# Patient Record
Sex: MALE | Race: WHITE | NOT HISPANIC OR LATINO | Employment: FULL TIME | URBAN - METROPOLITAN AREA
[De-identification: names, ages, dates, MRNs, and addresses within clinical notes are randomized per-mention and may not be internally consistent; named-entity substitution may affect disease eponyms.]

---

## 2017-01-24 ENCOUNTER — OFFICE VISIT (OUTPATIENT)
Dept: UROLOGY | Facility: CLINIC | Age: 67
End: 2017-01-24
Payer: MEDICARE

## 2017-01-24 VITALS — HEART RATE: 71 BPM | DIASTOLIC BLOOD PRESSURE: 84 MMHG | SYSTOLIC BLOOD PRESSURE: 150 MMHG

## 2017-01-24 DIAGNOSIS — N40.1 BENIGN NON-NODULAR PROSTATIC HYPERPLASIA WITH LOWER URINARY TRACT SYMPTOMS: ICD-10-CM

## 2017-01-24 DIAGNOSIS — N40.0 BPH WITH ELEVATED PSA: ICD-10-CM

## 2017-01-24 DIAGNOSIS — R97.20 BPH WITH ELEVATED PSA: ICD-10-CM

## 2017-01-24 DIAGNOSIS — R97.20 ELEVATED PROSTATE SPECIFIC ANTIGEN (PSA): Primary | ICD-10-CM

## 2017-01-24 LAB
BILIRUB SERPL-MCNC: ABNORMAL MG/DL
BLOOD URINE, POC: ABNORMAL
COLOR, POC UA: YELLOW
GLUCOSE UR QL STRIP: 50
KETONES UR QL STRIP: ABNORMAL
LEUKOCYTE ESTERASE URINE, POC: ABNORMAL
NITRITE, POC UA: ABNORMAL
PH, POC UA: 5
PROTEIN, POC: ABNORMAL
SPECIFIC GRAVITY, POC UA: 1
UROBILINOGEN, POC UA: ABNORMAL

## 2017-01-24 PROCEDURE — 99999 PR PBB SHADOW E&M-EST. PATIENT-LVL II: CPT | Mod: PBBFAC,,, | Performed by: UROLOGY

## 2017-01-24 PROCEDURE — 99214 OFFICE O/P EST MOD 30 MIN: CPT | Mod: 25,S$PBB,, | Performed by: UROLOGY

## 2017-01-24 PROCEDURE — 81002 URINALYSIS NONAUTO W/O SCOPE: CPT | Mod: PBBFAC,PO | Performed by: UROLOGY

## 2017-01-24 PROCEDURE — 99212 OFFICE O/P EST SF 10 MIN: CPT | Mod: PBBFAC,PO | Performed by: UROLOGY

## 2017-01-24 NOTE — MR AVS SNAPSHOT
[facility] [place] - Urology  [address]  [place] [address]  Phone: [phone]                  Ludwin Gee   2017 10:30 AM   Office Visit    Description:  Male : 1950   Provider:  Salo Michel MD   Department:  [facility] - [facility]           Reason for Visit     Elevated PSA           Diagnoses this Visit        Comments    Elevated prostate specific antigen (PSA)    -  Primary            To Do List           Future Appointments        Provider Department Dept Phone    2017 9:15 AM MD [facility] - Urology [phone]      Goals (5 Years of Data)     None      [facility]     [facility] On Call Nurse Care Line -  Assistance  Registered nurses in the [facility] On Call Center provide clinical advisement, health education, appointment booking, and other advisory services.  Call for this free service at [phone].             Medications           Message regarding Medications     Verify the changes and/or additions to your medication regime listed below are the same as discussed with your clinician today.  If any of these changes or additions are incorrect, please notify your healthcare provider.             Verify that the below list of medications is an accurate representation of the medications you are currently taking.  If none reported, the list may be blank. If incorrect, please contact your healthcare provider. Carry this list with you in case of emergency.           Current Medications     coenzyme Q10 (CO Q-10) 10 mg capsule Take 10 mg by mouth once daily.    losartan (COZAAR) 25 MG tablet Take 1 tablet (25 mg total) by mouth once daily.    metformin (GLUCOPHAGE-XR) 500 MG 24 hr tablet TAKE ONE TABLET BY MOUTH EVERY MORNING & TAKE TWO TABLETS BY MOUTH EVERY EVENING    rosuvastatin (CRESTOR) 5 MG tablet Take one every other evening    tamsulosin (FLOMAX) 0.4 mg Cp24 Take 1 capsule (0.4 mg total) by mouth once daily.    tramadol (ULTRAM) 50  mg tablet Take 50 mg by mouth every 6 (six) hours as needed for Pain.           Clinical Reference Information           Vital Signs - Last Recorded  Most recent update: 1/24/2017 10:51 AM by Shantell Bell MA    BP Pulse                (!) 150/84 71          Blood Pressure          Most Recent Value    BP  (!)  150/84      Allergies as of 1/24/2017     Pravastatin    Lisinopril      Immunizations Administered on Date of Encounter - 1/24/2017     None      Orders Placed During Today's Visit      Normal Orders This Visit    POCT URINE DIPSTICK WITHOUT MICROSCOPE     Future Labs/Procedures Expected by Expires    PROSTATE SPECIFIC ANTIGEN, DIAGNOSTIC  1/24/2017 3/25/2018

## 2017-01-25 NOTE — PROGRESS NOTES
CHIEF COMPLAINT  Elevated PSA and BPH.    HISTORY OF PRESENT ILLNESS:  This 67-year-old male returns for routine recheck.    He has a history of elevated PSA for which he has undergone a prostate   ultrasound with biopsy on January 2014 which showed no evidence of any   malignancy.  PSA at that time was 6.7.  He subsequently did undergo for a   4Kscore in 2014, which revealed only 1% risk of developing adenocarcinoma of the   prostate according to the report.  His most recent PSA was 7.4 on 12/02/2016.    He also has been treated for BPH with Flomax 0.4 mg p.o. daily with which he   continues to do well.    MEDICAL HISTORY UPDATE:  Reveals no change in his general health since his last   visit.    PHYSICAL EXAMINATION:  ABDOMEN:  Soft, benign and nontender.  No masses.  No hernias or organomegaly.  EXTERNAL GENITALIA:  Normal phallus with adequate meatus.  Testes descended and   feel normal.  No scrotal masses.  RECTAL:  Reveals a 40 g, smooth, firm prostate.  No nodules.  Normal sphincter   tone.    UA, 1+ sugar, pH 5.0.    FINAL IMPRESSION:  Elevated PSA, BPH.    RECOMMENDATIONS:  Continue with Flomax 0.4 mg p.o. daily and recheck in six   months with repeat PSA.      MD/ORTEGA  dd: 01/24/2017 19:02:34 (CST)  td: 01/25/2017 01:19:38 (CST)  Doc ID   #[id number]  Job ID #[id number]    CC:

## 2017-02-13 ENCOUNTER — TELEPHONE (OUTPATIENT)
Dept: FAMILY MEDICINE | Facility: CLINIC | Age: 67
End: 2017-02-13

## 2017-02-13 DIAGNOSIS — M79.646 THUMB PAIN, UNSPECIFIED LATERALITY: Primary | ICD-10-CM

## 2017-02-13 NOTE — TELEPHONE ENCOUNTER
----- Message from Román Gómez sent at 2/13/2017  8:19 AM CST -----  Contact: Mark Diaz  Has question about which orthopedic to be recommended for hand. Please call back [phone]. Thank you!

## 2017-02-21 DIAGNOSIS — E11.9 TYPE 2 DIABETES MELLITUS WITHOUT COMPLICATION, UNSPECIFIED LONG TERM INSULIN USE STATUS: ICD-10-CM

## 2017-02-21 RX ORDER — METFORMIN HYDROCHLORIDE 500 MG/1
TABLET, EXTENDED RELEASE ORAL
Qty: 90 TABLET | Refills: 0 | Status: SHIPPED | OUTPATIENT
Start: 2017-02-21 | End: 2017-03-17 | Stop reason: SDUPTHER

## 2017-03-17 DIAGNOSIS — E11.9 TYPE 2 DIABETES MELLITUS WITHOUT COMPLICATION, UNSPECIFIED LONG TERM INSULIN USE STATUS: ICD-10-CM

## 2017-03-21 RX ORDER — METFORMIN HYDROCHLORIDE 500 MG/1
TABLET, EXTENDED RELEASE ORAL
Qty: 90 TABLET | Refills: 0 | Status: SHIPPED | OUTPATIENT
Start: 2017-03-21 | End: 2017-04-20 | Stop reason: SDUPTHER

## 2017-04-05 ENCOUNTER — DOCUMENTATION ONLY (OUTPATIENT)
Dept: FAMILY MEDICINE | Facility: CLINIC | Age: 67
End: 2017-04-05

## 2017-04-05 NOTE — PROGRESS NOTES
Pre-Visit Chart Review  For Appointment Scheduled on 4-7-17    Health Maintenance Due   Topic Date Due    Influenza Vaccine  08/01/2016    Hemoglobin A1c  03/24/2017    Foot Exam  03/28/2017

## 2017-04-07 ENCOUNTER — OFFICE VISIT (OUTPATIENT)
Dept: FAMILY MEDICINE | Facility: CLINIC | Age: 67
End: 2017-04-07
Payer: MEDICARE

## 2017-04-07 VITALS
BODY MASS INDEX: 27.93 KG/M2 | WEIGHT: 177.94 LBS | OXYGEN SATURATION: 95 % | HEART RATE: 73 BPM | DIASTOLIC BLOOD PRESSURE: 77 MMHG | HEIGHT: 67 IN | RESPIRATION RATE: 12 BRPM | SYSTOLIC BLOOD PRESSURE: 128 MMHG | TEMPERATURE: 98 F

## 2017-04-07 DIAGNOSIS — E78.5 HYPERLIPIDEMIA ASSOCIATED WITH TYPE 2 DIABETES MELLITUS: ICD-10-CM

## 2017-04-07 DIAGNOSIS — E11.59 HYPERTENSION ASSOCIATED WITH DIABETES: ICD-10-CM

## 2017-04-07 DIAGNOSIS — Z00.00 ANNUAL PHYSICAL EXAM: Primary | ICD-10-CM

## 2017-04-07 DIAGNOSIS — E11.9 CONTROLLED TYPE 2 DIABETES MELLITUS WITHOUT COMPLICATION, UNSPECIFIED LONG TERM INSULIN USE STATUS: ICD-10-CM

## 2017-04-07 DIAGNOSIS — I15.2 HYPERTENSION ASSOCIATED WITH DIABETES: ICD-10-CM

## 2017-04-07 DIAGNOSIS — I10 ESSENTIAL HYPERTENSION: ICD-10-CM

## 2017-04-07 DIAGNOSIS — I10 GOOD HYPERTENSION CONTROL: ICD-10-CM

## 2017-04-07 DIAGNOSIS — E11.69 HYPERLIPIDEMIA ASSOCIATED WITH TYPE 2 DIABETES MELLITUS: ICD-10-CM

## 2017-04-07 PROCEDURE — 99397 PER PM REEVAL EST PAT 65+ YR: CPT | Mod: S$PBB,,, | Performed by: FAMILY MEDICINE

## 2017-04-07 PROCEDURE — 99213 OFFICE O/P EST LOW 20 MIN: CPT | Mod: PBBFAC,PO | Performed by: FAMILY MEDICINE

## 2017-04-07 PROCEDURE — 99999 PR PBB SHADOW E&M-EST. PATIENT-LVL III: CPT | Mod: PBBFAC,,, | Performed by: FAMILY MEDICINE

## 2017-04-07 RX ORDER — IBUPROFEN 200 MG
400 TABLET ORAL EVERY 6 HOURS PRN
COMMUNITY
End: 2019-05-07 | Stop reason: ALTCHOICE

## 2017-04-07 RX ORDER — TRAMADOL HYDROCHLORIDE 50 MG/1
50 TABLET ORAL EVERY 6 HOURS PRN
Qty: 60 TABLET | Refills: 0 | Status: SHIPPED | OUTPATIENT
Start: 2017-04-07 | End: 2019-01-08 | Stop reason: SDUPTHER

## 2017-04-07 NOTE — MR AVS SNAPSHOT
[facility]  [address] [place] [address]  Phone: [phone]  Fax: [phone]                  Ludwin Gee   2017 4:00 PM   Office Visit    Description:  Male : 1950   Provider:  Herrera Fisher MD   Department:  [facility] - Family Medicine           Reason for Visit     Annual Exam           Diagnoses this Visit        Comments    Annual physical exam    -  Primary     Controlled type 2 diabetes mellitus without complication, unspecified long term insulin use status                To Do List           Future Appointments        Provider Department Dept Phone    2017 8:45 AM [facility] [phone]    2017 9:15 AM [facility] [phone]      Goals (5 Years of Data)     None       These Medications        Disp Refills Start End    tramadol (ULTRAM) 50 mg tablet 60 tablet 0 2017     Take 1 tablet (50 mg total) by mouth every 6 (six) hours as needed for Pain. - Oral    Pharmacy: [facility] [place] [facility] [address] [facility] [address] Ph #: [phone]         [facility]     [facility] On Call Nurse Care Line -  Assistance  Unless otherwise directed by your provider, please contact [facility], our nurse care line that is available for  assistance.     Registered nurses in the [facility] Center provide: appointment scheduling, clinical advisement, health education, and other advisory services.  Call: [phone] (toll free)               Medications           Message regarding Medications     Verify the changes and/or additions to your medication regime listed below are the same as discussed with your clinician today.  If any of these changes or additions are incorrect, please notify your healthcare provider.        CHANGE how you are taking these medications     Start Taking Instead of    tramadol (ULTRAM) 50 mg tablet tramadol (ULTRAM) 50 mg tablet    Dosage:  Take 1  "tablet (50 mg total) by mouth every 6 (six) hours as needed for Pain. Dosage:  Take 50 mg by mouth every 6 (six) hours as needed for Pain.    Reason for Change:  Reorder            Verify that the below list of medications is an accurate representation of the medications you are currently taking.  If none reported, the list may be blank. If incorrect, please contact your healthcare provider. Carry this list with you in case of emergency.           Current Medications     coenzyme Q10 (CO Q-10) 10 mg capsule Take 10 mg by mouth once daily.    ibuprofen (ADVIL,MOTRIN) 200 MG tablet Take 400 mg by mouth every 6 (six) hours as needed for Pain.    losartan (COZAAR) 25 MG tablet Take 1 tablet (25 mg total) by mouth once daily.    metformin (GLUCOPHAGE-XR) 500 MG 24 hr tablet TAKE ONE TABLET BY MOUTH EVERY MORNING AND TAKE TWO TABLETS BY MOUTH EVERY EVENING    rosuvastatin (CRESTOR) 5 MG tablet Take one every other evening    tamsulosin (FLOMAX) 0.4 mg Cp24 Take 1 capsule (0.4 mg total) by mouth once daily.    tramadol (ULTRAM) 50 mg tablet Take 1 tablet (50 mg total) by mouth every 6 (six) hours as needed for Pain.           Clinical Reference Information           Your Vitals Were     BP Pulse Temp Resp    128/77 (BP Location: Right arm, Patient Position: Sitting, BP Method: Automatic) 73 98.2 °F (36.8 °C) (Oral) 12    Height Weight SpO2 BMI    5' 7.25" (1.708 m) 80.7 kg (177 lb 14.6 oz) 95% 27.66 kg/m2      Blood Pressure          Most Recent Value    BP  128/77      Allergies as of 4/7/2017     Pravastatin    Lisinopril      Immunizations Administered on Date of Encounter - 4/7/2017     None      Orders Placed During Today's Visit     Future Labs/Procedures Expected by Expires    CBC auto differential  4/7/2017 4/8/2018    Comprehensive metabolic panel  4/7/2017 4/8/2018    Hemoglobin A1c  4/7/2017 4/8/2018      Language Assistance Services     ATTENTION: Language assistance services are available, free of charge. " Please call [phone].      ATENCIÓN: Si habla español, tiene a olivarez disposición servicios gratuitos de asistencia lingüística. Llame al [phone].     CHÚ Ý: N?u b?n nói Ti?ng Vi?t, có các d?ch v? h? tr? ngôn ng? mi?n phí dành cho b?n. G?i s? [phone].         [facility] complies with applicable Federal civil rights laws and does not discriminate on the basis of race, color, national origin, age, disability, or sex.

## 2017-04-08 NOTE — PROGRESS NOTES
Subjective:       Patient ID: Ludwin Gee is a 67 y.o. male.    Chief Complaint: Annual Exam    HPI Comments: 67 year old male in for annual exam and follow up on diabetes, hypertension, and hyperlipidemia.  He had an elevated psa test seen by urology and has a recheck on that scheduled for July.  He has no active complaints.  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    Past Medical History:  No date: Diabetes mellitus  No date: Elevated PSA  No date: Hyperlipidemia  No date: Hypertension    Past Surgical History:  12/6/2013: COLONOSCOPY      Comment: Dr. Martinez, 5 year recheck  12/09/2011: SHOULDER SURGERY      Comment: right     Review of patient's family history indicates:    Diabetes                       Father                    Heart disease                  Father                    Cataracts                      Mother                    Diabetes                       Maternal Grandmother      No Known Problems              Brother                   No Known Problems              Daughter                  No Known Problems              Son                       No Known Problems              Maternal Grandfather      No Known Problems              Paternal Grandmother      No Known Problems              Paternal Grandfather      No Known Problems              Maternal Aunt             No Known Problems              Maternal Uncle            No Known Problems              Paternal Aunt             No Known Problems              Paternal Uncle            Urolithiasis                   Neg Hx                    Prostate cancer                Neg Hx                    Kidney cancer                  Neg Hx                    Retinal detachment             Neg Hx                    Macular  degeneration           Neg Hx                    Glaucoma                       Neg Hx                    Smoking status: Former Smoker                                                              Packs/day: 0.00      Years: 0.00         Types: Cigarettes     Quit date: 5/23/1985  Smokeless status: Never Used                      Alcohol use: Yes              Comment: seldom      Current Outpatient Prescriptions:     coenzyme Q10 (CO Q-10) 10 mg capsule, Take 10 mg by mouth once daily., Disp: , Rfl:     ibuprofen (ADVIL,MOTRIN) 200 MG tablet, Take 400 mg by mouth every 6 (six) hours as needed for Pain., Disp: , Rfl:     losartan (COZAAR) 25 MG tablet, Take 1 tablet (25 mg total) by mouth once daily., Disp: 30 tablet, Rfl: 11    metformin (GLUCOPHAGE-XR) 500 MG 24 hr tablet, TAKE ONE TABLET BY MOUTH EVERY MORNING AND TAKE TWO TABLETS BY MOUTH EVERY EVENING, Disp: 90 tablet, Rfl: 0    rosuvastatin (CRESTOR) 5 MG tablet, Take one every other evening, Disp: 30 tablet, Rfl: 5    tamsulosin (FLOMAX) 0.4 mg Cp24, Take 1 capsule (0.4 mg total) by mouth once daily., Disp: 30 capsule, Rfl: 6    tramadol (ULTRAM) 50 mg tablet, Take 1 tablet (50 mg total) by mouth every 6 (six) hours as needed for Pain., Disp: 60 tablet, Rfl: 0    TETANUS VACCINE due on 01/05/1968  Zoster Vaccine due on 01/05/2010  Influenza Vaccine due on 08/01/2016  Hemoglobin A1c due on 03/24/2017  Foot Exam due on 03/28/2017      Review of Systems   Constitutional: Negative for activity change, chills, fatigue and fever.   HENT: Negative for congestion, ear pain, rhinorrhea and sore throat.    Eyes: Negative for visual disturbance.   Respiratory: Negative for cough, chest tightness and shortness of breath.    Cardiovascular: Negative for chest pain and palpitations.   Gastrointestinal: Negative for abdominal pain, blood in stool, constipation, diarrhea, nausea and vomiting.   Genitourinary: Negative for difficulty urinating, dysuria and hematuria.    Musculoskeletal: Positive for arthralgias. Negative for joint swelling and neck pain.   Skin: Negative for color change, pallor and rash.   Neurological: Negative for dizziness and headaches.   Psychiatric/Behavioral: Negative for dysphoric mood and sleep disturbance. The patient is not nervous/anxious.        Objective:      Physical Exam   Constitutional: He is oriented to person, place, and time. He appears well-developed and well-nourished. No distress.   Good blood pressure control  Patient is normal weight with bmi of 27.7.   Weight is increased by 4.6lb since last visit of 6/6/16.     HENT:   Head: Normocephalic and atraumatic.   Right Ear: External ear normal.   Left Ear: External ear normal.   Nose: Nose normal.   Mouth/Throat: Oropharynx is clear and moist. No oropharyngeal exudate.   Eyes: Conjunctivae and EOM are normal. Pupils are equal, round, and reactive to light. No scleral icterus.   Neck: Normal range of motion. Neck supple. No JVD present. No tracheal deviation present. No thyromegaly present.   Cardiovascular: Normal rate, regular rhythm and normal heart sounds.  Exam reveals no gallop and no friction rub.    No murmur heard.  Pulses:       Dorsalis pedis pulses are 2+ on the right side, and 2+ on the left side.        Posterior tibial pulses are 2+ on the right side, and 2+ on the left side.   Pulmonary/Chest: Effort normal and breath sounds normal. No respiratory distress. He has no wheezes. He has no rales. He exhibits no tenderness.   Abdominal: Soft. Bowel sounds are normal. He exhibits no distension and no mass. There is no tenderness. There is no rebound and no guarding. No hernia.   Musculoskeletal: Normal range of motion. He exhibits no edema or deformity.        Right foot: There is normal range of motion and no deformity.        Left foot: There is normal range of motion and no deformity.   Feet:   Right Foot:   Protective Sensation: 10 sites tested. 10 sites sensed.   Skin  Integrity: Negative for ulcer, blister, skin breakdown, erythema, warmth, callus or dry skin.   Left Foot:   Protective Sensation: 10 sites tested. 10 sites sensed.   Skin Integrity: Negative for ulcer, blister, skin breakdown, erythema, warmth, callus or dry skin.   Lymphadenopathy:     He has no cervical adenopathy.   Neurological: He is alert and oriented to person, place, and time. He has normal reflexes. He displays normal reflexes. No cranial nerve deficit. He exhibits normal muscle tone.   Skin: Skin is warm and dry. No rash noted. He is not diaphoretic. No erythema. No pallor.   Psychiatric: He has a normal mood and affect. His behavior is normal. Judgment and thought content normal.   Nursing note and vitals reviewed.      Assessment:       1. Annual physical exam    2. Good hypertension control    3. Controlled type 2 diabetes mellitus without complication, unspecified long term insulin use status    4. Essential hypertension    5. Hypertension associated with diabetes    6. Hyperlipidemia associated with type 2 diabetes mellitus    7. BMI 27.0-27.9,adult        Plan:       1. Annual physical exam  - Comprehensive metabolic panel; Future  - CBC auto differential; Future    2. Good hypertension control  No changes needed    3. Controlled type 2 diabetes mellitus without complication, unspecified long term insulin use status  Lab Results   Component Value Date    HGBA1C 6.8 (H) 09/24/2016     - Comprehensive metabolic panel; Future  - Hemoglobin A1c; Future    4. Essential hypertension    5. Hypertension associated with diabetes    6. Hyperlipidemia associated with type 2 diabetes mellitus  Lab Results   Component Value Date    CHOL 148 09/24/2016    CHOL 129 11/25/2015    CHOL 171 05/08/2015     Lab Results   Component Value Date    HDL 39 (L) 09/24/2016    HDL 46 11/25/2015    HDL 43 05/08/2015     Lab Results   Component Value Date    LDLCALC 87.4 09/24/2016    LDLCALC 66.8 11/25/2015    LDLCALC 109.6  05/08/2015     Lab Results   Component Value Date    TRIG 108 09/24/2016    TRIG 81 11/25/2015    TRIG 92 05/08/2015     Lab Results   Component Value Date    CHOLHDL 26.4 09/24/2016    CHOLHDL 35.7 11/25/2015    CHOLHDL 25.1 05/08/2015     Due for next recheck in september 7. BMI 27.0-27.9,adult         Patient readiness: acceptance and barriers:none    During the course of the visit the patient was educated and counseled about the following:     Diabetes:  Discussed general issues about diabetes pathophysiology and management.  Discussed foot care.  Reminded to get yearly retinal exam.  Hypertension:   Medication: no change.  Dietary sodium restriction.  Regular aerobic exercise.    Goals: Diabetes: Maintain Hemoglobin A1C below 7 and Hypertension: Reduce Blood Pressure    Did patient meet goals/outcomes: Yes    The following self management tools provided: blood pressure log  blood glucose log    Patient Instructions (the written plan) was given to the patient/family.     Time spent with patient: 45 minutes

## 2017-04-13 ENCOUNTER — LAB VISIT (OUTPATIENT)
Dept: LAB | Facility: HOSPITAL | Age: 67
End: 2017-04-13
Attending: FAMILY MEDICINE
Payer: COMMERCIAL

## 2017-04-13 DIAGNOSIS — E11.9 CONTROLLED TYPE 2 DIABETES MELLITUS WITHOUT COMPLICATION, UNSPECIFIED LONG TERM INSULIN USE STATUS: ICD-10-CM

## 2017-04-13 DIAGNOSIS — Z00.00 ANNUAL PHYSICAL EXAM: ICD-10-CM

## 2017-04-13 LAB
ALBUMIN SERPL BCP-MCNC: 4.1 G/DL
ALP SERPL-CCNC: 47 U/L
ALT SERPL W/O P-5'-P-CCNC: 13 U/L
ANION GAP SERPL CALC-SCNC: 10 MMOL/L
AST SERPL-CCNC: 19 U/L
BASOPHILS # BLD AUTO: 0.02 K/UL
BASOPHILS NFR BLD: 0.5 %
BILIRUB SERPL-MCNC: 0.7 MG/DL
BUN SERPL-MCNC: 15 MG/DL
CALCIUM SERPL-MCNC: 10.1 MG/DL
CHLORIDE SERPL-SCNC: 106 MMOL/L
CO2 SERPL-SCNC: 25 MMOL/L
CREAT SERPL-MCNC: 1.3 MG/DL
DIFFERENTIAL METHOD: NORMAL
EOSINOPHIL # BLD AUTO: 0 K/UL
EOSINOPHIL NFR BLD: 0.9 %
ERYTHROCYTE [DISTWIDTH] IN BLOOD BY AUTOMATED COUNT: 12.7 %
EST. GFR  (AFRICAN AMERICAN): >60 ML/MIN/1.73 M^2
EST. GFR  (NON AFRICAN AMERICAN): 56.5 ML/MIN/1.73 M^2
GLUCOSE SERPL-MCNC: 123 MG/DL
HCT VFR BLD AUTO: 41.2 %
HGB BLD-MCNC: 14 G/DL
LYMPHOCYTES # BLD AUTO: 1.3 K/UL
LYMPHOCYTES NFR BLD: 29.1 %
MCH RBC QN AUTO: 30.3 PG
MCHC RBC AUTO-ENTMCNC: 34 %
MCV RBC AUTO: 89 FL
MONOCYTES # BLD AUTO: 0.4 K/UL
MONOCYTES NFR BLD: 8.9 %
NEUTROPHILS # BLD AUTO: 2.6 K/UL
NEUTROPHILS NFR BLD: 60.4 %
PLATELET # BLD AUTO: 205 K/UL
PMV BLD AUTO: 12 FL
POTASSIUM SERPL-SCNC: 5.3 MMOL/L
PROT SERPL-MCNC: 7.5 G/DL
RBC # BLD AUTO: 4.62 M/UL
SODIUM SERPL-SCNC: 141 MMOL/L
WBC # BLD AUTO: 4.37 K/UL

## 2017-04-13 PROCEDURE — 36415 COLL VENOUS BLD VENIPUNCTURE: CPT | Mod: PO

## 2017-04-13 PROCEDURE — 80053 COMPREHEN METABOLIC PANEL: CPT

## 2017-04-13 PROCEDURE — 85025 COMPLETE CBC W/AUTO DIFF WBC: CPT

## 2017-04-13 PROCEDURE — 83036 HEMOGLOBIN GLYCOSYLATED A1C: CPT

## 2017-04-15 LAB
ESTIMATED AVG GLUCOSE: 154 MG/DL
HBA1C MFR BLD HPLC: 7 %

## 2017-04-18 ENCOUNTER — TELEPHONE (OUTPATIENT)
Dept: FAMILY MEDICINE | Facility: CLINIC | Age: 67
End: 2017-04-18

## 2017-04-18 NOTE — TELEPHONE ENCOUNTER
Patient informed. Please send rx to [facility]. I can book his lab in 3 months please sign orders.

## 2017-04-18 NOTE — TELEPHONE ENCOUNTER
----- Message from Herrera Fisher MD sent at 4/17/2017  7:00 PM CDT -----  His potassium is mildly elevated with mildly impaired kidney function.  The blood sugar control is creeping up and no longer in the good range.  We should not increase the metformin any further due to the kidney impairment, side effects multiply.  We can add some januvia which is relatively safer with impaired kidneys.  He should not be taking any potassium supplements, most likely the elevation is a lab error caused by red cells breaking up in the sample.  The blood count is good.  Suggest we add 50mg januvia to existing dose of metformin and repeat lab in three months with bmp and a1c

## 2017-04-20 DIAGNOSIS — E11.9 TYPE 2 DIABETES MELLITUS WITHOUT COMPLICATION, UNSPECIFIED LONG TERM INSULIN USE STATUS: ICD-10-CM

## 2017-04-20 RX ORDER — METFORMIN HYDROCHLORIDE 500 MG/1
TABLET, EXTENDED RELEASE ORAL
Qty: 90 TABLET | Refills: 5 | Status: SHIPPED | OUTPATIENT
Start: 2017-04-20 | End: 2017-10-24 | Stop reason: SDUPTHER

## 2017-05-17 DIAGNOSIS — N40.0 BENIGN NON-NODULAR PROSTATIC HYPERPLASIA WITHOUT LOWER URINARY TRACT SYMPTOMS: ICD-10-CM

## 2017-05-17 RX ORDER — TAMSULOSIN HYDROCHLORIDE 0.4 MG/1
CAPSULE ORAL
Qty: 30 CAPSULE | Refills: 6 | Status: SHIPPED | OUTPATIENT
Start: 2017-05-17 | End: 2017-12-20 | Stop reason: SDUPTHER

## 2017-06-05 DIAGNOSIS — E11.9 CONTROLLED TYPE 2 DIABETES MELLITUS WITHOUT COMPLICATION, WITHOUT LONG-TERM CURRENT USE OF INSULIN: Primary | ICD-10-CM

## 2017-06-05 DIAGNOSIS — E78.5 HYPERLIPIDEMIA: ICD-10-CM

## 2017-06-05 RX ORDER — ROSUVASTATIN CALCIUM 5 MG
TABLET ORAL
Qty: 30 TABLET | Refills: 5 | Status: SHIPPED | OUTPATIENT
Start: 2017-06-05 | End: 2017-06-08 | Stop reason: SDUPTHER

## 2017-06-08 DIAGNOSIS — E78.5 HYPERLIPIDEMIA, UNSPECIFIED HYPERLIPIDEMIA TYPE: ICD-10-CM

## 2017-06-08 RX ORDER — ROSUVASTATIN CALCIUM 5 MG/1
TABLET, COATED ORAL
Qty: 30 TABLET | Refills: 2 | Status: SHIPPED | OUTPATIENT
Start: 2017-06-08 | End: 2017-11-18 | Stop reason: SDUPTHER

## 2017-06-08 NOTE — TELEPHONE ENCOUNTER
----- Message from Chery Fisher sent at 6/8/2017  3:07 PM CDT -----  Contact: Luna with [facility]   Pharmacy would like to discuss going with the generic Crestor, because the patient can't afford the name brand.       [facility] - [place], [place] - [address]  [address]  [place] [address]  Phone: [phone] Fax: [phone]    Please advise the pharmacist at [phone]    Thank you

## 2017-07-19 RX ORDER — LOSARTAN POTASSIUM 25 MG/1
TABLET ORAL
Qty: 30 TABLET | Refills: 8 | Status: SHIPPED | OUTPATIENT
Start: 2017-07-19 | End: 2018-04-24 | Stop reason: SDUPTHER

## 2017-08-09 ENCOUNTER — LAB VISIT (OUTPATIENT)
Dept: LAB | Facility: HOSPITAL | Age: 67
End: 2017-08-09
Attending: UROLOGY
Payer: COMMERCIAL

## 2017-08-09 DIAGNOSIS — R97.20 ELEVATED PROSTATE SPECIFIC ANTIGEN (PSA): ICD-10-CM

## 2017-08-09 LAB — COMPLEXED PSA SERPL-MCNC: 6.1 NG/ML

## 2017-08-09 PROCEDURE — 84153 ASSAY OF PSA TOTAL: CPT

## 2017-08-09 PROCEDURE — 36415 COLL VENOUS BLD VENIPUNCTURE: CPT | Mod: PO

## 2017-08-11 ENCOUNTER — TELEPHONE (OUTPATIENT)
Dept: UROLOGY | Facility: CLINIC | Age: 67
End: 2017-08-11

## 2017-08-11 NOTE — TELEPHONE ENCOUNTER
Spoke with pt. Informed of results & recommendations per Dr. Michel. Pt verbalized understanding.

## 2017-08-11 NOTE — TELEPHONE ENCOUNTER
----- Message from Salo Michel MD sent at 8/11/2017 10:51 AM CDT -----  PSA - 6.1 down from 7.4    Rec: Keep appt

## 2017-08-15 ENCOUNTER — OFFICE VISIT (OUTPATIENT)
Dept: UROLOGY | Facility: CLINIC | Age: 67
End: 2017-08-15
Payer: COMMERCIAL

## 2017-08-15 VITALS
BODY MASS INDEX: 27.75 KG/M2 | HEIGHT: 67 IN | HEART RATE: 77 BPM | TEMPERATURE: 98 F | WEIGHT: 176.81 LBS | RESPIRATION RATE: 18 BRPM | SYSTOLIC BLOOD PRESSURE: 128 MMHG | DIASTOLIC BLOOD PRESSURE: 77 MMHG

## 2017-08-15 DIAGNOSIS — R97.20 ELEVATED PROSTATE SPECIFIC ANTIGEN (PSA): Primary | ICD-10-CM

## 2017-08-15 DIAGNOSIS — N40.1 BENIGN NON-NODULAR PROSTATIC HYPERPLASIA WITH LOWER URINARY TRACT SYMPTOMS: ICD-10-CM

## 2017-08-15 LAB
BILIRUB SERPL-MCNC: NORMAL MG/DL
BLOOD URINE, POC: NORMAL
COLOR, POC UA: YELLOW
GLUCOSE UR QL STRIP: NORMAL
KETONES UR QL STRIP: NORMAL
LEUKOCYTE ESTERASE URINE, POC: NORMAL
NITRITE, POC UA: NORMAL
PH, POC UA: 5
PROTEIN, POC: NORMAL
SPECIFIC GRAVITY, POC UA: 1
UROBILINOGEN, POC UA: NORMAL

## 2017-08-15 PROCEDURE — 99214 OFFICE O/P EST MOD 30 MIN: CPT | Mod: 25,S$GLB,, | Performed by: UROLOGY

## 2017-08-15 PROCEDURE — 1126F AMNT PAIN NOTED NONE PRSNT: CPT | Mod: S$GLB,,, | Performed by: UROLOGY

## 2017-08-15 PROCEDURE — 3008F BODY MASS INDEX DOCD: CPT | Mod: S$GLB,,, | Performed by: UROLOGY

## 2017-08-15 PROCEDURE — 99999 PR PBB SHADOW E&M-EST. PATIENT-LVL III: CPT | Mod: PBBFAC,,, | Performed by: UROLOGY

## 2017-08-15 PROCEDURE — 81002 URINALYSIS NONAUTO W/O SCOPE: CPT | Mod: S$GLB,,, | Performed by: UROLOGY

## 2017-08-15 PROCEDURE — 3074F SYST BP LT 130 MM HG: CPT | Mod: S$GLB,,, | Performed by: UROLOGY

## 2017-08-15 PROCEDURE — 1159F MED LIST DOCD IN RCRD: CPT | Mod: S$GLB,,, | Performed by: UROLOGY

## 2017-08-15 PROCEDURE — 3078F DIAST BP <80 MM HG: CPT | Mod: S$GLB,,, | Performed by: UROLOGY

## 2017-08-16 NOTE — PROGRESS NOTES
OFFICE NOTE    CHIEF COMPLAINT:  BPH and history of elevated PSA.    HISTORY OF PRESENT ILLNESS:  This 67-year-old male returns for a routine   recheck.  He has a history of BPH for which he continues to take Flomax 0.4 mg   p.o. daily with which he continues to do well.  He also has a history of   elevated PSA for which he has undergone a prostate ultrasound with biopsy in   January 2014, which showed no evidence of any malignancy.  PSA at that time was   6.7.  He did undergo a 4Kscore in 2014, which revealed only 1% probability of   him developing prostatic carcinoma and this had been explained to the patient   and we thus continue to manage him conservatively and otherwise he has been   doing quite well.    MEDICAL HISTORY UPDATE:  Reveals no change in his general health since his last   visit.    PHYSICAL EXAMINATION:  ABDOMEN:  Soft, benign and nontender.  No masses.  No hernias or organomegaly.  EXTERNAL GENITAL:  Normal phallus with adequate meatus.  Testes descended and   feel normal.  No scrotal masses.  RECTAL:  Reveals a 40 g, smooth prostate.  No nodules.  Normal sphincter tone.    UA negative with pH 5.0.    His most recent PSA was 6.1 on 08/09/2017, having come down from 7.4 on   12/12/2016.    FINAL IMPRESSION:  BPH, history of elevated PSA.    RECOMMENDATIONS:  Continue with Flomax 0.4 mg p.o. daily.  Recheck in six months   with repeat PSA.  We also may consider possible MRI of the prostate.      MD/ORTEGA  dd: 08/16/2017 09:04:17 (CDT)  td: 08/16/2017 14:35:29 (LINDSEYT)  Doc ID   #[id number]  Job ID #[id number]    CC:

## 2017-10-02 ENCOUNTER — LAB VISIT (OUTPATIENT)
Dept: LAB | Facility: HOSPITAL | Age: 67
End: 2017-10-02
Attending: FAMILY MEDICINE
Payer: COMMERCIAL

## 2017-10-02 DIAGNOSIS — E11.9 CONTROLLED TYPE 2 DIABETES MELLITUS WITHOUT COMPLICATION, WITHOUT LONG-TERM CURRENT USE OF INSULIN: ICD-10-CM

## 2017-10-02 DIAGNOSIS — E78.5 HYPERLIPIDEMIA: ICD-10-CM

## 2017-10-02 LAB
ANION GAP SERPL CALC-SCNC: 8 MMOL/L
BUN SERPL-MCNC: 18 MG/DL
CALCIUM SERPL-MCNC: 9.2 MG/DL
CHLORIDE SERPL-SCNC: 105 MMOL/L
CHOLEST SERPL-MCNC: 122 MG/DL
CHOLEST/HDLC SERPL: 3 {RATIO}
CO2 SERPL-SCNC: 26 MMOL/L
CREAT SERPL-MCNC: 1.4 MG/DL
EST. GFR  (AFRICAN AMERICAN): 59.7 ML/MIN/1.73 M^2
EST. GFR  (NON AFRICAN AMERICAN): 51.6 ML/MIN/1.73 M^2
ESTIMATED AVG GLUCOSE: 134 MG/DL
GLUCOSE SERPL-MCNC: 106 MG/DL
HBA1C MFR BLD HPLC: 6.3 %
HDLC SERPL-MCNC: 41 MG/DL
HDLC SERPL: 33.6 %
LDLC SERPL CALC-MCNC: 64.4 MG/DL
NONHDLC SERPL-MCNC: 81 MG/DL
POTASSIUM SERPL-SCNC: 4 MMOL/L
SODIUM SERPL-SCNC: 139 MMOL/L
TRIGL SERPL-MCNC: 83 MG/DL

## 2017-10-02 PROCEDURE — 80061 LIPID PANEL: CPT

## 2017-10-02 PROCEDURE — 83036 HEMOGLOBIN GLYCOSYLATED A1C: CPT

## 2017-10-02 PROCEDURE — 36415 COLL VENOUS BLD VENIPUNCTURE: CPT | Mod: PO

## 2017-10-02 PROCEDURE — 80048 BASIC METABOLIC PNL TOTAL CA: CPT

## 2017-10-23 RX ORDER — SITAGLIPTIN 50 MG/1
TABLET, FILM COATED ORAL
Qty: 30 TABLET | Refills: 5 | Status: SHIPPED | OUTPATIENT
Start: 2017-10-23 | End: 2018-04-30 | Stop reason: SDUPTHER

## 2017-10-24 DIAGNOSIS — E11.9 TYPE 2 DIABETES MELLITUS WITHOUT COMPLICATION, UNSPECIFIED LONG TERM INSULIN USE STATUS: ICD-10-CM

## 2017-10-24 RX ORDER — METFORMIN HYDROCHLORIDE 500 MG/1
TABLET, EXTENDED RELEASE ORAL
Qty: 90 TABLET | Refills: 5 | Status: SHIPPED | OUTPATIENT
Start: 2017-10-24 | End: 2018-05-01 | Stop reason: SDUPTHER

## 2017-11-18 DIAGNOSIS — E78.5 HYPERLIPIDEMIA, UNSPECIFIED HYPERLIPIDEMIA TYPE: ICD-10-CM

## 2017-11-20 RX ORDER — ROSUVASTATIN CALCIUM 5 MG/1
TABLET, COATED ORAL
Qty: 30 TABLET | Refills: 10 | Status: SHIPPED | OUTPATIENT
Start: 2017-11-20 | End: 2018-12-27 | Stop reason: SDUPTHER

## 2017-12-20 DIAGNOSIS — N40.0 BENIGN NON-NODULAR PROSTATIC HYPERPLASIA WITHOUT LOWER URINARY TRACT SYMPTOMS: ICD-10-CM

## 2017-12-20 RX ORDER — TAMSULOSIN HYDROCHLORIDE 0.4 MG/1
CAPSULE ORAL
Qty: 30 CAPSULE | Refills: 6 | Status: SHIPPED | OUTPATIENT
Start: 2017-12-20 | End: 2018-07-17 | Stop reason: SDUPTHER

## 2018-01-25 ENCOUNTER — LAB VISIT (OUTPATIENT)
Dept: LAB | Facility: HOSPITAL | Age: 68
End: 2018-01-25
Attending: UROLOGY
Payer: COMMERCIAL

## 2018-01-25 ENCOUNTER — OFFICE VISIT (OUTPATIENT)
Dept: UROLOGY | Facility: CLINIC | Age: 68
End: 2018-01-25
Payer: COMMERCIAL

## 2018-01-25 VITALS
SYSTOLIC BLOOD PRESSURE: 127 MMHG | HEART RATE: 76 BPM | WEIGHT: 176.81 LBS | HEIGHT: 67 IN | RESPIRATION RATE: 18 BRPM | BODY MASS INDEX: 27.75 KG/M2 | DIASTOLIC BLOOD PRESSURE: 75 MMHG

## 2018-01-25 DIAGNOSIS — R97.20 ELEVATED PSA: ICD-10-CM

## 2018-01-25 DIAGNOSIS — R97.20 ELEVATED PSA: Primary | ICD-10-CM

## 2018-01-25 DIAGNOSIS — N40.1 BENIGN NON-NODULAR PROSTATIC HYPERPLASIA WITH LOWER URINARY TRACT SYMPTOMS: ICD-10-CM

## 2018-01-25 LAB
BILIRUB SERPL-MCNC: ABNORMAL MG/DL
BLOOD URINE, POC: ABNORMAL
COLOR, POC UA: CLEAR
COMPLEXED PSA SERPL-MCNC: 11.7 NG/ML
GLUCOSE UR QL STRIP: 250
KETONES UR QL STRIP: ABNORMAL
LEUKOCYTE ESTERASE URINE, POC: ABNORMAL
NITRITE, POC UA: ABNORMAL
PH, POC UA: 5
PROTEIN, POC: ABNORMAL
SPECIFIC GRAVITY, POC UA: 1.01
UROBILINOGEN, POC UA: ABNORMAL

## 2018-01-25 PROCEDURE — 81002 URINALYSIS NONAUTO W/O SCOPE: CPT | Mod: S$GLB,,, | Performed by: UROLOGY

## 2018-01-25 PROCEDURE — 99214 OFFICE O/P EST MOD 30 MIN: CPT | Mod: 25,S$GLB,, | Performed by: UROLOGY

## 2018-01-25 PROCEDURE — 99999 PR PBB SHADOW E&M-EST. PATIENT-LVL III: CPT | Mod: PBBFAC,,, | Performed by: UROLOGY

## 2018-01-25 PROCEDURE — 36415 COLL VENOUS BLD VENIPUNCTURE: CPT | Mod: PO

## 2018-01-25 PROCEDURE — 84153 ASSAY OF PSA TOTAL: CPT

## 2018-01-25 PROCEDURE — 81000 URINALYSIS NONAUTO W/SCOPE: CPT | Mod: S$GLB,,, | Performed by: UROLOGY

## 2018-01-25 NOTE — PROGRESS NOTES
OFFICE NOTE    CHIEF COMPLAINT:  BPH, elevated PSA.    HISTORY OF PRESENT ILLNESS:  This 68-year-old male returns for routine recheck.    He has a history of BPH for which he continues to take Flomax 0.4 mg p.o.   daily, which continues to be effective and is voiding quite well without any   problems.  He also has a history of elevated PSA for which he had undergone a   prostate ultrasound with biopsy in 01/2014, which had showed no evidence of any   malignancy.  He did undergo a 4Kscore in 2014, which revealed only 1%   probability of developing prostatic carcinoma and we are continuing to monitor   his PSAs.  His last PSA was 6.1 on 08/09/2017.    MEDICAL HISTORY UPDATE:  Reveals no change in general health since his last   visit of 08/15/2017.    PHYSICAL EXAMINATION:  ABDOMEN:  Soft, benign and nontender.  No masses.  No hernias.  No organomegaly.  EXTERNAL GENITAL:  Normal phallus with adequate meatus.  Testes descended and   feel normal.  No scrotal masses.  RECTAL:  Reveals a 40 g smooth prostate.  No nodules.  Normal sphincter tone.    UA reveals 1+ sugar, pH 5.0.    His last PSA was 6.1 on 08/09/2017.    FINAL IMPRESSION:  BPH, elevated PSA.    RECOMMENDATIONS:  Recheck PSA.  Otherwise, continue on Flomax 0.4 mg p.o. daily,   and contact the patient with the PSA report.      FADUMO  dd: 01/25/2018 10:30:28 (CST)  td: 01/26/2018 00:36:51 (CST)  Doc ID   #[id number]  Job ID #[id number]    CC:

## 2018-02-07 ENCOUNTER — TELEPHONE (OUTPATIENT)
Dept: UROLOGY | Facility: CLINIC | Age: 68
End: 2018-02-07

## 2018-02-07 NOTE — TELEPHONE ENCOUNTER
Spoke with patient, results given with recommendation, info faxed to [facility], to call to schedule appt, patient verbally understood.

## 2018-02-07 NOTE — TELEPHONE ENCOUNTER
----- Message from Joan Leblanc sent at 2/7/2018  4:38 PM CST -----  Contact: self  Returning missed call, please call back [phone] (home)

## 2018-02-14 ENCOUNTER — TELEPHONE (OUTPATIENT)
Dept: UROLOGY | Facility: CLINIC | Age: 68
End: 2018-02-14

## 2018-02-14 NOTE — TELEPHONE ENCOUNTER
Spoke with Marcos at [facility] informed her per Bernadette Grimes MRI of the prostate approved by insurance.

## 2018-02-14 NOTE — TELEPHONE ENCOUNTER
----- Message from Virgie Stone sent at 2/14/2018 12:21 PM CST -----  Contact: Suzan with [facility] [phone]  Suzan with [facility] [phone] calling because patient is scheduled for 2/19/18 for a prostate MRI and will need authorization for this to be done. Please advise. Thanks.

## 2018-02-19 ENCOUNTER — TELEPHONE (OUTPATIENT)
Dept: UROLOGY | Facility: CLINIC | Age: 68
End: 2018-02-19

## 2018-02-19 NOTE — TELEPHONE ENCOUNTER
Returned call, Nick was not available, informed that the last biopsy results we have is from 2014. Copy of those results faxed to given number, rep verbally understood.

## 2018-02-19 NOTE — TELEPHONE ENCOUNTER
----- Message from Andressa Mcdaniel sent at 2/19/2018 10:38 AM CST -----  Contact: [facility]  Needs biopsy of prostate report. Please fax to   .

## 2018-02-28 ENCOUNTER — TELEPHONE (OUTPATIENT)
Dept: UROLOGY | Facility: CLINIC | Age: 68
End: 2018-02-28

## 2018-02-28 NOTE — TELEPHONE ENCOUNTER
----- Message from Salo Michel MD sent at 2/28/2018  4:06 PM CST -----  MRI of the prostate showed a low probability of 2 out of 5 of prostate cancer.    Patient was informed over the phone by me    Rec: Recheck 4-6 months with repeat PSA

## 2018-04-24 RX ORDER — LOSARTAN POTASSIUM 25 MG/1
TABLET ORAL
Qty: 30 TABLET | Refills: 0 | Status: SHIPPED | OUTPATIENT
Start: 2018-04-24 | End: 2018-05-24 | Stop reason: SDUPTHER

## 2018-04-30 RX ORDER — SITAGLIPTIN 50 MG/1
TABLET, FILM COATED ORAL
Qty: 30 TABLET | Refills: 0 | Status: SHIPPED | OUTPATIENT
Start: 2018-04-30 | End: 2018-06-02 | Stop reason: SDUPTHER

## 2018-05-01 DIAGNOSIS — E11.9 TYPE 2 DIABETES MELLITUS WITHOUT COMPLICATION: ICD-10-CM

## 2018-05-01 RX ORDER — METFORMIN HYDROCHLORIDE 500 MG/1
TABLET, EXTENDED RELEASE ORAL
Qty: 90 TABLET | Refills: 0 | Status: SHIPPED | OUTPATIENT
Start: 2018-05-01 | End: 2018-06-04 | Stop reason: SDUPTHER

## 2018-05-03 ENCOUNTER — OFFICE VISIT (OUTPATIENT)
Dept: FAMILY MEDICINE | Facility: CLINIC | Age: 68
End: 2018-05-03
Payer: COMMERCIAL

## 2018-05-03 ENCOUNTER — LAB VISIT (OUTPATIENT)
Dept: LAB | Facility: HOSPITAL | Age: 68
End: 2018-05-03
Attending: NURSE PRACTITIONER
Payer: COMMERCIAL

## 2018-05-03 VITALS
SYSTOLIC BLOOD PRESSURE: 139 MMHG | HEIGHT: 67 IN | TEMPERATURE: 98 F | HEART RATE: 72 BPM | BODY MASS INDEX: 27.62 KG/M2 | WEIGHT: 176 LBS | DIASTOLIC BLOOD PRESSURE: 79 MMHG

## 2018-05-03 DIAGNOSIS — E11.22 TYPE 2 DIABETES MELLITUS WITH STAGE 3 CHRONIC KIDNEY DISEASE, WITHOUT LONG-TERM CURRENT USE OF INSULIN: ICD-10-CM

## 2018-05-03 DIAGNOSIS — N18.30 CKD STAGE 3 SECONDARY TO DIABETES: ICD-10-CM

## 2018-05-03 DIAGNOSIS — E11.69 HYPERLIPIDEMIA ASSOCIATED WITH TYPE 2 DIABETES MELLITUS: ICD-10-CM

## 2018-05-03 DIAGNOSIS — E78.5 HYPERLIPIDEMIA ASSOCIATED WITH TYPE 2 DIABETES MELLITUS: ICD-10-CM

## 2018-05-03 DIAGNOSIS — E11.22 CKD STAGE 3 SECONDARY TO DIABETES: ICD-10-CM

## 2018-05-03 DIAGNOSIS — Z00.00 ANNUAL PHYSICAL EXAM: Primary | ICD-10-CM

## 2018-05-03 DIAGNOSIS — N40.1 BENIGN PROSTATIC HYPERPLASIA WITH WEAK URINARY STREAM: ICD-10-CM

## 2018-05-03 DIAGNOSIS — I15.2 HYPERTENSION ASSOCIATED WITH DIABETES: ICD-10-CM

## 2018-05-03 DIAGNOSIS — N18.30 TYPE 2 DIABETES MELLITUS WITH STAGE 3 CHRONIC KIDNEY DISEASE, WITHOUT LONG-TERM CURRENT USE OF INSULIN: ICD-10-CM

## 2018-05-03 DIAGNOSIS — R39.12 BENIGN PROSTATIC HYPERPLASIA WITH WEAK URINARY STREAM: ICD-10-CM

## 2018-05-03 DIAGNOSIS — E11.59 HYPERTENSION ASSOCIATED WITH DIABETES: ICD-10-CM

## 2018-05-03 DIAGNOSIS — M19.042 ARTHRITIS OF BOTH HANDS: ICD-10-CM

## 2018-05-03 DIAGNOSIS — Z23 NEED FOR PROPHYLACTIC VACCINATION AGAINST DIPHTHERIA AND TETANUS: ICD-10-CM

## 2018-05-03 DIAGNOSIS — M19.041 ARTHRITIS OF BOTH HANDS: ICD-10-CM

## 2018-05-03 DIAGNOSIS — M65.332 TRIGGER MIDDLE FINGER OF LEFT HAND: ICD-10-CM

## 2018-05-03 LAB
ALBUMIN SERPL BCP-MCNC: 4.4 G/DL
ALP SERPL-CCNC: 45 U/L
ALT SERPL W/O P-5'-P-CCNC: 16 U/L
ANION GAP SERPL CALC-SCNC: 8 MMOL/L
AST SERPL-CCNC: 21 U/L
BILIRUB SERPL-MCNC: 0.7 MG/DL
BUN SERPL-MCNC: 19 MG/DL
CALCIUM SERPL-MCNC: 10.3 MG/DL
CHLORIDE SERPL-SCNC: 107 MMOL/L
CO2 SERPL-SCNC: 25 MMOL/L
CREAT SERPL-MCNC: 1.4 MG/DL
EST. GFR  (AFRICAN AMERICAN): 59.3 ML/MIN/1.73 M^2
EST. GFR  (NON AFRICAN AMERICAN): 51.3 ML/MIN/1.73 M^2
ESTIMATED AVG GLUCOSE: 140 MG/DL
GLUCOSE SERPL-MCNC: 138 MG/DL
HBA1C MFR BLD HPLC: 6.5 %
POTASSIUM SERPL-SCNC: 4.9 MMOL/L
PROT SERPL-MCNC: 7.8 G/DL
SODIUM SERPL-SCNC: 140 MMOL/L

## 2018-05-03 PROCEDURE — 90714 TD VACC NO PRESV 7 YRS+ IM: CPT | Mod: S$GLB,,, | Performed by: FAMILY MEDICINE

## 2018-05-03 PROCEDURE — 3075F SYST BP GE 130 - 139MM HG: CPT | Mod: CPTII,S$GLB,, | Performed by: NURSE PRACTITIONER

## 2018-05-03 PROCEDURE — 36415 COLL VENOUS BLD VENIPUNCTURE: CPT | Mod: PO

## 2018-05-03 PROCEDURE — 99999 PR PBB SHADOW E&M-EST. PATIENT-LVL III: CPT | Mod: PBBFAC,,, | Performed by: NURSE PRACTITIONER

## 2018-05-03 PROCEDURE — 3044F HG A1C LEVEL LT 7.0%: CPT | Mod: CPTII,S$GLB,, | Performed by: NURSE PRACTITIONER

## 2018-05-03 PROCEDURE — 90471 IMMUNIZATION ADMIN: CPT | Mod: S$GLB,,, | Performed by: FAMILY MEDICINE

## 2018-05-03 PROCEDURE — 80053 COMPREHEN METABOLIC PANEL: CPT

## 2018-05-03 PROCEDURE — 99397 PER PM REEVAL EST PAT 65+ YR: CPT | Mod: S$GLB,,, | Performed by: NURSE PRACTITIONER

## 2018-05-03 PROCEDURE — 3078F DIAST BP <80 MM HG: CPT | Mod: CPTII,S$GLB,, | Performed by: NURSE PRACTITIONER

## 2018-05-03 PROCEDURE — 83036 HEMOGLOBIN GLYCOSYLATED A1C: CPT

## 2018-05-03 NOTE — PROGRESS NOTES
Subjective:       Patient ID: Ludwin Gee is a 68 y.o. male.    Chief Complaint: Annual Exam    HPI   Chief Complaint  Chief Complaint   Patient presents with    Annual Exam       HPI  Ludwin Gee is a 68 y.o. male with medical diagnoses as listed in the medical history and problem list that presents for an annual exam.  Patient is regularly treated for high blood pressure and blood pressure today is 139/79.  He has hyperlipidemia and LDL on 10/2/17 was 64.4 and he is taking crestor 5 mg daily.  Patient has CKD 3 with GFR 51.6 on 10/2/17 and has advil on his medication list.  Advised not to take over the counter NSAIDs and to take tylenol instead.  Patient has diabetes that is controlled with A1c 6.3% on 10/2/17.  Patient is followed by Dr. Michel, urology, for an elevated PSA and PSA was 11.7 on 1/25/18. Patient had ultrasound of prostate following this PSA and has scheduled follow up in 6 months.  BMI today is 27.57 and patient has lost 1 pound since last visit 4/7/17.     PAST MEDICAL HISTORY:  Past Medical History:   Diagnosis Date    Diabetes mellitus     Elevated PSA     Hyperlipidemia     Hypertension        PAST SURGICAL HISTORY:  Past Surgical History:   Procedure Laterality Date    COLONOSCOPY  12/6/2013    Dr. Martinez, 5 year recheck    SHOULDER SURGERY  12/09/2011    right        SOCIAL HISTORY:  Social History     Social History    Marital status:      Spouse name: N/A    Number of children: N/A    Years of education: N/A     Occupational History    Not on file.     Social History Main Topics    Smoking status: Former Smoker     Types: Cigarettes     Quit date: 5/23/1985    Smokeless tobacco: Never Used    Alcohol use Yes      Comment: seldom    Drug use: No    Sexual activity: Not on file     Other Topics Concern    Not on file     Social History Narrative    No narrative on file       FAMILY HISTORY:  Family History   Problem Relation Age of Onset    Diabetes Father      Heart disease Father     Cataracts Mother     Diabetes Maternal Grandmother     No Known Problems Brother     No Known Problems Daughter     No Known Problems Son     No Known Problems Maternal Grandfather     No Known Problems Paternal Grandmother     No Known Problems Paternal Grandfather     No Known Problems Maternal Aunt     No Known Problems Maternal Uncle     No Known Problems Paternal Aunt     No Known Problems Paternal Uncle     Urolithiasis Neg Hx     Prostate cancer Neg Hx     Kidney cancer Neg Hx     Retinal detachment Neg Hx     Macular degeneration Neg Hx     Glaucoma Neg Hx        ALLERGIES AND MEDICATIONS: updated and reviewed.  Review of patient's allergies indicates:   Allergen Reactions    Pravastatin Other (See Comments)     Joint pain    Lisinopril Other (See Comments)     Cough      Current Outpatient Prescriptions   Medication Sig Dispense Refill    coenzyme Q10 (CO Q-10) 10 mg capsule Take 10 mg by mouth once daily.      ibuprofen (ADVIL,MOTRIN) 200 MG tablet Take 400 mg by mouth every 6 (six) hours as needed for Pain.      JANUVIA 50 mg Tab TAKE ONE TABLET BY MOUTH ONCE DAILY 30 tablet 0    losartan (COZAAR) 25 MG tablet TAKE ONE TABLET BY MOUTH EVERY DAY 30 tablet 0    metFORMIN (GLUCOPHAGE-XR) 500 MG 24 hr tablet TAKE ONE TABLET BY MOUTH EVERY MORNING AND TAKE TWO TABLETS BY MOUTH EVERY EVENING 90 tablet 0    rosuvastatin (CRESTOR) 5 MG tablet TAKE ONE TABLET BY MOUTH EVERY OTHER EVENING 30 tablet 10    tamsulosin (FLOMAX) 0.4 mg Cp24 TAKE ONE CAPSULE BY MOUTH EVERY DAY 30 capsule 6    tramadol (ULTRAM) 50 mg tablet Take 1 tablet (50 mg total) by mouth every 6 (six) hours as needed for Pain. 60 tablet 0     No current facility-administered medications for this visit.      I have reviewed the patient's medical history in detail and updated the computerized patient record.  Review of Systems   Constitutional: Negative for activity change, appetite change,  "chills, fatigue and fever.   HENT: Negative for congestion, ear pain, postnasal drip, rhinorrhea, sinus pain, sinus pressure and sore throat.    Eyes: Negative for visual disturbance.        Wears prescription reading glasses   Respiratory: Negative for cough, shortness of breath and wheezing.    Cardiovascular: Negative for chest pain, palpitations and leg swelling.   Gastrointestinal: Negative for abdominal pain, blood in stool, constipation, diarrhea, nausea and vomiting.   Genitourinary: Negative for difficulty urinating.        Nocturia x one at night, some difficulty with slow stream in the morning, otherwise urinates without difficulty   Musculoskeletal: Positive for arthralgias. Negative for myalgias.        Arthritis to hands with some pain off and on. Trigger finger middle finger left hand. Follows up with Dr. Valiente.    Neurological: Positive for numbness. Negative for dizziness and headaches.   Hematological: Negative for adenopathy. Does not bruise/bleed easily.   Psychiatric/Behavioral: Negative for dysphoric mood and sleep disturbance. The patient is not nervous/anxious.        Objective:       Vitals:    05/03/18 0752   BP: 139/79   BP Location: Right arm   Patient Position: Sitting   BP Method: Large (Automatic)   Pulse: 72   Temp: 98.1 °F (36.7 °C)   TempSrc: Oral   Weight: 79.8 kg (176 lb)   Height: 5' 7" (1.702 m)     Physical Exam   Constitutional: He is oriented to person, place, and time. Vital signs are normal. He appears well-developed and well-nourished. No distress.   HENT:   Head: Normocephalic and atraumatic.   Right Ear: Tympanic membrane, external ear and ear canal normal.   Left Ear: Tympanic membrane, external ear and ear canal normal.   Nose: Nose normal. No mucosal edema. Right sinus exhibits no maxillary sinus tenderness and no frontal sinus tenderness. Left sinus exhibits no maxillary sinus tenderness and no frontal sinus tenderness.   Mouth/Throat: Oropharynx is clear and moist " and mucous membranes are normal. No oropharyngeal exudate.   Eyes: Conjunctivae and lids are normal. Pupils are equal, round, and reactive to light. Right eye exhibits no discharge. Left eye exhibits no discharge. Right conjunctiva is not injected. Left conjunctiva is not injected.   Neck: Normal range of motion. Neck supple. Normal carotid pulses present. Carotid bruit is not present. No thyromegaly present.   Cardiovascular: Normal rate, regular rhythm, S1 normal, S2 normal, normal heart sounds, intact distal pulses and normal pulses.  Exam reveals no gallop and no friction rub.    No murmur heard.  Pulses:       Carotid pulses are 2+ on the right side, and 2+ on the left side.       Radial pulses are 2+ on the right side, and 2+ on the left side.        Dorsalis pedis pulses are 2+ on the right side, and 2+ on the left side.        Posterior tibial pulses are 2+ on the right side, and 2+ on the left side.   No edema   Pulmonary/Chest: Effort normal and breath sounds normal. No respiratory distress. He has no decreased breath sounds. He has no wheezes. He has no rhonchi. He has no rales.   Abdominal: Soft. Normal appearance and bowel sounds are normal. He exhibits no distension, no abdominal bruit, no pulsatile midline mass and no mass. There is no hepatosplenomegaly. There is no tenderness. No hernia.   Musculoskeletal: Normal range of motion. He exhibits no edema, tenderness or deformity.        Right foot: There is normal range of motion and no deformity.        Left foot: There is normal range of motion and no deformity.   Feet:   Right Foot:   Protective Sensation: 9 sites tested. 9 sites sensed.   Skin Integrity: Negative for ulcer, blister, skin breakdown, erythema, warmth, callus or dry skin.   Left Foot:   Protective Sensation: 9 sites tested. 9 sites sensed.   Skin Integrity: Negative for ulcer, blister, skin breakdown, erythema, warmth, callus or dry skin.   Lymphadenopathy:        Head (right side): No  submental, no submandibular, no tonsillar, no preauricular, no posterior auricular and no occipital adenopathy present.        Head (left side): No submental, no submandibular, no tonsillar, no preauricular, no posterior auricular and no occipital adenopathy present.     He has no cervical adenopathy.   Neurological: He is alert and oriented to person, place, and time. He has normal strength.   Proprioception intact to all toes to bilateral feet.    Skin: Skin is warm, dry and intact. Capillary refill takes less than 2 seconds. No rash noted. He is not diaphoretic. No pallor.   Psychiatric: He has a normal mood and affect. His speech is normal and behavior is normal. Judgment and thought content normal. His mood appears not anxious. Cognition and memory are normal. He does not exhibit a depressed mood.   Nursing note and vitals reviewed.      Assessment:       1. Annual physical exam    2. Type 2 diabetes mellitus with stage 3 chronic kidney disease, without long-term current use of insulin    3. Hypertension associated with diabetes    4. Hyperlipidemia associated with type 2 diabetes mellitus    5. Benign prostatic hyperplasia with weak urinary stream    6. CKD stage 3 secondary to diabetes    7. Arthritis of both hands    8. Trigger middle finger of left hand    9. BMI 27.0-27.9,adult    10. Need for prophylactic vaccination against diphtheria and tetanus        Plan:   Ludwin was seen today for annual exam.  Discussed healthy diet, regular exercise, necessary labs, age appropriate cancer screening, and routine vaccinations.    Diagnoses and all orders for this visit:    Annual physical exam   Discussed healthy diet, regular exercise, necessary labs, age appropriate cancer screening, and routine vaccinations.     Educational handouts provided. Prevention guidelines men age 65 and over  Type 2 diabetes mellitus with stage 3 chronic kidney disease, without long-term current use of insulin  -      Microalbumin/creatinine urine ratio; Future  -     Hemoglobin A1c; Future  - A1c 6.3% on 10/2/17, controlled, continue current diabetes management plan  - Foot exam completed, normal  - Encouraged to schedule appointment with Dr. Horta for eye exam    Hypertension associated with diabetes  -     Comprehensive metabolic panel; Future  - Blood pressure 139/79 today, no changes to medication  - Patient monitors blood pressure at home with readings 120's/70's    Hyperlipidemia associated with type 2 diabetes mellitus  -     Comprehensive metabolic panel; Future  - LDL 64.4 on 10/2/17, continue [id number] 5 mg daily    Benign prostatic hyperplasia with weak urinary stream   PSA 11.7 on 1/25/18, continue follow up Dr. Michel  CKD stage 3 secondary to diabetes  -     Comprehensive metabolic panel; Future  - Microalbumin 27.1 on 5/8/15  - Discussed with patient, advised no OTC NSAID use    Arthritis of both hands   Stable, use tylenol as needed for pain, follow up with Dr. Valiente as needed  Trigger middle finger of left hand   Stable, use tylenol as needed for pain, follow up with Dr. Valiente as needed  BMI 27.0-27.9,adult   Healthy weight for age  Need for prophylactic vaccination against diphtheria and tetanus  -     (In Office Administered) Td Vaccine - Preservative Free    Follow-up in about 6 months (around 11/3/2018) for Diabetes follow up.     Patient readiness: acceptance and barriers:none    During the course of the visit the patient was educated and counseled about the following:     Diabetes:  Discussed general issues about diabetes pathophysiology and management.  Discussed foot care.  Reminded to get yearly retinal exam.  Labs: fasting blood sugar, hemoglobin A1C and microalbuminuria.  Follow up in 6 months or as needed.  Hypertension:   Medication: no change.  Dietary sodium restriction.  Regular aerobic exercise.  Follow up: 6 months and as needed.    Goals: Diabetes: Maintain Hemoglobin A1C below 7 and  Hypertension: Reduce Blood Pressure    Did patient meet goals/outcomes: Yes    The following self management tools provided: declined    Patient Instructions (the written plan) was given to the patient/family.     Time spent with patient: 45 minutes    Barriers to medications present (no )    Adverse reactions to current medications (no)    Over the counter medications reviewed (Yes)    A total of 45 minutes was spent with patient with more than 50% of time spent on counseling and coordination of care, health maintenance reviewed with appropriate recommendations made, previous lab results discussed, current BMI discussed, appropriate diet, exercise and lifestyle modification recommendations made.

## 2018-05-03 NOTE — PROGRESS NOTES
2 patient identifiers used ( name &  ).  Administered TD  IM Left Deltoid.  Patient tolerated well, no bleeding at insertion site noted.  Pain scale 0/10.  Aseptic technique maintained. Advised patient to remain in clinic for 15 minutes to monitor for reaction.  No AR noted.

## 2018-05-03 NOTE — PATIENT INSTRUCTIONS
Prevention Guidelines, Men Ages 65 and Older  Screening tests and vaccines are an important part of managing your health. Health counseling is essential, too. Below are guidelines for these, for men ages 65 and older. Talk with your healthcare provider to make sure youre up-to-date on what you need.  Screening Who needs it How often   Abdominal aortic aneurysm Men ages 65 to 75 who have ever smoked 1 ultrasound   Alcohol misuse All men in this age group At routine exams   Blood pressure All men in this age group Every 2 years if your blood pressure is less than 120/80 mm Hg; yearly if your systolic blood pressure is 120 to 139 mm Hg, or your diastolic blood pressure reading is 80 to 89 mm Hg   Colorectal cancer All men in this age group Flexible sigmoidoscopy every 5 years, or colonoscopy every 10 years, or double-contrast barium enema every 5 years; yearly fecal occult blood test or fecal immunochemical test; or a stool DNA test as often as your healthcare provider advises; talk with your healthcare provider about which tests are best for you and when you no longer need colonoscopies (generally after age 75)   Depression All men in this age group At routine exams   Type 2 diabetes or prediabetes All adults beginning at age 45 and adults without symptoms at any age who are overweight or obese and have 1 or more other risk factors for diabetes At least every 3 years (yearly if your blood sugar has already begun to rise)   Hepatitis C Men at increased risk for infection - talk with your healthcare provider At routine exams   High cholesterol or triglycerides All men in this age group At least every 5 years   HIV Men at increased risk for infection - talk with your healthcare provider At routine exams   Lung cancer Adults ages 55 to 80 who have smoked Yearly screening in smokers with 30 pack-year history of smoking or who quit within 15 years   Obesity All men in this age group At routine exams   Prostate cancer All  men in this age group, talk to healthcare provider about risks and benefits of digital rectal exam (JOYCE) and prostate-specific antigen (PSA) screening1 At routine exams   Syphilis Men at increased risk for infection - talk with your healthcare provider At routine exams   Tuberculosis Men at increased risk for infection - talk with your healthcare provider Ask your healthcare provider   Vision All men in this age group Every 1 to 2 years; if you have a chronic health condition, ask your healthcare provider if you needs exams more often   Vaccine Who needs it How often   Chickenpox (varicella) All men in this age group who have no record of this infection or vaccine 2 doses; second dose should be given at least 4 weeks after the first dose   Hepatitis A Men at increased risk for infection - talk with your healthcare provider 2 doses given at least 6 months apart   Hepatitis B Men at increased risk for infection - talk with your healthcare provider 3 doses over 6 months; second dose should be given 1 month after the first dose; the third dose should be given at least 2 months after the second dose and at least 4 months after the first dose   Haemophilus influenzae Type B (HIB) Men at increased risk for infection - talk with your healthcare provider 1 to 3 doses   Influenza (flu) All men in this age group  Once a year   Meningococcal Men at increased risk for infection - talk with your healthcare provider 1 or more doses   Pneumococcal conjugate vaccine (PCV13) and pneumococcal polysaccharide vaccine (PPSV23) All men in this age group 1 dose of each vaccine   Tetanus/diphtheria/  pertussis (Td/Tdap) booster All men in this age group Td every 10 years, or Tdap if you will have contact with a child younger than 12 months old   Zoster All men in this age group 1 dose   Counseling Who needs it How often   Diet and exercise Men who are overweight or obese When diagnosed, and then at routine exams   Fall prevention (exercise,  vitamin D supplements) All men in this age group At routine exams   Sexually transmitted infection Men at increased risk for infection - talk with your healthcare provider At routine exams   Use of daily aspirin Men ages 45 to 79 at risk for cardiovascular health problems At routine exams   Use of tobacco and the health effects it can cause All men in this age group Every visit   [address] [place] [address] [facility]   Date Last Reviewed: 2/1/2017  © 6452-8288 [employer]. [address] [place] [address], [place] [address]. All rights reserved. This information is not intended as a substitute for professional medical care. Always follow your healthcare professional's instructions.      Encouraged to schedule eye exam with Dr. Horta.

## 2018-05-24 RX ORDER — LOSARTAN POTASSIUM 25 MG/1
TABLET ORAL
Qty: 30 TABLET | Refills: 10 | Status: SHIPPED | OUTPATIENT
Start: 2018-05-24 | End: 2019-01-08 | Stop reason: SDUPTHER

## 2018-06-04 DIAGNOSIS — E11.9 TYPE 2 DIABETES MELLITUS WITHOUT COMPLICATION: ICD-10-CM

## 2018-06-04 RX ORDER — METFORMIN HYDROCHLORIDE 500 MG/1
TABLET, EXTENDED RELEASE ORAL
Qty: 90 TABLET | Refills: 1 | Status: SHIPPED | OUTPATIENT
Start: 2018-06-04 | End: 2018-08-16 | Stop reason: SDUPTHER

## 2018-06-04 RX ORDER — SITAGLIPTIN 50 MG/1
TABLET, FILM COATED ORAL
Qty: 30 TABLET | Refills: 4 | Status: SHIPPED | OUTPATIENT
Start: 2018-06-04 | End: 2018-11-08 | Stop reason: SDUPTHER

## 2018-07-17 DIAGNOSIS — N40.0 BENIGN NON-NODULAR PROSTATIC HYPERPLASIA WITHOUT LOWER URINARY TRACT SYMPTOMS: ICD-10-CM

## 2018-07-18 RX ORDER — TAMSULOSIN HYDROCHLORIDE 0.4 MG/1
CAPSULE ORAL
Qty: 30 CAPSULE | Refills: 6 | Status: SHIPPED | OUTPATIENT
Start: 2018-07-18 | End: 2019-02-25 | Stop reason: SDUPTHER

## 2018-08-16 DIAGNOSIS — E11.9 TYPE 2 DIABETES MELLITUS WITHOUT COMPLICATION: ICD-10-CM

## 2018-08-16 RX ORDER — METFORMIN HYDROCHLORIDE 500 MG/1
TABLET, EXTENDED RELEASE ORAL
Qty: 90 TABLET | Refills: 3 | Status: SHIPPED | OUTPATIENT
Start: 2018-08-16 | End: 2018-12-27 | Stop reason: SDUPTHER

## 2018-08-21 ENCOUNTER — OFFICE VISIT (OUTPATIENT)
Dept: OPHTHALMOLOGY | Facility: CLINIC | Age: 68
End: 2018-08-21
Payer: MEDICARE

## 2018-08-21 DIAGNOSIS — H52.7 REFRACTIVE ERROR: ICD-10-CM

## 2018-08-21 DIAGNOSIS — E11.9 DIABETES MELLITUS TYPE 2 WITHOUT RETINOPATHY: Primary | ICD-10-CM

## 2018-08-21 DIAGNOSIS — H25.13 NUCLEAR SCLEROSIS, BILATERAL: ICD-10-CM

## 2018-08-21 PROCEDURE — 92014 COMPRE OPH EXAM EST PT 1/>: CPT | Mod: S$GLB,,, | Performed by: OPHTHALMOLOGY

## 2018-08-21 PROCEDURE — 92015 DETERMINE REFRACTIVE STATE: CPT | Mod: S$GLB,,, | Performed by: OPHTHALMOLOGY

## 2018-08-21 PROCEDURE — 99999 PR PBB SHADOW E&M-EST. PATIENT-LVL III: CPT | Mod: PBBFAC,,, | Performed by: OPHTHALMOLOGY

## 2018-08-21 NOTE — PROGRESS NOTES
HPI     Presenting Complaint: Pt here toady for yearly diabetic eye exam. Pt   states blood sugar leveks have been controled.   Lab Results       Component                Value               Date                       HGBA1C                   6.5 (H)             05/03/2018                 HGBA1C                   6.3 (H)             10/02/2017                 HGBA1C                   7.0 (H)             04/13/2017              Pt states vision has been stable. Pt uses OTC readers for near vision.    Ophthalmic medication / drops:Art tears as needed for dryness  (-) Eye feel crusty in the mornings or like eye lash is hitting eye  (-) headaches  (-) diplopia   (-) flashes / (-) floaters      Last edited by Gavino Rothman on 8/21/2018  3:44 PM. (History)        ROS     Positive for: Genitourinary (flomax; denies nephropathy), Endocrine (DM   type 2 diagnosed around 2011), Cardiovascular (HTN - controlled with meds   per pt), Eyes (denies surgery/trauma)    Negative for: Constitutional, Gastrointestinal, Skin, Musculoskeletal,   HENT, Respiratory, Psychiatric, Allergic/Imm, Heme/Lymph    Other comments for: Neurological (denies neuropathy)    Last edited by Mckenna Live MD on 8/21/2018  4:46 PM.   (History)        Assessment /Plan     For exam results, see Encounter Report.    Diabetes mellitus type 2 without retinopathy    Nuclear sclerosis, bilateral    Refractive error          F/u 1 year    Very mild, but  developing vacuole OD.    MRx given

## 2018-11-08 RX ORDER — SITAGLIPTIN 50 MG/1
TABLET, FILM COATED ORAL
Qty: 30 TABLET | Refills: 4 | Status: SHIPPED | OUTPATIENT
Start: 2018-11-08 | End: 2019-01-08 | Stop reason: SDUPTHER

## 2018-12-27 DIAGNOSIS — E11.9 TYPE 2 DIABETES MELLITUS WITHOUT COMPLICATION: ICD-10-CM

## 2018-12-27 DIAGNOSIS — E78.5 HYPERLIPIDEMIA, UNSPECIFIED HYPERLIPIDEMIA TYPE: ICD-10-CM

## 2018-12-27 RX ORDER — ROSUVASTATIN CALCIUM 5 MG/1
TABLET, COATED ORAL
Qty: 30 TABLET | Refills: 0 | Status: SHIPPED | OUTPATIENT
Start: 2018-12-27 | End: 2019-01-08 | Stop reason: SDUPTHER

## 2018-12-27 RX ORDER — ROSUVASTATIN CALCIUM 5 MG/1
TABLET, COATED ORAL
Qty: 30 TABLET | Refills: 10 | OUTPATIENT
Start: 2018-12-27

## 2018-12-27 RX ORDER — METFORMIN HYDROCHLORIDE 500 MG/1
TABLET, EXTENDED RELEASE ORAL
Qty: 90 TABLET | Refills: 3 | OUTPATIENT
Start: 2018-12-27

## 2018-12-27 RX ORDER — METFORMIN HYDROCHLORIDE 500 MG/1
TABLET, EXTENDED RELEASE ORAL
Qty: 90 TABLET | Refills: 0 | Status: SHIPPED | OUTPATIENT
Start: 2018-12-27 | End: 2019-01-08 | Stop reason: SDUPTHER

## 2019-01-08 ENCOUNTER — LAB VISIT (OUTPATIENT)
Dept: LAB | Facility: HOSPITAL | Age: 69
End: 2019-01-08
Attending: PODIATRIST
Payer: MEDICARE

## 2019-01-08 ENCOUNTER — OFFICE VISIT (OUTPATIENT)
Dept: FAMILY MEDICINE | Facility: CLINIC | Age: 69
End: 2019-01-08
Payer: MEDICARE

## 2019-01-08 VITALS
BODY MASS INDEX: 27.31 KG/M2 | DIASTOLIC BLOOD PRESSURE: 79 MMHG | HEIGHT: 67 IN | SYSTOLIC BLOOD PRESSURE: 130 MMHG | WEIGHT: 174 LBS | HEART RATE: 73 BPM | TEMPERATURE: 98 F

## 2019-01-08 DIAGNOSIS — R80.9 CONTROLLED TYPE 2 DIABETES MELLITUS WITH MICROALBUMINURIA, WITHOUT LONG-TERM CURRENT USE OF INSULIN: ICD-10-CM

## 2019-01-08 DIAGNOSIS — I15.2 HYPERTENSION ASSOCIATED WITH DIABETES: ICD-10-CM

## 2019-01-08 DIAGNOSIS — Z12.11 SCREEN FOR COLON CANCER: ICD-10-CM

## 2019-01-08 DIAGNOSIS — E11.59 HYPERTENSION ASSOCIATED WITH DIABETES: ICD-10-CM

## 2019-01-08 DIAGNOSIS — N18.30 TYPE 2 DIABETES MELLITUS WITH STAGE 3 CHRONIC KIDNEY DISEASE, WITHOUT LONG-TERM CURRENT USE OF INSULIN: ICD-10-CM

## 2019-01-08 DIAGNOSIS — R39.12 BENIGN PROSTATIC HYPERPLASIA WITH WEAK URINARY STREAM: ICD-10-CM

## 2019-01-08 DIAGNOSIS — E11.29 CONTROLLED TYPE 2 DIABETES MELLITUS WITH MICROALBUMINURIA, WITHOUT LONG-TERM CURRENT USE OF INSULIN: ICD-10-CM

## 2019-01-08 DIAGNOSIS — E11.22 TYPE 2 DIABETES MELLITUS WITH STAGE 3 CHRONIC KIDNEY DISEASE, WITHOUT LONG-TERM CURRENT USE OF INSULIN: Primary | ICD-10-CM

## 2019-01-08 DIAGNOSIS — E11.22 TYPE 2 DIABETES MELLITUS WITH STAGE 3 CHRONIC KIDNEY DISEASE, WITHOUT LONG-TERM CURRENT USE OF INSULIN: ICD-10-CM

## 2019-01-08 DIAGNOSIS — E78.5 HYPERLIPIDEMIA ASSOCIATED WITH TYPE 2 DIABETES MELLITUS: ICD-10-CM

## 2019-01-08 DIAGNOSIS — N40.1 BENIGN PROSTATIC HYPERPLASIA WITH WEAK URINARY STREAM: ICD-10-CM

## 2019-01-08 DIAGNOSIS — E11.69 HYPERLIPIDEMIA ASSOCIATED WITH TYPE 2 DIABETES MELLITUS: ICD-10-CM

## 2019-01-08 DIAGNOSIS — N18.30 TYPE 2 DIABETES MELLITUS WITH STAGE 3 CHRONIC KIDNEY DISEASE, WITHOUT LONG-TERM CURRENT USE OF INSULIN: Primary | ICD-10-CM

## 2019-01-08 LAB
ANION GAP SERPL CALC-SCNC: 10 MMOL/L
BUN SERPL-MCNC: 13 MG/DL
CALCIUM SERPL-MCNC: 10.2 MG/DL
CHLORIDE SERPL-SCNC: 106 MMOL/L
CO2 SERPL-SCNC: 25 MMOL/L
CREAT SERPL-MCNC: 1.2 MG/DL
EST. GFR  (AFRICAN AMERICAN): >60 ML/MIN/1.73 M^2
EST. GFR  (NON AFRICAN AMERICAN): >60 ML/MIN/1.73 M^2
ESTIMATED AVG GLUCOSE: 157 MG/DL
GLUCOSE SERPL-MCNC: 121 MG/DL
HBA1C MFR BLD HPLC: 7.1 %
POTASSIUM SERPL-SCNC: 3.8 MMOL/L
SODIUM SERPL-SCNC: 141 MMOL/L

## 2019-01-08 PROCEDURE — 83036 HEMOGLOBIN GLYCOSYLATED A1C: CPT

## 2019-01-08 PROCEDURE — 80048 BASIC METABOLIC PNL TOTAL CA: CPT

## 2019-01-08 PROCEDURE — 99214 PR OFFICE/OUTPT VISIT, EST, LEVL IV, 30-39 MIN: ICD-10-PCS | Mod: S$GLB,,, | Performed by: NURSE PRACTITIONER

## 2019-01-08 PROCEDURE — 3045F PR MOST RECENT HEMOGLOBIN A1C LEVEL 7.0-9.0%: ICD-10-PCS | Mod: S$GLB,,, | Performed by: NURSE PRACTITIONER

## 2019-01-08 PROCEDURE — 99999 PR PBB SHADOW E&M-EST. PATIENT-LVL IV: ICD-10-PCS | Mod: PBBFAC,,, | Performed by: NURSE PRACTITIONER

## 2019-01-08 PROCEDURE — 99214 OFFICE O/P EST MOD 30 MIN: CPT | Mod: S$GLB,,, | Performed by: NURSE PRACTITIONER

## 2019-01-08 PROCEDURE — 3078F PR MOST RECENT DIASTOLIC BLOOD PRESSURE < 80 MM HG: ICD-10-PCS | Mod: S$GLB,,, | Performed by: NURSE PRACTITIONER

## 2019-01-08 PROCEDURE — 1101F PR PT FALLS ASSESS DOC 0-1 FALLS W/OUT INJ PAST YR: ICD-10-PCS | Mod: S$GLB,,, | Performed by: NURSE PRACTITIONER

## 2019-01-08 PROCEDURE — 3075F SYST BP GE 130 - 139MM HG: CPT | Mod: S$GLB,,, | Performed by: NURSE PRACTITIONER

## 2019-01-08 PROCEDURE — 99999 PR PBB SHADOW E&M-EST. PATIENT-LVL IV: CPT | Mod: PBBFAC,,, | Performed by: NURSE PRACTITIONER

## 2019-01-08 PROCEDURE — 36415 COLL VENOUS BLD VENIPUNCTURE: CPT | Mod: PO

## 2019-01-08 PROCEDURE — 3075F PR MOST RECENT SYSTOLIC BLOOD PRESS GE 130-139MM HG: ICD-10-PCS | Mod: S$GLB,,, | Performed by: NURSE PRACTITIONER

## 2019-01-08 PROCEDURE — 3045F PR MOST RECENT HEMOGLOBIN A1C LEVEL 7.0-9.0%: CPT | Mod: S$GLB,,, | Performed by: NURSE PRACTITIONER

## 2019-01-08 PROCEDURE — 3078F DIAST BP <80 MM HG: CPT | Mod: S$GLB,,, | Performed by: NURSE PRACTITIONER

## 2019-01-08 PROCEDURE — 1101F PT FALLS ASSESS-DOCD LE1/YR: CPT | Mod: S$GLB,,, | Performed by: NURSE PRACTITIONER

## 2019-01-08 RX ORDER — METFORMIN HYDROCHLORIDE 500 MG/1
TABLET, EXTENDED RELEASE ORAL
Qty: 270 TABLET | Refills: 1 | Status: SHIPPED | OUTPATIENT
Start: 2019-01-08 | End: 2019-05-07 | Stop reason: SDUPTHER

## 2019-01-08 RX ORDER — ROSUVASTATIN CALCIUM 5 MG/1
TABLET, COATED ORAL
Qty: 90 TABLET | Refills: 1 | Status: SHIPPED | OUTPATIENT
Start: 2019-01-08 | End: 2019-05-07 | Stop reason: SDUPTHER

## 2019-01-08 RX ORDER — TRAMADOL HYDROCHLORIDE 50 MG/1
50 TABLET ORAL EVERY 6 HOURS PRN
Qty: 60 TABLET | Refills: 0 | Status: CANCELLED | OUTPATIENT
Start: 2019-01-08

## 2019-01-08 RX ORDER — TRAMADOL HYDROCHLORIDE 50 MG/1
50 TABLET ORAL EVERY 6 HOURS PRN
Qty: 60 TABLET | Refills: 0 | Status: SHIPPED | OUTPATIENT
Start: 2019-01-08 | End: 2019-10-31

## 2019-01-08 RX ORDER — LOSARTAN POTASSIUM 25 MG/1
25 TABLET ORAL DAILY
Qty: 90 TABLET | Refills: 1 | Status: SHIPPED | OUTPATIENT
Start: 2019-01-08 | End: 2019-05-07 | Stop reason: SDUPTHER

## 2019-01-08 NOTE — PROGRESS NOTES
Subjective:       Patient ID: Ludwin Gee is a 69 y.o. male.    Chief Complaint: Diabetes and Hypertension    Chief Complaint  Chief Complaint   Patient presents with    Diabetes    Hypertension       HPI  Ludwni Gee is a 69 y.o. male with medical diagnoses as listed in the medical history and problem list that presents for regular scheduled follow up of type 2 diabetes, hypertension, and hyperlipidemia.  Patient does not check his blood pressure or blood sugar at home.  Patient has also had an elevated PSA in the past and is followed by Dr. Michel and has had a negative prostate biopsy.  Blood pressure today is 130/79.  BMI today is 27.25 and patient has lost 2 pounds since last visit 5/3/18.       PAST MEDICAL HISTORY:  Past Medical History:   Diagnosis Date    Diabetes mellitus     Elevated PSA     Hyperlipidemia     Hypertension        PAST SURGICAL HISTORY:  Past Surgical History:   Procedure Laterality Date    COLONOSCOPY  2013    Dr. Martinez, 5 year recheck    COLONOSCOPY N/A 2013    Performed by Boo Martinez MD at [facility] END[facility]    SHOULDER SURGERY  2011    right        SOCIAL HISTORY:  Social History     Socioeconomic History    Marital status:      Spouse name: Not on file    Number of children: Not on file    Years of education: Not on file    Highest education level: Not on file   Social Needs    Financial resource strain: Not on file    Food insecurity - worry: Not on file    Food insecurity - inability: Not on file    Transportation needs - medical: Not on file    Transportation needs - non-medical: Not on file   Occupational History    Not on file   Tobacco Use    Smoking status: Former Smoker     Types: Cigarettes     Last attempt to quit: 1985     Years since quittin.6    Smokeless tobacco: Never Used   Substance and Sexual Activity    Alcohol use: Yes     Comment: seldom    Drug use: No    Sexual activity: Not on file   Other  Topics Concern    Not on file   Social History Narrative    Not on file       FAMILY HISTORY:  Family History   Problem Relation Age of Onset    Diabetes Father     Heart disease Father     Cataracts Mother     Diabetes Maternal Grandmother     No Known Problems Brother     No Known Problems Daughter     No Known Problems Son     No Known Problems Maternal Grandfather     No Known Problems Paternal Grandmother     No Known Problems Paternal Grandfather     No Known Problems Maternal Aunt     No Known Problems Maternal Uncle     No Known Problems Paternal Aunt     No Known Problems Paternal Uncle     Urolithiasis Neg Hx     Prostate cancer Neg Hx     Kidney cancer Neg Hx     Retinal detachment Neg Hx     Macular degeneration Neg Hx     Glaucoma Neg Hx        ALLERGIES AND MEDICATIONS: updated and reviewed.  Review of patient's allergies indicates:   Allergen Reactions    Pravastatin Other (See Comments)     Joint pain    Lisinopril Other (See Comments)     Cough      Current Outpatient Medications   Medication Sig Dispense Refill    coenzyme Q10 (CO Q-10) 10 mg capsule Take 10 mg by mouth once daily.      ibuprofen (ADVIL,MOTRIN) 200 MG tablet Take 400 mg by mouth every 6 (six) hours as needed for Pain.      losartan (COZAAR) 25 MG tablet Take 1 tablet (25 mg total) by mouth once daily. 90 tablet 1    metFORMIN (GLUCOPHAGE-XR) 500 MG 24 hr tablet TAKE ONE TABLET BY MOUTH EVERY MORNING AND TAKE TWO TABLETS BY MOUTH EVERY EVENING 270 tablet 1    rosuvastatin (CRESTOR) 5 MG tablet TAKE ONE TABLET BY MOUTH EVERY OTHER EVENING 90 tablet 1    SITagliptin (JANUVIA) 50 MG Tab Take 1 tablet (50 mg total) by mouth once daily. 90 tablet 1    tamsulosin (FLOMAX) 0.4 mg Cap TAKE ONE CAPSULE BY MOUTH EVERY DAY 30 capsule 6    tramadol (ULTRAM) 50 mg tablet Take 1 tablet (50 mg total) by mouth every 6 (six) hours as needed for Pain. 60 tablet 0     No current facility-administered medications for  "this visit.        I have reviewed the patient's medical history in detail and updated the computerized patient record.    Review of Systems   Constitutional: Negative for activity change, appetite change, chills, fatigue and fever.        No regular exercise, active lifestyle   HENT: Negative for congestion, ear pain, postnasal drip, rhinorrhea, sinus pressure, sinus pain and sore throat.    Eyes: Negative for visual disturbance.        Vision is good, reading glasses only   Respiratory: Negative for cough and shortness of breath.    Cardiovascular: Negative for chest pain, palpitations and leg swelling.   Gastrointestinal: Negative for abdominal pain, constipation, diarrhea, nausea and vomiting.   Genitourinary: Positive for difficulty urinating. Negative for dysuria.        Nocturia x 2, intermittent difficulty passing urine.    Musculoskeletal: Positive for arthralgias and myalgias.        Bilateral thumb pain due to arthritis, takes tylenol and if really severe will take a tramadol. Orthopedist was Dr. Valiente.    Skin: Negative for rash and wound.   Neurological: Positive for numbness. Negative for dizziness and headaches.        Numbness and tingling to hands, intermittent   Hematological: Does not bruise/bleed easily.   Psychiatric/Behavioral: Negative for dysphoric mood and sleep disturbance. The patient is not nervous/anxious.          Objective:      Vitals:    01/08/19 1034   BP: 130/79   BP Location: Right arm   Patient Position: Sitting   BP Method: Large (Automatic)   Pulse: 73   Temp: 98.2 °F (36.8 °C)   TempSrc: Oral   Weight: 78.9 kg (174 lb)   Height: 5' 7" (1.702 m)     Physical Exam   Constitutional: He is oriented to person, place, and time. Vital signs are normal. He appears well-developed and well-nourished. No distress.   Blood pressure 130/79   HENT:   Head: Normocephalic and atraumatic.   Right Ear: Tympanic membrane, external ear and ear canal normal.   Left Ear: Tympanic membrane, external " ear and ear canal normal.   Nose: Nose normal. No mucosal edema.   Mouth/Throat: Oropharynx is clear and moist and mucous membranes are normal. No oropharyngeal exudate.   Eyes: Conjunctivae, EOM and lids are normal. Pupils are equal, round, and reactive to light. Right eye exhibits no discharge. Left eye exhibits no discharge. Right conjunctiva is not injected. Left conjunctiva is not injected.   Neck: Normal range of motion. Neck supple. Normal carotid pulses present. Carotid bruit is not present. No thyromegaly present.   Cardiovascular: Normal rate, regular rhythm, S1 normal, S2 normal, normal heart sounds, intact distal pulses and normal pulses.   No murmur heard.  Pulses:       Carotid pulses are 2+ on the right side, and 2+ on the left side.       Radial pulses are 2+ on the right side, and 2+ on the left side.        Posterior tibial pulses are 2+ on the right side, and 2+ on the left side.   Pulmonary/Chest: Effort normal and breath sounds normal. No respiratory distress. He has no decreased breath sounds. He has no wheezes. He has no rhonchi. He has no rales.   Abdominal: Soft. Normal appearance, normal aorta and bowel sounds are normal. He exhibits no distension, no abdominal bruit, no pulsatile midline mass and no mass. There is no hepatosplenomegaly. There is no tenderness. No hernia.   Musculoskeletal: Normal range of motion. He exhibits no edema, tenderness or deformity.   Lymphadenopathy:     He has no cervical adenopathy.        Right: No supraclavicular adenopathy present.        Left: No supraclavicular adenopathy present.   Neurological: He is alert and oriented to person, place, and time. He has normal strength.   Skin: Skin is warm, dry and intact. No rash noted. He is not diaphoretic. No erythema. No pallor.   Psychiatric: He has a normal mood and affect. His speech is normal and behavior is normal. Judgment and thought content normal. His mood appears not anxious. Cognition and memory are  normal. He does not exhibit a depressed mood.   Nursing note and vitals reviewed.        Assessment:       1. Type 2 diabetes mellitus with stage 3 chronic kidney disease, without long-term current use of insulin    2. Controlled type 2 diabetes mellitus with microalbuminuria, without long-term current use of insulin    3. Hypertension associated with diabetes    4. Hyperlipidemia associated with type 2 diabetes mellitus    5. Benign prostatic hyperplasia with weak urinary stream    6. BMI 27.0-27.9,adult    7. Screen for colon cancer          Plan:       Ludwin was seen today for diabetes and hypertension.  Discussed healthy diet, regular exercise, necessary labs, age appropriate cancer screening, and routine vaccinations.  Patient declines flu and pneumonia immunizations today.  Agrees to referral to Gastroenterology for colonoscopy.  Diagnoses and all orders for this visit:    Type 2 diabetes mellitus with stage 3 chronic kidney disease, without long-term current use of insulin  -     metFORMIN (GLUCOPHAGE-XR) 500 MG 24 hr tablet; TAKE ONE TABLET BY MOUTH EVERY MORNING AND TAKE TWO TABLETS BY MOUTH EVERY EVENING  -     SITagliptin (JANUVIA) 50 MG Tab; Take 1 tablet (50 mg total) by mouth once daily.  -     Hemoglobin A1c; Future  -     Microalbumin/creatinine urine ratio; Future  -     Basic metabolic panel; Future  -   Lab Results   Component Value Date    HGBA1C 6.5 (H) 05/03/2018   - continue current diabetes medications and management plan, will adjust if needed when new A1c results are available  - GFR was 51.3 on May 03/2018  - Advised no OTC NSAIDs, tylenol only as needed for pain or fever.     Controlled type 2 diabetes mellitus with microalbuminuria, without long-term current use of insulin  -     metFORMIN (GLUCOPHAGE-XR) 500 MG 24 hr tablet; TAKE ONE TABLET BY MOUTH EVERY MORNING AND TAKE TWO TABLETS BY MOUTH EVERY EVENING  -     SITagliptin (JANUVIA) 50 MG Tab; Take 1 tablet (50 mg total) by mouth once  daily.  -     Microalbumin/creatinine urine ratio; Future  -     Basic metabolic panel; Future  - random urine microalbumin creatinine ratio was 35.6 on 5/3/2018    Hypertension associated with diabetes  -     losartan (COZAAR) 25 MG tablet; Take 1 tablet (25 mg total) by mouth once daily.  -     Basic metabolic panel; Future  - Blood pressure controlled 130/79, continue current medication without change    Hyperlipidemia associated with type 2 diabetes mellitus  -     rosuvastatin (CRESTOR) 5 MG tablet; TAKE ONE TABLET BY MOUTH EVERY OTHER EVENING  -   Lab Results   Component Value Date    CHOL 122 10/02/2017    CHOL 148 09/24/2016    CHOL 129 11/25/2015     Lab Results   Component Value Date    HDL 41 10/02/2017    HDL 39 (L) 09/24/2016    HDL 46 11/25/2015     Lab Results   Component Value Date    LDLCALC 64.4 10/02/2017    LDLCALC 87.4 09/24/2016    LDLCALC 66.8 11/25/2015     Lab Results   Component Value Date    TRIG 83 10/02/2017    TRIG 108 09/24/2016    TRIG 81 11/25/2015     Lab Results   Component Value Date    CHOLHDL 33.6 10/02/2017    CHOLHDL 26.4 09/24/2016    CHOLHDL 35.7 11/25/2015       Benign prostatic hyperplasia with weak urinary stream   PSA on 1/25/2018 was 11.7   Continue follow up with Dr. Michel as instructed  BMI 27.0-27.9,adult   BMI today is 27.25 and patient has lost 2 lb since visit on 5/3/2018  Screen for colon cancer  -     Ambulatory referral to Gastroenterology    Follow-up in about 6 months (around 7/8/2019) for diabetes/HTN/HLD follow up.      Patient readiness: acceptance and barriers:none    During the course of the visit the patient was educated and counseled about the following:     Diabetes:  Discussed general issues about diabetes pathophysiology and management.  Addressed ADA diet.  Reminded to get yearly retinal exam.  Labs: fasting blood sugar, fasting lipid panel and hemoglobin A1C.  Follow up in 6 months or as needed.  Hypertension:   Medication: no change.  Dietary  sodium restriction.  Regular aerobic exercise.  Follow up: 6 months and as needed.    Goals: Diabetes: Maintain Hemoglobin A1C below 7 and Hypertension: Reduce Blood Pressure    Did patient meet goals/outcomes: Yes    The following self management tools provided: declined    Patient Instructions (the written plan) was given to the patient/family.     Time spent with patient: 30 minutes    Barriers to medications present (no )    Adverse reactions to current medications (no)    Over the counter medications reviewed (Yes)

## 2019-01-08 NOTE — TELEPHONE ENCOUNTER
Patient is requesting a refill for his tramadol for hand pain, takes infrequently.  queried without inappropriate activity. You last provided medication #60 on 4/7/17, no other controlled meds listed.   Thanks.  Iliana

## 2019-01-10 ENCOUNTER — TELEPHONE (OUTPATIENT)
Dept: FAMILY MEDICINE | Facility: CLINIC | Age: 69
End: 2019-01-10

## 2019-01-10 DIAGNOSIS — R80.9 CONTROLLED TYPE 2 DIABETES MELLITUS WITH MICROALBUMINURIA, WITHOUT LONG-TERM CURRENT USE OF INSULIN: Primary | ICD-10-CM

## 2019-01-10 DIAGNOSIS — E11.29 CONTROLLED TYPE 2 DIABETES MELLITUS WITH MICROALBUMINURIA, WITHOUT LONG-TERM CURRENT USE OF INSULIN: Primary | ICD-10-CM

## 2019-01-10 NOTE — TELEPHONE ENCOUNTER
Patient states he has not been following his diabetic meal plans. He doesn't want to increase his medication at this time. Will work on his diet and follow up in 3 months.       ----- Message from Iliana Beard NP sent at 1/10/2019  7:22 AM CST -----  ## would he like to try following a better diabetes meal plan or increase the dose of his metformin or januvia at this time? He will need another A1c in 3 months, ordered, please schedule.##    Dear Mr. Gee,   I have reviewed the results of your recent blood work.  Your blood sugar at the lab was 121.  This is close to the goal for fasting blood sugar for patients with diabetes which is .  However, your hemoglobin A1c is now 7.1%, and this indicates that your diabetes is not well controlled.  Goal for A1c is less 7%.  If you feel that you have been following your diabetes meal plan pretty strictly in the past three months, we should consider either increasing her metformin to two tablets 2 times daily or increasing the dose of Januvia to 100 mg a day.  Please let me know how you would like to proceed.  All other electrolytes and kidney function tests are normal.  If you have any questions, please call or email the office.  Thank you.  Iliana

## 2019-02-25 DIAGNOSIS — N40.0 BENIGN NON-NODULAR PROSTATIC HYPERPLASIA WITHOUT LOWER URINARY TRACT SYMPTOMS: ICD-10-CM

## 2019-02-25 RX ORDER — TAMSULOSIN HYDROCHLORIDE 0.4 MG/1
CAPSULE ORAL
Qty: 30 CAPSULE | Refills: 6 | Status: SHIPPED | OUTPATIENT
Start: 2019-02-25 | End: 2019-05-07 | Stop reason: SDUPTHER

## 2019-04-18 ENCOUNTER — LAB VISIT (OUTPATIENT)
Dept: LAB | Facility: HOSPITAL | Age: 69
End: 2019-04-18
Attending: NURSE PRACTITIONER
Payer: MEDICARE

## 2019-04-18 DIAGNOSIS — E11.29 CONTROLLED TYPE 2 DIABETES MELLITUS WITH MICROALBUMINURIA, WITHOUT LONG-TERM CURRENT USE OF INSULIN: ICD-10-CM

## 2019-04-18 DIAGNOSIS — R80.9 CONTROLLED TYPE 2 DIABETES MELLITUS WITH MICROALBUMINURIA, WITHOUT LONG-TERM CURRENT USE OF INSULIN: ICD-10-CM

## 2019-04-18 LAB
ESTIMATED AVG GLUCOSE: 140 MG/DL (ref 68–131)
HBA1C MFR BLD HPLC: 6.5 % (ref 4–5.6)

## 2019-04-18 PROCEDURE — 36415 COLL VENOUS BLD VENIPUNCTURE: CPT | Mod: PO

## 2019-04-18 PROCEDURE — 83036 HEMOGLOBIN GLYCOSYLATED A1C: CPT

## 2019-05-07 ENCOUNTER — TELEPHONE (OUTPATIENT)
Dept: FAMILY MEDICINE | Facility: CLINIC | Age: 69
End: 2019-05-07

## 2019-05-07 ENCOUNTER — OFFICE VISIT (OUTPATIENT)
Dept: FAMILY MEDICINE | Facility: CLINIC | Age: 69
End: 2019-05-07
Payer: MEDICARE

## 2019-05-07 VITALS
BODY MASS INDEX: 27 KG/M2 | HEIGHT: 67 IN | TEMPERATURE: 98 F | SYSTOLIC BLOOD PRESSURE: 127 MMHG | HEART RATE: 81 BPM | DIASTOLIC BLOOD PRESSURE: 76 MMHG | WEIGHT: 172 LBS

## 2019-05-07 DIAGNOSIS — N40.1 BENIGN PROSTATIC HYPERPLASIA WITH NOCTURIA: ICD-10-CM

## 2019-05-07 DIAGNOSIS — Z79.899 HIGH RISK MEDICATION USE: ICD-10-CM

## 2019-05-07 DIAGNOSIS — I15.2 HYPERTENSION ASSOCIATED WITH DIABETES: ICD-10-CM

## 2019-05-07 DIAGNOSIS — Z12.11 SCREEN FOR COLON CANCER: ICD-10-CM

## 2019-05-07 DIAGNOSIS — R35.1 BENIGN PROSTATIC HYPERPLASIA WITH NOCTURIA: ICD-10-CM

## 2019-05-07 DIAGNOSIS — R80.9 CONTROLLED TYPE 2 DIABETES MELLITUS WITH MICROALBUMINURIA, WITHOUT LONG-TERM CURRENT USE OF INSULIN: Primary | ICD-10-CM

## 2019-05-07 DIAGNOSIS — E11.59 HYPERTENSION ASSOCIATED WITH DIABETES: ICD-10-CM

## 2019-05-07 DIAGNOSIS — E11.29 CONTROLLED TYPE 2 DIABETES MELLITUS WITH MICROALBUMINURIA, WITHOUT LONG-TERM CURRENT USE OF INSULIN: Primary | ICD-10-CM

## 2019-05-07 DIAGNOSIS — E78.5 HYPERLIPIDEMIA ASSOCIATED WITH TYPE 2 DIABETES MELLITUS: ICD-10-CM

## 2019-05-07 DIAGNOSIS — E11.69 HYPERLIPIDEMIA ASSOCIATED WITH TYPE 2 DIABETES MELLITUS: ICD-10-CM

## 2019-05-07 PROCEDURE — 1101F PR PT FALLS ASSESS DOC 0-1 FALLS W/OUT INJ PAST YR: ICD-10-PCS | Mod: S$GLB,,, | Performed by: NURSE PRACTITIONER

## 2019-05-07 PROCEDURE — 3078F DIAST BP <80 MM HG: CPT | Mod: S$GLB,,, | Performed by: NURSE PRACTITIONER

## 2019-05-07 PROCEDURE — 3044F HG A1C LEVEL LT 7.0%: CPT | Mod: S$GLB,,, | Performed by: NURSE PRACTITIONER

## 2019-05-07 PROCEDURE — 99214 PR OFFICE/OUTPT VISIT, EST, LEVL IV, 30-39 MIN: ICD-10-PCS | Mod: S$GLB,,, | Performed by: NURSE PRACTITIONER

## 2019-05-07 PROCEDURE — 99214 OFFICE O/P EST MOD 30 MIN: CPT | Mod: S$GLB,,, | Performed by: NURSE PRACTITIONER

## 2019-05-07 PROCEDURE — 3074F PR MOST RECENT SYSTOLIC BLOOD PRESSURE < 130 MM HG: ICD-10-PCS | Mod: S$GLB,,, | Performed by: NURSE PRACTITIONER

## 2019-05-07 PROCEDURE — 1101F PT FALLS ASSESS-DOCD LE1/YR: CPT | Mod: S$GLB,,, | Performed by: NURSE PRACTITIONER

## 2019-05-07 PROCEDURE — 3044F PR MOST RECENT HEMOGLOBIN A1C LEVEL <7.0%: ICD-10-PCS | Mod: S$GLB,,, | Performed by: NURSE PRACTITIONER

## 2019-05-07 PROCEDURE — 99999 PR PBB SHADOW E&M-EST. PATIENT-LVL III: ICD-10-PCS | Mod: PBBFAC,,, | Performed by: NURSE PRACTITIONER

## 2019-05-07 PROCEDURE — 99999 PR PBB SHADOW E&M-EST. PATIENT-LVL III: CPT | Mod: PBBFAC,,, | Performed by: NURSE PRACTITIONER

## 2019-05-07 PROCEDURE — 3078F PR MOST RECENT DIASTOLIC BLOOD PRESSURE < 80 MM HG: ICD-10-PCS | Mod: S$GLB,,, | Performed by: NURSE PRACTITIONER

## 2019-05-07 PROCEDURE — 3074F SYST BP LT 130 MM HG: CPT | Mod: S$GLB,,, | Performed by: NURSE PRACTITIONER

## 2019-05-07 RX ORDER — ROSUVASTATIN CALCIUM 5 MG/1
TABLET, COATED ORAL
Qty: 90 TABLET | Refills: 1 | Status: SHIPPED | OUTPATIENT
Start: 2019-05-07 | End: 2020-02-14 | Stop reason: SDUPTHER

## 2019-05-07 RX ORDER — LOSARTAN POTASSIUM 25 MG/1
25 TABLET ORAL DAILY
Qty: 90 TABLET | Refills: 1 | Status: SHIPPED | OUTPATIENT
Start: 2019-05-07 | End: 2020-02-14 | Stop reason: SDUPTHER

## 2019-05-07 RX ORDER — METFORMIN HYDROCHLORIDE 500 MG/1
TABLET, EXTENDED RELEASE ORAL
Qty: 270 TABLET | Refills: 1 | Status: SHIPPED | OUTPATIENT
Start: 2019-05-07 | End: 2020-02-14 | Stop reason: SDUPTHER

## 2019-05-07 RX ORDER — TAMSULOSIN HYDROCHLORIDE 0.4 MG/1
1 CAPSULE ORAL DAILY
Qty: 90 CAPSULE | Refills: 1 | Status: SHIPPED | OUTPATIENT
Start: 2019-05-07 | End: 2020-03-23

## 2019-05-07 NOTE — PROGRESS NOTES
Subjective:       Patient ID: Ludwin Gee is a 69 y.o. male.    Chief Complaint: Diabetes and Hypertension    Chief Complaint  Chief Complaint   Patient presents with    Diabetes    Hypertension       HPI  Ludwin Gee is a 69 y.o. male with medical diagnoses as listed in the medical history and problem list that presents for regular scheduled follow-up of type 2 diabetes with microalbuminuria, BPH, hyperlipidemia, and hypertension.  Blood pressure today is 127/76.  Patient's most recent hemoglobin A1c was 6.5% on 4/18/2019.  BMI today is 26.94 and he has lost 2 lb since his last visit.  He is due for a foot exam, a urine microalbumin creatinine ratio, a lipid panel, and a colonoscopy.  He is also due for a Pneum.rep ovax 23 and shingles vaccine.  The shingles vaccine will have to be received at his pharmacy.  Established patient with last clinic appointment 1/8/2019.  The patient also has [employer] physical paperwork that needs to be completed today.         PAST MEDICAL HISTORY:  Past Medical History:   Diagnosis Date    Diabetes mellitus     Elevated PSA     Hyperlipidemia     Hypertension        PAST SURGICAL HISTORY:  Past Surgical History:   Procedure Laterality Date    COLONOSCOPY  12/6/2013    Dr. Martinez, 5 year recheck    COLONOSCOPY N/A 12/6/2013    Performed by Boo Martinez [facility] at [facility]    SHOULDER SURGERY  12/09/2011    right        SOCIAL HISTORY:  Social History     Socioeconomic History    Marital status:      Spouse name: Not on file    Number of children: Not on file    Years of education: Not on file    Highest education level: Not on file   Occupational History    Not on file   Social Needs    Financial resource strain: Not on file    Food insecurity:     Worry: Not on file     Inability: Not on file    Transportation needs:     Medical: Not on file     Non-medical: Not on file   Tobacco Use    Smoking status: Former Smoker     Types: Cigarettes      Last attempt to quit: 1985     Years since quittin.9    Smokeless tobacco: Never Used   Substance and Sexual Activity    Alcohol use: Yes     Comment: seldom    Drug use: No    Sexual activity: Not on file   Lifestyle    Physical activity:     Days per week: Not on file     Minutes per session: Not on file    Stress: Not on file   Relationships    Social connections:     Talks on phone: Not on file     Gets together: Not on file     Attends [employer] service: Not on file     Active member of club or organization: Not on file     Attends meetings of clubs or organizations: Not on file     Relationship status: Not on file   Other Topics Concern    Not on file   Social History Narrative    Not on file       FAMILY HISTORY:  Family History   Problem Relation Age of Onset    Diabetes Father     Heart disease Father     Cataracts Mother     Diabetes Maternal Grandmother     No Known Problems Brother     No Known Problems Daughter     No Known Problems Son     No Known Problems Maternal Grandfather     No Known Problems Paternal Grandmother     No Known Problems Paternal Grandfather     No Known Problems Maternal Aunt     No Known Problems Maternal Uncle     No Known Problems Paternal Aunt     No Known Problems Paternal Uncle     Urolithiasis Neg Hx     Prostate cancer Neg Hx     Kidney cancer Neg Hx     Retinal detachment Neg Hx     Macular degeneration Neg Hx     Glaucoma Neg Hx        ALLERGIES AND MEDICATIONS: updated and reviewed.  Review of patient's allergies indicates:   Allergen Reactions    Pravastatin Other (See Comments)     Joint pain    Lisinopril Other (See Comments)     Cough      Current Outpatient Medications   Medication Sig Dispense Refill    coenzyme Q10 (CO Q-10) 10 mg capsule Take 10 mg by mouth once daily.      losartan (COZAAR) 25 MG tablet Take 1 tablet (25 mg total) by mouth once daily. 90 tablet 1    metFORMIN (GLUCOPHAGE-XR) 500 MG 24 hr tablet TAKE  ONE TABLET BY MOUTH EVERY MORNING AND TAKE TWO TABLETS BY MOUTH EVERY EVENING 270 tablet 1    rosuvastatin (CRESTOR) 5 MG tablet TAKE ONE TABLET BY MOUTH EVERY OTHER EVENING 90 tablet 1    SITagliptin (JANUVIA) 50 MG Tab Take 1 tablet (50 mg total) by mouth once daily. 90 tablet 1    tamsulosin (FLOMAX) 0.4 mg Cap Take 1 capsule (0.4 mg total) by mouth once daily. 90 capsule 1    traMADol (ULTRAM) 50 mg tablet Take 1 tablet (50 mg total) by mouth every 6 (six) hours as needed for Pain. 60 tablet 0     No current facility-administered medications for this visit.        I have reviewed the patient's medical, family, and social history in detail and updated the computerized patient record.    Review of Systems   Constitutional: Negative for activity change, appetite change, chills, fatigue and fever.        No regular exercise, active lifestyle.   HENT: Negative for congestion, ear pain, postnasal drip, rhinorrhea, sinus pressure, sinus pain, sore throat and trouble swallowing.    Eyes: Negative for visual disturbance.        Vision corrected with glasses, recent eye exam with no diabetic eye changes.    Respiratory: Negative for cough and shortness of breath.    Cardiovascular: Negative for chest pain, palpitations and leg swelling.   Gastrointestinal: Negative for abdominal pain, blood in stool, constipation, diarrhea, nausea and vomiting.   Genitourinary: Negative for difficulty urinating, dysuria, frequency, hematuria and urgency.        Nocturia 2 times   Musculoskeletal: Negative for arthralgias and myalgias.   Skin: Negative for rash and wound.   Neurological: Negative for dizziness, numbness and headaches.   Hematological: Does not bruise/bleed easily.   Psychiatric/Behavioral: Negative for dysphoric mood and sleep disturbance. The patient is not nervous/anxious.          Objective:      Vitals:    05/07/19 0922   BP: 127/76   BP Location: Right arm   Patient Position: Sitting   BP Method: Large (Automatic)  "  Pulse: 81   Temp: 98.4 °F (36.9 °C)   TempSrc: Oral   Weight: 78 kg (172 lb)   Height: 5' 7" (1.702 m)     Physical Exam   Constitutional: He is oriented to person, place, and time. Vital signs are normal. He appears well-developed and well-nourished. No distress.   Blood pressure 127/76   HENT:   Head: Normocephalic and atraumatic.   Right Ear: Hearing, tympanic membrane, external ear and ear canal normal.   Left Ear: Hearing, tympanic membrane, external ear and ear canal normal.   Nose: Nose normal. No mucosal edema.   Mouth/Throat: Oropharynx is clear and moist and mucous membranes are normal. No oropharyngeal exudate.   Five foot whisper test intact to bilateral ears.    Eyes: Pupils are equal, round, and reactive to light. Conjunctivae, EOM and lids are normal. Right eye exhibits no discharge. Left eye exhibits no discharge. Right conjunctiva is not injected. Left conjunctiva is not injected.   Ishihara color plate color vision screening using web site is normal   Neck: Normal range of motion. Neck supple. Normal carotid pulses present. Carotid bruit is not present. No thyromegaly present.   Cardiovascular: Normal rate, regular rhythm, S1 normal, S2 normal, normal heart sounds, intact distal pulses and normal pulses.   No murmur heard.  Pulses:       Carotid pulses are 2+ on the right side, and 2+ on the left side.       Radial pulses are 2+ on the right side, and 2+ on the left side.        Dorsalis pedis pulses are 2+ on the right side, and 2+ on the left side.        Posterior tibial pulses are 2+ on the right side, and 2+ on the left side.   No edema   Pulmonary/Chest: Effort normal and breath sounds normal. No respiratory distress. He has no decreased breath sounds. He has no wheezes. He has no rhonchi. He has no rales.   Abdominal: Soft. Normal appearance, normal aorta and bowel sounds are normal. He exhibits no distension, no abdominal bruit, no pulsatile midline mass and no mass. There is no " hepatosplenomegaly. There is no tenderness. No hernia.   Musculoskeletal: Normal range of motion. He exhibits no edema, tenderness or deformity.        Right foot: There is normal range of motion and no deformity.        Left foot: There is normal range of motion and no deformity.   Full active ROM of all joints without pain.   Feet:   Right Foot:   Protective Sensation: 10 sites tested. 10 sites sensed.   Skin Integrity: Negative for ulcer, blister, skin breakdown, erythema, warmth, callus or dry skin.   Left Foot:   Protective Sensation: 10 sites tested. 10 sites sensed.   Skin Integrity: Negative for ulcer, blister, skin breakdown, erythema, warmth, callus or dry skin.   Lymphadenopathy:        Head (right side): No submental, no submandibular and no tonsillar adenopathy present.        Head (left side): No submental, no submandibular and no tonsillar adenopathy present.     He has no cervical adenopathy.        Right: No supraclavicular adenopathy present.        Left: No supraclavicular adenopathy present.   Neurological: He is alert and oriented to person, place, and time. He has normal strength. No sensory deficit. Gait normal.   Reflex Scores:       Bicep reflexes are 2+ on the right side and 2+ on the left side.       Brachioradialis reflexes are 2+ on the right side and 2+ on the left side.       Patellar reflexes are 2+ on the right side and 2+ on the left side.       Achilles reflexes are 2+ on the right side and 2+ on the left side.  No sensory deficit identified to either foot with monofilament, proprioception intact to all toes bilateral feet.    Skin: Skin is warm, dry and intact. No rash noted. He is not diaphoretic. No erythema. No pallor.   Psychiatric: He has a normal mood and affect. His speech is normal and behavior is normal. Judgment and thought content normal. His mood appears not anxious. Cognition and memory are normal. He does not exhibit a depressed mood.   Nursing note and vitals  reviewed.        Assessment:       1. Controlled type 2 diabetes mellitus with microalbuminuria, without long-term current use of insulin    2. Hypertension associated with diabetes    3. Hyperlipidemia associated with type 2 diabetes mellitus    4. Benign prostatic hyperplasia with nocturia    5. BMI 26.0-26.9,adult    6. Screen for colon cancer    7. High risk medication use          Plan:       Ludwin was seen today for diabetes and hypertension.  Discussed healthy diet, regular exercise, necessary labs, age appropriate cancer screening, and routine vaccinations.  Patient declined Pneumovax 23 at today's visit.  Patient was educated on shingles vaccine and the need to receive it at pharmacy.   Educational handouts provided.  Prevention guidelines men age 65 and over  [employer] physical form completed and returned to the patient  Diagnoses and all orders for this visit:    Controlled type 2 diabetes mellitus with microalbuminuria, without long-term current use of insulin  -     metFORMIN (GLUCOPHAGE-XR) 500 MG 24 hr tablet; TAKE ONE TABLET BY MOUTH EVERY MORNING AND TAKE TWO TABLETS BY MOUTH EVERY EVENING  -     SITagliptin (JANUVIA) 50 MG Tab; Take 1 tablet (50 mg total) by mouth once daily.  -     Comprehensive metabolic panel; Future  -     Microalbumin/creatinine urine ratio; Future  -   Lab Results   Component Value Date    HGBA1C 6.5 (H) 04/18/2019   - diabetes is well controlled, continue current medications and management plan  - diabetic foot exam completed at today's visit, normal monofilament exam normal proprioception  - Educational handouts provided.  Diabetic foot care  - patient does not check his blood sugar at home and this is not necessary at this time  - random urine microalbumin creatinine ratio was 35.6 on 5/3/2018, patient to continue ARB    Hypertension associated with diabetes  -     losartan (COZAAR) 25 MG tablet; Take 1 tablet (25 mg total) by mouth once daily.  -     Comprehensive  metabolic panel; Future  - Blood pressure controlled 127/76, continue current medication without change    Hyperlipidemia associated with type 2 diabetes mellitus  -     rosuvastatin (CRESTOR) 5 MG tablet; TAKE ONE TABLET BY MOUTH EVERY OTHER EVENING  -     Lipid panel; Future  -   Lab Results   Component Value Date    CHOL 122 10/02/2017    CHOL 148 09/24/2016    CHOL 129 11/25/2015     Lab Results   Component Value Date    HDL 41 10/02/2017    HDL 39 (L) 09/24/2016    HDL 46 11/25/2015     Lab Results   Component Value Date    LDLCALC 64.4 10/02/2017    LDLCALC 87.4 09/24/2016    LDLCALC 66.8 11/25/2015     Lab Results   Component Value Date    TRIG 83 10/02/2017    TRIG 108 09/24/2016    TRIG 81 11/25/2015     Lab Results   Component Value Date    CHOLHDL 33.6 10/02/2017    CHOLHDL 26.4 09/24/2016    CHOLHDL 35.7 11/25/2015   - LDL cholesterol is at goal of less than 70 on current dose of Crestor 5 mg daily, will readdress statin dose when new lipid panel results are available    Benign prostatic hyperplasia with nocturia  -     tamsulosin (FLOMAX) 0.4 mg Cap; Take 1 capsule (0.4 mg total) by mouth once daily.  -     Prostate Specific Antigen, Diagnostic; Future  - PSA a was 11.7 on 1/25/2018  - patient is being monitored and followed by Urology, Dr. Michel    BMI 26.0-26.9,adult   BMI today is 26.94 and the patient has lost 2 lb since his last visit on 1/8/2019   Weight loss recommended with well balanced diet and portion controlled meals and increased activity.   Screen for colon cancer  -     Ambulatory referral to Gastroenterology    High risk medication use  -     Comprehensive metabolic panel; Future      Follow up in about 6 months (around 11/7/2019) for Dr. Fisher, 40 minutes, schedule lab fasting.      Patient readiness: acceptance and barriers:none    During the course of the visit the patient was educated and counseled about the following:     Diabetes:  Discussed general issues about diabetes  pathophysiology and management.  Addressed ADA diet.  Discussed foot care.  Reminded to get yearly retinal exam.  Labs: fasting blood sugar, fasting lipid panel, hemoglobin A1C and microalbuminuria.  Follow up in 6 months or as needed.  Hypertension:   Medication: no change.  Dietary sodium restriction.  Regular aerobic exercise.  Follow up: 6 months and as needed.    Goals: Diabetes: Maintain Hemoglobin A1C below 7 and Hypertension: Reduce Blood Pressure    Did patient meet goals/outcomes: Yes    The following self management tools provided: declined    Patient Instructions (the written plan) was given to the patient/family.     Time spent with patient: 45 minutes    Barriers to medications present (no )    Adverse reactions to current medications (no)    Over the counter medications reviewed (Yes)

## 2019-05-07 NOTE — PATIENT INSTRUCTIONS
Prevention Guidelines, Men Ages 65 and Older  Screening tests and vaccines are an important part of managing your health. Health counseling is essential, too. Below are guidelines for these, for men ages 65 and older. Talk with your healthcare provider to make sure youre up-to-date on what you need.  Screening Who needs it How often   Abdominal aortic aneurysm Men ages 65 to 75 who have ever smoked 1 ultrasound   Alcohol misuse All men in this age group At routine exams   Blood pressure All men in this age group Every 2 years if your blood pressure is less than 120/80 mm Hg; yearly if your systolic blood pressure is 120 to 139 mm Hg, or your diastolic blood pressure reading is 80 to 89 mm Hg   Colorectal cancer All men in this age group Flexible sigmoidoscopy every 5 years, or colonoscopy every 10 years, or double-contrast barium enema every 5 years; yearly fecal occult blood test or fecal immunochemical test; or a stool DNA test as often as your healthcare provider advises; talk with your healthcare provider about which tests are best for you and when you no longer need colonoscopies (generally after age 75)   Depression All men in this age group At routine exams   Type 2 diabetes or prediabetes All adults beginning at age 45 and adults without symptoms at any age who are overweight or obese and have 1 or more other risk factors for diabetes At least every 3 years (yearly if your blood sugar has already begun to rise)   Hepatitis C Men at increased risk for infection - talk with your healthcare provider At routine exams   High cholesterol or triglycerides All men in this age group At least every 5 years   HIV Men at increased risk for infection - talk with your healthcare provider At routine exams   Lung cancer Adults ages 55 to 80 who have smoked Yearly screening in smokers with 30 pack-year history of smoking or who quit within 15 years   Obesity All men in this age group At routine exams   Prostate cancer All  men in this age group, talk to healthcare provider about risks and benefits of digital rectal exam (JOYCE) and prostate-specific antigen (PSA) screening1 At routine exams   Syphilis Men at increased risk for infection - talk with your healthcare provider At routine exams   Tuberculosis Men at increased risk for infection - talk with your healthcare provider Ask your healthcare provider   Vision All men in this age group Every 1 to 2 years; if you have a chronic health condition, ask your healthcare provider if you needs exams more often   Vaccine Who needs it How often   Chickenpox (varicella) All men in this age group who have no record of this infection or vaccine 2 doses; second dose should be given at least 4 weeks after the first dose   Hepatitis A Men at increased risk for infection - talk with your healthcare provider 2 doses given at least 6 months apart   Hepatitis B Men at increased risk for infection - talk with your healthcare provider 3 doses over 6 months; second dose should be given 1 month after the first dose; the third dose should be given at least 2 months after the second dose and at least 4 months after the first dose   Haemophilus influenzae Type B (HIB) Men at increased risk for infection - talk with your healthcare provider 1 to 3 doses   Influenza (flu) All men in this age group  Once a year   Meningococcal Men at increased risk for infection - talk with your healthcare provider 1 or more doses   Pneumococcal conjugate vaccine (PCV13) and pneumococcal polysaccharide vaccine (PPSV23) All men in this age group 1 dose of each vaccine   Tetanus/diphtheria/  pertussis (Td/Tdap) booster All men in this age group Td every 10 years, or Tdap if you will have contact with a child younger than 12 months old   Zoster All men in this age group 1 dose   Counseling Who needs it How often   Diet and exercise Men who are overweight or obese When diagnosed, and then at routine exams   Fall prevention (exercise,  vitamin D supplements) All men in this age group At routine exams   Sexually transmitted infection Men at increased risk for infection - talk with your healthcare provider At routine exams   Use of daily aspirin Men ages 45 to 79 at risk for cardiovascular health problems At routine exams   Use of tobacco and the health effects it can cause All men in this age group Every visit   [address] [place] [address] [facility]   Date Last Reviewed: 2/1/2017  © 2736-7958 [employer]. [address] [place] [address], [place] [address]. All rights reserved. This information is not intended as a substitute for professional medical care. Always follow your healthcare professional's instructions.        Diabetic Foot Care  Diabetes can lead to a number of foot complications. Fortunately, you can prevent most of these with a little extra foot care. If diabetes is not well controlled, it can cause damage to blood vessels and result in poor circulation to the foot. When the skin does not get enough blood flow, it becomes prone to pressure sores and ulcers, which heal slowly.  Diabetes can also damage nerves, interfering with the ability to feel pain and pressure. When you cant feel your foot normally, it is easy to injure your skin, bones, and joints without knowing it. For these reasons diabetes increases the risk of fungal infections, bunions, and ulcers. An ulcer is a sore or break in the skin. With ulcers, often the skin seems to have worn away. Deep ulcers can lead to bone infection.  Gangrene is the most serious foot complication of diabetes. It usually occurs on the tips of the toes as blackened areas of skin. The black area is dead tissue. In severe cases, gangrene spreads to involve the entire toe, other toes, and the entire foot. Foot or toe amputation may be required. Good foot care and blood sugar control can prevent this.  Home care  · Wear comfortable, well-fitting shoes.  · Wash your feet daily with warm water  and mild soap.  · After drying, apply a moisturizing cream or lotion to the top and bottom of your feet. Don't put lotion between toes.  · Check your feet daily for skin breaks, blisters, swelling, or redness. Look between your toes as well. If you cannot see the bottoms of your feet, ask someone to look or use a mirror.  · Wear cotton socks and change them every day.  · Trim toenails carefully, and do not cut your cuticles.  · Strive to keep your blood sugar under control with a combination of medicines, diet, and activity.  · If you smoke and have diabetes, it is very important that you stop. Smoking reduces blood flow to your foot.  · Schedule foot exams at least every year, or more often if you have foot problems.  · Put your feet up when sitting, wiggle toes, and move ankles to help improve blood flow.  Avoid activities that increase your risk of foot injury:  · Do not walk barefoot.  · Do not use heating pads or hot water bottles on your feet.  · Do not put your foot in a hot tub without first checking the temperature with your hand.  Follow-up care  Follow up with your healthcare provider, or as advised. Be sure to take off your shoes and socks before your appointment starts so your healthcare provider will be sure to check your feet. Report any cut, puncture, scrape, blister, or other injury to your foot. Also report if you have a bunion, hammertoes, ingrown toenail, or ulcer on your foot.  When to seek medical advice  Call your healthcare provider right away if any of these occur:  · Black skin color anywhere on the foot  · Open ulcer with pus draining from the wound  · Increasing foot or leg pain  · New areas of redness or swelling or tender areas of the foot  · Fever of 100.4°F (38°C) or greater  Date Last Reviewed: 5/25/2016  © 6557-6048 [employer]. [address] [place] [address], [place] [address]. All rights reserved. This information is not intended as a substitute for professional medical  care. Always follow your healthcare professional's instructions.

## 2019-05-09 ENCOUNTER — TELEPHONE (OUTPATIENT)
Dept: FAMILY MEDICINE | Facility: CLINIC | Age: 69
End: 2019-05-09

## 2019-05-09 NOTE — TELEPHONE ENCOUNTER
----- Message from Kaitlynn Roland sent at 5/9/2019 12:50 PM CDT -----  Contact: gabriella Diaz  Type: Needs Medical Advice    Who Called:  Emily Raymond  Best Call Back Number: [phone]  Additional Information: forms were filled out for [employer] were not done correctly & needs to have them redone--wants to know if he needs to be seen again or if he can just come in & give the forms to be filled out? Please advise--thank you

## 2019-05-09 NOTE — TELEPHONE ENCOUNTER
Spoke to patient- he was given outdated form by [facility]-he will bring new forms to office today.

## 2019-07-02 ENCOUNTER — TELEPHONE (OUTPATIENT)
Dept: FAMILY MEDICINE | Facility: CLINIC | Age: 69
End: 2019-07-02

## 2019-07-02 NOTE — TELEPHONE ENCOUNTER
----- Message from Micheal Wood sent at 7/2/2019  4:53 PM CDT -----  Contact: pt's wife Emily  Type: Needs Medical Advice    Who Called:  Emily    [facility] Call Back Number: [phone]  Additional Information: pt would like to discuss the paperwork for the [employer]. Please call to advise.

## 2019-07-08 NOTE — TELEPHONE ENCOUNTER
Spoke to patient- the last form filled out has that he takes Tramadol  prn-he states he no longer takes this medication- he will bring a new [employer] form on Friday.

## 2019-07-12 ENCOUNTER — TELEPHONE (OUTPATIENT)
Dept: FAMILY MEDICINE | Facility: CLINIC | Age: 69
End: 2019-07-12

## 2019-07-12 NOTE — TELEPHONE ENCOUNTER
Patient seen by  Kisha in May. Paperwork for [facility] [employer] was done. Medication list included Tramadol. Patient does not take it anymore. His last refill was in 2017. [facility] requesting letter stating patient no longer takes Tramadol.

## 2019-07-15 ENCOUNTER — PATIENT MESSAGE (OUTPATIENT)
Dept: FAMILY MEDICINE | Facility: CLINIC | Age: 69
End: 2019-07-15

## 2019-07-15 NOTE — TELEPHONE ENCOUNTER
Letter completed. Last tramadol refill was 1/14/19.   Letter given to Paz to be provided to patient.  Iliana

## 2019-10-31 ENCOUNTER — APPOINTMENT (OUTPATIENT)
Dept: LAB | Facility: HOSPITAL | Age: 69
End: 2019-10-31
Attending: UROLOGY
Payer: MEDICARE

## 2019-10-31 ENCOUNTER — OFFICE VISIT (OUTPATIENT)
Dept: UROLOGY | Facility: CLINIC | Age: 69
End: 2019-10-31
Payer: MEDICARE

## 2019-10-31 VITALS
WEIGHT: 180.75 LBS | SYSTOLIC BLOOD PRESSURE: 131 MMHG | TEMPERATURE: 99 F | HEART RATE: 78 BPM | DIASTOLIC BLOOD PRESSURE: 80 MMHG | HEIGHT: 67 IN | BODY MASS INDEX: 28.37 KG/M2 | RESPIRATION RATE: 18 BRPM

## 2019-10-31 DIAGNOSIS — R97.20 ELEVATED PSA: ICD-10-CM

## 2019-10-31 DIAGNOSIS — N40.1 BENIGN NON-NODULAR PROSTATIC HYPERPLASIA WITH LOWER URINARY TRACT SYMPTOMS: Primary | ICD-10-CM

## 2019-10-31 DIAGNOSIS — R31.29 MICROSCOPIC HEMATURIA: ICD-10-CM

## 2019-10-31 LAB
BILIRUB SERPL-MCNC: ABNORMAL MG/DL
BLOOD URINE, POC: ABNORMAL
COLOR, POC UA: YELLOW
GLUCOSE UR QL STRIP: ABNORMAL
KETONES UR QL STRIP: ABNORMAL
LEUKOCYTE ESTERASE URINE, POC: ABNORMAL
NITRITE, POC UA: ABNORMAL
PH, POC UA: 5.5
PROTEIN, POC: ABNORMAL
SPECIFIC GRAVITY, POC UA: 1.01
UROBILINOGEN, POC UA: 0.2

## 2019-10-31 PROCEDURE — 99214 OFFICE O/P EST MOD 30 MIN: CPT | Mod: 25,S$GLB,, | Performed by: UROLOGY

## 2019-10-31 PROCEDURE — 99999 PR PBB SHADOW E&M-EST. PATIENT-LVL III: CPT | Mod: PBBFAC,,, | Performed by: UROLOGY

## 2019-10-31 PROCEDURE — 88112 CYTOPATH CELL ENHANCE TECH: CPT | Mod: 26,,, | Performed by: PATHOLOGY

## 2019-10-31 PROCEDURE — 1101F PR PT FALLS ASSESS DOC 0-1 FALLS W/OUT INJ PAST YR: ICD-10-PCS | Mod: S$GLB,,, | Performed by: UROLOGY

## 2019-10-31 PROCEDURE — 3079F DIAST BP 80-89 MM HG: CPT | Mod: S$GLB,,, | Performed by: UROLOGY

## 2019-10-31 PROCEDURE — 81002 URINALYSIS NONAUTO W/O SCOPE: CPT | Mod: S$GLB,,, | Performed by: UROLOGY

## 2019-10-31 PROCEDURE — 99214 PR OFFICE/OUTPT VISIT, EST, LEVL IV, 30-39 MIN: ICD-10-PCS | Mod: 25,S$GLB,, | Performed by: UROLOGY

## 2019-10-31 PROCEDURE — 1101F PT FALLS ASSESS-DOCD LE1/YR: CPT | Mod: S$GLB,,, | Performed by: UROLOGY

## 2019-10-31 PROCEDURE — 87086 URINE CULTURE/COLONY COUNT: CPT

## 2019-10-31 PROCEDURE — 81002 POCT URINE DIPSTICK WITHOUT MICROSCOPE: ICD-10-PCS | Mod: S$GLB,,, | Performed by: UROLOGY

## 2019-10-31 PROCEDURE — 3075F PR MOST RECENT SYSTOLIC BLOOD PRESS GE 130-139MM HG: ICD-10-PCS | Mod: S$GLB,,, | Performed by: UROLOGY

## 2019-10-31 PROCEDURE — 3075F SYST BP GE 130 - 139MM HG: CPT | Mod: S$GLB,,, | Performed by: UROLOGY

## 2019-10-31 PROCEDURE — 88112 CYTOPATH CELL ENHANCE TECH: CPT | Performed by: PATHOLOGY

## 2019-10-31 PROCEDURE — 3079F PR MOST RECENT DIASTOLIC BLOOD PRESSURE 80-89 MM HG: ICD-10-PCS | Mod: S$GLB,,, | Performed by: UROLOGY

## 2019-10-31 PROCEDURE — 88112 CYTOLOGY SPECIMEN-URINE: ICD-10-PCS | Mod: 26,,, | Performed by: PATHOLOGY

## 2019-10-31 PROCEDURE — 99999 PR PBB SHADOW E&M-EST. PATIENT-LVL III: ICD-10-PCS | Mod: PBBFAC,,, | Performed by: UROLOGY

## 2019-10-31 NOTE — PROGRESS NOTES
OFFICE NOTE  [unfilled]  [id number]  10/31/2019       CHIEF COMPLAINT:   BPH, elevated PSA     HISTORY OF PRESENT ILLNESS:   this 69-year-old male returns routine recheck.  Has a history of BPH for which he continues to take Flomax 0.4 mg p.o. q.d. which continues to be effective.  He has a history elevated PSA for which he underwent prostate ultrasound biopsy in 2014 which showed no evidence of any malignancy.  He has undergone MRI of the prostate and 4K score both of which revealed low probability of developing prostatic carcinoma with a 4 K score of only 1% and the MRI revealed a low probability of only 2/5.    Physical exam:  Abdomen - soft benign nontender no masses no hernias no organomegaly                             External genitalia - normal phallus with adequate meatus testes descended and feel normal no scrotal mass                             Rectal - 40 g smooth prostate no nodules normal sphincter tone       PSA  - 11.7 on 01/25/2018     UA - trace of blood pH 5.5     FINAL IMPRESSION:  BPH, elevated PSA, microscopic hematuria       RECOMMENDATIONS:  PSA, urine for culture and cytology.  Continue on Flomax 0.4 mg p.o. q.d. .  We also discussed consideration for resuming Proscar that he has taken in the past.

## 2019-11-01 LAB — BACTERIA UR CULT: NO GROWTH

## 2019-11-06 ENCOUNTER — TELEPHONE (OUTPATIENT)
Dept: UROLOGY | Facility: CLINIC | Age: 69
End: 2019-11-06

## 2019-11-06 DIAGNOSIS — R39.11 HESITANCY: Primary | ICD-10-CM

## 2019-11-06 NOTE — TELEPHONE ENCOUNTER
----- Message from Modesta Butterfield sent at 11/6/2019  2:04 PM CST -----  Contact: pt [phone]  Call placed to pod pt is returning a mumtaz back,

## 2019-11-08 ENCOUNTER — LAB VISIT (OUTPATIENT)
Dept: LAB | Facility: HOSPITAL | Age: 69
End: 2019-11-08
Attending: UROLOGY
Payer: MEDICARE

## 2019-11-08 DIAGNOSIS — R97.20 ELEVATED PSA: ICD-10-CM

## 2019-11-08 LAB — COMPLEXED PSA SERPL-MCNC: 7.3 NG/ML (ref 0–4)

## 2019-11-08 PROCEDURE — 36415 COLL VENOUS BLD VENIPUNCTURE: CPT | Mod: PO

## 2019-11-08 PROCEDURE — 84153 ASSAY OF PSA TOTAL: CPT

## 2019-11-13 ENCOUNTER — TELEPHONE (OUTPATIENT)
Dept: UROLOGY | Facility: CLINIC | Age: 69
End: 2019-11-13

## 2019-11-13 NOTE — TELEPHONE ENCOUNTER
Call placed to give urine results, no answer, unable to leave message as mailbox is full, will send message via portal.

## 2019-11-13 NOTE — TELEPHONE ENCOUNTER
----- Message from Salo Michel MD sent at 11/12/2019  5:32 PM CST -----  Repeat urinalysis - no blood, 1+ glucose    Rec:  Recheck 3-4 months

## 2019-11-18 ENCOUNTER — PES CALL (OUTPATIENT)
Dept: ADMINISTRATIVE | Facility: CLINIC | Age: 69
End: 2019-11-18

## 2019-11-19 ENCOUNTER — TELEPHONE (OUTPATIENT)
Dept: FAMILY MEDICINE | Facility: CLINIC | Age: 69
End: 2019-11-19

## 2019-11-19 ENCOUNTER — PES CALL (OUTPATIENT)
Dept: ADMINISTRATIVE | Facility: CLINIC | Age: 69
End: 2019-11-19

## 2019-11-19 DIAGNOSIS — E11.9 DIABETES MELLITUS WITHOUT COMPLICATION: Primary | ICD-10-CM

## 2019-11-19 NOTE — TELEPHONE ENCOUNTER
----- Message from Christa Betancourt sent at 11/19/2019 12:19 PM CST -----  Contact: Emily Gee (Spouse) [phone] or [phone]  Emily Gee (Spouse) [phone] or [phone]  Patient called requesting orders to have labs done, a1c.

## 2019-11-22 ENCOUNTER — LAB VISIT (OUTPATIENT)
Dept: LAB | Facility: HOSPITAL | Age: 69
End: 2019-11-22
Attending: NURSE PRACTITIONER
Payer: MEDICARE

## 2019-11-22 ENCOUNTER — TELEPHONE (OUTPATIENT)
Dept: FAMILY MEDICINE | Facility: CLINIC | Age: 69
End: 2019-11-22

## 2019-11-22 DIAGNOSIS — R80.9 CONTROLLED TYPE 2 DIABETES MELLITUS WITH MICROALBUMINURIA, WITHOUT LONG-TERM CURRENT USE OF INSULIN: Primary | ICD-10-CM

## 2019-11-22 DIAGNOSIS — I15.2 HYPERTENSION ASSOCIATED WITH DIABETES: ICD-10-CM

## 2019-11-22 DIAGNOSIS — R35.1 BENIGN PROSTATIC HYPERPLASIA WITH NOCTURIA: ICD-10-CM

## 2019-11-22 DIAGNOSIS — E11.9 DIABETES MELLITUS WITHOUT COMPLICATION: ICD-10-CM

## 2019-11-22 DIAGNOSIS — E11.29 CONTROLLED TYPE 2 DIABETES MELLITUS WITH MICROALBUMINURIA, WITHOUT LONG-TERM CURRENT USE OF INSULIN: ICD-10-CM

## 2019-11-22 DIAGNOSIS — N40.1 BENIGN PROSTATIC HYPERPLASIA WITH NOCTURIA: ICD-10-CM

## 2019-11-22 DIAGNOSIS — R80.9 CONTROLLED TYPE 2 DIABETES MELLITUS WITH MICROALBUMINURIA, WITHOUT LONG-TERM CURRENT USE OF INSULIN: ICD-10-CM

## 2019-11-22 DIAGNOSIS — E11.29 CONTROLLED TYPE 2 DIABETES MELLITUS WITH MICROALBUMINURIA, WITHOUT LONG-TERM CURRENT USE OF INSULIN: Primary | ICD-10-CM

## 2019-11-22 DIAGNOSIS — Z79.899 HIGH RISK MEDICATION USE: ICD-10-CM

## 2019-11-22 DIAGNOSIS — E11.59 HYPERTENSION ASSOCIATED WITH DIABETES: ICD-10-CM

## 2019-11-22 DIAGNOSIS — E11.69 HYPERLIPIDEMIA ASSOCIATED WITH TYPE 2 DIABETES MELLITUS: ICD-10-CM

## 2019-11-22 DIAGNOSIS — E78.5 HYPERLIPIDEMIA ASSOCIATED WITH TYPE 2 DIABETES MELLITUS: ICD-10-CM

## 2019-11-22 LAB
ALBUMIN SERPL BCP-MCNC: 4 G/DL (ref 3.5–5.2)
ALP SERPL-CCNC: 38 U/L (ref 55–135)
ALT SERPL W/O P-5'-P-CCNC: 12 U/L (ref 10–44)
ANION GAP SERPL CALC-SCNC: 8 MMOL/L (ref 8–16)
AST SERPL-CCNC: 15 U/L (ref 10–40)
BILIRUB SERPL-MCNC: 0.7 MG/DL (ref 0.1–1)
BUN SERPL-MCNC: 12 MG/DL (ref 8–23)
CALCIUM SERPL-MCNC: 9.6 MG/DL (ref 8.7–10.5)
CHLORIDE SERPL-SCNC: 103 MMOL/L (ref 95–110)
CHOLEST SERPL-MCNC: 135 MG/DL (ref 120–199)
CHOLEST/HDLC SERPL: 3.4 {RATIO} (ref 2–5)
CO2 SERPL-SCNC: 29 MMOL/L (ref 23–29)
COMPLEXED PSA SERPL-MCNC: 6.6 NG/ML (ref 0–4)
CREAT SERPL-MCNC: 1.4 MG/DL (ref 0.5–1.4)
EST. GFR  (AFRICAN AMERICAN): 58.8 ML/MIN/1.73 M^2
EST. GFR  (NON AFRICAN AMERICAN): 50.9 ML/MIN/1.73 M^2
ESTIMATED AVG GLUCOSE: 160 MG/DL (ref 68–131)
GLUCOSE SERPL-MCNC: 167 MG/DL (ref 70–110)
HBA1C MFR BLD HPLC: 7.2 % (ref 4–5.6)
HDLC SERPL-MCNC: 40 MG/DL (ref 40–75)
HDLC SERPL: 29.6 % (ref 20–50)
LDLC SERPL CALC-MCNC: 72.2 MG/DL (ref 63–159)
NONHDLC SERPL-MCNC: 95 MG/DL
POTASSIUM SERPL-SCNC: 4.8 MMOL/L (ref 3.5–5.1)
PROT SERPL-MCNC: 7.1 G/DL (ref 6–8.4)
SODIUM SERPL-SCNC: 140 MMOL/L (ref 136–145)
TRIGL SERPL-MCNC: 114 MG/DL (ref 30–150)

## 2019-11-22 PROCEDURE — 84153 ASSAY OF PSA TOTAL: CPT

## 2019-11-22 PROCEDURE — 83036 HEMOGLOBIN GLYCOSYLATED A1C: CPT

## 2019-11-22 PROCEDURE — 80053 COMPREHEN METABOLIC PANEL: CPT

## 2019-11-22 PROCEDURE — 80061 LIPID PANEL: CPT

## 2019-11-22 PROCEDURE — 36415 COLL VENOUS BLD VENIPUNCTURE: CPT | Mod: PO

## 2019-11-22 NOTE — TELEPHONE ENCOUNTER
Patient informed. He will get back on his diet. Did not want to increase medications. Please place 3 month lab orders. (any day is ok. Patient is on [facility])

## 2019-12-03 ENCOUNTER — PES CALL (OUTPATIENT)
Dept: ADMINISTRATIVE | Facility: CLINIC | Age: 69
End: 2019-12-03

## 2020-02-14 DIAGNOSIS — E78.5 HYPERLIPIDEMIA ASSOCIATED WITH TYPE 2 DIABETES MELLITUS: ICD-10-CM

## 2020-02-14 DIAGNOSIS — E11.29 CONTROLLED TYPE 2 DIABETES MELLITUS WITH MICROALBUMINURIA, WITHOUT LONG-TERM CURRENT USE OF INSULIN: ICD-10-CM

## 2020-02-14 DIAGNOSIS — E11.69 HYPERLIPIDEMIA ASSOCIATED WITH TYPE 2 DIABETES MELLITUS: ICD-10-CM

## 2020-02-14 DIAGNOSIS — E11.59 HYPERTENSION ASSOCIATED WITH DIABETES: ICD-10-CM

## 2020-02-14 DIAGNOSIS — R80.9 CONTROLLED TYPE 2 DIABETES MELLITUS WITH MICROALBUMINURIA, WITHOUT LONG-TERM CURRENT USE OF INSULIN: ICD-10-CM

## 2020-02-14 DIAGNOSIS — I15.2 HYPERTENSION ASSOCIATED WITH DIABETES: ICD-10-CM

## 2020-02-14 RX ORDER — ROSUVASTATIN CALCIUM 5 MG/1
TABLET, COATED ORAL
Qty: 90 TABLET | Refills: 2 | Status: SHIPPED | OUTPATIENT
Start: 2020-02-14 | End: 2021-04-20

## 2020-02-14 RX ORDER — METFORMIN HYDROCHLORIDE 500 MG/1
TABLET, EXTENDED RELEASE ORAL
Qty: 270 TABLET | Refills: 0 | Status: SHIPPED | OUTPATIENT
Start: 2020-02-14 | End: 2020-06-30

## 2020-02-14 RX ORDER — LOSARTAN POTASSIUM 25 MG/1
25 TABLET ORAL DAILY
Qty: 90 TABLET | Refills: 0 | Status: SHIPPED | OUTPATIENT
Start: 2020-02-14 | End: 2020-03-05

## 2020-02-18 ENCOUNTER — TELEPHONE (OUTPATIENT)
Dept: FAMILY MEDICINE | Facility: CLINIC | Age: 70
End: 2020-02-18

## 2020-02-18 NOTE — TELEPHONE ENCOUNTER
----- Message from Laury Monsivais sent at 2/18/2020 12:57 PM CST -----  Contact: pt  Pt needing a call back regarding scheduling his annual appt. No dates in epic until June       Pt contact # [phone]

## 2020-02-18 NOTE — TELEPHONE ENCOUNTER
Spoke to patient in regarding to scheduling annual exam. Patient scheduled Friday, April 17, 2020 at 1400 with XOCHILT Mcrae. Patient verbalized understanding.

## 2020-02-26 ENCOUNTER — LAB VISIT (OUTPATIENT)
Dept: LAB | Facility: HOSPITAL | Age: 70
End: 2020-02-26
Attending: NURSE PRACTITIONER
Payer: MEDICARE

## 2020-02-26 DIAGNOSIS — E11.29 CONTROLLED TYPE 2 DIABETES MELLITUS WITH MICROALBUMINURIA, WITHOUT LONG-TERM CURRENT USE OF INSULIN: ICD-10-CM

## 2020-02-26 DIAGNOSIS — R80.9 CONTROLLED TYPE 2 DIABETES MELLITUS WITH MICROALBUMINURIA, WITHOUT LONG-TERM CURRENT USE OF INSULIN: ICD-10-CM

## 2020-02-26 LAB
ESTIMATED AVG GLUCOSE: 171 MG/DL (ref 68–131)
HBA1C MFR BLD HPLC: 7.6 % (ref 4–5.6)

## 2020-02-26 PROCEDURE — 83036 HEMOGLOBIN GLYCOSYLATED A1C: CPT

## 2020-02-26 PROCEDURE — 36415 COLL VENOUS BLD VENIPUNCTURE: CPT | Mod: PO

## 2020-03-05 DIAGNOSIS — I15.2 HYPERTENSION ASSOCIATED WITH DIABETES: ICD-10-CM

## 2020-03-05 DIAGNOSIS — E11.59 HYPERTENSION ASSOCIATED WITH DIABETES: ICD-10-CM

## 2020-03-05 RX ORDER — LOSARTAN POTASSIUM 25 MG/1
TABLET ORAL
Qty: 30 TABLET | Refills: 0 | Status: SHIPPED | OUTPATIENT
Start: 2020-03-05 | End: 2020-06-15 | Stop reason: SDUPTHER

## 2020-03-17 ENCOUNTER — TELEPHONE (OUTPATIENT)
Dept: GASTROENTEROLOGY | Facility: CLINIC | Age: 70
End: 2020-03-17

## 2020-03-17 NOTE — TELEPHONE ENCOUNTER
----- Message from Modesta Butterfield sent at 3/10/2020  2:13 PM CDT -----  Contact: pt's wife Emily  [phone]  Appt     Patient's wife is calling to make an appointment for Colonoscopy Screening. He was told by   His PCP to have the test done .   Last test was 2013  Call back [phone]

## 2020-03-17 NOTE — TELEPHONE ENCOUNTER
Call placed to Ms. Diaz in regards to message received. I advised her that at this time we are not scheduling screening colonoscopy's due to covid-19. She stated that she will call back at a later date to get it scheduled. No further issues noted.

## 2020-03-23 DIAGNOSIS — N40.1 BENIGN PROSTATIC HYPERPLASIA WITH NOCTURIA: ICD-10-CM

## 2020-03-23 DIAGNOSIS — R35.1 BENIGN PROSTATIC HYPERPLASIA WITH NOCTURIA: ICD-10-CM

## 2020-03-23 RX ORDER — TAMSULOSIN HYDROCHLORIDE 0.4 MG/1
CAPSULE ORAL
Qty: 30 CAPSULE | Refills: 6 | Status: SHIPPED | OUTPATIENT
Start: 2020-03-23 | End: 2020-10-16 | Stop reason: SDUPTHER

## 2020-04-03 ENCOUNTER — PATIENT OUTREACH (OUTPATIENT)
Dept: ADMINISTRATIVE | Facility: OTHER | Age: 70
End: 2020-04-03

## 2020-04-03 DIAGNOSIS — R80.9 CONTROLLED TYPE 2 DIABETES MELLITUS WITH MICROALBUMINURIA, WITHOUT LONG-TERM CURRENT USE OF INSULIN: Primary | ICD-10-CM

## 2020-04-03 DIAGNOSIS — E11.29 CONTROLLED TYPE 2 DIABETES MELLITUS WITH MICROALBUMINURIA, WITHOUT LONG-TERM CURRENT USE OF INSULIN: Primary | ICD-10-CM

## 2020-04-06 ENCOUNTER — TELEPHONE (OUTPATIENT)
Dept: UROLOGY | Facility: CLINIC | Age: 70
End: 2020-04-06

## 2020-04-06 ENCOUNTER — OFFICE VISIT (OUTPATIENT)
Dept: UROLOGY | Facility: CLINIC | Age: 70
End: 2020-04-06
Payer: MEDICARE

## 2020-04-06 DIAGNOSIS — R97.20 ELEVATED PSA: ICD-10-CM

## 2020-04-06 DIAGNOSIS — N40.1 BENIGN NON-NODULAR PROSTATIC HYPERPLASIA WITH LOWER URINARY TRACT SYMPTOMS: Primary | ICD-10-CM

## 2020-04-06 PROCEDURE — 1101F PR PT FALLS ASSESS DOC 0-1 FALLS W/OUT INJ PAST YR: ICD-10-PCS | Mod: 95,,, | Performed by: UROLOGY

## 2020-04-06 PROCEDURE — 1101F PT FALLS ASSESS-DOCD LE1/YR: CPT | Mod: 95,,, | Performed by: UROLOGY

## 2020-04-06 PROCEDURE — 1159F PR MEDICATION LIST DOCUMENTED IN MEDICAL RECORD: ICD-10-PCS | Mod: 95,,, | Performed by: UROLOGY

## 2020-04-06 PROCEDURE — 1159F MED LIST DOCD IN RCRD: CPT | Mod: 95,,, | Performed by: UROLOGY

## 2020-04-06 PROCEDURE — 99441 PR PHYSICIAN TELEPHONE EVALUATION 5-10 MIN: ICD-10-PCS | Mod: 95,,, | Performed by: UROLOGY

## 2020-04-06 PROCEDURE — 99441 PR PHYSICIAN TELEPHONE EVALUATION 5-10 MIN: CPT | Mod: 95,,, | Performed by: UROLOGY

## 2020-04-06 NOTE — TELEPHONE ENCOUNTER
----- Message from Mara Gomez MA sent at 4/6/2020  2:57 PM CDT -----  Contact: self  Patient requesting to speak to nurse regarding virtual visit tomorrow       Please call to advice [phone] (home)

## 2020-04-06 NOTE — PROGRESS NOTES
OFFICE NOTE  [unfilled]  [id number]  4/6/2020       CHIEF COMPLAINT:   BPH, elevated PSA     HISTORY OF PRESENT ILLNESS:   this 70-year-old male returns routine visit although on today's visit we can only do an audio visit in view of the Coronavirus situation.  Patient has history of BPH for which he continues to take Flomax with which he states he continues to do well.  He also has a history of elevated PSA and did undergo prostate ultrasound biopsy in 2014 which showed no evidence of any malignancy.  He did subsequently undergo MRI of the prostate and the 4K score with both of which showed low probability of developing prostate cancer.  His most recent PSA was 6.6 on 11/22/2019 having come down from 7.3 five months prior and 11.7 one to two years prior.  Denies any specific complaints on today's visit.    Physical exam:   unable the form physical examination view of the Coronavirus situation     PSA  - 6.6 on 11/22/2019       FINAL IMPRESSION:  BPH, elevated PSA       RECOMMENDATIONS:   continue Flomax 0.4 mg p.o. Q.d..  Recheck in 4-6 months.

## 2020-05-14 ENCOUNTER — TELEPHONE (OUTPATIENT)
Dept: FAMILY MEDICINE | Facility: CLINIC | Age: 70
End: 2020-05-14

## 2020-05-14 DIAGNOSIS — R80.9 CONTROLLED TYPE 2 DIABETES MELLITUS WITH MICROALBUMINURIA, WITHOUT LONG-TERM CURRENT USE OF INSULIN: ICD-10-CM

## 2020-05-14 DIAGNOSIS — E11.29 CONTROLLED TYPE 2 DIABETES MELLITUS WITH MICROALBUMINURIA, WITHOUT LONG-TERM CURRENT USE OF INSULIN: ICD-10-CM

## 2020-05-14 NOTE — TELEPHONE ENCOUNTER
----- Message from Geetha Landeros sent at 5/14/2020 12:01 PM CDT -----  Contact: Pt wife  Type: Needs medical advice      Who Called: Pt wife  Best Call Back Number:  [phone]  Additional Information:   Pt wife requesting a call back. Pt wants to see Dr. Fisher for his annual appt, pt wife stated he does not want to see the PA.       Please advise. Thank you

## 2020-05-14 NOTE — TELEPHONE ENCOUNTER
Spoke to wife- advised it would be September when I can schedule with Dr. Fisher-he will keep appt in June.

## 2020-05-15 ENCOUNTER — TELEPHONE (OUTPATIENT)
Dept: FAMILY MEDICINE | Facility: CLINIC | Age: 70
End: 2020-05-15

## 2020-05-15 NOTE — TELEPHONE ENCOUNTER
----- Message from Lico Ferrari sent at 5/15/2020 12:21 PM CDT -----  Contact: Christina ([facility])  Type:  Pharmacy Calling to Clarify an RX    Name of Caller:  Christina  Pharmacy Name:    [facility] - [place], [place] - [id number]  [place] [address]  Phone: [phone] Fax: [phone]  Prescription Name:  JANUVIA 50 mg Tab  What do they need to clarify?:  Did not receive prescription  Best Call Back Number:  [phone]  Additional Information:  NA

## 2020-05-15 NOTE — TELEPHONE ENCOUNTER
----- Message from Christa Betancourt sent at 5/15/2020 12:46 PM CDT -----  Contact: [phone]  Patient requesting a refill on SITagliptin (JANUVIA) 50 MG Tab, completely out stated they been waiting since Friday of last week.    Patient will be using   [facility] - [place], [place] - [address] [facility] [id number]  [facility] [address]  Phone: [phone] Fax: [phone]  .    Please call patient at [phone]. Thanks!

## 2020-06-01 ENCOUNTER — PATIENT OUTREACH (OUTPATIENT)
Dept: ADMINISTRATIVE | Facility: HOSPITAL | Age: 70
End: 2020-06-01

## 2020-06-01 NOTE — PROGRESS NOTES
Chart review completed 06/01/2020.  Care Everywhere updates requested and reviewed.  Immunizations reconciled. Media reviewed.     DIS, Lab aaron, and quest reviewed    WOG orders placed. URINE MICROALBUMIN  PORTAL MESSAGE SENT W. LINK TO SCHEDULE EYE EXAM

## 2020-06-10 ENCOUNTER — TELEPHONE (OUTPATIENT)
Dept: GASTROENTEROLOGY | Facility: CLINIC | Age: 70
End: 2020-06-10

## 2020-06-10 NOTE — TELEPHONE ENCOUNTER
Call placed to Mr. Mascorro in regards to scheduling his colonoscopy. 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 He stated he will call us back once he gets the schedule. No further issues noted.

## 2020-06-15 ENCOUNTER — OFFICE VISIT (OUTPATIENT)
Dept: FAMILY MEDICINE | Facility: CLINIC | Age: 70
End: 2020-06-15
Payer: MEDICARE

## 2020-06-15 VITALS
HEIGHT: 67 IN | WEIGHT: 169.75 LBS | HEART RATE: 70 BPM | BODY MASS INDEX: 26.64 KG/M2 | OXYGEN SATURATION: 97 % | TEMPERATURE: 98 F | SYSTOLIC BLOOD PRESSURE: 136 MMHG | DIASTOLIC BLOOD PRESSURE: 72 MMHG

## 2020-06-15 DIAGNOSIS — I15.2 HYPERTENSION ASSOCIATED WITH DIABETES: ICD-10-CM

## 2020-06-15 DIAGNOSIS — E78.5 HYPERLIPIDEMIA ASSOCIATED WITH TYPE 2 DIABETES MELLITUS: ICD-10-CM

## 2020-06-15 DIAGNOSIS — E78.2 MIXED HYPERLIPIDEMIA: Primary | ICD-10-CM

## 2020-06-15 DIAGNOSIS — N18.30 CKD (CHRONIC KIDNEY DISEASE) STAGE 3, GFR 30-59 ML/MIN: ICD-10-CM

## 2020-06-15 DIAGNOSIS — R80.9 CONTROLLED TYPE 2 DIABETES MELLITUS WITH MICROALBUMINURIA, WITHOUT LONG-TERM CURRENT USE OF INSULIN: ICD-10-CM

## 2020-06-15 DIAGNOSIS — E11.69 HYPERLIPIDEMIA ASSOCIATED WITH TYPE 2 DIABETES MELLITUS: ICD-10-CM

## 2020-06-15 DIAGNOSIS — E11.59 HYPERTENSION ASSOCIATED WITH DIABETES: ICD-10-CM

## 2020-06-15 DIAGNOSIS — I10 ESSENTIAL HYPERTENSION: ICD-10-CM

## 2020-06-15 DIAGNOSIS — E11.29 CONTROLLED TYPE 2 DIABETES MELLITUS WITH MICROALBUMINURIA, WITHOUT LONG-TERM CURRENT USE OF INSULIN: ICD-10-CM

## 2020-06-15 DIAGNOSIS — Z12.11 COLON CANCER SCREENING: ICD-10-CM

## 2020-06-15 PROCEDURE — 3051F HG A1C>EQUAL 7.0%<8.0%: CPT | Mod: S$GLB,,, | Performed by: PHYSICIAN ASSISTANT

## 2020-06-15 PROCEDURE — 1126F AMNT PAIN NOTED NONE PRSNT: CPT | Mod: S$GLB,,, | Performed by: PHYSICIAN ASSISTANT

## 2020-06-15 PROCEDURE — 99214 PR OFFICE/OUTPT VISIT, EST, LEVL IV, 30-39 MIN: ICD-10-PCS | Mod: S$GLB,,, | Performed by: PHYSICIAN ASSISTANT

## 2020-06-15 PROCEDURE — 3051F PR MOST RECENT HEMOGLOBIN A1C LEVEL 7.0 - < 8.0%: ICD-10-PCS | Mod: S$GLB,,, | Performed by: PHYSICIAN ASSISTANT

## 2020-06-15 PROCEDURE — 1126F PR PAIN SEVERITY QUANTIFIED, NO PAIN PRESENT: ICD-10-PCS | Mod: S$GLB,,, | Performed by: PHYSICIAN ASSISTANT

## 2020-06-15 PROCEDURE — 3078F DIAST BP <80 MM HG: CPT | Mod: S$GLB,,, | Performed by: PHYSICIAN ASSISTANT

## 2020-06-15 PROCEDURE — 1101F PR PT FALLS ASSESS DOC 0-1 FALLS W/OUT INJ PAST YR: ICD-10-PCS | Mod: S$GLB,,, | Performed by: PHYSICIAN ASSISTANT

## 2020-06-15 PROCEDURE — 3075F PR MOST RECENT SYSTOLIC BLOOD PRESS GE 130-139MM HG: ICD-10-PCS | Mod: S$GLB,,, | Performed by: PHYSICIAN ASSISTANT

## 2020-06-15 PROCEDURE — 1101F PT FALLS ASSESS-DOCD LE1/YR: CPT | Mod: S$GLB,,, | Performed by: PHYSICIAN ASSISTANT

## 2020-06-15 PROCEDURE — 99999 PR PBB SHADOW E&M-EST. PATIENT-LVL IV: CPT | Mod: PBBFAC,,, | Performed by: PHYSICIAN ASSISTANT

## 2020-06-15 PROCEDURE — 99214 OFFICE O/P EST MOD 30 MIN: CPT | Mod: S$GLB,,, | Performed by: PHYSICIAN ASSISTANT

## 2020-06-15 PROCEDURE — 3075F SYST BP GE 130 - 139MM HG: CPT | Mod: S$GLB,,, | Performed by: PHYSICIAN ASSISTANT

## 2020-06-15 PROCEDURE — 1159F PR MEDICATION LIST DOCUMENTED IN MEDICAL RECORD: ICD-10-PCS | Mod: S$GLB,,, | Performed by: PHYSICIAN ASSISTANT

## 2020-06-15 PROCEDURE — 1159F MED LIST DOCD IN RCRD: CPT | Mod: S$GLB,,, | Performed by: PHYSICIAN ASSISTANT

## 2020-06-15 PROCEDURE — 3078F PR MOST RECENT DIASTOLIC BLOOD PRESSURE < 80 MM HG: ICD-10-PCS | Mod: S$GLB,,, | Performed by: PHYSICIAN ASSISTANT

## 2020-06-15 PROCEDURE — 99999 PR PBB SHADOW E&M-EST. PATIENT-LVL IV: ICD-10-PCS | Mod: PBBFAC,,, | Performed by: PHYSICIAN ASSISTANT

## 2020-06-15 RX ORDER — LOSARTAN POTASSIUM 25 MG/1
25 TABLET ORAL DAILY
Qty: 30 TABLET | Refills: 0 | Status: SHIPPED | OUTPATIENT
Start: 2020-06-15 | End: 2021-03-03

## 2020-06-15 NOTE — PROGRESS NOTES
Subjective:       Patient ID: Ludwin Gee is a 70 y.o. male.    Chief Complaint: Annual Exam    HPI   Pt. Needs foot and eye exam  Needs f/u colonoscopy  Pt. Needs refills  Pt. Feels well   No med complaints  Pt. Glucose readings elevated  Pt. Not on diabetic diet  Review of Systems   Constitutional: Negative.  Negative for activity change, appetite change, chills, diaphoresis, fatigue, fever and unexpected weight change.   HENT: Negative.    Eyes: Negative.    Respiratory: Negative.  Negative for cough and shortness of breath.    Cardiovascular: Negative.  Negative for chest pain and leg swelling.   Gastrointestinal: Negative.    Endocrine: Negative.    Genitourinary: Negative.    Musculoskeletal: Negative.    Skin: Negative.  Negative for rash.   Neurological: Negative.        Objective:      Physical Exam  Vitals signs reviewed.   Constitutional:       General: He is not in acute distress.     Appearance: Normal appearance. He is normal weight. He is not ill-appearing, toxic-appearing or diaphoretic.   HENT:      Head: Normocephalic and atraumatic.      Right Ear: Tympanic membrane and ear canal normal.      Left Ear: Tympanic membrane and ear canal normal.      Mouth/Throat:      Mouth: Mucous membranes are moist.      Pharynx: No oropharyngeal exudate.   Eyes:      General: No scleral icterus.     Conjunctiva/sclera: Conjunctivae normal.   Neck:      Musculoskeletal: Neck supple. No muscular tenderness.      Vascular: No carotid bruit.   Cardiovascular:      Rate and Rhythm: Normal rate and regular rhythm.      Pulses: Normal pulses.           Dorsalis pedis pulses are 2+ on the right side and 2+ on the left side.        Posterior tibial pulses are 2+ on the right side and 2+ on the left side.      Heart sounds: No murmur. No friction rub. No gallop.    Pulmonary:      Effort: Pulmonary effort is normal. No respiratory distress.      Breath sounds: Normal breath sounds. No stridor. No wheezing, rhonchi or  rales.   Abdominal:      General: Abdomen is flat. Bowel sounds are normal. There is no distension.      Palpations: Abdomen is soft. There is no mass.      Tenderness: There is no abdominal tenderness. There is no guarding or rebound.      Hernia: No hernia is present.      Comments: No organomegaly   Musculoskeletal:         General: No swelling.      Right lower leg: No edema.      Left lower leg: No edema.      Right foot: Normal range of motion. No deformity, bunion, Charcot foot, foot drop or prominent metatarsal heads.      Left foot: Normal range of motion. No deformity, bunion, Charcot foot, foot drop or prominent metatarsal heads.   Feet:      Right foot:      Protective Sensation: 10 sites tested. 10 sites sensed.      Skin integrity: Skin integrity normal. No ulcer, blister, skin breakdown, erythema, warmth, callus, dry skin or fissure.      Toenail Condition: Right toenails are normal.      Left foot:      Protective Sensation: 10 sites tested. 10 sites sensed.      Skin integrity: Skin integrity normal. No ulcer, blister, skin breakdown, erythema, warmth, callus, dry skin or fissure.      Toenail Condition: Left toenails are normal.   Lymphadenopathy:      Cervical: No cervical adenopathy.   Skin:     General: Skin is warm and dry.      Findings: No rash.   Neurological:      General: No focal deficit present.      Mental Status: He is alert and oriented to person, place, and time.         Assessment:       1. Mixed hyperlipidemia    2. Hypertension associated with diabetes    3. Hyperlipidemia associated with type 2 diabetes mellitus    4. Essential hypertension    5. Controlled type 2 diabetes mellitus with microalbuminuria, without long-term current use of insulin    6. CKD (chronic kidney disease) stage 3, GFR 30-59 ml/min    7. Colon cancer screening        Plan:       Ludwin was seen today for annual exam.    Diagnoses and all orders for this visit:    Mixed hyperlipidemia  -     Comprehensive  metabolic panel; Future  -     Lipid Panel; Future  -     Hemoglobin A1C; Future  -     MICROALBUMIN / CREATININE RATIO URINE  -     Urinalysis; Future    Hypertension associated with diabetes  -     losartan (COZAAR) 25 MG tablet; Take 1 tablet (25 mg total) by mouth once daily.  -     Comprehensive metabolic panel; Future  -     Lipid Panel; Future  -     Hemoglobin A1C; Future  -     MICROALBUMIN / CREATININE RATIO URINE  -     Urinalysis; Future    Hyperlipidemia associated with type 2 diabetes mellitus  -     Comprehensive metabolic panel; Future  -     Lipid Panel; Future  -     Hemoglobin A1C; Future  -     MICROALBUMIN / CREATININE RATIO URINE  -     Urinalysis; Future    Essential hypertension  -     Comprehensive metabolic panel; Future  -     Lipid Panel; Future  -     Hemoglobin A1C; Future  -     MICROALBUMIN / CREATININE RATIO URINE  -     Urinalysis; Future    Controlled type 2 diabetes mellitus with microalbuminuria, without long-term current use of insulin  -     SITagliptin (JANUVIA) 50 MG Tab; Take 1 tablet (50 mg total) by mouth once daily.  -     Comprehensive metabolic panel; Future  -     Lipid Panel; Future  -     Hemoglobin A1C; Future  -     MICROALBUMIN / CREATININE RATIO URINE  -     Urinalysis; Future    CKD (chronic kidney disease) stage 3, GFR 30-59 ml/min  -     MICROALBUMIN / CREATININE RATIO URINE  -     Urinalysis; Future    Colon cancer screening  -     Ambulatory referral/consult to Gastroenterology; Future    pt. To schedule eye exam  Discussed diet  Recheck 6 mos  Sent script to [facility] to see if Januvia discount available

## 2020-06-18 DIAGNOSIS — E11.29 CONTROLLED TYPE 2 DIABETES MELLITUS WITH MICROALBUMINURIA, WITHOUT LONG-TERM CURRENT USE OF INSULIN: ICD-10-CM

## 2020-06-18 DIAGNOSIS — R80.9 CONTROLLED TYPE 2 DIABETES MELLITUS WITH MICROALBUMINURIA, WITHOUT LONG-TERM CURRENT USE OF INSULIN: ICD-10-CM

## 2020-06-18 NOTE — TELEPHONE ENCOUNTER
----- Message from Chandana Marks sent at 6/18/2020  4:03 PM CDT -----  Regarding: Patient Advice  Contact: Ludwin Gee  Pt would like to have a nurse call about some medication     Pt can be reached at [phone]

## 2020-06-19 NOTE — TELEPHONE ENCOUNTER
No answer patients phone. Called spoke to wife- he is in the donut hole. Januvia will be less expensive at [facility]. If still unaffordable she was advised to ask pharmacy to run it under Good Rx card. Please send Januvia to [facility] pharmacy.

## 2020-06-24 ENCOUNTER — LAB VISIT (OUTPATIENT)
Dept: LAB | Facility: HOSPITAL | Age: 70
End: 2020-06-24
Attending: PHYSICIAN ASSISTANT
Payer: MEDICARE

## 2020-06-24 DIAGNOSIS — I15.2 HYPERTENSION ASSOCIATED WITH DIABETES: ICD-10-CM

## 2020-06-24 DIAGNOSIS — E78.2 MIXED HYPERLIPIDEMIA: ICD-10-CM

## 2020-06-24 DIAGNOSIS — I10 ESSENTIAL HYPERTENSION: ICD-10-CM

## 2020-06-24 DIAGNOSIS — R80.9 CONTROLLED TYPE 2 DIABETES MELLITUS WITH MICROALBUMINURIA, WITHOUT LONG-TERM CURRENT USE OF INSULIN: ICD-10-CM

## 2020-06-24 DIAGNOSIS — E11.69 HYPERLIPIDEMIA ASSOCIATED WITH TYPE 2 DIABETES MELLITUS: ICD-10-CM

## 2020-06-24 DIAGNOSIS — E11.59 HYPERTENSION ASSOCIATED WITH DIABETES: ICD-10-CM

## 2020-06-24 DIAGNOSIS — E78.5 HYPERLIPIDEMIA ASSOCIATED WITH TYPE 2 DIABETES MELLITUS: ICD-10-CM

## 2020-06-24 DIAGNOSIS — E11.29 CONTROLLED TYPE 2 DIABETES MELLITUS WITH MICROALBUMINURIA, WITHOUT LONG-TERM CURRENT USE OF INSULIN: ICD-10-CM

## 2020-06-24 LAB
ALBUMIN SERPL BCP-MCNC: 4.2 G/DL (ref 3.5–5.2)
ALP SERPL-CCNC: 39 U/L (ref 55–135)
ALT SERPL W/O P-5'-P-CCNC: 13 U/L (ref 10–44)
ANION GAP SERPL CALC-SCNC: 10 MMOL/L (ref 8–16)
AST SERPL-CCNC: 17 U/L (ref 10–40)
BILIRUB SERPL-MCNC: 0.5 MG/DL (ref 0.1–1)
BUN SERPL-MCNC: 14 MG/DL (ref 8–23)
CALCIUM SERPL-MCNC: 9.8 MG/DL (ref 8.7–10.5)
CHLORIDE SERPL-SCNC: 103 MMOL/L (ref 95–110)
CHOLEST SERPL-MCNC: 128 MG/DL (ref 120–199)
CHOLEST/HDLC SERPL: 2.9 {RATIO} (ref 2–5)
CO2 SERPL-SCNC: 25 MMOL/L (ref 23–29)
CREAT SERPL-MCNC: 1.3 MG/DL (ref 0.5–1.4)
EST. GFR  (AFRICAN AMERICAN): >60 ML/MIN/1.73 M^2
EST. GFR  (NON AFRICAN AMERICAN): 55.3 ML/MIN/1.73 M^2
ESTIMATED AVG GLUCOSE: 143 MG/DL (ref 68–131)
GLUCOSE SERPL-MCNC: 131 MG/DL (ref 70–110)
HBA1C MFR BLD HPLC: 6.6 % (ref 4–5.6)
HDLC SERPL-MCNC: 44 MG/DL (ref 40–75)
HDLC SERPL: 34.4 % (ref 20–50)
LDLC SERPL CALC-MCNC: 66.4 MG/DL (ref 63–159)
NONHDLC SERPL-MCNC: 84 MG/DL
POTASSIUM SERPL-SCNC: 4.6 MMOL/L (ref 3.5–5.1)
PROT SERPL-MCNC: 7.4 G/DL (ref 6–8.4)
SODIUM SERPL-SCNC: 138 MMOL/L (ref 136–145)
TRIGL SERPL-MCNC: 88 MG/DL (ref 30–150)

## 2020-06-24 PROCEDURE — 83036 HEMOGLOBIN GLYCOSYLATED A1C: CPT

## 2020-06-24 PROCEDURE — 80053 COMPREHEN METABOLIC PANEL: CPT

## 2020-06-24 PROCEDURE — 36415 COLL VENOUS BLD VENIPUNCTURE: CPT | Mod: PO

## 2020-06-24 PROCEDURE — 80061 LIPID PANEL: CPT

## 2020-06-30 DIAGNOSIS — E11.29 CONTROLLED TYPE 2 DIABETES MELLITUS WITH MICROALBUMINURIA, WITHOUT LONG-TERM CURRENT USE OF INSULIN: ICD-10-CM

## 2020-06-30 DIAGNOSIS — R80.9 CONTROLLED TYPE 2 DIABETES MELLITUS WITH MICROALBUMINURIA, WITHOUT LONG-TERM CURRENT USE OF INSULIN: ICD-10-CM

## 2020-06-30 RX ORDER — METFORMIN HYDROCHLORIDE 500 MG/1
TABLET, EXTENDED RELEASE ORAL
Qty: 270 TABLET | Refills: 1 | Status: SHIPPED | OUTPATIENT
Start: 2020-06-30 | End: 2021-01-25

## 2020-08-19 ENCOUNTER — PATIENT OUTREACH (OUTPATIENT)
Dept: ADMINISTRATIVE | Facility: OTHER | Age: 70
End: 2020-08-19

## 2020-08-19 NOTE — PROGRESS NOTES
Chart was reviewed for overdue [facility] Encounters (IR[facility])  topics  Updates were requested from care everywhere  Health Maintenance was unable to be updated  LINKS immunization registry triggered

## 2020-08-25 ENCOUNTER — OFFICE VISIT (OUTPATIENT)
Dept: OPTOMETRY | Facility: CLINIC | Age: 70
End: 2020-08-25
Payer: MEDICARE

## 2020-08-25 DIAGNOSIS — H25.13 NUCLEAR SCLEROSIS, BILATERAL: ICD-10-CM

## 2020-08-25 DIAGNOSIS — H52.7 REFRACTIVE ERROR: ICD-10-CM

## 2020-08-25 DIAGNOSIS — E11.9 DIABETES MELLITUS TYPE 2 WITHOUT RETINOPATHY: Primary | ICD-10-CM

## 2020-08-25 DIAGNOSIS — E11.36 DIABETIC CATARACT: ICD-10-CM

## 2020-08-25 PROCEDURE — 92014 COMPRE OPH EXAM EST PT 1/>: CPT | Mod: S$GLB,,, | Performed by: OPTOMETRIST

## 2020-08-25 PROCEDURE — 92014 PR EYE EXAM, EST PATIENT,COMPREHESV: ICD-10-PCS | Mod: S$GLB,,, | Performed by: OPTOMETRIST

## 2020-08-25 PROCEDURE — 99999 PR PBB SHADOW E&M-EST. PATIENT-LVL III: CPT | Mod: PBBFAC,,, | Performed by: OPTOMETRIST

## 2020-08-25 PROCEDURE — 99999 PR PBB SHADOW E&M-EST. PATIENT-LVL III: ICD-10-PCS | Mod: PBBFAC,,, | Performed by: OPTOMETRIST

## 2020-08-25 NOTE — PROGRESS NOTES
HPI     Diabetic Eye Exam      Additional comments: BSL controlled              Itchy Eye      Additional comments: +Refresh gtts OU prn              Annual Exam      Additional comments: DLE 8-18 (madison)   ocular health exam //               Comments     Hemoglobin A1C       Date                     Value               Ref Range             Status                06/24/2020               6.6 (H)             4.0 - 5.6 %           Final                  02/26/2020               7.6 (H)             4.0 - 5.6 %           Final                  11/22/2019               7.2 (H)             4.0 - 5.6 %           Final                          Last edited by Lui Horta, OD on 8/25/2020 10:31 AM. (History)            Assessment /Plan     For exam results, see Encounter Report.    Diabetes mellitus type 2 without retinopathy    Nuclear sclerosis, bilateral    Diabetic cataract    Refractive error      DM type 2 w/o ocular retinopathy OU. Discussed possible ocular affects of uncontrolled blood sugar with patient. Recommended continued strong blood sugar control and continued care with PCP. Monitor yearly.     Mild cataracts OU. Discussed possible ocular affects of cataracts. Acceptable BCVA OU. Discussed treatment options. Surgery not recommended at this time. Monitor yearly.     Patient doing well with current specs, declines refraction. Return as desired for spec Rx.       RTC in 1 year for comprehensive eye exam, or sooner prn.

## 2020-10-04 ENCOUNTER — PATIENT OUTREACH (OUTPATIENT)
Dept: ADMINISTRATIVE | Facility: OTHER | Age: 70
End: 2020-10-04

## 2020-10-07 ENCOUNTER — LAB VISIT (OUTPATIENT)
Dept: LAB | Facility: HOSPITAL | Age: 70
End: 2020-10-07
Attending: UROLOGY
Payer: MEDICARE

## 2020-10-07 ENCOUNTER — OFFICE VISIT (OUTPATIENT)
Dept: UROLOGY | Facility: CLINIC | Age: 70
End: 2020-10-07
Payer: MEDICARE

## 2020-10-07 VITALS
WEIGHT: 169.75 LBS | HEIGHT: 67 IN | DIASTOLIC BLOOD PRESSURE: 79 MMHG | BODY MASS INDEX: 26.64 KG/M2 | HEART RATE: 75 BPM | TEMPERATURE: 98 F | RESPIRATION RATE: 18 BRPM | SYSTOLIC BLOOD PRESSURE: 122 MMHG

## 2020-10-07 DIAGNOSIS — R31.29 MICROSCOPIC HEMATURIA: ICD-10-CM

## 2020-10-07 DIAGNOSIS — N40.1 BENIGN NON-NODULAR PROSTATIC HYPERPLASIA WITH LOWER URINARY TRACT SYMPTOMS: ICD-10-CM

## 2020-10-07 DIAGNOSIS — R97.20 ELEVATED PROSTATE SPECIFIC ANTIGEN (PSA): Primary | ICD-10-CM

## 2020-10-07 DIAGNOSIS — R97.20 ELEVATED PROSTATE SPECIFIC ANTIGEN (PSA): ICD-10-CM

## 2020-10-07 LAB — COMPLEXED PSA SERPL-MCNC: 7.9 NG/ML (ref 0–4)

## 2020-10-07 PROCEDURE — 81000 URINALYSIS NONAUTO W/SCOPE: CPT | Mod: S$GLB,,, | Performed by: UROLOGY

## 2020-10-07 PROCEDURE — 3078F DIAST BP <80 MM HG: CPT | Mod: S$GLB,,, | Performed by: UROLOGY

## 2020-10-07 PROCEDURE — 1159F MED LIST DOCD IN RCRD: CPT | Mod: S$GLB,,, | Performed by: UROLOGY

## 2020-10-07 PROCEDURE — 3008F PR BODY MASS INDEX (BMI) DOCUMENTED: ICD-10-PCS | Mod: S$GLB,,, | Performed by: UROLOGY

## 2020-10-07 PROCEDURE — 81000 CHG URINALYSIS, NONAUTO, W/SCOPE: ICD-10-PCS | Mod: S$GLB,,, | Performed by: UROLOGY

## 2020-10-07 PROCEDURE — 36415 COLL VENOUS BLD VENIPUNCTURE: CPT | Mod: PO

## 2020-10-07 PROCEDURE — 84153 ASSAY OF PSA TOTAL: CPT

## 2020-10-07 PROCEDURE — 3074F SYST BP LT 130 MM HG: CPT | Mod: S$GLB,,, | Performed by: UROLOGY

## 2020-10-07 PROCEDURE — 1126F AMNT PAIN NOTED NONE PRSNT: CPT | Mod: S$GLB,,, | Performed by: UROLOGY

## 2020-10-07 PROCEDURE — 88112 CYTOPATH CELL ENHANCE TECH: CPT | Performed by: PATHOLOGY

## 2020-10-07 PROCEDURE — 1159F PR MEDICATION LIST DOCUMENTED IN MEDICAL RECORD: ICD-10-PCS | Mod: S$GLB,,, | Performed by: UROLOGY

## 2020-10-07 PROCEDURE — 99214 OFFICE O/P EST MOD 30 MIN: CPT | Mod: 25,S$GLB,, | Performed by: UROLOGY

## 2020-10-07 PROCEDURE — 88112 CYTOPATH CELL ENHANCE TECH: CPT | Mod: 26,,, | Performed by: PATHOLOGY

## 2020-10-07 PROCEDURE — 99999 PR PBB SHADOW E&M-EST. PATIENT-LVL III: ICD-10-PCS | Mod: PBBFAC,,, | Performed by: UROLOGY

## 2020-10-07 PROCEDURE — 1101F PR PT FALLS ASSESS DOC 0-1 FALLS W/OUT INJ PAST YR: ICD-10-PCS | Mod: S$GLB,,, | Performed by: UROLOGY

## 2020-10-07 PROCEDURE — 3078F PR MOST RECENT DIASTOLIC BLOOD PRESSURE < 80 MM HG: ICD-10-PCS | Mod: S$GLB,,, | Performed by: UROLOGY

## 2020-10-07 PROCEDURE — 1126F PR PAIN SEVERITY QUANTIFIED, NO PAIN PRESENT: ICD-10-PCS | Mod: S$GLB,,, | Performed by: UROLOGY

## 2020-10-07 PROCEDURE — 3074F PR MOST RECENT SYSTOLIC BLOOD PRESSURE < 130 MM HG: ICD-10-PCS | Mod: S$GLB,,, | Performed by: UROLOGY

## 2020-10-07 PROCEDURE — 1101F PT FALLS ASSESS-DOCD LE1/YR: CPT | Mod: S$GLB,,, | Performed by: UROLOGY

## 2020-10-07 PROCEDURE — 87086 URINE CULTURE/COLONY COUNT: CPT

## 2020-10-07 PROCEDURE — 99214 PR OFFICE/OUTPT VISIT, EST, LEVL IV, 30-39 MIN: ICD-10-PCS | Mod: 25,S$GLB,, | Performed by: UROLOGY

## 2020-10-07 PROCEDURE — 3008F BODY MASS INDEX DOCD: CPT | Mod: S$GLB,,, | Performed by: UROLOGY

## 2020-10-07 PROCEDURE — 88112 PR  CYTOPATH, CELL ENHANCE TECH: ICD-10-PCS | Mod: 26,,, | Performed by: PATHOLOGY

## 2020-10-07 PROCEDURE — 99999 PR PBB SHADOW E&M-EST. PATIENT-LVL III: CPT | Mod: PBBFAC,,, | Performed by: UROLOGY

## 2020-10-07 NOTE — PROGRESS NOTES
OFFICE NOTE  [unfilled]  [id number]  10/7/2020       CHIEF COMPLAINT:   BPH, elevated PSA     HISTORY OF PRESENT ILLNESS:   this 70-year-old male returns routine recheck.  Has history of elevated PSA for which he had undergone a prostate ultrasound biopsy in 2014 and was no evidence any malignancy.  He did undergo an MRI of the prostate and the 4K score both of which revealed a low probability of developing prostate cancer.  He continues to take Flomax with which he continues to do well.  His most recent PSA was 6.6 on 11/22/2019 having come down from 7.3 for some months prior.  He is yet to do another 1 this year.  Denies any complaints.    No changes his general medical history    Physical exam:  Abdomen - soft benign nontender no masses no hernias no organomegaly                             External genitalia - normal phallus with adequate meatus testes descended and feel normal no scrotal mass                             Rectal - 30 g smooth firm slightly increased in size prostate no nodules normal sphincter tone         PSA  - 6.6 on 11/22/2019     UA - trace of blood pH 5.5     FINAL IMPRESSION:  BPH, elevated PSA, microscopic hematuria of undetermined significance       RECOMMENDATIONS:  Recheck PSA.  Urine for culture and cytology.  Continue on Flomax.  Will contact patient with results and notify him of his next appointment.

## 2020-10-08 LAB — BACTERIA UR CULT: NO GROWTH

## 2020-10-12 LAB — FINAL PATHOLOGIC DIAGNOSIS: NORMAL

## 2020-10-13 DIAGNOSIS — R97.20 ELEVATED PSA: Primary | ICD-10-CM

## 2020-10-16 DIAGNOSIS — N40.1 BENIGN PROSTATIC HYPERPLASIA WITH NOCTURIA: ICD-10-CM

## 2020-10-16 DIAGNOSIS — R35.1 BENIGN PROSTATIC HYPERPLASIA WITH NOCTURIA: ICD-10-CM

## 2020-10-16 RX ORDER — TAMSULOSIN HYDROCHLORIDE 0.4 MG/1
1 CAPSULE ORAL DAILY
Qty: 30 CAPSULE | Refills: 6 | Status: SHIPPED | OUTPATIENT
Start: 2020-10-16 | End: 2020-12-16

## 2020-10-27 DIAGNOSIS — R80.9 CONTROLLED TYPE 2 DIABETES MELLITUS WITH MICROALBUMINURIA, WITHOUT LONG-TERM CURRENT USE OF INSULIN: Primary | ICD-10-CM

## 2020-10-27 DIAGNOSIS — E11.59 HYPERTENSION ASSOCIATED WITH DIABETES: ICD-10-CM

## 2020-10-27 DIAGNOSIS — E78.2 MIXED HYPERLIPIDEMIA: ICD-10-CM

## 2020-10-27 DIAGNOSIS — E11.29 CONTROLLED TYPE 2 DIABETES MELLITUS WITH MICROALBUMINURIA, WITHOUT LONG-TERM CURRENT USE OF INSULIN: Primary | ICD-10-CM

## 2020-10-27 DIAGNOSIS — I15.2 HYPERTENSION ASSOCIATED WITH DIABETES: ICD-10-CM

## 2020-10-27 DIAGNOSIS — I10 ESSENTIAL HYPERTENSION: ICD-10-CM

## 2020-10-27 NOTE — TELEPHONE ENCOUNTER
Care Due:                  Date            Visit Type   Department     Provider  --------------------------------------------------------------------------------    Last Visit: None Found      None         None Found                                           [facility]  Next Visit: 12-      [facility]       Herrera Fisher                                                            Last  Test          Frequency    Reason                     Performed    Due Date  --------------------------------------------------------------------------------    HBA1C.......  6 months...  metFORMIN................  06- 12-    Powered by [employer]. Reference number: [id number]. 10/27/2020 9:32:46 AM   CDT

## 2020-10-28 RX ORDER — LOSARTAN POTASSIUM 25 MG/1
TABLET ORAL
Qty: 90 TABLET | Refills: 0 | Status: SHIPPED | OUTPATIENT
Start: 2020-10-28 | End: 2020-12-16 | Stop reason: SDUPTHER

## 2020-10-28 NOTE — PROGRESS NOTES
Refill Authorization Note   Ludwin Gee is requesting a refill authorization.  Brief assessment and rationale for refill: Approve    -Medication-related problems identified: Non-adherence (knowledge deficit) non-intentional  Medication Therapy Plan: CDMR. (FOVS). LABS(A1C); PER EPIC DATA 78% ADHERENCE    Medication reconciliation completed: No   Comments:       Requested Prescriptions   Pending Prescriptions Disp Refills    losartan (COZAAR) 25 MG tablet [Pharmacy Med Name: LOSARTAN POTASSIUM 25 MG TA 25 Tablet] 90 tablet 0     Sig: TAKE ONE TABLET BY MOUTH EVERY DAY       Cardiovascular:  Angiotensin Receptor Blockers Passed - 10/27/2020  9:32 AM        Passed - Patient is at least 18 years old        Passed - Last BP in normal range within 360 days.     BP Readings from Last 3 Encounters:   10/07/20 122/79   06/15/20 136/72   10/31/19 131/80              Passed - Office visit in past 12 months or future 90 days     Recent Outpatient Visits            3 weeks ago Elevated prostate specific antigen (PSA)    [facility] Salo Michel MD    2 months ago Diabetes mellitus type 2 without retinopathy    [facility] [place] Diana Horta, OD    4 months ago Mixed hyperlipidemia    [facility] Alo Mcrae PA-C    6 months ago Benign non-nodular prostatic hyperplasia with lower urinary tract symptoms    [facility] Salo Michel MD    12 months ago Benign non-nodular prostatic hyperplasia with lower urinary tract symptoms    [facility] Salo Michel MD          Future Appointments              In 1 month Herrera Fisher MD [facility]                Passed - Cr is 1.4 or below and within 360 days     Creatinine   Date Value Ref Range Status   06/24/2020 1.3 0.5 - 1.4 mg/dL Final   11/22/2019 1.4 0.5 - 1.4 mg/dL Final   01/08/2019 1.2 0.5 - 1.4 mg/dL Final              Passed - K in normal range and within 360 days     Potassium   Date Value  Ref Range Status   06/24/2020 4.6 3.5 - 5.1 mmol/L Final   11/22/2019 4.8 3.5 - 5.1 mmol/L Final   01/08/2019 3.8 3.5 - 5.1 mmol/L Final              Passed - eGFR within 360 days     eGFR if non    Date Value Ref Range Status   06/24/2020 55.3 (A) >60 mL/min/1.73 m^2 Final     Comment:     Calculation used to obtain the estimated glomerular filtration  rate (eGFR) is the CKD-EPI equation.      11/22/2019 50.9 (A) >60 mL/min/1.73 m^2 Final     Comment:     Calculation used to obtain the estimated glomerular filtration  rate (eGFR) is the CKD-EPI equation.      01/08/2019 >60.0 >60 mL/min/1.73 m^2 Final     Comment:     Calculation used to obtain the estimated glomerular filtration  rate (eGFR) is the CKD-EPI equation.        eGFR if    Date Value Ref Range Status   06/24/2020 >60.0 >60 mL/min/1.73 m^2 Final   11/22/2019 58.8 (A) >60 mL/min/1.73 m^2 Final   01/08/2019 >60.0 >60 mL/min/1.73 m^2 Final                  Appointments  past 12m or future 3m with PCP    Date Provider   Last Visit   4/7/2017 Herrera Fisher MD   Next Visit   12/16/2020 Herrera Fisher MD   ED visits in past 90 days: 0     Note composed:4:34 PM 10/28/2020

## 2020-10-28 NOTE — TELEPHONE ENCOUNTER
Provider Staff:     Action is required for this patient.     Please schedule patient for Labs (A1C)    Thanks!  [facility]     Appointments  past 12m or future 3m with PCP    Date Provider   Last Visit   4/7/2017 Herrera Fisher MD   Next Visit   12/16/2020 Herrera Fisher MD     Note composed:4:38 PM 10/28/2020

## 2020-10-29 NOTE — TELEPHONE ENCOUNTER
In addition to the A1c ordered with his refill request he will need a CMP and a microalbumin creatinine ratio.  The microalbumin creatinine ratio has already been ordered in June but does need to be scheduled.  The order is in for a CMP.  Please schedule them altogether with the A1c, it should be due in December.

## 2020-10-29 NOTE — TELEPHONE ENCOUNTER
Called patient to schedule labs for prior to his appt in Dec. Patient was not available so I left a message with his wife and he will return my call to schedule at his convenience

## 2020-11-03 ENCOUNTER — TELEPHONE (OUTPATIENT)
Dept: UROLOGY | Facility: CLINIC | Age: 70
End: 2020-11-03

## 2020-11-03 NOTE — TELEPHONE ENCOUNTER
Returned call, informed the order for PSA is in, the patient will have it drawn with his lab work for PCP, to have ready for appt, she verbally understood.

## 2020-11-03 NOTE — TELEPHONE ENCOUNTER
----- Message from Lizzy Quiroz sent at 11/3/2020 12:10 PM CST -----  Regarding: advice  Contact: wife  Type: Needs Medical Advice  Who Called: wife-Emily  Symptoms (please be specific):    How long has patient had these symptoms:    Pharmacy name and phone #:    Best Call Back Number: [phone]   Additional Information: Patient's wife requesting ordered labs for patient prior to the appointment.

## 2020-12-02 ENCOUNTER — PATIENT OUTREACH (OUTPATIENT)
Dept: ADMINISTRATIVE | Facility: OTHER | Age: 70
End: 2020-12-02

## 2020-12-03 ENCOUNTER — LAB VISIT (OUTPATIENT)
Dept: LAB | Facility: HOSPITAL | Age: 70
End: 2020-12-03
Attending: UROLOGY
Payer: MEDICARE

## 2020-12-03 DIAGNOSIS — R97.20 ELEVATED PSA: ICD-10-CM

## 2020-12-03 LAB — COMPLEXED PSA SERPL-MCNC: 5.4 NG/ML (ref 0–4)

## 2020-12-03 PROCEDURE — 84153 ASSAY OF PSA TOTAL: CPT

## 2020-12-04 ENCOUNTER — OFFICE VISIT (OUTPATIENT)
Dept: UROLOGY | Facility: CLINIC | Age: 70
End: 2020-12-04
Payer: MEDICARE

## 2020-12-04 VITALS
SYSTOLIC BLOOD PRESSURE: 133 MMHG | HEART RATE: 72 BPM | WEIGHT: 169.75 LBS | HEIGHT: 67 IN | DIASTOLIC BLOOD PRESSURE: 80 MMHG | BODY MASS INDEX: 26.64 KG/M2 | RESPIRATION RATE: 18 BRPM | TEMPERATURE: 98 F

## 2020-12-04 DIAGNOSIS — N40.1 BENIGN NON-NODULAR PROSTATIC HYPERPLASIA WITH LOWER URINARY TRACT SYMPTOMS: ICD-10-CM

## 2020-12-04 DIAGNOSIS — R97.20 ELEVATED PROSTATE SPECIFIC ANTIGEN (PSA): Primary | ICD-10-CM

## 2020-12-04 PROCEDURE — 3288F PR FALLS RISK ASSESSMENT DOCUMENTED: ICD-10-PCS | Mod: S$GLB,,, | Performed by: UROLOGY

## 2020-12-04 PROCEDURE — 1126F AMNT PAIN NOTED NONE PRSNT: CPT | Mod: S$GLB,,, | Performed by: UROLOGY

## 2020-12-04 PROCEDURE — 99214 OFFICE O/P EST MOD 30 MIN: CPT | Mod: 25,S$GLB,, | Performed by: UROLOGY

## 2020-12-04 PROCEDURE — 3079F PR MOST RECENT DIASTOLIC BLOOD PRESSURE 80-89 MM HG: ICD-10-PCS | Mod: S$GLB,,, | Performed by: UROLOGY

## 2020-12-04 PROCEDURE — 1159F PR MEDICATION LIST DOCUMENTED IN MEDICAL RECORD: ICD-10-PCS | Mod: S$GLB,,, | Performed by: UROLOGY

## 2020-12-04 PROCEDURE — 3008F BODY MASS INDEX DOCD: CPT | Mod: S$GLB,,, | Performed by: UROLOGY

## 2020-12-04 PROCEDURE — 99214 PR OFFICE/OUTPT VISIT, EST, LEVL IV, 30-39 MIN: ICD-10-PCS | Mod: 25,S$GLB,, | Performed by: UROLOGY

## 2020-12-04 PROCEDURE — 1126F PR PAIN SEVERITY QUANTIFIED, NO PAIN PRESENT: ICD-10-PCS | Mod: S$GLB,,, | Performed by: UROLOGY

## 2020-12-04 PROCEDURE — 3075F SYST BP GE 130 - 139MM HG: CPT | Mod: S$GLB,,, | Performed by: UROLOGY

## 2020-12-04 PROCEDURE — 3008F PR BODY MASS INDEX (BMI) DOCUMENTED: ICD-10-PCS | Mod: S$GLB,,, | Performed by: UROLOGY

## 2020-12-04 PROCEDURE — 3075F PR MOST RECENT SYSTOLIC BLOOD PRESS GE 130-139MM HG: ICD-10-PCS | Mod: S$GLB,,, | Performed by: UROLOGY

## 2020-12-04 PROCEDURE — 81000 CHG URINALYSIS, NONAUTO, W/SCOPE: ICD-10-PCS | Mod: S$GLB,,, | Performed by: UROLOGY

## 2020-12-04 PROCEDURE — 3079F DIAST BP 80-89 MM HG: CPT | Mod: S$GLB,,, | Performed by: UROLOGY

## 2020-12-04 PROCEDURE — 1101F PR PT FALLS ASSESS DOC 0-1 FALLS W/OUT INJ PAST YR: ICD-10-PCS | Mod: S$GLB,,, | Performed by: UROLOGY

## 2020-12-04 PROCEDURE — 1159F MED LIST DOCD IN RCRD: CPT | Mod: S$GLB,,, | Performed by: UROLOGY

## 2020-12-04 PROCEDURE — 3288F FALL RISK ASSESSMENT DOCD: CPT | Mod: S$GLB,,, | Performed by: UROLOGY

## 2020-12-04 PROCEDURE — 1101F PT FALLS ASSESS-DOCD LE1/YR: CPT | Mod: S$GLB,,, | Performed by: UROLOGY

## 2020-12-04 PROCEDURE — 99999 PR PBB SHADOW E&M-EST. PATIENT-LVL III: ICD-10-PCS | Mod: PBBFAC,,, | Performed by: UROLOGY

## 2020-12-04 PROCEDURE — 81000 URINALYSIS NONAUTO W/SCOPE: CPT | Mod: S$GLB,,, | Performed by: UROLOGY

## 2020-12-04 PROCEDURE — 99999 PR PBB SHADOW E&M-EST. PATIENT-LVL III: CPT | Mod: PBBFAC,,, | Performed by: UROLOGY

## 2020-12-04 RX ORDER — ALFUZOSIN HYDROCHLORIDE 10 MG/1
10 TABLET, EXTENDED RELEASE ORAL
Qty: 30 TABLET | Refills: 11 | Status: SHIPPED | OUTPATIENT
Start: 2020-12-04 | End: 2021-08-04 | Stop reason: SDUPTHER

## 2020-12-04 NOTE — PROGRESS NOTES
OFFICE NOTE  [unfilled]  [id number]  12/4/2020       CHIEF COMPLAINT:   BPH, elevated PSA     HISTORY OF PRESENT ILLNESS:   this 70-year-old male returns routine recheck.  He has history of elevated PSA for which he underwent prostate ultrasound biopsy in 2014 and was no evidence of any malignancies.  He has subsequently undergone MRI of the prostate and the 4K score both of which revealed low probability of developing prostate cancer.  He has been taking Flomax but he feels it has not been as effective lately and wants to try something else.  His most recent PSA was 5.4 on 12/03/2020 having come down from 6.6.       PSA  - 5.4 on 12/03/2020     UA negative pH 6.0     FINAL IMPRESSION:   BPH, elevated PSA     RECOMMENDATIONS:  Trial on Uroxatral 10 mg p.o. q.d. in place of Flomax.  Recheck 3 months with repeat PSA.

## 2020-12-07 DIAGNOSIS — R80.9 CONTROLLED TYPE 2 DIABETES MELLITUS WITH MICROALBUMINURIA, WITHOUT LONG-TERM CURRENT USE OF INSULIN: Primary | ICD-10-CM

## 2020-12-07 DIAGNOSIS — E11.29 CONTROLLED TYPE 2 DIABETES MELLITUS WITH MICROALBUMINURIA, WITHOUT LONG-TERM CURRENT USE OF INSULIN: Primary | ICD-10-CM

## 2020-12-07 RX ORDER — GLIMEPIRIDE 1 MG/1
TABLET ORAL
Qty: 90 TABLET | Refills: 0 | Status: SHIPPED | OUTPATIENT
Start: 2020-12-07 | End: 2021-03-18

## 2020-12-07 NOTE — TELEPHONE ENCOUNTER
----- Message from Lillie Jimenez MA sent at 12/4/2020  2:52 PM CST -----  Spoke to patient re lab results. Pt verbalized understanding. Pt states he agrees to start taking the higher dose of metformin. Also, patient states he has not been taking the Januvia because the price increased and he was not able to afford anymore. Pt wants to know if there is something else he can take that would be easier on his pocket book. Pt has phys scheduled 12/16 and states this is one of the things he would like to address.

## 2020-12-07 NOTE — TELEPHONE ENCOUNTER
I am a little leery of increasing the metformin as his kidney function is getting a little weak with age.  If he can't afford the januvia he will have the same problem with most of the other alternatives.  We can try a low dose glimepiride-its relatively cheap but tends to cause some weight gain and has a bit higher risk of low blood sugar.  I think he can handle a low dose and hopefully that will be all he needs.

## 2020-12-07 NOTE — TELEPHONE ENCOUNTER
Glimepiride [facility] to [facility].  I want him to take 1/2 daily for the 1st two weeks and keep a blood sugar log every morning.  Send log results to me before increasing to a full tablet.  I want to avoid hypoglycemia

## 2020-12-07 NOTE — TELEPHONE ENCOUNTER
Patient notified and given this information- he agrees to try the Glimeperide. [facility] [place]

## 2020-12-08 RX ORDER — LANCETS
EACH MISCELLANEOUS
Qty: 100 EACH | Refills: 3 | Status: SHIPPED | OUTPATIENT
Start: 2020-12-08 | End: 2023-06-22 | Stop reason: ALTCHOICE

## 2020-12-08 RX ORDER — DEXTROSE 4 G
TABLET,CHEWABLE ORAL
Qty: 1 EACH | Refills: 0 | Status: SHIPPED | OUTPATIENT
Start: 2020-12-08

## 2020-12-08 NOTE — TELEPHONE ENCOUNTER
No new care gaps identified.  Powered by [employer]. Reference number: [id number]. 12/08/2020 10:53:02 AM   CST

## 2020-12-08 NOTE — TELEPHONE ENCOUNTER
Patient notified and will start with half tablet daily. [facility] in meter[facility] strips [facility]. Patient advised to take fasting glucose every morning and send in numbers.

## 2020-12-16 ENCOUNTER — OFFICE VISIT (OUTPATIENT)
Dept: FAMILY MEDICINE | Facility: CLINIC | Age: 70
End: 2020-12-16
Attending: FAMILY MEDICINE
Payer: MEDICARE

## 2020-12-16 VITALS
WEIGHT: 171.06 LBS | HEIGHT: 67 IN | HEART RATE: 71 BPM | DIASTOLIC BLOOD PRESSURE: 70 MMHG | BODY MASS INDEX: 26.85 KG/M2 | TEMPERATURE: 98 F | SYSTOLIC BLOOD PRESSURE: 118 MMHG | OXYGEN SATURATION: 97 %

## 2020-12-16 DIAGNOSIS — E11.29 CONTROLLED TYPE 2 DIABETES MELLITUS WITH MICROALBUMINURIA, WITHOUT LONG-TERM CURRENT USE OF INSULIN: Primary | ICD-10-CM

## 2020-12-16 DIAGNOSIS — I15.2 HYPERTENSION ASSOCIATED WITH DIABETES: ICD-10-CM

## 2020-12-16 DIAGNOSIS — R80.9 CONTROLLED TYPE 2 DIABETES MELLITUS WITH MICROALBUMINURIA, WITHOUT LONG-TERM CURRENT USE OF INSULIN: Primary | ICD-10-CM

## 2020-12-16 DIAGNOSIS — E78.5 HYPERLIPIDEMIA ASSOCIATED WITH TYPE 2 DIABETES MELLITUS: ICD-10-CM

## 2020-12-16 DIAGNOSIS — E11.69 HYPERLIPIDEMIA ASSOCIATED WITH TYPE 2 DIABETES MELLITUS: ICD-10-CM

## 2020-12-16 DIAGNOSIS — E11.59 HYPERTENSION ASSOCIATED WITH DIABETES: ICD-10-CM

## 2020-12-16 PROCEDURE — 3008F BODY MASS INDEX DOCD: CPT | Mod: S$GLB,,, | Performed by: FAMILY MEDICINE

## 2020-12-16 PROCEDURE — 1159F PR MEDICATION LIST DOCUMENTED IN MEDICAL RECORD: ICD-10-PCS | Mod: S$GLB,,, | Performed by: FAMILY MEDICINE

## 2020-12-16 PROCEDURE — 3074F PR MOST RECENT SYSTOLIC BLOOD PRESSURE < 130 MM HG: ICD-10-PCS | Mod: S$GLB,,, | Performed by: FAMILY MEDICINE

## 2020-12-16 PROCEDURE — 1159F MED LIST DOCD IN RCRD: CPT | Mod: S$GLB,,, | Performed by: FAMILY MEDICINE

## 2020-12-16 PROCEDURE — 99212 PR OFFICE/OUTPT VISIT, EST, LEVL II, 10-19 MIN: ICD-10-PCS | Mod: S$GLB,,, | Performed by: FAMILY MEDICINE

## 2020-12-16 PROCEDURE — 3078F PR MOST RECENT DIASTOLIC BLOOD PRESSURE < 80 MM HG: ICD-10-PCS | Mod: S$GLB,,, | Performed by: FAMILY MEDICINE

## 2020-12-16 PROCEDURE — 99212 OFFICE O/P EST SF 10 MIN: CPT | Mod: S$GLB,,, | Performed by: FAMILY MEDICINE

## 2020-12-16 PROCEDURE — 1101F PT FALLS ASSESS-DOCD LE1/YR: CPT | Mod: S$GLB,,, | Performed by: FAMILY MEDICINE

## 2020-12-16 PROCEDURE — 3288F FALL RISK ASSESSMENT DOCD: CPT | Mod: S$GLB,,, | Performed by: FAMILY MEDICINE

## 2020-12-16 PROCEDURE — 99999 PR PBB SHADOW E&M-EST. PATIENT-LVL IV: ICD-10-PCS | Mod: PBBFAC,,, | Performed by: FAMILY MEDICINE

## 2020-12-16 PROCEDURE — 3288F PR FALLS RISK ASSESSMENT DOCUMENTED: ICD-10-PCS | Mod: S$GLB,,, | Performed by: FAMILY MEDICINE

## 2020-12-16 PROCEDURE — 3051F PR MOST RECENT HEMOGLOBIN A1C LEVEL 7.0 - < 8.0%: ICD-10-PCS | Mod: S$GLB,,, | Performed by: FAMILY MEDICINE

## 2020-12-16 PROCEDURE — 3051F HG A1C>EQUAL 7.0%<8.0%: CPT | Mod: S$GLB,,, | Performed by: FAMILY MEDICINE

## 2020-12-16 PROCEDURE — 1126F AMNT PAIN NOTED NONE PRSNT: CPT | Mod: S$GLB,,, | Performed by: FAMILY MEDICINE

## 2020-12-16 PROCEDURE — 3008F PR BODY MASS INDEX (BMI) DOCUMENTED: ICD-10-PCS | Mod: S$GLB,,, | Performed by: FAMILY MEDICINE

## 2020-12-16 PROCEDURE — 99999 PR PBB SHADOW E&M-EST. PATIENT-LVL IV: CPT | Mod: PBBFAC,,, | Performed by: FAMILY MEDICINE

## 2020-12-16 PROCEDURE — 3078F DIAST BP <80 MM HG: CPT | Mod: S$GLB,,, | Performed by: FAMILY MEDICINE

## 2020-12-16 PROCEDURE — 1126F PR PAIN SEVERITY QUANTIFIED, NO PAIN PRESENT: ICD-10-PCS | Mod: S$GLB,,, | Performed by: FAMILY MEDICINE

## 2020-12-16 PROCEDURE — 1101F PR PT FALLS ASSESS DOC 0-1 FALLS W/OUT INJ PAST YR: ICD-10-PCS | Mod: S$GLB,,, | Performed by: FAMILY MEDICINE

## 2020-12-16 PROCEDURE — 3074F SYST BP LT 130 MM HG: CPT | Mod: S$GLB,,, | Performed by: FAMILY MEDICINE

## 2020-12-16 NOTE — PROGRESS NOTES
Subjective:       Patient ID: Ludwin Gee is a 70 y.o. male.    Chief Complaint: Follow-up    70-year-old male comes in for follow-up on diabetes..  He had a recent A1c of 7.1 resulting in an increase in his metformin from three pills a day to four pills taking two b.i.d..  He also was placed on a low-dose glimepiride and brings in his blood sugar log from the last seven days with a high of 140 that he says was due to some dietary indiscretions and a low of 96.  The majority of the readings are in a good range.  He has no complaints at this time and feels well.    Past Medical History:  No date: Diabetes mellitus  No date: Elevated PSA  No date: Hyperlipidemia  No date: Hypertension    Past Surgical History:  12/6/2013: COLONOSCOPY      Comment:  Dr. Martinez, 5 year recheck  12/09/2011: SHOULDER SURGERY      Comment:  right     Current Outpatient Medications on File Prior to Visit:  alfuzosin (UROXATRAL) 10 mg Tb24, Take 1 tablet (10 mg total) by mouth daily with breakfast., Disp: 30 tablet, Rfl: 11  blood sugar diagnostic Strp, One Touch check fasting glucose in morning, Disp: 100 each, Rfl: 3  blood-glucose meter Misc, One Touch glucometer check fasting glucose in morning, Disp: 1 each, Rfl: 0  coenzyme Q10 (CO Q-10) 10 mg capsule, Take 10 mg by mouth once daily., Disp: , Rfl:   glimepiride (AMARYL) 1 MG tablet, Take 1/2 tablet daily by mouth for the 1st two weeks.  After that increase to one tablet daily by mouth, Disp: 90 tablet, Rfl: 0  lancets Misc, One Touch lancets check fasting glucose in morning, Disp: 100 each, Rfl: 3  losartan (COZAAR) 25 MG tablet, Take 1 tablet (25 mg total) by mouth once daily., Disp: 30 tablet, Rfl: 0  metFORMIN (GLUCOPHAGE-XR) 500 MG XR 24hr tablet, TAKE ONE TABLET BY MOUTH EVERY MORNING THEN TAKE TWO TABLETS BY MOUTH EVERY EVENING, Disp: 270 tablet, Rfl: 1  rosuvastatin (CRESTOR) 5 MG tablet, TAKE ONE TABLET BY MOUTH EVERY OTHER EVENING, Disp: 90 tablet, Rfl: 2  (DISCONTINUED)  losartan (COZAAR) 25 MG tablet, TAKE ONE TABLET BY MOUTH EVERY DAY, Disp: 90 tablet, Rfl: 0  (DISCONTINUED) SITagliptin (JANUVIA) 50 MG Tab, Take 1 tablet (50 mg total) by mouth once daily. (Patient not taking: Reported on 12/16/2020), Disp: 90 tablet, Rfl: 1  (DISCONTINUED) tamsulosin (FLOMAX) 0.4 mg Cap, Take 1 capsule (0.4 mg total) by mouth once daily. (Patient not taking: Reported on 12/16/2020), Disp: 30 capsule, Rfl: 6    No current facility-administered medications on file prior to visit.         Review of Systems   Constitutional: Negative for activity change, appetite change, chills, fever and unexpected weight change.       Objective:      Physical Exam  Vitals signs and nursing note reviewed.   Constitutional:       Comments: Good blood pressure control  Normal weight with a BMI of 26.8 he is down 6.8 lb from his last visit with me April 7, 2017         Assessment:       1. Controlled type 2 diabetes mellitus with microalbuminuria, without long-term current use of insulin    2. Hypertension associated with diabetes    3. Hyperlipidemia associated with type 2 diabetes mellitus        Plan:       1. Controlled type 2 diabetes mellitus with microalbuminuria, without long-term current use of insulin  Continue maximum dose metformin and low-dose glimepiride.  Patient had stop Januvia due to the cost which was prohibitive for him.  He has lab scheduled for Dr. Michel to check a PSA in March and will get CMP, A1c, lipid panel and microalbumin at that time.  - Comprehensive Metabolic Panel; Future  - Hemoglobin A1C; Future  - Lipid Panel; Future  - Microalbumin/Creatinine Ratio, Urine; Future    2. Hypertension associated with diabetes  Good control no changes needed  - Comprehensive Metabolic Panel; Future    3. Hyperlipidemia associated with type 2 diabetes mellitus  See above, lab scheduled  - Comprehensive Metabolic Panel; Future  - Lipid Panel; Future

## 2021-01-04 ENCOUNTER — PATIENT MESSAGE (OUTPATIENT)
Dept: ADMINISTRATIVE | Facility: HOSPITAL | Age: 71
End: 2021-01-04

## 2021-01-22 DIAGNOSIS — E11.29 CONTROLLED TYPE 2 DIABETES MELLITUS WITH MICROALBUMINURIA, WITHOUT LONG-TERM CURRENT USE OF INSULIN: ICD-10-CM

## 2021-01-22 DIAGNOSIS — R80.9 CONTROLLED TYPE 2 DIABETES MELLITUS WITH MICROALBUMINURIA, WITHOUT LONG-TERM CURRENT USE OF INSULIN: ICD-10-CM

## 2021-01-25 RX ORDER — METFORMIN HYDROCHLORIDE 500 MG/1
TABLET, EXTENDED RELEASE ORAL
Qty: 270 TABLET | Refills: 1 | Status: SHIPPED | OUTPATIENT
Start: 2021-01-25 | End: 2021-09-26

## 2021-03-01 ENCOUNTER — LAB VISIT (OUTPATIENT)
Dept: LAB | Facility: HOSPITAL | Age: 71
End: 2021-03-01
Attending: UROLOGY
Payer: MEDICARE

## 2021-03-01 DIAGNOSIS — E11.59 HYPERTENSION ASSOCIATED WITH DIABETES: ICD-10-CM

## 2021-03-01 DIAGNOSIS — R97.20 ELEVATED PROSTATE SPECIFIC ANTIGEN (PSA): ICD-10-CM

## 2021-03-01 DIAGNOSIS — E11.69 HYPERLIPIDEMIA ASSOCIATED WITH TYPE 2 DIABETES MELLITUS: ICD-10-CM

## 2021-03-01 DIAGNOSIS — I15.2 HYPERTENSION ASSOCIATED WITH DIABETES: ICD-10-CM

## 2021-03-01 DIAGNOSIS — E78.5 HYPERLIPIDEMIA ASSOCIATED WITH TYPE 2 DIABETES MELLITUS: ICD-10-CM

## 2021-03-01 DIAGNOSIS — E11.29 CONTROLLED TYPE 2 DIABETES MELLITUS WITH MICROALBUMINURIA, WITHOUT LONG-TERM CURRENT USE OF INSULIN: ICD-10-CM

## 2021-03-01 DIAGNOSIS — R80.9 CONTROLLED TYPE 2 DIABETES MELLITUS WITH MICROALBUMINURIA, WITHOUT LONG-TERM CURRENT USE OF INSULIN: ICD-10-CM

## 2021-03-01 LAB
ALBUMIN SERPL BCP-MCNC: 4.2 G/DL (ref 3.5–5.2)
ALP SERPL-CCNC: 37 U/L (ref 55–135)
ALT SERPL W/O P-5'-P-CCNC: 15 U/L (ref 10–44)
ANION GAP SERPL CALC-SCNC: 7 MMOL/L (ref 8–16)
AST SERPL-CCNC: 19 U/L (ref 10–40)
BILIRUB SERPL-MCNC: 0.8 MG/DL (ref 0.1–1)
BUN SERPL-MCNC: 17 MG/DL (ref 8–23)
CALCIUM SERPL-MCNC: 9.8 MG/DL (ref 8.7–10.5)
CHLORIDE SERPL-SCNC: 104 MMOL/L (ref 95–110)
CHOLEST SERPL-MCNC: 152 MG/DL (ref 120–199)
CHOLEST/HDLC SERPL: 3.2 {RATIO} (ref 2–5)
CO2 SERPL-SCNC: 28 MMOL/L (ref 23–29)
COMPLEXED PSA SERPL-MCNC: 6.4 NG/ML (ref 0–4)
CREAT SERPL-MCNC: 1.3 MG/DL (ref 0.5–1.4)
EST. GFR  (AFRICAN AMERICAN): >60 ML/MIN/1.73 M^2
EST. GFR  (NON AFRICAN AMERICAN): 54.9 ML/MIN/1.73 M^2
ESTIMATED AVG GLUCOSE: 123 MG/DL (ref 68–131)
GLUCOSE SERPL-MCNC: 110 MG/DL (ref 70–110)
HBA1C MFR BLD: 5.9 % (ref 4–5.6)
HDLC SERPL-MCNC: 48 MG/DL (ref 40–75)
HDLC SERPL: 31.6 % (ref 20–50)
LDLC SERPL CALC-MCNC: 84.8 MG/DL (ref 63–159)
NONHDLC SERPL-MCNC: 104 MG/DL
POTASSIUM SERPL-SCNC: 5 MMOL/L (ref 3.5–5.1)
PROT SERPL-MCNC: 7.6 G/DL (ref 6–8.4)
SODIUM SERPL-SCNC: 139 MMOL/L (ref 136–145)
TRIGL SERPL-MCNC: 96 MG/DL (ref 30–150)

## 2021-03-01 PROCEDURE — 36415 COLL VENOUS BLD VENIPUNCTURE: CPT | Mod: PO

## 2021-03-01 PROCEDURE — 83036 HEMOGLOBIN GLYCOSYLATED A1C: CPT

## 2021-03-01 PROCEDURE — 80053 COMPREHEN METABOLIC PANEL: CPT

## 2021-03-01 PROCEDURE — 80061 LIPID PANEL: CPT

## 2021-03-01 PROCEDURE — 84153 ASSAY OF PSA TOTAL: CPT

## 2021-03-02 ENCOUNTER — PATIENT OUTREACH (OUTPATIENT)
Dept: ADMINISTRATIVE | Facility: OTHER | Age: 71
End: 2021-03-02

## 2021-03-03 RX ORDER — LOSARTAN POTASSIUM 25 MG/1
TABLET ORAL
Qty: 90 TABLET | Refills: 3 | Status: SHIPPED | OUTPATIENT
Start: 2021-03-03 | End: 2022-04-04

## 2021-03-04 ENCOUNTER — OFFICE VISIT (OUTPATIENT)
Dept: UROLOGY | Facility: CLINIC | Age: 71
End: 2021-03-04
Payer: MEDICARE

## 2021-03-04 VITALS
HEIGHT: 67 IN | RESPIRATION RATE: 18 BRPM | HEART RATE: 77 BPM | DIASTOLIC BLOOD PRESSURE: 76 MMHG | BODY MASS INDEX: 26.85 KG/M2 | WEIGHT: 171.06 LBS | SYSTOLIC BLOOD PRESSURE: 137 MMHG

## 2021-03-04 DIAGNOSIS — N40.1 BENIGN NON-NODULAR PROSTATIC HYPERPLASIA WITH LOWER URINARY TRACT SYMPTOMS: ICD-10-CM

## 2021-03-04 DIAGNOSIS — R97.20 ELEVATED PROSTATE SPECIFIC ANTIGEN (PSA): Primary | ICD-10-CM

## 2021-03-04 PROCEDURE — 3072F PR LOW RISK FOR RETINOPATHY: ICD-10-PCS | Mod: S$GLB,,, | Performed by: UROLOGY

## 2021-03-04 PROCEDURE — 3008F BODY MASS INDEX DOCD: CPT | Mod: S$GLB,,, | Performed by: UROLOGY

## 2021-03-04 PROCEDURE — 1126F AMNT PAIN NOTED NONE PRSNT: CPT | Mod: S$GLB,,, | Performed by: UROLOGY

## 2021-03-04 PROCEDURE — 3075F SYST BP GE 130 - 139MM HG: CPT | Mod: S$GLB,,, | Performed by: UROLOGY

## 2021-03-04 PROCEDURE — 3078F DIAST BP <80 MM HG: CPT | Mod: S$GLB,,, | Performed by: UROLOGY

## 2021-03-04 PROCEDURE — 1159F PR MEDICATION LIST DOCUMENTED IN MEDICAL RECORD: ICD-10-PCS | Mod: S$GLB,,, | Performed by: UROLOGY

## 2021-03-04 PROCEDURE — 3008F PR BODY MASS INDEX (BMI) DOCUMENTED: ICD-10-PCS | Mod: S$GLB,,, | Performed by: UROLOGY

## 2021-03-04 PROCEDURE — 3288F PR FALLS RISK ASSESSMENT DOCUMENTED: ICD-10-PCS | Mod: S$GLB,,, | Performed by: UROLOGY

## 2021-03-04 PROCEDURE — 1101F PT FALLS ASSESS-DOCD LE1/YR: CPT | Mod: S$GLB,,, | Performed by: UROLOGY

## 2021-03-04 PROCEDURE — 1126F PR PAIN SEVERITY QUANTIFIED, NO PAIN PRESENT: ICD-10-PCS | Mod: S$GLB,,, | Performed by: UROLOGY

## 2021-03-04 PROCEDURE — 3075F PR MOST RECENT SYSTOLIC BLOOD PRESS GE 130-139MM HG: ICD-10-PCS | Mod: S$GLB,,, | Performed by: UROLOGY

## 2021-03-04 PROCEDURE — 99999 PR PBB SHADOW E&M-EST. PATIENT-LVL III: CPT | Mod: PBBFAC,,, | Performed by: UROLOGY

## 2021-03-04 PROCEDURE — 1159F MED LIST DOCD IN RCRD: CPT | Mod: S$GLB,,, | Performed by: UROLOGY

## 2021-03-04 PROCEDURE — 81000 CHG URINALYSIS, NONAUTO, W/SCOPE: ICD-10-PCS | Mod: S$GLB,,, | Performed by: UROLOGY

## 2021-03-04 PROCEDURE — 81000 URINALYSIS NONAUTO W/SCOPE: CPT | Mod: S$GLB,,, | Performed by: UROLOGY

## 2021-03-04 PROCEDURE — 99999 PR PBB SHADOW E&M-EST. PATIENT-LVL III: ICD-10-PCS | Mod: PBBFAC,,, | Performed by: UROLOGY

## 2021-03-04 PROCEDURE — 3072F LOW RISK FOR RETINOPATHY: CPT | Mod: S$GLB,,, | Performed by: UROLOGY

## 2021-03-04 PROCEDURE — 99214 OFFICE O/P EST MOD 30 MIN: CPT | Mod: 25,S$GLB,, | Performed by: UROLOGY

## 2021-03-04 PROCEDURE — 3288F FALL RISK ASSESSMENT DOCD: CPT | Mod: S$GLB,,, | Performed by: UROLOGY

## 2021-03-04 PROCEDURE — 99214 PR OFFICE/OUTPT VISIT, EST, LEVL IV, 30-39 MIN: ICD-10-PCS | Mod: 25,S$GLB,, | Performed by: UROLOGY

## 2021-03-04 PROCEDURE — 3078F PR MOST RECENT DIASTOLIC BLOOD PRESSURE < 80 MM HG: ICD-10-PCS | Mod: S$GLB,,, | Performed by: UROLOGY

## 2021-03-04 PROCEDURE — 1101F PR PT FALLS ASSESS DOC 0-1 FALLS W/OUT INJ PAST YR: ICD-10-PCS | Mod: S$GLB,,, | Performed by: UROLOGY

## 2021-03-16 DIAGNOSIS — E11.29 CONTROLLED TYPE 2 DIABETES MELLITUS WITH MICROALBUMINURIA, WITHOUT LONG-TERM CURRENT USE OF INSULIN: ICD-10-CM

## 2021-03-16 DIAGNOSIS — R80.9 CONTROLLED TYPE 2 DIABETES MELLITUS WITH MICROALBUMINURIA, WITHOUT LONG-TERM CURRENT USE OF INSULIN: ICD-10-CM

## 2021-03-18 RX ORDER — GLIMEPIRIDE 1 MG/1
1 TABLET ORAL DAILY
Qty: 90 TABLET | Refills: 1 | Status: SHIPPED | OUTPATIENT
Start: 2021-03-18 | End: 2021-10-05

## 2021-04-05 ENCOUNTER — PATIENT MESSAGE (OUTPATIENT)
Dept: ADMINISTRATIVE | Facility: HOSPITAL | Age: 71
End: 2021-04-05

## 2021-04-20 DIAGNOSIS — E78.5 HYPERLIPIDEMIA ASSOCIATED WITH TYPE 2 DIABETES MELLITUS: ICD-10-CM

## 2021-04-20 DIAGNOSIS — E11.69 HYPERLIPIDEMIA ASSOCIATED WITH TYPE 2 DIABETES MELLITUS: ICD-10-CM

## 2021-04-20 RX ORDER — ROSUVASTATIN CALCIUM 10 MG/1
10 TABLET, COATED ORAL DAILY
Qty: 90 TABLET | Refills: 3 | Status: SHIPPED | OUTPATIENT
Start: 2021-04-20 | End: 2022-05-06 | Stop reason: SDUPTHER

## 2021-04-20 RX ORDER — ROSUVASTATIN CALCIUM 10 MG/1
10 TABLET, COATED ORAL DAILY
Qty: 90 TABLET | Refills: 3 | Status: SHIPPED | OUTPATIENT
Start: 2021-04-20 | End: 2021-04-20 | Stop reason: SDUPTHER

## 2021-07-01 ENCOUNTER — TELEPHONE (OUTPATIENT)
Dept: UROLOGY | Facility: CLINIC | Age: 71
End: 2021-07-01

## 2021-07-07 ENCOUNTER — PATIENT MESSAGE (OUTPATIENT)
Dept: ADMINISTRATIVE | Facility: HOSPITAL | Age: 71
End: 2021-07-07

## 2021-07-23 ENCOUNTER — LAB VISIT (OUTPATIENT)
Dept: LAB | Facility: HOSPITAL | Age: 71
End: 2021-07-23
Attending: UROLOGY
Payer: MEDICARE

## 2021-07-23 DIAGNOSIS — R97.20 ELEVATED PROSTATE SPECIFIC ANTIGEN (PSA): ICD-10-CM

## 2021-07-23 LAB — COMPLEXED PSA SERPL-MCNC: 5.8 NG/ML (ref 0–4)

## 2021-07-23 PROCEDURE — 36415 COLL VENOUS BLD VENIPUNCTURE: CPT | Mod: PO | Performed by: UROLOGY

## 2021-07-23 PROCEDURE — 84153 ASSAY OF PSA TOTAL: CPT | Performed by: UROLOGY

## 2021-07-28 ENCOUNTER — PATIENT OUTREACH (OUTPATIENT)
Dept: ADMINISTRATIVE | Facility: OTHER | Age: 71
End: 2021-07-28

## 2021-08-04 ENCOUNTER — OFFICE VISIT (OUTPATIENT)
Dept: UROLOGY | Facility: CLINIC | Age: 71
End: 2021-08-04
Payer: MEDICARE

## 2021-08-04 VITALS
DIASTOLIC BLOOD PRESSURE: 76 MMHG | HEIGHT: 66 IN | WEIGHT: 171.06 LBS | SYSTOLIC BLOOD PRESSURE: 135 MMHG | HEART RATE: 66 BPM | BODY MASS INDEX: 27.49 KG/M2

## 2021-08-04 DIAGNOSIS — N40.0 BPH WITH ELEVATED PSA: Primary | ICD-10-CM

## 2021-08-04 DIAGNOSIS — R97.20 BPH WITH ELEVATED PSA: Primary | ICD-10-CM

## 2021-08-04 PROCEDURE — 1160F RVW MEDS BY RX/DR IN RCRD: CPT | Mod: S$GLB,,, | Performed by: NURSE PRACTITIONER

## 2021-08-04 PROCEDURE — 1160F PR REVIEW ALL MEDS BY PRESCRIBER/CLIN PHARMACIST DOCUMENTED: ICD-10-PCS | Mod: S$GLB,,, | Performed by: NURSE PRACTITIONER

## 2021-08-04 PROCEDURE — 1159F MED LIST DOCD IN RCRD: CPT | Mod: S$GLB,,, | Performed by: NURSE PRACTITIONER

## 2021-08-04 PROCEDURE — 99213 PR OFFICE/OUTPT VISIT, EST, LEVL III, 20-29 MIN: ICD-10-PCS | Mod: S$GLB,,, | Performed by: NURSE PRACTITIONER

## 2021-08-04 PROCEDURE — 3044F HG A1C LEVEL LT 7.0%: CPT | Mod: S$GLB,,, | Performed by: NURSE PRACTITIONER

## 2021-08-04 PROCEDURE — 3075F SYST BP GE 130 - 139MM HG: CPT | Mod: S$GLB,,, | Performed by: NURSE PRACTITIONER

## 2021-08-04 PROCEDURE — 1159F PR MEDICATION LIST DOCUMENTED IN MEDICAL RECORD: ICD-10-PCS | Mod: S$GLB,,, | Performed by: NURSE PRACTITIONER

## 2021-08-04 PROCEDURE — 3078F PR MOST RECENT DIASTOLIC BLOOD PRESSURE < 80 MM HG: ICD-10-PCS | Mod: S$GLB,,, | Performed by: NURSE PRACTITIONER

## 2021-08-04 PROCEDURE — 3008F BODY MASS INDEX DOCD: CPT | Mod: S$GLB,,, | Performed by: NURSE PRACTITIONER

## 2021-08-04 PROCEDURE — 99999 PR PBB SHADOW E&M-EST. PATIENT-LVL III: ICD-10-PCS | Mod: PBBFAC,,, | Performed by: NURSE PRACTITIONER

## 2021-08-04 PROCEDURE — 99999 PR PBB SHADOW E&M-EST. PATIENT-LVL III: CPT | Mod: PBBFAC,,, | Performed by: NURSE PRACTITIONER

## 2021-08-04 PROCEDURE — 3072F PR LOW RISK FOR RETINOPATHY: ICD-10-PCS | Mod: S$GLB,,, | Performed by: NURSE PRACTITIONER

## 2021-08-04 PROCEDURE — 3078F DIAST BP <80 MM HG: CPT | Mod: S$GLB,,, | Performed by: NURSE PRACTITIONER

## 2021-08-04 PROCEDURE — 3072F LOW RISK FOR RETINOPATHY: CPT | Mod: S$GLB,,, | Performed by: NURSE PRACTITIONER

## 2021-08-04 PROCEDURE — 3075F PR MOST RECENT SYSTOLIC BLOOD PRESS GE 130-139MM HG: ICD-10-PCS | Mod: S$GLB,,, | Performed by: NURSE PRACTITIONER

## 2021-08-04 PROCEDURE — 99213 OFFICE O/P EST LOW 20 MIN: CPT | Mod: S$GLB,,, | Performed by: NURSE PRACTITIONER

## 2021-08-04 PROCEDURE — 3008F PR BODY MASS INDEX (BMI) DOCUMENTED: ICD-10-PCS | Mod: S$GLB,,, | Performed by: NURSE PRACTITIONER

## 2021-08-04 PROCEDURE — 3044F PR MOST RECENT HEMOGLOBIN A1C LEVEL <7.0%: ICD-10-PCS | Mod: S$GLB,,, | Performed by: NURSE PRACTITIONER

## 2021-08-04 RX ORDER — ALFUZOSIN HYDROCHLORIDE 10 MG/1
10 TABLET, EXTENDED RELEASE ORAL
Qty: 30 TABLET | Refills: 11 | Status: SHIPPED | OUTPATIENT
Start: 2021-08-04 | End: 2022-03-24 | Stop reason: SDUPTHER

## 2021-08-18 ENCOUNTER — OFFICE VISIT (OUTPATIENT)
Dept: FAMILY MEDICINE | Facility: CLINIC | Age: 71
End: 2021-08-18
Attending: FAMILY MEDICINE
Payer: MEDICARE

## 2021-08-18 VITALS
OXYGEN SATURATION: 98 % | WEIGHT: 175.69 LBS | BODY MASS INDEX: 28.23 KG/M2 | DIASTOLIC BLOOD PRESSURE: 70 MMHG | HEIGHT: 66 IN | HEART RATE: 77 BPM | SYSTOLIC BLOOD PRESSURE: 128 MMHG

## 2021-08-18 DIAGNOSIS — R35.1 BENIGN PROSTATIC HYPERPLASIA WITH NOCTURIA: ICD-10-CM

## 2021-08-18 DIAGNOSIS — E11.59 HYPERTENSION ASSOCIATED WITH DIABETES: ICD-10-CM

## 2021-08-18 DIAGNOSIS — R80.9 CONTROLLED TYPE 2 DIABETES MELLITUS WITH MICROALBUMINURIA, WITHOUT LONG-TERM CURRENT USE OF INSULIN: Primary | ICD-10-CM

## 2021-08-18 DIAGNOSIS — E78.5 HYPERLIPIDEMIA ASSOCIATED WITH TYPE 2 DIABETES MELLITUS: ICD-10-CM

## 2021-08-18 DIAGNOSIS — Z53.20 COLON CANCER SCREENING DECLINED: ICD-10-CM

## 2021-08-18 DIAGNOSIS — I15.2 HYPERTENSION ASSOCIATED WITH DIABETES: ICD-10-CM

## 2021-08-18 DIAGNOSIS — Z78.9 UNKNOWN STATUS OF IMMUNITY TO COVID-19 VIRUS: ICD-10-CM

## 2021-08-18 DIAGNOSIS — Z28.21 PNEUMOCOCCAL VACCINATION DECLINED: ICD-10-CM

## 2021-08-18 DIAGNOSIS — N40.1 BENIGN PROSTATIC HYPERPLASIA WITH NOCTURIA: ICD-10-CM

## 2021-08-18 DIAGNOSIS — E11.69 HYPERLIPIDEMIA ASSOCIATED WITH TYPE 2 DIABETES MELLITUS: ICD-10-CM

## 2021-08-18 DIAGNOSIS — E11.29 CONTROLLED TYPE 2 DIABETES MELLITUS WITH MICROALBUMINURIA, WITHOUT LONG-TERM CURRENT USE OF INSULIN: Primary | ICD-10-CM

## 2021-08-18 DIAGNOSIS — Z28.21 COVID-19 VIRUS VACCINATION DECLINED: ICD-10-CM

## 2021-08-18 PROCEDURE — 1126F AMNT PAIN NOTED NONE PRSNT: CPT | Mod: S$GLB,,, | Performed by: FAMILY MEDICINE

## 2021-08-18 PROCEDURE — 1160F RVW MEDS BY RX/DR IN RCRD: CPT | Mod: S$GLB,,, | Performed by: FAMILY MEDICINE

## 2021-08-18 PROCEDURE — 3078F PR MOST RECENT DIASTOLIC BLOOD PRESSURE < 80 MM HG: ICD-10-PCS | Mod: S$GLB,,, | Performed by: FAMILY MEDICINE

## 2021-08-18 PROCEDURE — 3288F PR FALLS RISK ASSESSMENT DOCUMENTED: ICD-10-PCS | Mod: S$GLB,,, | Performed by: FAMILY MEDICINE

## 2021-08-18 PROCEDURE — 3008F PR BODY MASS INDEX (BMI) DOCUMENTED: ICD-10-PCS | Mod: S$GLB,,, | Performed by: FAMILY MEDICINE

## 2021-08-18 PROCEDURE — 99999 PR PBB SHADOW E&M-EST. PATIENT-LVL IV: CPT | Mod: PBBFAC,,, | Performed by: FAMILY MEDICINE

## 2021-08-18 PROCEDURE — 1101F PT FALLS ASSESS-DOCD LE1/YR: CPT | Mod: S$GLB,,, | Performed by: FAMILY MEDICINE

## 2021-08-18 PROCEDURE — 1159F MED LIST DOCD IN RCRD: CPT | Mod: S$GLB,,, | Performed by: FAMILY MEDICINE

## 2021-08-18 PROCEDURE — 1101F PR PT FALLS ASSESS DOC 0-1 FALLS W/OUT INJ PAST YR: ICD-10-PCS | Mod: S$GLB,,, | Performed by: FAMILY MEDICINE

## 2021-08-18 PROCEDURE — 1159F PR MEDICATION LIST DOCUMENTED IN MEDICAL RECORD: ICD-10-PCS | Mod: S$GLB,,, | Performed by: FAMILY MEDICINE

## 2021-08-18 PROCEDURE — 1126F PR PAIN SEVERITY QUANTIFIED, NO PAIN PRESENT: ICD-10-PCS | Mod: S$GLB,,, | Performed by: FAMILY MEDICINE

## 2021-08-18 PROCEDURE — 3288F FALL RISK ASSESSMENT DOCD: CPT | Mod: S$GLB,,, | Performed by: FAMILY MEDICINE

## 2021-08-18 PROCEDURE — 3074F SYST BP LT 130 MM HG: CPT | Mod: S$GLB,,, | Performed by: FAMILY MEDICINE

## 2021-08-18 PROCEDURE — 3044F HG A1C LEVEL LT 7.0%: CPT | Mod: S$GLB,,, | Performed by: FAMILY MEDICINE

## 2021-08-18 PROCEDURE — 3072F LOW RISK FOR RETINOPATHY: CPT | Mod: S$GLB,,, | Performed by: FAMILY MEDICINE

## 2021-08-18 PROCEDURE — 3078F DIAST BP <80 MM HG: CPT | Mod: S$GLB,,, | Performed by: FAMILY MEDICINE

## 2021-08-18 PROCEDURE — 3008F BODY MASS INDEX DOCD: CPT | Mod: S$GLB,,, | Performed by: FAMILY MEDICINE

## 2021-08-18 PROCEDURE — 3044F PR MOST RECENT HEMOGLOBIN A1C LEVEL <7.0%: ICD-10-PCS | Mod: S$GLB,,, | Performed by: FAMILY MEDICINE

## 2021-08-18 PROCEDURE — 99999 PR PBB SHADOW E&M-EST. PATIENT-LVL IV: ICD-10-PCS | Mod: PBBFAC,,, | Performed by: FAMILY MEDICINE

## 2021-08-18 PROCEDURE — 3074F PR MOST RECENT SYSTOLIC BLOOD PRESSURE < 130 MM HG: ICD-10-PCS | Mod: S$GLB,,, | Performed by: FAMILY MEDICINE

## 2021-08-18 PROCEDURE — 3072F PR LOW RISK FOR RETINOPATHY: ICD-10-PCS | Mod: S$GLB,,, | Performed by: FAMILY MEDICINE

## 2021-08-18 PROCEDURE — 99214 PR OFFICE/OUTPT VISIT, EST, LEVL IV, 30-39 MIN: ICD-10-PCS | Mod: S$GLB,,, | Performed by: FAMILY MEDICINE

## 2021-08-18 PROCEDURE — 99214 OFFICE O/P EST MOD 30 MIN: CPT | Mod: S$GLB,,, | Performed by: FAMILY MEDICINE

## 2021-08-18 PROCEDURE — 1160F PR REVIEW ALL MEDS BY PRESCRIBER/CLIN PHARMACIST DOCUMENTED: ICD-10-PCS | Mod: S$GLB,,, | Performed by: FAMILY MEDICINE

## 2021-08-18 RX ORDER — IBUPROFEN 800 MG/1
800 TABLET ORAL EVERY 6 HOURS PRN
COMMUNITY
Start: 2021-08-10 | End: 2022-09-06

## 2021-08-18 RX ORDER — AMOXICILLIN 500 MG/1
CAPSULE ORAL
COMMUNITY
Start: 2021-08-10 | End: 2021-12-03

## 2021-08-18 RX ORDER — HYDROCODONE BITARTRATE AND ACETAMINOPHEN 7.5; 325 MG/1; MG/1
1 TABLET ORAL
COMMUNITY
Start: 2021-08-10 | End: 2021-12-03

## 2021-08-24 ENCOUNTER — LAB VISIT (OUTPATIENT)
Dept: LAB | Facility: HOSPITAL | Age: 71
End: 2021-08-24
Attending: FAMILY MEDICINE
Payer: MEDICARE

## 2021-08-24 DIAGNOSIS — E11.29 CONTROLLED TYPE 2 DIABETES MELLITUS WITH MICROALBUMINURIA, WITHOUT LONG-TERM CURRENT USE OF INSULIN: ICD-10-CM

## 2021-08-24 DIAGNOSIS — E11.59 HYPERTENSION ASSOCIATED WITH DIABETES: ICD-10-CM

## 2021-08-24 DIAGNOSIS — I15.2 HYPERTENSION ASSOCIATED WITH DIABETES: ICD-10-CM

## 2021-08-24 DIAGNOSIS — R80.9 CONTROLLED TYPE 2 DIABETES MELLITUS WITH MICROALBUMINURIA, WITHOUT LONG-TERM CURRENT USE OF INSULIN: ICD-10-CM

## 2021-08-24 DIAGNOSIS — Z78.9 UNKNOWN STATUS OF IMMUNITY TO COVID-19 VIRUS: ICD-10-CM

## 2021-08-24 LAB
ANION GAP SERPL CALC-SCNC: 8 MMOL/L (ref 8–16)
BUN SERPL-MCNC: 17 MG/DL (ref 8–23)
CALCIUM SERPL-MCNC: 10.6 MG/DL (ref 8.7–10.5)
CHLORIDE SERPL-SCNC: 103 MMOL/L (ref 95–110)
CO2 SERPL-SCNC: 27 MMOL/L (ref 23–29)
CREAT SERPL-MCNC: 1.2 MG/DL (ref 0.5–1.4)
EST. GFR  (AFRICAN AMERICAN): >60 ML/MIN/1.73 M^2
EST. GFR  (NON AFRICAN AMERICAN): >60 ML/MIN/1.73 M^2
ESTIMATED AVG GLUCOSE: 120 MG/DL (ref 68–131)
GLUCOSE SERPL-MCNC: 101 MG/DL (ref 70–110)
HBA1C MFR BLD: 5.8 % (ref 4–5.6)
POTASSIUM SERPL-SCNC: 5 MMOL/L (ref 3.5–5.1)
SARS-COV-2 IGG SERPL IA-ACNC: <50 AU/ML
SARS-COV-2 IGG SERPL QL IA: NEGATIVE
SODIUM SERPL-SCNC: 138 MMOL/L (ref 136–145)

## 2021-08-24 PROCEDURE — 83036 HEMOGLOBIN GLYCOSYLATED A1C: CPT | Performed by: FAMILY MEDICINE

## 2021-08-24 PROCEDURE — 86769 SARS-COV-2 COVID-19 ANTIBODY: CPT | Performed by: FAMILY MEDICINE

## 2021-08-24 PROCEDURE — 80048 BASIC METABOLIC PNL TOTAL CA: CPT | Performed by: FAMILY MEDICINE

## 2021-08-24 PROCEDURE — 36415 COLL VENOUS BLD VENIPUNCTURE: CPT | Mod: PO | Performed by: FAMILY MEDICINE

## 2021-09-01 ENCOUNTER — TELEPHONE (OUTPATIENT)
Dept: OPHTHALMOLOGY | Facility: CLINIC | Age: 71
End: 2021-09-01

## 2021-09-22 DIAGNOSIS — E11.29 CONTROLLED TYPE 2 DIABETES MELLITUS WITH MICROALBUMINURIA, WITHOUT LONG-TERM CURRENT USE OF INSULIN: ICD-10-CM

## 2021-09-22 DIAGNOSIS — R80.9 CONTROLLED TYPE 2 DIABETES MELLITUS WITH MICROALBUMINURIA, WITHOUT LONG-TERM CURRENT USE OF INSULIN: ICD-10-CM

## 2021-09-23 ENCOUNTER — PATIENT OUTREACH (OUTPATIENT)
Dept: ADMINISTRATIVE | Facility: OTHER | Age: 71
End: 2021-09-23

## 2021-09-23 ENCOUNTER — OFFICE VISIT (OUTPATIENT)
Dept: OPTOMETRY | Facility: CLINIC | Age: 71
End: 2021-09-23
Payer: COMMERCIAL

## 2021-09-23 DIAGNOSIS — H26.9 CORTICAL CATARACT: ICD-10-CM

## 2021-09-23 DIAGNOSIS — E11.9 DIABETES MELLITUS TYPE 2 WITHOUT RETINOPATHY: ICD-10-CM

## 2021-09-23 DIAGNOSIS — H52.7 REFRACTIVE ERROR: ICD-10-CM

## 2021-09-23 DIAGNOSIS — H04.123 DRY EYE SYNDROME, BILATERAL: ICD-10-CM

## 2021-09-23 DIAGNOSIS — Z01.00 EXAMINATION OF EYES AND VISION: Primary | ICD-10-CM

## 2021-09-23 DIAGNOSIS — E11.36 DIABETIC CATARACT: ICD-10-CM

## 2021-09-23 DIAGNOSIS — H25.13 NUCLEAR SCLEROSIS, BILATERAL: ICD-10-CM

## 2021-09-23 PROCEDURE — 99999 PR PBB SHADOW E&M-EST. PATIENT-LVL III: ICD-10-PCS | Mod: PBBFAC,,, | Performed by: OPTOMETRIST

## 2021-09-23 PROCEDURE — 92015 DETERMINE REFRACTIVE STATE: CPT | Mod: S$GLB,,, | Performed by: OPTOMETRIST

## 2021-09-23 PROCEDURE — 99999 PR PBB SHADOW E&M-EST. PATIENT-LVL III: CPT | Mod: PBBFAC,,, | Performed by: OPTOMETRIST

## 2021-09-23 PROCEDURE — 92014 PR EYE EXAM, EST PATIENT,COMPREHESV: ICD-10-PCS | Mod: S$GLB,,, | Performed by: OPTOMETRIST

## 2021-09-23 PROCEDURE — 92015 PR REFRACTION: ICD-10-PCS | Mod: S$GLB,,, | Performed by: OPTOMETRIST

## 2021-09-23 PROCEDURE — 92014 COMPRE OPH EXAM EST PT 1/>: CPT | Mod: S$GLB,,, | Performed by: OPTOMETRIST

## 2021-09-26 RX ORDER — METFORMIN HYDROCHLORIDE 500 MG/1
TABLET, EXTENDED RELEASE ORAL
Qty: 270 TABLET | Refills: 1 | Status: SHIPPED | OUTPATIENT
Start: 2021-09-26 | End: 2022-05-06 | Stop reason: SDUPTHER

## 2021-10-04 DIAGNOSIS — E11.29 CONTROLLED TYPE 2 DIABETES MELLITUS WITH MICROALBUMINURIA, WITHOUT LONG-TERM CURRENT USE OF INSULIN: ICD-10-CM

## 2021-10-04 DIAGNOSIS — R80.9 CONTROLLED TYPE 2 DIABETES MELLITUS WITH MICROALBUMINURIA, WITHOUT LONG-TERM CURRENT USE OF INSULIN: ICD-10-CM

## 2021-10-05 RX ORDER — GLIMEPIRIDE 1 MG/1
1 TABLET ORAL DAILY
Qty: 90 TABLET | Refills: 1 | Status: SHIPPED | OUTPATIENT
Start: 2021-10-05 | End: 2022-03-07 | Stop reason: ALTCHOICE

## 2021-11-20 ENCOUNTER — HOSPITAL ENCOUNTER (EMERGENCY)
Facility: HOSPITAL | Age: 71
Discharge: HOME | End: 2021-11-20
Attending: EMERGENCY MEDICINE
Payer: MEDICARE

## 2021-11-20 VITALS
OXYGEN SATURATION: 98 % | WEIGHT: 170 LBS | BODY MASS INDEX: 27.32 KG/M2 | HEIGHT: 66 IN | RESPIRATION RATE: 16 BRPM | SYSTOLIC BLOOD PRESSURE: 137 MMHG | TEMPERATURE: 98 F | DIASTOLIC BLOOD PRESSURE: 72 MMHG | HEART RATE: 86 BPM

## 2021-11-20 DIAGNOSIS — S51.811A FOREARM LACERATION, RIGHT, INITIAL ENCOUNTER: Primary | ICD-10-CM

## 2021-11-20 PROCEDURE — 25000003 PHARM REV CODE 250

## 2021-11-20 PROCEDURE — 25000003 PHARM REV CODE 250: Performed by: EMERGENCY MEDICINE

## 2021-11-20 PROCEDURE — 12001 RPR S/N/AX/GEN/TRNK 2.5CM/<: CPT

## 2021-11-20 PROCEDURE — 99283 EMERGENCY DEPT VISIT LOW MDM: CPT | Mod: 25

## 2021-11-20 RX ORDER — LIDOCAINE HYDROCHLORIDE 10 MG/ML
10 INJECTION INFILTRATION; PERINEURAL
Status: COMPLETED | OUTPATIENT
Start: 2021-11-20 | End: 2021-11-20

## 2021-11-20 RX ORDER — CEPHALEXIN 250 MG/1
500 CAPSULE ORAL EVERY 8 HOURS
Status: DISCONTINUED | OUTPATIENT
Start: 2021-11-20 | End: 2021-11-20 | Stop reason: HOSPADM

## 2021-11-20 RX ORDER — BACITRACIN ZINC 500 [USP'U]/G
OINTMENT TOPICAL
Status: COMPLETED
Start: 2021-11-20 | End: 2021-11-20

## 2021-11-20 RX ORDER — BACITRACIN ZINC 500 [USP'U]/G
OINTMENT TOPICAL 2 TIMES DAILY
Status: COMPLETED | OUTPATIENT
Start: 2021-11-20 | End: 2021-11-20

## 2021-11-20 RX ADMIN — LIDOCAINE HYDROCHLORIDE 10 ML: 10 INJECTION, SOLUTION INFILTRATION; PERINEURAL at 03:11

## 2021-11-20 RX ADMIN — BACITRACIN ZINC: 500 OINTMENT TOPICAL at 03:11

## 2021-11-22 ENCOUNTER — TELEPHONE (OUTPATIENT)
Dept: FAMILY MEDICINE | Facility: CLINIC | Age: 71
End: 2021-11-22
Payer: MEDICARE

## 2021-12-03 ENCOUNTER — OFFICE VISIT (OUTPATIENT)
Dept: FAMILY MEDICINE | Facility: CLINIC | Age: 71
End: 2021-12-03
Payer: MEDICARE

## 2021-12-03 VITALS
DIASTOLIC BLOOD PRESSURE: 62 MMHG | BODY MASS INDEX: 28.88 KG/M2 | OXYGEN SATURATION: 97 % | HEIGHT: 66 IN | SYSTOLIC BLOOD PRESSURE: 128 MMHG | HEART RATE: 67 BPM | WEIGHT: 179.69 LBS

## 2021-12-03 DIAGNOSIS — S51.811D LACERATION OF RIGHT FOREARM, SUBSEQUENT ENCOUNTER: Primary | ICD-10-CM

## 2021-12-03 PROCEDURE — 99213 PR OFFICE/OUTPT VISIT, EST, LEVL III, 20-29 MIN: ICD-10-PCS | Mod: S$GLB,,, | Performed by: PHYSICIAN ASSISTANT

## 2021-12-03 PROCEDURE — 99999 PR PBB SHADOW E&M-EST. PATIENT-LVL III: CPT | Mod: PBBFAC,,, | Performed by: PHYSICIAN ASSISTANT

## 2021-12-03 PROCEDURE — 3061F NEG MICROALBUMINURIA REV: CPT | Mod: CPTII,S$GLB,, | Performed by: PHYSICIAN ASSISTANT

## 2021-12-03 PROCEDURE — 3072F LOW RISK FOR RETINOPATHY: CPT | Mod: CPTII,S$GLB,, | Performed by: PHYSICIAN ASSISTANT

## 2021-12-03 PROCEDURE — 3072F PR LOW RISK FOR RETINOPATHY: ICD-10-PCS | Mod: CPTII,S$GLB,, | Performed by: PHYSICIAN ASSISTANT

## 2021-12-03 PROCEDURE — 3061F PR NEG MICROALBUMINURIA RESULT DOCUMENTED/REVIEW: ICD-10-PCS | Mod: CPTII,S$GLB,, | Performed by: PHYSICIAN ASSISTANT

## 2021-12-03 PROCEDURE — 4010F ACE/ARB THERAPY RXD/TAKEN: CPT | Mod: CPTII,S$GLB,, | Performed by: PHYSICIAN ASSISTANT

## 2021-12-03 PROCEDURE — 99213 OFFICE O/P EST LOW 20 MIN: CPT | Mod: S$GLB,,, | Performed by: PHYSICIAN ASSISTANT

## 2021-12-03 PROCEDURE — 99999 PR PBB SHADOW E&M-EST. PATIENT-LVL III: ICD-10-PCS | Mod: PBBFAC,,, | Performed by: PHYSICIAN ASSISTANT

## 2021-12-03 PROCEDURE — 4010F PR ACE/ARB THEARPY RXD/TAKEN: ICD-10-PCS | Mod: CPTII,S$GLB,, | Performed by: PHYSICIAN ASSISTANT

## 2021-12-03 PROCEDURE — 3066F NEPHROPATHY DOC TX: CPT | Mod: CPTII,S$GLB,, | Performed by: PHYSICIAN ASSISTANT

## 2021-12-03 PROCEDURE — 3066F PR DOCUMENTATION OF TREATMENT FOR NEPHROPATHY: ICD-10-PCS | Mod: CPTII,S$GLB,, | Performed by: PHYSICIAN ASSISTANT

## 2022-01-04 ENCOUNTER — TELEPHONE (OUTPATIENT)
Dept: FAMILY MEDICINE | Facility: CLINIC | Age: 72
End: 2022-01-04
Payer: MEDICARE

## 2022-01-04 DIAGNOSIS — M54.2 NECK PAIN: Primary | ICD-10-CM

## 2022-01-04 RX ORDER — TIZANIDINE 4 MG/1
4 TABLET ORAL EVERY 6 HOURS PRN
Qty: 60 TABLET | Refills: 0 | Status: SHIPPED | OUTPATIENT
Start: 2022-01-04 | End: 2022-01-14

## 2022-01-04 NOTE — TELEPHONE ENCOUNTER
Patient says neck is stiff on both sides- he was very busy with company over holidays. Trying heating pad and showers and it helps but wears off. Asking for a muscle relaxer. [facility] [place].

## 2022-01-04 NOTE — TELEPHONE ENCOUNTER
----- Message from Manisha Sweeney sent at 1/4/2022 12:51 PM CST -----  Contact: PT  Type: Needs Medical Advice    Who Called:  the patient WIFE  Best Call Back Number: [phone]  Additional Information: Requesting a call back regarding STIFF NECK SINCE Saturday. WANTING TO KNOW IF YOU COULD GIVE HIM SOMETHING FOR THIS.  PLEASE CALL PT OR CALL TO COME IN.    [facility] - [place], [place] - [address] [facility] [address] [address] [facility] [address]  [place] [address]  Phone: [phone] Fax: [phone]      Please Advise ---Thank you

## 2022-01-05 NOTE — TELEPHONE ENCOUNTER
Zanaflex 4 mg sent to [facility].  If he needs to he can cut them in half for use during the day if he has problems with drowsiness or dizziness.

## 2022-01-22 ENCOUNTER — NURSE TRIAGE (OUTPATIENT)
Dept: ADMINISTRATIVE | Facility: CLINIC | Age: 72
End: 2022-01-22
Payer: MEDICARE

## 2022-01-22 NOTE — TELEPHONE ENCOUNTER
Ludwin was exposed to Covid 19 on 01/14/2022.  He has not been vaccinated. He has been isolating at home since; states he does have cough, runny nose, and he took home test for Covid 19 yesterday and it was negative.  He is having no other symptoms, but is concerned about length of time to isolate.  Home care advice given, encouraged one more home test to be taken two days after first one, and assured him I will message Herrera Fisher MD, pcp, with request to call him to discuss any further needs / recommended PCR testing.  He states understanding.  Will continue isolating for now and wait to hear from Dr Fisher or his nurse.      Reason for Disposition   [1] Fever (or feeling feverish) OR cough occurs AND [2] living in area with major community spread AND [3] testing being done in the community for symptoms    Additional Information   Negative: Severe difficulty breathing (e.g., struggling for each breath, speaks in single words)   Negative: Bluish (or gray) lips or face now   Negative: Sounds like a life-threatening emergency to the triager   Negative: [1] Adult has symptoms of COVID-19 (fever, cough, or SOB) AND [2] lab test positive   Negative: [1] Difficulty breathing (shortness of breath) occurs AND [2] onset > 14 days after COVID-19 EXPOSURE (Close Contact) AND [3] no major community spread   Negative: [1] Adult has symptoms of COVID-19 (fever, cough or SOB) AND [2] major community spread where patient lives AND [3] testing not being done for mild symptoms   Negative: [1] Dry cough occurs AND [2] onset > 14 days after COVID-19 EXPOSURE AND [3] no major community spread   Negative: [1] Wet cough (i.e., white-yellow, yellow, green, or angeline colored sputum) AND [2] onset > 14 days after COVID-19 EXPOSURE AND [3] no major community spread   Negative: [1] Common cold symptoms AND [2] onset > 14 days after COVID-19 EXPOSURE AND [3] no major community spread   Negative: [1] Difficulty breathing occurs  AND [2] within 14 days of COVID-19 EXPOSURE (Close Contact)   Negative: Patient sounds very sick or weak to the triager   Negative: [1] Fever (or feeling feverish) OR cough AND [2] within 14 Days of COVID-19 EXPOSURE (Close Contact)   Negative: [1] Fever (or feeling feverish) OR cough occurs AND [2] within 14 days of travel from another country (international travel)   Negative: [1] Fever (or feeling feverish) OR cough occurs AND [2] within 14 days of travel from a city or area with major community spread    Protocols used: CORONAVIRUS (COVID-19) - EXPOSURE-A-AH

## 2022-01-24 NOTE — TELEPHONE ENCOUNTER
Call placed to patient. Call answered by patient's wife (Emily) who states patient was not home at time of call. States patient is fine. States they are currently keeping their minor grandchild, and wanted to know if the grandchild could go to [employer] with them. Recommended for Mrs Diaz to contact grandchild's pediatrician with questions regarding minor child to ensure advise given is appropriate regarding child's medical conditions.

## 2022-01-27 ENCOUNTER — PATIENT MESSAGE (OUTPATIENT)
Dept: UROLOGY | Facility: CLINIC | Age: 72
End: 2022-01-27
Payer: MEDICARE

## 2022-01-30 ENCOUNTER — NURSE TRIAGE (OUTPATIENT)
Dept: ADMINISTRATIVE | Facility: CLINIC | Age: 72
End: 2022-01-30
Payer: MEDICARE

## 2022-01-31 NOTE — TELEPHONE ENCOUNTER
Patient thought steroids would help get rid of the fever. Patient advised he doesn't need steroids-keep using Tylenol every 4 hours as needed.

## 2022-01-31 NOTE — TELEPHONE ENCOUNTER
Why?  His symptoms do not appear to warrant it.  Steroids do reduce the effectiveness of the immune system.  FLOR

## 2022-01-31 NOTE — TELEPHONE ENCOUNTER
Reason for Disposition   HIGH RISK for severe COVID complications (e.g., age > 64 years, obesity with BMI > 25, pregnant, chronic lung disease or other chronic medical condition)  (Exception: Already seen by PCP and no new or worsening symptoms.)    Additional Information   Negative: SEVERE difficulty breathing (e.g., struggling for each breath, speaks in single words)   Negative: Bluish (or gray) lips or face now   Negative: Difficult to awaken or acting confused (e.g., disoriented, slurred speech)   Negative: Shock suspected (e.g., cold/pale/clammy skin, too weak to stand, low BP, rapid pulse)   Negative: Sounds like a life-threatening emergency to the triager   Negative: [1] Diagnosed with COVID-19 AND [2] symptoms lasting 3 or more weeks   Negative: [1] COVID-19 exposure AND [2] no symptoms   Negative: COVID-19 vaccine reaction suspected (e.g., fever, headache, muscle aches) occurring 1 to 3 days after getting vaccine   Negative: COVID-19 vaccine, questions about   Negative: [1] Lives with someone known to have influenza (flu test positive) AND [2] flu-like symptoms (e.g., cough, runny nose, sore throat, SOB; with or without fever)   Negative: [1] Adult with possible COVID-19 symptoms AND [2] triager concerned about severity of symptoms or other causes   Negative: COVID-19 and breastfeeding, questions about   Negative: SEVERE or constant chest pain or pressure (Exception: mild central chest pain, present only when coughing)   Negative: MODERATE difficulty breathing (e.g., speaks in phrases, SOB even at rest, pulse 100-120)   Negative: [1] Headache AND [2] stiff neck (can't touch chin to chest)   Negative: Chest pain or pressure   Negative: Patient sounds very sick or weak to the triager   Negative: MILD difficulty breathing (e.g., minimal/no SOB at rest, SOB with walking, pulse <100)   Negative: Fever > 103 F (39.4 C)   Negative: [1] Fever > 101 F (38.3 C) AND [2] age > 60 years    Negative: [1] Fever > 100.0 F (37.8 C) AND [2] bedridden (e.g., nursing home patient, CVA, chronic illness, recovering from surgery)    Protocols used: CORONAVIRUS (COVID-19) DIAGNOSED OR [id number]  spouse states pt tested pos for covid 19 virus at home thurs. having low grade fever. T99.2 took Tylenol at 2pm. started with cough last pm. wife called re O2 sat. wife states oximeter reading low. After deep breaths and making sure hands warm pt o2 sat =95%. denies dizzy no SOB, no CP. pt states he has been up and around. pt states hx DM. BG okay. spoke with dr roberts re above. rec home care treat cough, tylneol. rec ED if persistent sob, high fever [id number] mental status changes. not doing infusions for omicron. spouse notified. Call back with questions.

## 2022-01-31 NOTE — TELEPHONE ENCOUNTER
Patient reports testing positive for Covid on Thursday 1-27-22. Reports symptoms of temperature of 99 this morning, and diarrhea on last night/none today. Patient denies using any over the counter medications related to symptoms. Patient requesting to know if Dr Fisher will prescribe a steroid medication for him. Preferred pharmacy is [facility]. Please advise. Thank you.

## 2022-02-01 ENCOUNTER — HOSPITAL ENCOUNTER (INPATIENT)
Facility: HOSPITAL | Age: 72
LOS: 4 days | Discharge: HOME | DRG: 177 | End: 2022-02-05
Attending: EMERGENCY MEDICINE | Admitting: OPHTHALMOLOGY
Payer: MEDICARE

## 2022-02-01 DIAGNOSIS — R07.9 CHEST PAIN: ICD-10-CM

## 2022-02-01 DIAGNOSIS — U07.1 COVID-19: ICD-10-CM

## 2022-02-01 PROBLEM — J96.01 ACUTE HYPOXEMIC RESPIRATORY FAILURE DUE TO COVID-19: Status: ACTIVE | Noted: 2022-02-01

## 2022-02-01 PROBLEM — D64.9 ANEMIA: Status: ACTIVE | Noted: 2022-02-01

## 2022-02-01 LAB
ALBUMIN SERPL BCP-MCNC: 3.3 G/DL (ref 3.5–5.2)
ALP SERPL-CCNC: 46 U/L (ref 55–135)
ALT SERPL W/O P-5'-P-CCNC: 32 U/L (ref 10–44)
ANION GAP SERPL CALC-SCNC: 15 MMOL/L (ref 8–16)
AST SERPL-CCNC: 51 U/L (ref 10–40)
BASOPHILS # BLD AUTO: 0.01 K/UL (ref 0–0.2)
BASOPHILS NFR BLD: 0.3 % (ref 0–1.9)
BILIRUB SERPL-MCNC: 0.6 MG/DL (ref 0.1–1)
BNP SERPL-MCNC: 16 PG/ML (ref 0–99)
BUN SERPL-MCNC: 17 MG/DL (ref 8–23)
CALCIUM SERPL-MCNC: 9.2 MG/DL (ref 8.7–10.5)
CHLORIDE SERPL-SCNC: 100 MMOL/L (ref 95–110)
CK SERPL-CCNC: 91 U/L (ref 20–200)
CO2 SERPL-SCNC: 20 MMOL/L (ref 23–29)
CREAT SERPL-MCNC: 1.3 MG/DL (ref 0.5–1.4)
CRP SERPL-MCNC: 79.8 MG/L (ref 0–8.2)
D DIMER PPP IA.FEU-MCNC: 0.77 MG/L FEU
DIFFERENTIAL METHOD: ABNORMAL
EOSINOPHIL # BLD AUTO: 0 K/UL (ref 0–0.5)
EOSINOPHIL NFR BLD: 0 % (ref 0–8)
ERYTHROCYTE [DISTWIDTH] IN BLOOD BY AUTOMATED COUNT: 11.7 % (ref 11.5–14.5)
EST. GFR  (AFRICAN AMERICAN): >60 ML/MIN/1.73 M^2
EST. GFR  (NON AFRICAN AMERICAN): 55 ML/MIN/1.73 M^2
FERRITIN SERPL-MCNC: 2328 NG/ML (ref 20–300)
GLUCOSE SERPL-MCNC: 195 MG/DL (ref 70–110)
HCT VFR BLD AUTO: 36.7 % (ref 40–54)
HGB BLD-MCNC: 12.4 G/DL (ref 14–18)
IMM GRANULOCYTES # BLD AUTO: 0.03 K/UL (ref 0–0.04)
IMM GRANULOCYTES NFR BLD AUTO: 0.8 % (ref 0–0.5)
LACTATE SERPL-SCNC: 0.9 MMOL/L (ref 0.5–2.2)
LDH SERPL L TO P-CCNC: 388 U/L (ref 110–260)
LYMPHOCYTES # BLD AUTO: 0.4 K/UL (ref 1–4.8)
LYMPHOCYTES NFR BLD: 9.4 % (ref 18–48)
MCH RBC QN AUTO: 30 PG (ref 27–31)
MCHC RBC AUTO-ENTMCNC: 33.8 G/DL (ref 32–36)
MCV RBC AUTO: 89 FL (ref 82–98)
MONOCYTES # BLD AUTO: 0.2 K/UL (ref 0.3–1)
MONOCYTES NFR BLD: 4.4 % (ref 4–15)
NEUTROPHILS # BLD AUTO: 3.3 K/UL (ref 1.8–7.7)
NEUTROPHILS NFR BLD: 85.1 % (ref 38–73)
NRBC BLD-RTO: 0 /100 WBC
PLATELET # BLD AUTO: 151 K/UL (ref 150–450)
PMV BLD AUTO: 10.7 FL (ref 9.2–12.9)
POCT GLUCOSE: 139 MG/DL (ref 70–110)
POCT GLUCOSE: 150 MG/DL (ref 70–110)
POCT GLUCOSE: 167 MG/DL (ref 70–110)
POTASSIUM SERPL-SCNC: 4 MMOL/L (ref 3.5–5.1)
PROCALCITONIN SERPL IA-MCNC: 0.11 NG/ML
PROT SERPL-MCNC: 7.2 G/DL (ref 6–8.4)
RBC # BLD AUTO: 4.14 M/UL (ref 4.6–6.2)
SARS-COV-2 RDRP RESP QL NAA+PROBE: POSITIVE
SODIUM SERPL-SCNC: 135 MMOL/L (ref 136–145)
TROPONIN I SERPL DL<=0.01 NG/ML-MCNC: 0.01 NG/ML (ref 0–0.03)
WBC # BLD AUTO: 3.85 K/UL (ref 3.9–12.7)

## 2022-02-01 PROCEDURE — 12000002 HC ACUTE/MED SURGE SEMI-PRIVATE ROOM

## 2022-02-01 PROCEDURE — 85025 COMPLETE CBC W/AUTO DIFF WBC: CPT | Performed by: EMERGENCY MEDICINE

## 2022-02-01 PROCEDURE — 99291 CRITICAL CARE FIRST HOUR: CPT | Mod: 25

## 2022-02-01 PROCEDURE — 84484 ASSAY OF TROPONIN QUANT: CPT | Performed by: EMERGENCY MEDICINE

## 2022-02-01 PROCEDURE — 93010 EKG 12-LEAD: ICD-10-PCS | Mod: ,,, | Performed by: SPECIALIST

## 2022-02-01 PROCEDURE — 85379 FIBRIN DEGRADATION QUANT: CPT | Performed by: STUDENT IN AN ORGANIZED HEALTH CARE EDUCATION/TRAINING PROGRAM

## 2022-02-01 PROCEDURE — 83880 ASSAY OF NATRIURETIC PEPTIDE: CPT | Performed by: EMERGENCY MEDICINE

## 2022-02-01 PROCEDURE — 36415 COLL VENOUS BLD VENIPUNCTURE: CPT | Performed by: EMERGENCY MEDICINE

## 2022-02-01 PROCEDURE — 36415 COLL VENOUS BLD VENIPUNCTURE: CPT | Performed by: STUDENT IN AN ORGANIZED HEALTH CARE EDUCATION/TRAINING PROGRAM

## 2022-02-01 PROCEDURE — 82550 ASSAY OF CK (CPK): CPT | Performed by: EMERGENCY MEDICINE

## 2022-02-01 PROCEDURE — 86140 C-REACTIVE PROTEIN: CPT | Performed by: EMERGENCY MEDICINE

## 2022-02-01 PROCEDURE — 80053 COMPREHEN METABOLIC PANEL: CPT | Performed by: EMERGENCY MEDICINE

## 2022-02-01 PROCEDURE — 94761 N-INVAS EAR/PLS OXIMETRY MLT: CPT

## 2022-02-01 PROCEDURE — 63600175 PHARM REV CODE 636 W HCPCS: Performed by: STUDENT IN AN ORGANIZED HEALTH CARE EDUCATION/TRAINING PROGRAM

## 2022-02-01 PROCEDURE — 93005 ELECTROCARDIOGRAM TRACING: CPT

## 2022-02-01 PROCEDURE — 82728 ASSAY OF FERRITIN: CPT | Performed by: EMERGENCY MEDICINE

## 2022-02-01 PROCEDURE — 93010 ELECTROCARDIOGRAM REPORT: CPT | Mod: ,,, | Performed by: SPECIALIST

## 2022-02-01 PROCEDURE — 83615 LACTATE (LD) (LDH) ENZYME: CPT | Performed by: EMERGENCY MEDICINE

## 2022-02-01 PROCEDURE — 27000221 HC OXYGEN, UP TO 24 HOURS

## 2022-02-01 PROCEDURE — U0002 COVID-19 LAB TEST NON-CDC: HCPCS | Performed by: EMERGENCY MEDICINE

## 2022-02-01 PROCEDURE — 25000242 PHARM REV CODE 250 ALT 637 W/ HCPCS: Performed by: STUDENT IN AN ORGANIZED HEALTH CARE EDUCATION/TRAINING PROGRAM

## 2022-02-01 PROCEDURE — 83605 ASSAY OF LACTIC ACID: CPT | Performed by: EMERGENCY MEDICINE

## 2022-02-01 PROCEDURE — 84145 PROCALCITONIN (PCT): CPT | Performed by: EMERGENCY MEDICINE

## 2022-02-01 PROCEDURE — 27000207 HC ISOLATION

## 2022-02-01 PROCEDURE — 25000003 PHARM REV CODE 250: Performed by: STUDENT IN AN ORGANIZED HEALTH CARE EDUCATION/TRAINING PROGRAM

## 2022-02-01 RX ORDER — GLUCAGON 1 MG
1 KIT INJECTION
Status: DISCONTINUED | OUTPATIENT
Start: 2022-02-01 | End: 2022-02-05 | Stop reason: HOSPADM

## 2022-02-01 RX ORDER — FAMOTIDINE 20 MG/1
20 TABLET, FILM COATED ORAL DAILY
Status: DISCONTINUED | OUTPATIENT
Start: 2022-02-01 | End: 2022-02-05 | Stop reason: HOSPADM

## 2022-02-01 RX ORDER — IBUPROFEN 200 MG
16 TABLET ORAL
Status: DISCONTINUED | OUTPATIENT
Start: 2022-02-01 | End: 2022-02-05 | Stop reason: HOSPADM

## 2022-02-01 RX ORDER — MELOXICAM 15 MG/1
15 TABLET ORAL DAILY
COMMUNITY
Start: 2021-11-10 | End: 2022-09-06

## 2022-02-01 RX ORDER — TAMSULOSIN HYDROCHLORIDE 0.4 MG/1
0.4 CAPSULE ORAL DAILY
Status: DISCONTINUED | OUTPATIENT
Start: 2022-02-01 | End: 2022-02-05 | Stop reason: HOSPADM

## 2022-02-01 RX ORDER — SODIUM CHLORIDE 0.9 % (FLUSH) 0.9 %
10 SYRINGE (ML) INJECTION
Status: DISCONTINUED | OUTPATIENT
Start: 2022-02-01 | End: 2022-02-05 | Stop reason: HOSPADM

## 2022-02-01 RX ORDER — IBUPROFEN 200 MG
24 TABLET ORAL
Status: DISCONTINUED | OUTPATIENT
Start: 2022-02-01 | End: 2022-02-05 | Stop reason: HOSPADM

## 2022-02-01 RX ORDER — SODIUM,POTASSIUM PHOSPHATES 280-250MG
2 POWDER IN PACKET (EA) ORAL
Status: DISCONTINUED | OUTPATIENT
Start: 2022-02-01 | End: 2022-02-05 | Stop reason: HOSPADM

## 2022-02-01 RX ORDER — ENOXAPARIN SODIUM 100 MG/ML
40 INJECTION SUBCUTANEOUS EVERY 24 HOURS
Status: DISCONTINUED | OUTPATIENT
Start: 2022-02-01 | End: 2022-02-05 | Stop reason: HOSPADM

## 2022-02-01 RX ORDER — INSULIN ASPART 100 [IU]/ML
1-10 INJECTION, SOLUTION INTRAVENOUS; SUBCUTANEOUS
Status: DISCONTINUED | OUTPATIENT
Start: 2022-02-01 | End: 2022-02-05 | Stop reason: HOSPADM

## 2022-02-01 RX ORDER — ACETAMINOPHEN 325 MG/1
650 TABLET ORAL EVERY 8 HOURS PRN
Status: DISCONTINUED | OUTPATIENT
Start: 2022-02-01 | End: 2022-02-05 | Stop reason: HOSPADM

## 2022-02-01 RX ORDER — TALC
6 POWDER (GRAM) TOPICAL NIGHTLY PRN
Status: DISCONTINUED | OUTPATIENT
Start: 2022-02-01 | End: 2022-02-05 | Stop reason: HOSPADM

## 2022-02-01 RX ORDER — ATORVASTATIN CALCIUM 40 MG/1
40 TABLET, FILM COATED ORAL DAILY
Status: DISCONTINUED | OUTPATIENT
Start: 2022-02-01 | End: 2022-02-05 | Stop reason: HOSPADM

## 2022-02-01 RX ORDER — FAMOTIDINE 20 MG/1
20 TABLET, FILM COATED ORAL DAILY
COMMUNITY
Start: 2021-11-10

## 2022-02-01 RX ORDER — LOSARTAN POTASSIUM 25 MG/1
25 TABLET ORAL DAILY
Status: DISCONTINUED | OUTPATIENT
Start: 2022-02-01 | End: 2022-02-05 | Stop reason: HOSPADM

## 2022-02-01 RX ORDER — NALOXONE HCL 0.4 MG/ML
0.02 VIAL (ML) INJECTION
Status: DISCONTINUED | OUTPATIENT
Start: 2022-02-01 | End: 2022-02-05 | Stop reason: HOSPADM

## 2022-02-01 RX ADMIN — DEXAMETHASONE 6 MG: 4 TABLET ORAL at 11:02

## 2022-02-01 RX ADMIN — ACETAMINOPHEN 650 MG: 325 TABLET ORAL at 11:02

## 2022-02-01 RX ADMIN — REMDESIVIR 200 MG: 100 INJECTION, POWDER, LYOPHILIZED, FOR SOLUTION INTRAVENOUS at 11:02

## 2022-02-01 RX ADMIN — ACETAMINOPHEN 650 MG: 325 TABLET ORAL at 03:02

## 2022-02-01 RX ADMIN — ENOXAPARIN SODIUM 40 MG: 100 INJECTION SUBCUTANEOUS at 05:02

## 2022-02-01 RX ADMIN — INSULIN ASPART 2 UNITS: 100 INJECTION, SOLUTION INTRAVENOUS; SUBCUTANEOUS at 12:02

## 2022-02-01 NOTE — Clinical Note
Is this patient a high probability for COVID-19?: Yes   Diagnosis: COVID-19 [[id number]]   Future Attending Provider: ALONZO OLMEDO [[id number]]   Admitting Provider:: WEI BERG [[id number]]   Special Needs:: No Special Needs [1]   Include Location In Plan?: No Detail Level: Detailed

## 2022-02-01 NOTE — PLAN OF CARE
[facility]  Initial Discharge Assessment       Primary Care Provider: Herrera Fisher MD    Admission Diagnosis: COVID-19 [U07.1]    Admission Date: 2/1/2022  Expected Discharge Date:  to be determined    Pt is covid positive and on isolation.  SW called pt's spouse, Emily to complete discharge assessment, verified PCP, pharmacy and information on facesheet.  Pt lives with spouse and drives self to MD appt and independent with ADLs, prior to admit.  No HH, DME, dialysis and doesn't take coumadin.  Spouse will drivept home upon discharge.  No needs identified at this time.     Payor: BCBS [place] MEDICARE / Plan: [employer] [place] / Product Type: Medicare Advantage /     Extended Emergency Contact Information  Primary Emergency Contact: [employer] ridleyte  Address: [id number] [place] [address] [id number] [place] [address]  Mobile Phone: [phone]  Relation: Spouse  Preferred language: English   needed? No    Discharge Plan A: Home  Discharge Plan B: Home      [facility] [address] [address]  [address] [facility] [address]  [place] [address]  Phone: [phone] Fax: [phone]      Initial Assessment (most recent)     Adult Discharge Assessment - 02/01/22 1444        Discharge Assessment    Assessment Type Discharge Planning Assessment     Confirmed/corrected address, phone number and insurance Yes     Confirmed Demographics Correct on Facesheet     Source of Information family     Communicated ANTELMO with patient/caregiver No     Lives With spouse     Do you expect to return to your current living situation? Yes     Walking or Climbing Stairs Difficulty none     Dressing/Bathing Difficulty none     Home Accessibility wheelchair accessible     Home Layout Able to live on 1st floor     Equipment Currently Used at Home none     Readmission within 30 days? No     Patient currently being followed by outpatient case management? No     Do you currently have  service(s) that help you manage your care at home? No     Do you take prescription medications? Yes     Do you have prescription coverage? Yes     Coverage BCBS     Do you have any problems affording any of your prescribed medications? No     Is the patient taking medications as prescribed? yes     How do you get to doctors appointments? car, drives self     Are you on dialysis? No     Do you take coumadin? No     Discharge Plan A Home     Discharge Plan B Home     DME Needed Upon Discharge  none     Discharge Plan discussed with: Spouse/sig other

## 2022-02-01 NOTE — ED PROVIDER NOTES
Encounter Date: 2/1/2022    SCRIBE #1 NOTE: [facility], am scribing for, and in the presence of, Ludwin Gonzalez MD.       History     Chief Complaint   Patient presents with    COVID-19 Concerns     Positive test last week     Time seen by provider: 7:40 AM on 02/01/2022    Ludwin Gee is a 72 y.o. male who presents to the ED for COVID-19 concerns with an onset of fever. Patient began experiencing symptoms a week ago and tested positive for COVID-19 last week. He reports fever (Tmax 102.0 °F) this morning for which he took medication PTA. He has had decreased appetite and diarrhea. The patient denies N/V or any other symptoms at this time. He is not vaccinated against COVID-19. No Hx of chronic lung disease, but pt is a former smoker. He discontinued tobacco use 30-35 years ago. Patient is a diabetic but has not been monitoring blood sugar recently. PMHx of DM, HLD, and HTN. No pertinent PSHx.    The history is provided by the patient and the spouse.     Review of patient's allergies indicates:   Allergen Reactions    Pravastatin Other (See Comments)     Joint pain    Lisinopril Other (See Comments)     Cough      Past Medical History:   Diagnosis Date    Diabetes mellitus     Elevated PSA     Hyperlipidemia     Hypertension      Past Surgical History:   Procedure Laterality Date    COLONOSCOPY  12/6/2013    Dr. Martinez, 5 year recheck    SHOULDER SURGERY  12/09/2011    right      Family History   Problem Relation Age of Onset    Diabetes Father     Heart disease Father     Cataracts Mother     Glaucoma Mother     Diabetes Maternal Grandmother     No Known Problems Brother     No Known Problems Daughter     No Known Problems Son     No Known Problems Maternal Grandfather     No Known Problems Paternal Grandmother     No Known Problems Paternal Grandfather     No Known Problems Maternal Aunt     No Known Problems Maternal Uncle     No Known Problems Paternal Aunt     No Known  Problems Paternal Uncle     Urolithiasis Neg Hx     Prostate cancer Neg Hx     Kidney cancer Neg Hx     Retinal detachment Neg Hx     Macular degeneration Neg Hx      Social History     Tobacco Use    Smoking status: Former Smoker     Types: Cigarettes     Quit date: 1985     Years since quittin.7    Smokeless tobacco: Never Used   Substance Use Topics    Alcohol use: Yes     Comment: seldom    Drug use: No     Review of Systems   Constitutional: Positive for appetite change and fever. Negative for activity change and fatigue.   HENT: Negative for congestion, rhinorrhea and sore throat.    Eyes: Negative for visual disturbance.   Respiratory: Negative for cough, chest tightness, shortness of breath and wheezing.    Cardiovascular: Negative for chest pain and palpitations.   Gastrointestinal: Positive for diarrhea. Negative for nausea and vomiting.   Musculoskeletal: Negative for arthralgias and myalgias.   Skin: Negative for rash.   Neurological: Negative for weakness, light-headedness and headaches.       Physical Exam     Initial Vitals [22 0727]   BP Pulse Resp Temp SpO2   (!) 112/57 96 18 98.4 °F (36.9 °C) (!) 91 %      MAP       --         Physical Exam    Nursing note and vitals reviewed.  Constitutional: He appears well-nourished.   HENT:   Head: Normocephalic and atraumatic.   Eyes: Conjunctivae and EOM are normal.   Neck: Neck supple. No thyroid mass present.   Normal range of motion.  Cardiovascular: Normal rate, regular rhythm and normal heart sounds. Exam reveals no gallop and no friction rub.    No murmur heard.  Pulmonary/Chest: Breath sounds normal. He has no wheezes. He has no rhonchi. He has no rales.   Abdominal: Abdomen is soft. Bowel sounds are normal. There is no abdominal tenderness.   Musculoskeletal:      Cervical back: Normal range of motion and neck supple.     Neurological: He is alert and oriented to person, place, and time. He has normal strength. No cranial  nerve deficit or sensory deficit.   Skin: Skin is warm and dry. No rash noted. No erythema.   Psychiatric: He has a normal mood and affect. His speech is normal. Cognition and memory are normal.         ED Course   Procedures  Labs Reviewed   CBC W/ AUTO DIFFERENTIAL - Abnormal; Notable for the following components:       Result Value    WBC 3.85 (*)     RBC 4.14 (*)     Hemoglobin 12.4 (*)     Hematocrit 36.7 (*)     Immature Granulocytes 0.8 (*)     Lymph # 0.4 (*)     Mono # 0.2 (*)     Gran % 85.1 (*)     Lymph % 9.4 (*)     All other components within normal limits    Narrative:     Collection has been rescheduled by TDC at 02/01/2022 07:48 Reason: Pt   getting iv   COMPREHENSIVE METABOLIC PANEL - Abnormal; Notable for the following components:    Sodium 135 (*)     CO2 20 (*)     Glucose 195 (*)     Albumin 3.3 (*)     Alkaline Phosphatase 46 (*)     AST 51 (*)     eGFR if non  55 (*)     All other components within normal limits    Narrative:     Collection has been rescheduled by TDC at 02/01/2022 07:48 Reason: Pt   getting iv   C-REACTIVE PROTEIN - Abnormal; Notable for the following components:    CRP 79.8 (*)     All other components within normal limits    Narrative:     Collection has been rescheduled by TDC at 02/01/2022 07:48 Reason: Pt   getting iv   FERRITIN - Abnormal; Notable for the following components:    Ferritin 2,328 (*)     All other components within normal limits    Narrative:     Collection has been rescheduled by TDC at 02/01/2022 07:48 Reason: Pt   getting iv   LACTATE DEHYDROGENASE - Abnormal; Notable for the following components:     (*)     All other components within normal limits    Narrative:     Collection has been rescheduled by TDC at 02/01/2022 07:48 Reason: Pt   getting iv   SARS-COV-2 RNA AMPLIFICATION, QUAL - Abnormal; Notable for the following components:    SARS-CoV-2 RNA, Amplification, Qual Positive (*)     All other components within normal  limits   CK    Narrative:     Collection has been rescheduled by TDC at 02/01/2022 07:48 Reason: Pt   getting iv   LACTIC ACID, PLASMA    Narrative:     Collection has been rescheduled by TDC at 02/01/2022 07:48 Reason: Pt   getting iv   TROPONIN I    Narrative:     Collection has been rescheduled by TDC at 02/01/2022 07:48 Reason: Pt   getting iv   B-TYPE NATRIURETIC PEPTIDE    Narrative:     Collection has been rescheduled by TDC at 02/01/2022 07:48 Reason: Pt   getting iv   PROCALCITONIN    Narrative:     Collection has been rescheduled by TDC at 02/01/2022 07:48 Reason: Pt   getting iv     EKG Readings: (Independently Interpreted)   Initial Reading: No STEMI. Rhythm: Normal Sinus Rhythm. Heart Rate: 91. Ectopy: No Ectopy. Conduction: Normal. ST Segments: Normal ST Segments. T Waves: Normal. Clinical Impression: Normal Sinus Rhythm     ECG Results          EKG 12-lead (Final result)  Result time 02/01/22 09:11:39    Final result by Interface, Lab In [facility] (02/01/22 09:11:39)                 Narrative:    Test Reason : U07.1,    Vent. Rate : 091 BPM     Atrial Rate : 091 BPM     P-R Int : 144 ms          QRS Dur : 078 ms      QT Int : 328 ms       P-R-T Axes : 024 024 033 degrees     QTc Int : 403 ms    Normal sinus rhythm  Normal ECG  When compared with ECG of 20-JUL-2009 07:54,  No significant change was found  Confirmed by Ebenezer SAEZ, Boo FULTON ([id number]) on 2/1/2022 9:11:29 AM    Referred By:  ED DR           Confirmed By:Boo Sol MD                            Imaging Results          X-Ray Chest AP Portable (Final result)  Result time 02/01/22 08:01:54    Final result by Vern Bailey MD (02/01/22 08:01:54)                 Impression:      Mild infiltrates which may represent an atypical infectious process.      Electronically signed by: Vern Bailey MD  Date:    02/01/2022  Time:    08:01             Narrative:    EXAMINATION:  XR CHEST AP PORTABLE    CLINICAL  HISTORY:  COVID-19;    TECHNIQUE:  Single frontal view of the chest was performed.    COMPARISON:  07/20/2009    FINDINGS:  There are strandy infiltrates in the periphery of the left mid to lower lung zone and subtly on the right.  There is no pleural effusion.  The heart size is normal.                                 Medications   tamsulosin 24 hr capsule 0.4 mg (0.4 mg Oral Not Given 2/1/22 1115)   famotidine tablet 20 mg (20 mg Oral Not Given 2/1/22 1115)   losartan tablet 25 mg (25 mg Oral Not Given 2/1/22 1115)   atorvastatin tablet 40 mg (40 mg Oral Not Given 2/1/22 1115)   sodium chloride 0.9% flush 10 mL (has no administration in time range)   potassium bicarbonate disintegrating tablet 50 mEq (has no administration in time range)   potassium bicarbonate disintegrating tablet 35 mEq (has no administration in time range)   potassium bicarbonate disintegrating tablet 60 mEq (has no administration in time range)   potassium, sodium phosphates 280-160-250 mg packet 2 packet (has no administration in time range)   potassium, sodium phosphates 280-160-250 mg packet 2 packet (has no administration in time range)   potassium, sodium phosphates 280-160-250 mg packet 2 packet (has no administration in time range)   glucose chewable tablet 16 g (has no administration in time range)   glucose chewable tablet 24 g (has no administration in time range)   dextrose 50% injection 12.5 g (has no administration in time range)   dextrose 50% injection 25 g (has no administration in time range)   glucagon (human recombinant) injection 1 mg (has no administration in time range)   acetaminophen tablet 650 mg (has no administration in time range)   melatonin tablet 6 mg (has no administration in time range)   naloxone 0.4 mg/mL injection 0.02 mg (has no administration in time range)   enoxaparin injection 40 mg (has no administration in time range)   insulin aspart U-100 pen 1-10 Units (2 Units Subcutaneous Given 2/1/22 1209)      Medical Decision Making:   History:   Old Medical Records: I decided to obtain old medical records.  Independently Interpreted Test(s):   I have ordered and independently interpreted EKG Reading(s) - see prior notes  Clinical Tests:   Lab Tests: Reviewed  Radiological Study: Ordered and Reviewed  Medical Tests: Reviewed and Ordered  ED Management:  This patient was rapidly assessed shortly after arrival.  Initial vital signs are significant for low oxygen saturations on room air.  There is appropriate improvement with nasal cannula supplementation.  He is COVID positive.  Chest x-ray reveals mild infiltrates consistent with COVID pneumonia.  Additional labs indicate systemic inflammation secondary to this infection.  The patient warrants admission for antiviral therapy and further oxygen supplementation.  Case discussed with and accepted by the on-call hospitalist.  Patient and significant other are in agreement with this disposition and he is transported to a telemetry bed in guarded condition.          Scribe Attestation:   Scribe #1: I performed the above scribed service and the documentation accurately describes the services I performed. I attest to the accuracy of the note.    Attending Attestation:         Attending Critical Care:   Critical Care Times:   Direct Patient Care (initial evaluation, reassessments, and time considering the case)................................................................15 minutes.   Additional History from reviewing old medical records or taking additional history from the family, EMS, PCP, etc.......................5 minutes.   Ordering, Reviewing, and Interpreting Diagnostic Studies...............................................................................................................5 minutes.    Documentation..................................................................................................................................................................................5 minutes.   Consultation with other Physicians. .................................................................................................................................................5 minutes.   Consultation with the patient's family directly relating to the patient's condition, care, and DNR status (when patient unable)......5 minutes.   Other..................................................................................................................................................................................................5 minutes.   ==============================================================  · Total Critical Care Time - exclusive of procedural time: 45 minutes.  ==============================================================  Critical care was necessary to treat or prevent imminent or life-threatening deterioration of the following conditions: respiratory failure.   Critical care was time spent personally by me on the following activities: obtaining history from patient or relative, examination of patient, review of old charts, ordering lab, x-rays, and/or EKG, evaluation of patient's response to treatment, ordering and performing treatments and interventions, development of treatment plan with patient or relative, discussions with primary provider, interpretation of cardiac measurements and re-evaluation of patient's conition.   Critical Care Condition: potentially life-threatening                  I, Dr. Ludwin Gonzalez, personally performed the services described in this documentation. All medical record entries made by the scribe were at my direction and in my presence.  I have reviewed the chart and agree that the record reflects my personal performance and is accurate and complete. Ludwin Gonzalez,  MD.  3:11 PM 02/01/2022    Clinical Impression:   Final diagnoses:  [U07.1] COVID-19          ED Disposition Condition    Admit               Ludwin Gonzalez MD  02/01/22 0019

## 2022-02-02 LAB
ALBUMIN SERPL BCP-MCNC: 3.1 G/DL (ref 3.5–5.2)
ALP SERPL-CCNC: 49 U/L (ref 55–135)
ALT SERPL W/O P-5'-P-CCNC: 44 U/L (ref 10–44)
ANION GAP SERPL CALC-SCNC: 12 MMOL/L (ref 8–16)
AST SERPL-CCNC: 61 U/L (ref 10–40)
BASOPHILS # BLD AUTO: 0 K/UL (ref 0–0.2)
BASOPHILS NFR BLD: 0 % (ref 0–1.9)
BILIRUB SERPL-MCNC: 0.5 MG/DL (ref 0.1–1)
BUN SERPL-MCNC: 25 MG/DL (ref 8–23)
CALCIUM SERPL-MCNC: 9.2 MG/DL (ref 8.7–10.5)
CHLORIDE SERPL-SCNC: 101 MMOL/L (ref 95–110)
CO2 SERPL-SCNC: 25 MMOL/L (ref 23–29)
CREAT SERPL-MCNC: 1.4 MG/DL (ref 0.5–1.4)
DIFFERENTIAL METHOD: ABNORMAL
EOSINOPHIL # BLD AUTO: 0 K/UL (ref 0–0.5)
EOSINOPHIL NFR BLD: 0 % (ref 0–8)
ERYTHROCYTE [DISTWIDTH] IN BLOOD BY AUTOMATED COUNT: 11.8 % (ref 11.5–14.5)
EST. GFR  (AFRICAN AMERICAN): 58 ML/MIN/1.73 M^2
EST. GFR  (NON AFRICAN AMERICAN): 50 ML/MIN/1.73 M^2
GLUCOSE SERPL-MCNC: 245 MG/DL (ref 70–110)
HCT VFR BLD AUTO: 34.9 % (ref 40–54)
HGB BLD-MCNC: 11.5 G/DL (ref 14–18)
IMM GRANULOCYTES # BLD AUTO: 0.01 K/UL (ref 0–0.04)
IMM GRANULOCYTES NFR BLD AUTO: 0.3 % (ref 0–0.5)
LYMPHOCYTES # BLD AUTO: 0.5 K/UL (ref 1–4.8)
LYMPHOCYTES NFR BLD: 12.9 % (ref 18–48)
MAGNESIUM SERPL-MCNC: 1.9 MG/DL (ref 1.6–2.6)
MCH RBC QN AUTO: 29.9 PG (ref 27–31)
MCHC RBC AUTO-ENTMCNC: 33 G/DL (ref 32–36)
MCV RBC AUTO: 91 FL (ref 82–98)
MONOCYTES # BLD AUTO: 0.1 K/UL (ref 0.3–1)
MONOCYTES NFR BLD: 3.1 % (ref 4–15)
NEUTROPHILS # BLD AUTO: 3 K/UL (ref 1.8–7.7)
NEUTROPHILS NFR BLD: 83.7 % (ref 38–73)
NRBC BLD-RTO: 0 /100 WBC
PHOSPHATE SERPL-MCNC: 3.4 MG/DL (ref 2.7–4.5)
PLATELET # BLD AUTO: 177 K/UL (ref 150–450)
PMV BLD AUTO: 10.7 FL (ref 9.2–12.9)
POCT GLUCOSE: 230 MG/DL (ref 70–110)
POCT GLUCOSE: 279 MG/DL (ref 70–110)
POCT GLUCOSE: 281 MG/DL (ref 70–110)
POCT GLUCOSE: 289 MG/DL (ref 70–110)
POTASSIUM SERPL-SCNC: 4.7 MMOL/L (ref 3.5–5.1)
PROT SERPL-MCNC: 7.1 G/DL (ref 6–8.4)
RBC # BLD AUTO: 3.84 M/UL (ref 4.6–6.2)
SODIUM SERPL-SCNC: 138 MMOL/L (ref 136–145)
WBC # BLD AUTO: 3.57 K/UL (ref 3.9–12.7)

## 2022-02-02 PROCEDURE — 25000242 PHARM REV CODE 250 ALT 637 W/ HCPCS: Performed by: STUDENT IN AN ORGANIZED HEALTH CARE EDUCATION/TRAINING PROGRAM

## 2022-02-02 PROCEDURE — 12000002 HC ACUTE/MED SURGE SEMI-PRIVATE ROOM

## 2022-02-02 PROCEDURE — 80053 COMPREHEN METABOLIC PANEL: CPT | Performed by: STUDENT IN AN ORGANIZED HEALTH CARE EDUCATION/TRAINING PROGRAM

## 2022-02-02 PROCEDURE — 83735 ASSAY OF MAGNESIUM: CPT | Performed by: STUDENT IN AN ORGANIZED HEALTH CARE EDUCATION/TRAINING PROGRAM

## 2022-02-02 PROCEDURE — 36415 COLL VENOUS BLD VENIPUNCTURE: CPT | Performed by: STUDENT IN AN ORGANIZED HEALTH CARE EDUCATION/TRAINING PROGRAM

## 2022-02-02 PROCEDURE — 84100 ASSAY OF PHOSPHORUS: CPT | Performed by: STUDENT IN AN ORGANIZED HEALTH CARE EDUCATION/TRAINING PROGRAM

## 2022-02-02 PROCEDURE — 63600175 PHARM REV CODE 636 W HCPCS: Performed by: STUDENT IN AN ORGANIZED HEALTH CARE EDUCATION/TRAINING PROGRAM

## 2022-02-02 PROCEDURE — 27000221 HC OXYGEN, UP TO 24 HOURS

## 2022-02-02 PROCEDURE — 25000003 PHARM REV CODE 250: Performed by: STUDENT IN AN ORGANIZED HEALTH CARE EDUCATION/TRAINING PROGRAM

## 2022-02-02 PROCEDURE — 27000207 HC ISOLATION

## 2022-02-02 PROCEDURE — 94761 N-INVAS EAR/PLS OXIMETRY MLT: CPT

## 2022-02-02 PROCEDURE — 85025 COMPLETE CBC W/AUTO DIFF WBC: CPT | Performed by: STUDENT IN AN ORGANIZED HEALTH CARE EDUCATION/TRAINING PROGRAM

## 2022-02-02 PROCEDURE — 94799 UNLISTED PULMONARY SVC/PX: CPT

## 2022-02-02 RX ADMIN — ATORVASTATIN CALCIUM 40 MG: 40 TABLET, FILM COATED ORAL at 10:02

## 2022-02-02 RX ADMIN — LOSARTAN POTASSIUM 25 MG: 25 TABLET, FILM COATED ORAL at 10:02

## 2022-02-02 RX ADMIN — INSULIN ASPART 6 UNITS: 100 INJECTION, SOLUTION INTRAVENOUS; SUBCUTANEOUS at 12:02

## 2022-02-02 RX ADMIN — TAMSULOSIN HYDROCHLORIDE 0.4 MG: 0.4 CAPSULE ORAL at 10:02

## 2022-02-02 RX ADMIN — DEXAMETHASONE 6 MG: 4 TABLET ORAL at 10:02

## 2022-02-02 RX ADMIN — INSULIN ASPART 6 UNITS: 100 INJECTION, SOLUTION INTRAVENOUS; SUBCUTANEOUS at 05:02

## 2022-02-02 RX ADMIN — FAMOTIDINE 20 MG: 20 TABLET ORAL at 10:02

## 2022-02-02 RX ADMIN — INSULIN ASPART 4 UNITS: 100 INJECTION, SOLUTION INTRAVENOUS; SUBCUTANEOUS at 08:02

## 2022-02-02 RX ADMIN — REMDESIVIR 100 MG: 100 INJECTION, POWDER, LYOPHILIZED, FOR SOLUTION INTRAVENOUS at 03:02

## 2022-02-02 RX ADMIN — ENOXAPARIN SODIUM 40 MG: 100 INJECTION SUBCUTANEOUS at 05:02

## 2022-02-02 RX ADMIN — INSULIN ASPART 3 UNITS: 100 INJECTION, SOLUTION INTRAVENOUS; SUBCUTANEOUS at 09:02

## 2022-02-02 NOTE — SUBJECTIVE & OBJECTIVE
Past Medical History:   Diagnosis Date    Diabetes mellitus     Elevated PSA     Hyperlipidemia     Hypertension        Past Surgical History:   Procedure Laterality Date    COLONOSCOPY  12/6/2013    Dr. Martinez, 5 year recheck    SHOULDER SURGERY  12/09/2011    right        Review of patient's allergies indicates:   Allergen Reactions    Pravastatin Other (See Comments)     Joint pain    Lisinopril Other (See Comments)     Cough        No current facility-administered medications on file prior to encounter.     Current Outpatient Medications on File Prior to Encounter   Medication Sig    alfuzosin (UROXATRAL) 10 mg Tb24 Take 1 tablet (10 mg total) by mouth daily with breakfast.    blood sugar diagnostic Strp One Touch check fasting glucose in morning    blood-glucose meter Misc One Touch glucometer check fasting glucose in morning    coenzyme Q10 10 mg capsule Take 10 mg by mouth once daily.    famotidine (PEPCID) 20 MG tablet Take 20 mg by mouth once daily.    glimepiride (AMARYL) 1 MG tablet Take 1 tablet (1 mg total) by mouth once daily.    ibuprofen (ADVIL,MOTRIN) 800 MG tablet Take 800 mg by mouth every 6 (six) hours as needed.    lancets Misc One Touch lancets check fasting glucose in morning    losartan (COZAAR) 25 MG tablet TAKE ONE TABLET BY MOUTH EVERY DAY    meloxicam (MOBIC) 15 MG tablet Take 15 mg by mouth once daily.    metFORMIN (GLUCOPHAGE-XR) 500 MG ER 24hr tablet TAKE ONE TABLET BY MOUTH EVERY MORNING AND TAKE TWO TABLETS BY MOUTH EVERY EVENING    rosuvastatin (CRESTOR) 10 MG tablet Take 1 tablet (10 mg total) by mouth once daily.     Family History     Problem Relation (Age of Onset)    Cataracts Mother    Diabetes Father, Maternal Grandmother    Glaucoma Mother    Heart disease Father    No Known Problems Brother, Daughter, Son, Maternal Grandfather, Paternal Grandmother, Paternal Grandfather, Maternal Aunt, Maternal Uncle, Paternal Aunt, Paternal Uncle        Tobacco Use     Smoking status: Former Smoker     Types: Cigarettes     Quit date: 1985     Years since quittin.7    Smokeless tobacco: Never Used   Substance and Sexual Activity    Alcohol use: Yes     Comment: seldom    Drug use: No    Sexual activity: Not on file     Review of Systems   Constitutional: Positive for chills and fever.   Respiratory: Negative for cough and shortness of breath.    Gastrointestinal: Positive for diarrhea.   Allergic/Immunologic: Positive for immunocompromised state.   All other systems reviewed and are negative.    Objective:     Vital Signs (Most Recent):  Temp: 98.9 °F (37.2 °C) (22)  Pulse: 80 (22)  Resp: 18 (22)  BP: 119/68 (22)  SpO2: (!) 92 % (22) Vital Signs (24h Range):  Temp:  [98.4 °F (36.9 °C)-101.1 °F (38.4 °C)] 98.9 °F (37.2 °C)  Pulse:  [79-96] 80  Resp:  [18-20] 18  SpO2:  [89 %-96 %] 92 %  BP: (108-119)/(52-68) 119/68     Weight: 76.2 kg (168 lb)  Body mass index is 24.81 kg/m².    Physical Exam  Vitals and nursing note reviewed.   Constitutional:       Appearance: He is ill-appearing.   HENT:      Head: Normocephalic and atraumatic.      Right Ear: External ear normal.      Left Ear: External ear normal.      Nose: Nose normal.      Mouth/Throat:      Mouth: Mucous membranes are moist.      Pharynx: Oropharynx is clear.   Eyes:      Extraocular Movements: Extraocular movements intact.      Conjunctiva/sclera: Conjunctivae normal.   Cardiovascular:      Rate and Rhythm: Normal rate and regular rhythm.      Pulses: Normal pulses.      Heart sounds: Normal heart sounds.   Pulmonary:      Effort: Pulmonary effort is normal.      Breath sounds: Rhonchi present.   Abdominal:      General: Bowel sounds are normal.      Palpations: Abdomen is soft.   Musculoskeletal:         General: Normal range of motion.      Cervical back: Normal range of motion and neck supple.      Right lower leg: No edema.      Left lower leg: No  edema.   Skin:     General: Skin is warm and dry.   Neurological:      General: No focal deficit present.      Mental Status: He is alert and oriented to person, place, and time. Mental status is at baseline.   Psychiatric:         Mood and Affect: Mood normal.         Behavior: Behavior normal.             Significant Labs: All pertinent labs within the past 24 hours have been reviewed.    Significant Imaging: I have reviewed all pertinent imaging results/findings within the past 24 hours.

## 2022-02-02 NOTE — H&P
[facility] Medicine  History & Physical    Patient Name: Ludwin Gee  MRN: [id number]  Patient Class: IP- Inpatient  Admission Date: 2/1/2022  Attending Physician: Adis Field MD  Primary Care Provider: Herrera Fisher MD         Patient information was obtained from patient, spouse/SO, past medical records and ER records.     Subjective:     Principal Problem:Acute hypoxemic respiratory failure due to COVID-19    Chief Complaint:   Chief Complaint   Patient presents with    COVID-19 Concerns     Positive test last week        HPI: Ludwin Webber is a 72 year old male with a past medical history with a past medical history of DM, HTN, HLD, and former tobacco use who presents with persistent fevers and chills. He recently tested positive for COVID-19 roughly one week ago. He had to be placed on 2 L NC while in the ED. Hospital Medicine was consulted for admission.      Past Medical History:   Diagnosis Date    Diabetes mellitus     Elevated PSA     Hyperlipidemia     Hypertension        Past Surgical History:   Procedure Laterality Date    COLONOSCOPY  12/6/2013    Dr. Martinez, 5 year recheck    SHOULDER SURGERY  12/09/2011    right        Review of patient's allergies indicates:   Allergen Reactions    Pravastatin Other (See Comments)     Joint pain    Lisinopril Other (See Comments)     Cough        No current facility-administered medications on file prior to encounter.     Current Outpatient Medications on File Prior to Encounter   Medication Sig    alfuzosin (UROXATRAL) 10 mg Tb24 Take 1 tablet (10 mg total) by mouth daily with breakfast.    blood sugar diagnostic Strp One Touch check fasting glucose in morning    blood-glucose meter Misc One Touch glucometer check fasting glucose in morning    coenzyme Q10 10 mg capsule Take 10 mg by mouth once daily.    famotidine (PEPCID) 20 MG tablet Take 20 mg by mouth once daily.    glimepiride (AMARYL) 1 MG tablet Take 1  tablet (1 mg total) by mouth once daily.    ibuprofen (ADVIL,MOTRIN) 800 MG tablet Take 800 mg by mouth every 6 (six) hours as needed.    lancets Misc One Touch lancets check fasting glucose in morning    losartan (COZAAR) 25 MG tablet TAKE ONE TABLET BY MOUTH EVERY DAY    meloxicam (MOBIC) 15 MG tablet Take 15 mg by mouth once daily.    metFORMIN (GLUCOPHAGE-XR) 500 MG ER 24hr tablet TAKE ONE TABLET BY MOUTH EVERY MORNING AND TAKE TWO TABLETS BY MOUTH EVERY EVENING    rosuvastatin (CRESTOR) 10 MG tablet Take 1 tablet (10 mg total) by mouth once daily.     Family History     Problem Relation (Age of Onset)    Cataracts Mother    Diabetes Father, Maternal Grandmother    Glaucoma Mother    Heart disease Father    No Known Problems Brother, Daughter, Son, Maternal Grandfather, Paternal Grandmother, Paternal Grandfather, Maternal Aunt, Maternal Uncle, Paternal Aunt, Paternal Uncle        Tobacco Use    Smoking status: Former Smoker     Types: Cigarettes     Quit date: 1985     Years since quittin.7    Smokeless tobacco: Never Used   Substance and Sexual Activity    Alcohol use: Yes     Comment: seldom    Drug use: No    Sexual activity: Not on file     Review of Systems   Constitutional: Positive for chills and fever.   Respiratory: Negative for cough and shortness of breath.    Gastrointestinal: Positive for diarrhea.   Allergic/Immunologic: Positive for immunocompromised state.   All other systems reviewed and are negative.    Objective:     Vital Signs (Most Recent):  Temp: 98.9 °F (37.2 °C) (22)  Pulse: 80 (22)  Resp: 18 (22)  BP: 119/68 (22)  SpO2: (!) 92 % (22) Vital Signs (24h Range):  Temp:  [98.4 °F (36.9 °C)-101.1 °F (38.4 °C)] 98.9 °F (37.2 °C)  Pulse:  [79-96] 80  Resp:  [18-20] 18  SpO2:  [89 %-96 %] 92 %  BP: (108-119)/(52-68) 119/68     Weight: 76.2 kg (168 lb)  Body mass index is 24.81 kg/m².    Physical Exam  Vitals and nursing  note reviewed.   Constitutional:       Appearance: He is ill-appearing.   HENT:      Head: Normocephalic and atraumatic.      Right Ear: External ear normal.      Left Ear: External ear normal.      Nose: Nose normal.      Mouth/Throat:      Mouth: Mucous membranes are moist.      Pharynx: Oropharynx is clear.   Eyes:      Extraocular Movements: Extraocular movements intact.      Conjunctiva/sclera: Conjunctivae normal.   Cardiovascular:      Rate and Rhythm: Normal rate and regular rhythm.      Pulses: Normal pulses.      Heart sounds: Normal heart sounds.   Pulmonary:      Effort: Pulmonary effort is normal.      Breath sounds: Rhonchi present.   Abdominal:      General: Bowel sounds are normal.      Palpations: Abdomen is soft.   Musculoskeletal:         General: Normal range of motion.      Cervical back: Normal range of motion and neck supple.      Right lower leg: No edema.      Left lower leg: No edema.   Skin:     General: Skin is warm and dry.   Neurological:      General: No focal deficit present.      Mental Status: He is alert and oriented to person, place, and time. Mental status is at baseline.   Psychiatric:         Mood and Affect: Mood normal.         Behavior: Behavior normal.             Significant Labs: All pertinent labs within the past 24 hours have been reviewed.    Significant Imaging: I have reviewed all pertinent imaging results/findings within the past 24 hours.    Assessment/Plan:     * Acute hypoxemic respiratory failure due to COVID-19  -Decadron  -Remdesivir  -Lovenox  -Trend inflammatory markers  -Airborne, isolation and contact precautions    Anemia  -CBC trend        Hyperlipidemia associated with type 2 diabetes mellitus  -Statin      Hypertension associated with diabetes  -Continue losartan      BPH (benign prostatic hyperplasia)  -Flomax      Controlled type 2 diabetes mellitus with microalbuminuria, without long-term current use of insulin  -SSI  -AC HS glucose  checks  -Hypoglycemic precautions  -Diabetic diet        VTE Risk Mitigation (From admission, onward)         Ordered     enoxaparin injection 40 mg  Daily         02/01/22 1101     IP VTE HIGH RISK PATIENT  Once         02/01/22 1101     Place sequential compression device  Until discontinued         02/01/22 1101                   Adis Field MD  Department of Hospital Medicine   [facility]

## 2022-02-02 NOTE — HOSPITAL COURSE
Ludwin Webber is a 72 year old male with a past medical history with a past medical history of DM, HTN, HLD, and former tobacco use who presented with persistent fevers and chills secondary to COVID-19 pneumonia. He was given Lovenox prophylactic dosing, remdesivir and Decadron. Inflammatory markers were trended.  Completed a 5 day course of remdesivir.  His oxygen requirements were stable and his inflammatory markers were improving.  He clinically looked and felt improved as well.  He was discharged home with supplemental oxygen and was enrolled in the COVID-19 surveillance program.  Return precautions were discussed.  He expressed understanding.  He was instructed to follow up with his PCP in 1 week.    Patient was seen on day of discharge by myself and was examined. They were stable for discharge. Discharge plan, follow up instructions, and contacts in case of further needs were discussed in detail.

## 2022-02-02 NOTE — ASSESSMENT & PLAN NOTE
-Decadron  -Remdesivir  -Lovenox  -Trend inflammatory markers  -Airborne, isolation and contact precautions

## 2022-02-02 NOTE — HPI
Ludwin Webber is a 72 year old male with a past medical history with a past medical history of DM, HTN, HLD, and former tobacco use who presents with persistent fevers and chills. He recently tested positive for COVID-19 roughly one week ago. He had to be placed on 2 L NC while in the ED. Hospital Medicine was consulted for admission.

## 2022-02-02 NOTE — SUBJECTIVE & OBJECTIVE
Interval History: remains on NC; on steriods, Lovenox and remdesivir.    Review of Systems   Constitutional: Positive for chills and fever.   Respiratory: Negative for cough and shortness of breath.    Gastrointestinal: Positive for diarrhea.   Allergic/Immunologic: Positive for immunocompromised state.   All other systems reviewed and are negative.    Objective:     Vital Signs (Most Recent):  Temp: 98.4 °F (36.9 °C) (02/02/22 1150)  Pulse: 72 (02/02/22 1150)  Resp: 16 (02/02/22 1150)  BP: (!) 118/59 (02/02/22 1150)  SpO2: (!) 92 % (02/02/22 1150) Vital Signs (24h Range):  Temp:  [97.2 °F (36.2 °C)-101.1 °F (38.4 °C)] 98.4 °F (36.9 °C)  Pulse:  [72-90] 72  Resp:  [16-20] 16  SpO2:  [91 %-96 %] 92 %  BP: (108-144)/(52-69) 118/59     Weight: 76.2 kg (168 lb)  Body mass index is 24.81 kg/m².  No intake or output data in the 24 hours ending 02/02/22 1312   Physical Exam  Vitals and nursing note reviewed.   Constitutional:       Appearance: He is ill-appearing.   HENT:      Head: Normocephalic and atraumatic.      Right Ear: External ear normal.      Left Ear: External ear normal.      Nose: Nose normal.      Mouth/Throat:      Mouth: Mucous membranes are moist.      Pharynx: Oropharynx is clear.   Eyes:      Extraocular Movements: Extraocular movements intact.      Conjunctiva/sclera: Conjunctivae normal.   Cardiovascular:      Rate and Rhythm: Normal rate and regular rhythm.      Pulses: Normal pulses.      Heart sounds: Normal heart sounds.   Pulmonary:      Effort: Pulmonary effort is normal.      Breath sounds: Rhonchi present.   Abdominal:      General: Bowel sounds are normal.      Palpations: Abdomen is soft.   Musculoskeletal:         General: Normal range of motion.      Cervical back: Normal range of motion and neck supple.      Right lower leg: No edema.      Left lower leg: No edema.   Skin:     General: Skin is warm and dry.   Neurological:      General: No focal deficit present.      Mental Status: He is  alert and oriented to person, place, and time. Mental status is at baseline.   Psychiatric:         Mood and Affect: Mood normal.         Behavior: Behavior normal.         Significant Labs: All pertinent labs within the past 24 hours have been reviewed.    Significant Imaging: I have reviewed all pertinent imaging results/findings within the past 24 hours.

## 2022-02-02 NOTE — PLAN OF CARE
Patient is alert & oriented x4.  He is up ad vick.  His vital signs are stable.  He has no complaints of pain.  He was started tonight on steroids and remdesivir.  All safety precautions are in place.  Report will be given to oncoming nurse.  I will continue to monitor patient.

## 2022-02-02 NOTE — PROGRESS NOTES
[facility]  Progress Note    Patient Name: Ludwin Gee  MRN: [id number]  Patient Class: IP- Inpatient   Admission Date: 2/1/2022  Length of Stay: 1 days  Attending Physician: Adis Field MD  Primary Care Provider: Herrera Fisher MD        Subjective:     Principal Problem:Acute hypoxemic respiratory failure due to COVID-19        HPI:  Ludwin Webber is a 72 year old male with a past medical history with a past medical history of DM, HTN, HLD, and former tobacco use who presents with persistent fevers and chills. He recently tested positive for COVID-19 roughly one week ago. He had to be placed on 2 L NC while in the ED. Hospital Medicine was consulted for admission.      Overview/Hospital Course:  Ludwin Webber is a 72 year old male with a past medical history with a past medical history of DM, HTN, HLD, and former tobacco use who presented with persistent fevers and chills secondary to COVID-19 pneumonia. He is on Lovenox prophylactic dosing, remdesivir and Decadron. Inflammatory markers are being trended.      Interval History: remains on NC; on steriods, Lovenox and remdesivir.    Review of Systems   Constitutional: Positive for chills and fever.   Respiratory: Negative for cough and shortness of breath.    Gastrointestinal: Positive for diarrhea.   Allergic/Immunologic: Positive for immunocompromised state.   All other systems reviewed and are negative.    Objective:     Vital Signs (Most Recent):  Temp: 98.4 °F (36.9 °C) (02/02/22 1150)  Pulse: 72 (02/02/22 1150)  Resp: 16 (02/02/22 1150)  BP: (!) 118/59 (02/02/22 1150)  SpO2: (!) 92 % (02/02/22 1150) Vital Signs (24h Range):  Temp:  [97.2 °F (36.2 °C)-101.1 °F (38.4 °C)] 98.4 °F (36.9 °C)  Pulse:  [72-90] 72  Resp:  [16-20] 16  SpO2:  [91 %-96 %] 92 %  BP: (108-144)/(52-69) 118/59     Weight: 76.2 kg (168 lb)  Body mass index is 24.81 kg/m².  No intake or output data in the 24 hours ending 02/02/22 1312   Physical  Exam  Vitals and nursing note reviewed.   Constitutional:       Appearance: He is ill-appearing.   HENT:      Head: Normocephalic and atraumatic.      Right Ear: External ear normal.      Left Ear: External ear normal.      Nose: Nose normal.      Mouth/Throat:      Mouth: Mucous membranes are moist.      Pharynx: Oropharynx is clear.   Eyes:      Extraocular Movements: Extraocular movements intact.      Conjunctiva/sclera: Conjunctivae normal.   Cardiovascular:      Rate and Rhythm: Normal rate and regular rhythm.      Pulses: Normal pulses.      Heart sounds: Normal heart sounds.   Pulmonary:      Effort: Pulmonary effort is normal.      Breath sounds: Rhonchi present.   Abdominal:      General: Bowel sounds are normal.      Palpations: Abdomen is soft.   Musculoskeletal:         General: Normal range of motion.      Cervical back: Normal range of motion and neck supple.      Right lower leg: No edema.      Left lower leg: No edema.   Skin:     General: Skin is warm and dry.   Neurological:      General: No focal deficit present.      Mental Status: He is alert and oriented to person, place, and time. Mental status is at baseline.   Psychiatric:         Mood and Affect: Mood normal.         Behavior: Behavior normal.         Significant Labs: All pertinent labs within the past 24 hours have been reviewed.    Significant Imaging: I have reviewed all pertinent imaging results/findings within the past 24 hours.      Assessment/Plan:      * Acute hypoxemic respiratory failure due to COVID-19  -Decadron  -Remdesivir  -Lovenox  -Trend inflammatory markers  -Airborne, isolation and contact precautions    Anemia  -CBC trend        Hyperlipidemia associated with type 2 diabetes mellitus  -Statin      Hypertension associated with diabetes  -Continue losartan      BPH (benign prostatic hyperplasia)  -Flomax      Controlled type 2 diabetes mellitus with microalbuminuria, without long-term current use of insulin  -SSI  -AC HS  glucose checks  -Hypoglycemic precautions  -Diabetic diet        VTE Risk Mitigation (From admission, onward)         Ordered     enoxaparin injection 40 mg  Daily         02/01/22 1101     IP VTE HIGH RISK PATIENT  Once         02/01/22 1101     Place sequential compression device  Until discontinued         02/01/22 1101                Discharge Planning   ANTELMO: 2/4/2022    Code Status: Full Code   Is the patient medically ready for discharge?:     Reason for patient still in hospital (select all that apply): Patient trending condition and Treatment  Discharge Plan A: Home                  Adis Field MD  Department of Hospital Medicine   [facility]

## 2022-02-03 LAB
ALBUMIN SERPL BCP-MCNC: 3.1 G/DL (ref 3.5–5.2)
ALP SERPL-CCNC: 49 U/L (ref 55–135)
ALT SERPL W/O P-5'-P-CCNC: 61 U/L (ref 10–44)
ANION GAP SERPL CALC-SCNC: 12 MMOL/L (ref 8–16)
AST SERPL-CCNC: 69 U/L (ref 10–40)
BASOPHILS # BLD AUTO: 0.01 K/UL (ref 0–0.2)
BASOPHILS NFR BLD: 0.1 % (ref 0–1.9)
BILIRUB SERPL-MCNC: 0.6 MG/DL (ref 0.1–1)
BUN SERPL-MCNC: 27 MG/DL (ref 8–23)
CALCIUM SERPL-MCNC: 9.3 MG/DL (ref 8.7–10.5)
CHLORIDE SERPL-SCNC: 104 MMOL/L (ref 95–110)
CO2 SERPL-SCNC: 23 MMOL/L (ref 23–29)
CREAT SERPL-MCNC: 1.3 MG/DL (ref 0.5–1.4)
CRP SERPL-MCNC: 54.2 MG/L (ref 0–8.2)
D DIMER PPP IA.FEU-MCNC: 0.7 MG/L FEU
DIFFERENTIAL METHOD: ABNORMAL
EOSINOPHIL # BLD AUTO: 0 K/UL (ref 0–0.5)
EOSINOPHIL NFR BLD: 0 % (ref 0–8)
ERYTHROCYTE [DISTWIDTH] IN BLOOD BY AUTOMATED COUNT: 11.7 % (ref 11.5–14.5)
EST. GFR  (AFRICAN AMERICAN): >60 ML/MIN/1.73 M^2
EST. GFR  (NON AFRICAN AMERICAN): 55 ML/MIN/1.73 M^2
FERRITIN SERPL-MCNC: 3062 NG/ML (ref 20–300)
GLUCOSE SERPL-MCNC: 250 MG/DL (ref 70–110)
HCT VFR BLD AUTO: 35.6 % (ref 40–54)
HGB BLD-MCNC: 12.2 G/DL (ref 14–18)
IMM GRANULOCYTES # BLD AUTO: 0.06 K/UL (ref 0–0.04)
IMM GRANULOCYTES NFR BLD AUTO: 0.9 % (ref 0–0.5)
LDH SERPL L TO P-CCNC: 397 U/L (ref 110–260)
LYMPHOCYTES # BLD AUTO: 0.5 K/UL (ref 1–4.8)
LYMPHOCYTES NFR BLD: 7.7 % (ref 18–48)
MAGNESIUM SERPL-MCNC: 2.1 MG/DL (ref 1.6–2.6)
MCH RBC QN AUTO: 30.2 PG (ref 27–31)
MCHC RBC AUTO-ENTMCNC: 34.3 G/DL (ref 32–36)
MCV RBC AUTO: 88 FL (ref 82–98)
MONOCYTES # BLD AUTO: 0.4 K/UL (ref 0.3–1)
MONOCYTES NFR BLD: 6.2 % (ref 4–15)
NEUTROPHILS # BLD AUTO: 5.8 K/UL (ref 1.8–7.7)
NEUTROPHILS NFR BLD: 85.1 % (ref 38–73)
NRBC BLD-RTO: 0 /100 WBC
PHOSPHATE SERPL-MCNC: 3.1 MG/DL (ref 2.7–4.5)
PLATELET # BLD AUTO: 251 K/UL (ref 150–450)
PLATELET BLD QL SMEAR: ABNORMAL
PMV BLD AUTO: 10.1 FL (ref 9.2–12.9)
POCT GLUCOSE: 256 MG/DL (ref 70–110)
POCT GLUCOSE: 271 MG/DL (ref 70–110)
POCT GLUCOSE: 309 MG/DL (ref 70–110)
POCT GLUCOSE: 318 MG/DL (ref 70–110)
POTASSIUM SERPL-SCNC: 4.2 MMOL/L (ref 3.5–5.1)
PROT SERPL-MCNC: 7.2 G/DL (ref 6–8.4)
RBC # BLD AUTO: 4.04 M/UL (ref 4.6–6.2)
SODIUM SERPL-SCNC: 139 MMOL/L (ref 136–145)
WBC # BLD AUTO: 6.78 K/UL (ref 3.9–12.7)

## 2022-02-03 PROCEDURE — 25000003 PHARM REV CODE 250: Performed by: STUDENT IN AN ORGANIZED HEALTH CARE EDUCATION/TRAINING PROGRAM

## 2022-02-03 PROCEDURE — 85379 FIBRIN DEGRADATION QUANT: CPT | Performed by: STUDENT IN AN ORGANIZED HEALTH CARE EDUCATION/TRAINING PROGRAM

## 2022-02-03 PROCEDURE — 82728 ASSAY OF FERRITIN: CPT | Performed by: STUDENT IN AN ORGANIZED HEALTH CARE EDUCATION/TRAINING PROGRAM

## 2022-02-03 PROCEDURE — 85025 COMPLETE CBC W/AUTO DIFF WBC: CPT | Performed by: STUDENT IN AN ORGANIZED HEALTH CARE EDUCATION/TRAINING PROGRAM

## 2022-02-03 PROCEDURE — 94761 N-INVAS EAR/PLS OXIMETRY MLT: CPT

## 2022-02-03 PROCEDURE — 80053 COMPREHEN METABOLIC PANEL: CPT | Performed by: STUDENT IN AN ORGANIZED HEALTH CARE EDUCATION/TRAINING PROGRAM

## 2022-02-03 PROCEDURE — 84100 ASSAY OF PHOSPHORUS: CPT | Performed by: STUDENT IN AN ORGANIZED HEALTH CARE EDUCATION/TRAINING PROGRAM

## 2022-02-03 PROCEDURE — 36415 COLL VENOUS BLD VENIPUNCTURE: CPT | Performed by: STUDENT IN AN ORGANIZED HEALTH CARE EDUCATION/TRAINING PROGRAM

## 2022-02-03 PROCEDURE — 25000242 PHARM REV CODE 250 ALT 637 W/ HCPCS: Performed by: STUDENT IN AN ORGANIZED HEALTH CARE EDUCATION/TRAINING PROGRAM

## 2022-02-03 PROCEDURE — 27000207 HC ISOLATION

## 2022-02-03 PROCEDURE — 27000221 HC OXYGEN, UP TO 24 HOURS

## 2022-02-03 PROCEDURE — 83615 LACTATE (LD) (LDH) ENZYME: CPT | Performed by: STUDENT IN AN ORGANIZED HEALTH CARE EDUCATION/TRAINING PROGRAM

## 2022-02-03 PROCEDURE — 86140 C-REACTIVE PROTEIN: CPT | Performed by: STUDENT IN AN ORGANIZED HEALTH CARE EDUCATION/TRAINING PROGRAM

## 2022-02-03 PROCEDURE — 12000002 HC ACUTE/MED SURGE SEMI-PRIVATE ROOM

## 2022-02-03 PROCEDURE — 63600175 PHARM REV CODE 636 W HCPCS: Performed by: STUDENT IN AN ORGANIZED HEALTH CARE EDUCATION/TRAINING PROGRAM

## 2022-02-03 PROCEDURE — 83735 ASSAY OF MAGNESIUM: CPT | Performed by: STUDENT IN AN ORGANIZED HEALTH CARE EDUCATION/TRAINING PROGRAM

## 2022-02-03 RX ORDER — ONDANSETRON 4 MG/1
4 TABLET, ORALLY DISINTEGRATING ORAL EVERY 8 HOURS PRN
Status: DISCONTINUED | OUTPATIENT
Start: 2022-02-03 | End: 2022-02-05 | Stop reason: HOSPADM

## 2022-02-03 RX ADMIN — INSULIN ASPART 6 UNITS: 100 INJECTION, SOLUTION INTRAVENOUS; SUBCUTANEOUS at 08:02

## 2022-02-03 RX ADMIN — ENOXAPARIN SODIUM 40 MG: 100 INJECTION SUBCUTANEOUS at 05:02

## 2022-02-03 RX ADMIN — INSULIN ASPART 6 UNITS: 100 INJECTION, SOLUTION INTRAVENOUS; SUBCUTANEOUS at 01:02

## 2022-02-03 RX ADMIN — INSULIN ASPART 8 UNITS: 100 INJECTION, SOLUTION INTRAVENOUS; SUBCUTANEOUS at 05:02

## 2022-02-03 RX ADMIN — TAMSULOSIN HYDROCHLORIDE 0.4 MG: 0.4 CAPSULE ORAL at 10:02

## 2022-02-03 RX ADMIN — INSULIN ASPART 4 UNITS: 100 INJECTION, SOLUTION INTRAVENOUS; SUBCUTANEOUS at 09:02

## 2022-02-03 RX ADMIN — LOSARTAN POTASSIUM 25 MG: 25 TABLET, FILM COATED ORAL at 10:02

## 2022-02-03 RX ADMIN — REMDESIVIR 100 MG: 100 INJECTION, POWDER, LYOPHILIZED, FOR SOLUTION INTRAVENOUS at 10:02

## 2022-02-03 RX ADMIN — FAMOTIDINE 20 MG: 20 TABLET ORAL at 10:02

## 2022-02-03 RX ADMIN — ATORVASTATIN CALCIUM 40 MG: 40 TABLET, FILM COATED ORAL at 10:02

## 2022-02-03 RX ADMIN — DEXAMETHASONE 6 MG: 4 TABLET ORAL at 10:02

## 2022-02-03 NOTE — PLAN OF CARE
Patient is alert & oriented x4.  He is up ad vick.  His vital signs are stable.  He has no complaints of pain.  He slept all night and did not call me at all.  His accucheck at 2100 was 289 and 3 units were given.  All safety precautions are in place.  Report will be given to oncoming nurse.  I will continue to monitor patient.

## 2022-02-03 NOTE — SUBJECTIVE & OBJECTIVE
Interval History: remains on NC, increased to 3L from 2L; on steriods, Lovenox and remdesivir. Ferritin worsening today. Patient reports feeling poorly, short of breath with movement.     Review of Systems   Constitutional: Positive for chills and fever.   Respiratory: Positive for shortness of breath. Negative for cough.    Gastrointestinal: Positive for diarrhea.   Allergic/Immunologic: Positive for immunocompromised state.   All other systems reviewed and are negative.    Objective:     Vital Signs (Most Recent):  Temp: 96.2 °F (35.7 °C) (02/03/22 0831)  Pulse: 66 (02/03/22 0831)  Resp: 19 (02/03/22 0831)  BP: 131/67 (02/03/22 0831)  SpO2: (!) 94 % (02/03/22 0831) Vital Signs (24h Range):  Temp:  [96.2 °F (35.7 °C)-100.1 °F (37.8 °C)] 96.2 °F (35.7 °C)  Pulse:  [65-80] 66  Resp:  [16-19] 19  SpO2:  [92 %-95 %] 94 %  BP: (118-131)/(59-67) 131/67     Weight: 76.2 kg (168 lb)  Body mass index is 24.81 kg/m².  No intake or output data in the 24 hours ending 02/03/22 1117   Physical Exam  Vitals and nursing note reviewed.   Constitutional:       Appearance: He is ill-appearing.   HENT:      Head: Normocephalic and atraumatic.      Right Ear: External ear normal.      Left Ear: External ear normal.      Nose: Nose normal.      Mouth/Throat:      Mouth: Mucous membranes are moist.      Pharynx: Oropharynx is clear.   Eyes:      Extraocular Movements: Extraocular movements intact.      Conjunctiva/sclera: Conjunctivae normal.   Cardiovascular:      Rate and Rhythm: Normal rate and regular rhythm.      Pulses: Normal pulses.      Heart sounds: Normal heart sounds.   Pulmonary:      Effort: Pulmonary effort is normal.      Breath sounds: Rhonchi present.   Abdominal:      General: Bowel sounds are normal.      Palpations: Abdomen is soft.   Musculoskeletal:         General: Normal range of motion.      Cervical back: Normal range of motion and neck supple.      Right lower leg: No edema.      Left lower leg: No edema.    Skin:     General: Skin is warm and dry.   Neurological:      General: No focal deficit present.      Mental Status: He is alert and oriented to person, place, and time. Mental status is at baseline.   Psychiatric:         Mood and Affect: Mood normal.         Behavior: Behavior normal.         Significant Labs: All pertinent labs within the past 24 hours have been reviewed.    Significant Imaging: I have reviewed all pertinent imaging results/findings within the past 24 hours.

## 2022-02-03 NOTE — PROGRESS NOTES
[facility]  Progress Note    Patient Name: Ludwin Gee  MRN: [id number]  Patient Class: IP- Inpatient   Admission Date: 2/1/2022  Length of Stay: 2 days  Attending Physician: Elaina Tatum MD  Primary Care Provider: Herrera Fisher MD        Subjective:     Principal Problem:Acute hypoxemic respiratory failure due to COVID-19        HPI:  Ludwin Webber is a 72 year old male with a past medical history with a past medical history of DM, HTN, HLD, and former tobacco use who presents with persistent fevers and chills. He recently tested positive for COVID-19 roughly one week ago. He had to be placed on 2 L NC while in the ED. Hospital Medicine was consulted for admission.      Overview/Hospital Course:  Ludwin Webber is a 72 year old male with a past medical history with a past medical history of DM, HTN, HLD, and former tobacco use who presented with persistent fevers and chills secondary to COVID-19 pneumonia. He is on Lovenox prophylactic dosing, remdesivir and Decadron. Inflammatory markers are being trended.      Interval History: remains on NC, increased to 3L from 2L; on steriods, Lovenox and remdesivir. Ferritin worsening today. Patient reports feeling poorly, short of breath with movement.     Review of Systems   Constitutional: Positive for chills and fever.   Respiratory: Positive for shortness of breath. Negative for cough.    Gastrointestinal: Positive for diarrhea.   Allergic/Immunologic: Positive for immunocompromised state.   All other systems reviewed and are negative.    Objective:     Vital Signs (Most Recent):  Temp: 96.2 °F (35.7 °C) (02/03/22 0831)  Pulse: 66 (02/03/22 0831)  Resp: 19 (02/03/22 0831)  BP: 131/67 (02/03/22 0831)  SpO2: (!) 94 % (02/03/22 0831) Vital Signs (24h Range):  Temp:  [96.2 °F (35.7 °C)-100.1 °F (37.8 °C)] 96.2 °F (35.7 °C)  Pulse:  [65-80] 66  Resp:  [16-19] 19  SpO2:  [92 %-95 %] 94 %  BP: (118-131)/(59-67) 131/67     Weight: 76.2  kg (168 lb)  Body mass index is 24.81 kg/m².  No intake or output data in the 24 hours ending 02/03/22 1117   Physical Exam  Vitals and nursing note reviewed.   Constitutional:       Appearance: He is ill-appearing.   HENT:      Head: Normocephalic and atraumatic.      Right Ear: External ear normal.      Left Ear: External ear normal.      Nose: Nose normal.      Mouth/Throat:      Mouth: Mucous membranes are moist.      Pharynx: Oropharynx is clear.   Eyes:      Extraocular Movements: Extraocular movements intact.      Conjunctiva/sclera: Conjunctivae normal.   Cardiovascular:      Rate and Rhythm: Normal rate and regular rhythm.      Pulses: Normal pulses.      Heart sounds: Normal heart sounds.   Pulmonary:      Effort: Pulmonary effort is normal.      Breath sounds: Rhonchi present.   Abdominal:      General: Bowel sounds are normal.      Palpations: Abdomen is soft.   Musculoskeletal:         General: Normal range of motion.      Cervical back: Normal range of motion and neck supple.      Right lower leg: No edema.      Left lower leg: No edema.   Skin:     General: Skin is warm and dry.   Neurological:      General: No focal deficit present.      Mental Status: He is alert and oriented to person, place, and time. Mental status is at baseline.   Psychiatric:         Mood and Affect: Mood normal.         Behavior: Behavior normal.         Significant Labs: All pertinent labs within the past 24 hours have been reviewed.    Significant Imaging: I have reviewed all pertinent imaging results/findings within the past 24 hours.      Assessment/Plan:      * Acute hypoxemic respiratory failure due to COVID-19  -Decadron  -Remdesivir  -Lovenox  -Trend inflammatory markers  -Airborne, isolation and contact precautions    Anemia  -CBC trend        Hyperlipidemia associated with type 2 diabetes mellitus  -Statin      Hypertension associated with diabetes  -Continue losartan      BPH (benign prostatic  hyperplasia)  -Flomax      Controlled type 2 diabetes mellitus with microalbuminuria, without long-term current use of insulin  -SSI  -AC HS glucose checks  -Hypoglycemic precautions  -Diabetic diet        VTE Risk Mitigation (From admission, onward)         Ordered     enoxaparin injection 40 mg  Daily         02/01/22 1101     IP VTE HIGH RISK PATIENT  Once         02/01/22 1101     Place sequential compression device  Until discontinued         02/01/22 1101                Discharge Planning   ANTELMO: 2/4/2022     Code Status: Full Code   Is the patient medically ready for discharge?:     Reason for patient still in hospital (select all that apply): Patient trending condition and Treatment  Discharge Plan A: Home                  Elaina Tatum MD  Department of Hospital Medicine   [facility]

## 2022-02-04 LAB
ALBUMIN SERPL BCP-MCNC: 3.1 G/DL (ref 3.5–5.2)
ALP SERPL-CCNC: 49 U/L (ref 55–135)
ALT SERPL W/O P-5'-P-CCNC: 74 U/L (ref 10–44)
ANION GAP SERPL CALC-SCNC: 8 MMOL/L (ref 8–16)
AST SERPL-CCNC: 55 U/L (ref 10–40)
BASOPHILS # BLD AUTO: 0 K/UL (ref 0–0.2)
BASOPHILS NFR BLD: 0 % (ref 0–1.9)
BILIRUB SERPL-MCNC: 0.7 MG/DL (ref 0.1–1)
BUN SERPL-MCNC: 26 MG/DL (ref 8–23)
CALCIUM SERPL-MCNC: 9.3 MG/DL (ref 8.7–10.5)
CHLORIDE SERPL-SCNC: 104 MMOL/L (ref 95–110)
CO2 SERPL-SCNC: 26 MMOL/L (ref 23–29)
CREAT SERPL-MCNC: 1.1 MG/DL (ref 0.5–1.4)
DIFFERENTIAL METHOD: ABNORMAL
EOSINOPHIL # BLD AUTO: 0 K/UL (ref 0–0.5)
EOSINOPHIL NFR BLD: 0 % (ref 0–8)
ERYTHROCYTE [DISTWIDTH] IN BLOOD BY AUTOMATED COUNT: 11.7 % (ref 11.5–14.5)
EST. GFR  (AFRICAN AMERICAN): >60 ML/MIN/1.73 M^2
EST. GFR  (NON AFRICAN AMERICAN): >60 ML/MIN/1.73 M^2
GLUCOSE SERPL-MCNC: 265 MG/DL (ref 70–110)
HCT VFR BLD AUTO: 36.2 % (ref 40–54)
HGB BLD-MCNC: 12.3 G/DL (ref 14–18)
IMM GRANULOCYTES # BLD AUTO: 0.07 K/UL (ref 0–0.04)
IMM GRANULOCYTES NFR BLD AUTO: 0.9 % (ref 0–0.5)
LYMPHOCYTES # BLD AUTO: 0.5 K/UL (ref 1–4.8)
LYMPHOCYTES NFR BLD: 6.4 % (ref 18–48)
MAGNESIUM SERPL-MCNC: 2.4 MG/DL (ref 1.6–2.6)
MCH RBC QN AUTO: 30.4 PG (ref 27–31)
MCHC RBC AUTO-ENTMCNC: 34 G/DL (ref 32–36)
MCV RBC AUTO: 90 FL (ref 82–98)
MONOCYTES # BLD AUTO: 0.5 K/UL (ref 0.3–1)
MONOCYTES NFR BLD: 6.5 % (ref 4–15)
NEUTROPHILS # BLD AUTO: 6.7 K/UL (ref 1.8–7.7)
NEUTROPHILS NFR BLD: 86.2 % (ref 38–73)
NRBC BLD-RTO: 0 /100 WBC
PHOSPHATE SERPL-MCNC: 3.3 MG/DL (ref 2.7–4.5)
PLATELET # BLD AUTO: 304 K/UL (ref 150–450)
PMV BLD AUTO: 10.4 FL (ref 9.2–12.9)
POCT GLUCOSE: 242 MG/DL (ref 70–110)
POCT GLUCOSE: 252 MG/DL (ref 70–110)
POCT GLUCOSE: 329 MG/DL (ref 70–110)
POCT GLUCOSE: 355 MG/DL (ref 70–110)
POTASSIUM SERPL-SCNC: 4.8 MMOL/L (ref 3.5–5.1)
PROT SERPL-MCNC: 7 G/DL (ref 6–8.4)
RBC # BLD AUTO: 4.04 M/UL (ref 4.6–6.2)
SODIUM SERPL-SCNC: 138 MMOL/L (ref 136–145)
WBC # BLD AUTO: 7.8 K/UL (ref 3.9–12.7)

## 2022-02-04 PROCEDURE — 25000003 PHARM REV CODE 250: Performed by: STUDENT IN AN ORGANIZED HEALTH CARE EDUCATION/TRAINING PROGRAM

## 2022-02-04 PROCEDURE — 84100 ASSAY OF PHOSPHORUS: CPT | Performed by: STUDENT IN AN ORGANIZED HEALTH CARE EDUCATION/TRAINING PROGRAM

## 2022-02-04 PROCEDURE — 80053 COMPREHEN METABOLIC PANEL: CPT | Performed by: STUDENT IN AN ORGANIZED HEALTH CARE EDUCATION/TRAINING PROGRAM

## 2022-02-04 PROCEDURE — 12000002 HC ACUTE/MED SURGE SEMI-PRIVATE ROOM

## 2022-02-04 PROCEDURE — 85025 COMPLETE CBC W/AUTO DIFF WBC: CPT | Performed by: STUDENT IN AN ORGANIZED HEALTH CARE EDUCATION/TRAINING PROGRAM

## 2022-02-04 PROCEDURE — 27000221 HC OXYGEN, UP TO 24 HOURS

## 2022-02-04 PROCEDURE — 27000207 HC ISOLATION

## 2022-02-04 PROCEDURE — 94761 N-INVAS EAR/PLS OXIMETRY MLT: CPT

## 2022-02-04 PROCEDURE — 63600175 PHARM REV CODE 636 W HCPCS: Performed by: STUDENT IN AN ORGANIZED HEALTH CARE EDUCATION/TRAINING PROGRAM

## 2022-02-04 PROCEDURE — 36415 COLL VENOUS BLD VENIPUNCTURE: CPT | Performed by: STUDENT IN AN ORGANIZED HEALTH CARE EDUCATION/TRAINING PROGRAM

## 2022-02-04 PROCEDURE — 25000242 PHARM REV CODE 250 ALT 637 W/ HCPCS: Performed by: STUDENT IN AN ORGANIZED HEALTH CARE EDUCATION/TRAINING PROGRAM

## 2022-02-04 PROCEDURE — 83735 ASSAY OF MAGNESIUM: CPT | Performed by: STUDENT IN AN ORGANIZED HEALTH CARE EDUCATION/TRAINING PROGRAM

## 2022-02-04 RX ADMIN — DEXAMETHASONE 6 MG: 4 TABLET ORAL at 09:02

## 2022-02-04 RX ADMIN — TAMSULOSIN HYDROCHLORIDE 0.4 MG: 0.4 CAPSULE ORAL at 09:02

## 2022-02-04 RX ADMIN — INSULIN ASPART 5 UNITS: 100 INJECTION, SOLUTION INTRAVENOUS; SUBCUTANEOUS at 09:02

## 2022-02-04 RX ADMIN — INSULIN ASPART 4 UNITS: 100 INJECTION, SOLUTION INTRAVENOUS; SUBCUTANEOUS at 12:02

## 2022-02-04 RX ADMIN — LOSARTAN POTASSIUM 25 MG: 25 TABLET, FILM COATED ORAL at 09:02

## 2022-02-04 RX ADMIN — ATORVASTATIN CALCIUM 40 MG: 40 TABLET, FILM COATED ORAL at 09:02

## 2022-02-04 RX ADMIN — INSULIN ASPART 8 UNITS: 100 INJECTION, SOLUTION INTRAVENOUS; SUBCUTANEOUS at 05:02

## 2022-02-04 RX ADMIN — INSULIN ASPART 6 UNITS: 100 INJECTION, SOLUTION INTRAVENOUS; SUBCUTANEOUS at 07:02

## 2022-02-04 RX ADMIN — FAMOTIDINE 20 MG: 20 TABLET ORAL at 09:02

## 2022-02-04 RX ADMIN — ENOXAPARIN SODIUM 40 MG: 100 INJECTION SUBCUTANEOUS at 05:02

## 2022-02-04 RX ADMIN — REMDESIVIR 100 MG: 100 INJECTION, POWDER, LYOPHILIZED, FOR SOLUTION INTRAVENOUS at 09:02

## 2022-02-04 NOTE — PLAN OF CARE
Plan of care reviewed with patient with understanding verbalized. IV intact with no redness or swelling. Tele in place. O2 3L NC intact and sats greater than 92%. No cough or SOB reported. VSS, afebrile. Safety maintained. Call light in reach and patient instructed to call for assistance. Will continue to monitor.

## 2022-02-04 NOTE — PLAN OF CARE
Problem: Adult Inpatient Plan of Care  Goal: Plan of Care Review  Outcome: Ongoing, Progressing     Problem: Diabetes Comorbidity  Goal: Blood Glucose Level Within Targeted Range  Outcome: Ongoing, Not Progressing

## 2022-02-04 NOTE — PROGRESS NOTES
[facility] Hospital Medicine  Progress Note    Patient Name: Ludwin Gee  MRN: [id number]  Patient Class: IP- Inpatient   Admission Date: 2/1/2022  Length of Stay: 3 days  Attending Physician: Elaina Tatum MD  Primary Care Provider: Herrera Fisher MD        Subjective:     Principal Problem:Acute hypoxemic respiratory failure due to COVID-19        HPI:  Ludwin Webber is a 72 year old male with a past medical history with a past medical history of DM, HTN, HLD, and former tobacco use who presents with persistent fevers and chills. He recently tested positive for COVID-19 roughly one week ago. He had to be placed on 2 L NC while in the ED. Hospital Medicine was consulted for admission.      Overview/Hospital Course:  Ludwin Webber is a 72 year old male with a past medical history with a past medical history of DM, HTN, HLD, and former tobacco use who presented with persistent fevers and chills secondary to COVID-19 pneumonia. He is on Lovenox prophylactic dosing, remdesivir and Decadron. Inflammatory markers are being trended.      Interval History: remains on NC, 3L; on steriods, Lovenox and remdesivir. Reports nasuea with ambulation.   Review of Systems   Constitutional: Negative for chills and fever.   Respiratory: Negative for cough and shortness of breath.    Gastrointestinal: Positive for nausea. Negative for abdominal pain.   Allergic/Immunologic: Positive for immunocompromised state.   All other systems reviewed and are negative.    Objective:     Vital Signs (Most Recent):  Temp: 98.4 °F (36.9 °C) (02/04/22 0748)  Pulse: 61 (02/04/22 0748)  Resp: 18 (02/04/22 0748)  BP: 135/65 (02/04/22 0748)  SpO2: (!) 94 % (02/04/22 0748) Vital Signs (24h Range):  Temp:  [96.1 °F (35.6 °C)-98.4 °F (36.9 °C)] 98.4 °F (36.9 °C)  Pulse:  [61-80] 61  Resp:  [16-19] 18  SpO2:  [92 %-95 %] 94 %  BP: (118-137)/(63-68) 135/65     Weight: 76.2 kg (168 lb)  Body mass index is 24.81 kg/m².    Intake/Output  Summary (Last 24 hours) at 2/4/2022 1223  Last data filed at 2/4/2022 1152  Gross per 24 hour   Intake 1134.52 ml   Output --   Net 1134.52 ml      Physical Exam  Vitals and nursing note reviewed.   Constitutional:       Appearance: He is ill-appearing.   HENT:      Head: Normocephalic and atraumatic.      Right Ear: External ear normal.      Left Ear: External ear normal.      Nose: Nose normal.      Mouth/Throat:      Mouth: Mucous membranes are moist.      Pharynx: Oropharynx is clear.   Eyes:      Extraocular Movements: Extraocular movements intact.      Conjunctiva/sclera: Conjunctivae normal.   Cardiovascular:      Rate and Rhythm: Normal rate and regular rhythm.      Pulses: Normal pulses.      Heart sounds: Normal heart sounds.   Pulmonary:      Effort: Pulmonary effort is normal.      Breath sounds: Rhonchi present.   Abdominal:      General: Bowel sounds are normal.      Palpations: Abdomen is soft.   Musculoskeletal:         General: Normal range of motion.      Cervical back: Normal range of motion and neck supple.      Right lower leg: No edema.      Left lower leg: No edema.   Skin:     General: Skin is warm and dry.   Neurological:      General: No focal deficit present.      Mental Status: He is alert and oriented to person, place, and time. Mental status is at baseline.   Psychiatric:         Mood and Affect: Mood normal.         Behavior: Behavior normal.         Significant Labs: All pertinent labs within the past 24 hours have been reviewed.    Significant Imaging: I have reviewed all pertinent imaging results/findings within the past 24 hours.      Assessment/Plan:      * Acute hypoxemic respiratory failure due to COVID-19  -Decadron  -Remdesivir  -Lovenox  -Trend inflammatory markers  -Airborne, isolation and contact precautions    Anemia  -CBC trend        Hyperlipidemia associated with type 2 diabetes mellitus  -Statin      Hypertension associated with diabetes  -Continue losartan      BPH  (benign prostatic hyperplasia)  -Flomax      Controlled type 2 diabetes mellitus with microalbuminuria, without long-term current use of insulin  -SSI  -AC HS glucose checks  -Hypoglycemic precautions  -Diabetic diet        VTE Risk Mitigation (From admission, onward)         Ordered     enoxaparin injection 40 mg  Daily         02/01/22 1101     IP VTE HIGH RISK PATIENT  Once         02/01/22 1101     Place sequential compression device  Until discontinued         02/01/22 1101                Discharge Planning   ANTELMO: 2/4/2022     Code Status: Full Code   Is the patient medically ready for discharge?:     Reason for patient still in hospital (select all that apply): Patient trending condition and Treatment  Discharge Plan A: Home                  Elaina Tatum MD  Department of Hospital Medicine   [facility]

## 2022-02-05 ENCOUNTER — NURSE TRIAGE (OUTPATIENT)
Dept: ADMINISTRATIVE | Facility: CLINIC | Age: 72
End: 2022-02-05
Payer: MEDICARE

## 2022-02-05 VITALS
BODY MASS INDEX: 24.88 KG/M2 | WEIGHT: 168 LBS | DIASTOLIC BLOOD PRESSURE: 69 MMHG | SYSTOLIC BLOOD PRESSURE: 123 MMHG | OXYGEN SATURATION: 95 % | HEART RATE: 65 BPM | TEMPERATURE: 98 F | RESPIRATION RATE: 20 BRPM | HEIGHT: 69 IN

## 2022-02-05 LAB
CRP SERPL-MCNC: 18.3 MG/L (ref 0–8.2)
D DIMER PPP IA.FEU-MCNC: 0.73 MG/L FEU
FERRITIN SERPL-MCNC: 1857 NG/ML (ref 20–300)
LDH SERPL L TO P-CCNC: 371 U/L (ref 110–260)
POCT GLUCOSE: 205 MG/DL (ref 70–110)

## 2022-02-05 PROCEDURE — 86140 C-REACTIVE PROTEIN: CPT | Performed by: STUDENT IN AN ORGANIZED HEALTH CARE EDUCATION/TRAINING PROGRAM

## 2022-02-05 PROCEDURE — 27000221 HC OXYGEN, UP TO 24 HOURS

## 2022-02-05 PROCEDURE — 94618 PULMONARY STRESS TESTING: CPT

## 2022-02-05 PROCEDURE — 85379 FIBRIN DEGRADATION QUANT: CPT | Performed by: STUDENT IN AN ORGANIZED HEALTH CARE EDUCATION/TRAINING PROGRAM

## 2022-02-05 PROCEDURE — 25000242 PHARM REV CODE 250 ALT 637 W/ HCPCS: Performed by: STUDENT IN AN ORGANIZED HEALTH CARE EDUCATION/TRAINING PROGRAM

## 2022-02-05 PROCEDURE — 94761 N-INVAS EAR/PLS OXIMETRY MLT: CPT

## 2022-02-05 PROCEDURE — 83615 LACTATE (LD) (LDH) ENZYME: CPT | Performed by: STUDENT IN AN ORGANIZED HEALTH CARE EDUCATION/TRAINING PROGRAM

## 2022-02-05 PROCEDURE — 63600175 PHARM REV CODE 636 W HCPCS: Performed by: STUDENT IN AN ORGANIZED HEALTH CARE EDUCATION/TRAINING PROGRAM

## 2022-02-05 PROCEDURE — 25000003 PHARM REV CODE 250: Performed by: STUDENT IN AN ORGANIZED HEALTH CARE EDUCATION/TRAINING PROGRAM

## 2022-02-05 PROCEDURE — 82728 ASSAY OF FERRITIN: CPT | Performed by: STUDENT IN AN ORGANIZED HEALTH CARE EDUCATION/TRAINING PROGRAM

## 2022-02-05 PROCEDURE — 36415 COLL VENOUS BLD VENIPUNCTURE: CPT | Performed by: STUDENT IN AN ORGANIZED HEALTH CARE EDUCATION/TRAINING PROGRAM

## 2022-02-05 RX ORDER — ONDANSETRON HYDROCHLORIDE 8 MG/1
8 TABLET, FILM COATED ORAL EVERY 8 HOURS PRN
Qty: 12 TABLET | Refills: 1 | Status: SHIPPED | OUTPATIENT
Start: 2022-02-05 | End: 2022-09-06

## 2022-02-05 RX ADMIN — LOSARTAN POTASSIUM 25 MG: 25 TABLET, FILM COATED ORAL at 09:02

## 2022-02-05 RX ADMIN — DEXAMETHASONE 6 MG: 4 TABLET ORAL at 09:02

## 2022-02-05 RX ADMIN — REMDESIVIR 100 MG: 100 INJECTION, POWDER, LYOPHILIZED, FOR SOLUTION INTRAVENOUS at 09:02

## 2022-02-05 RX ADMIN — ATORVASTATIN CALCIUM 40 MG: 40 TABLET, FILM COATED ORAL at 09:02

## 2022-02-05 RX ADMIN — INSULIN ASPART 4 UNITS: 100 INJECTION, SOLUTION INTRAVENOUS; SUBCUTANEOUS at 07:02

## 2022-02-05 RX ADMIN — FAMOTIDINE 20 MG: 20 TABLET ORAL at 09:02

## 2022-02-05 RX ADMIN — TAMSULOSIN HYDROCHLORIDE 0.4 MG: 0.4 CAPSULE ORAL at 09:02

## 2022-02-05 NOTE — DISCHARGE SUMMARY
[facility] Hospital Medicine  Discharge Summary      Patient Name: Ludwin Gee  MRN: [id number]  Patient Class: IP- Inpatient  Admission Date: 2/1/2022  Hospital Length of Stay: 4 days  Discharge Date and Time: 2/5/2022 11:54 AM  Attending Physician: No att. providers found   Discharging Provider: Elaina Tatum MD  Primary Care Provider: Herrera Fisher MD      HPI:   Ludwin Webber is a 72 year old male with a past medical history with a past medical history of DM, HTN, HLD, and former tobacco use who presents with persistent fevers and chills. He recently tested positive for COVID-19 roughly one week ago. He had to be placed on 2 L NC while in the ED. Hospital Medicine was consulted for admission.      * No surgery found *      Hospital Course:   Ludwin Webber is a 72 year old male with a past medical history with a past medical history of DM, HTN, HLD, and former tobacco use who presented with persistent fevers and chills secondary to COVID-19 pneumonia. He was given Lovenox prophylactic dosing, remdesivir and Decadron. Inflammatory markers were trended.  Completed a 5 day course of remdesivir.  His oxygen requirements were stable and his inflammatory markers were improving.  He clinically looked and felt improved as well.  He was discharged home with supplemental oxygen and was enrolled in the COVID-19 surveillance program.  Return precautions were discussed.  He expressed understanding.  He was instructed to follow up with his PCP in 1 week.    Patient was seen on day of discharge by myself and was examined. They were stable for discharge. Discharge plan, follow up instructions, and contacts in case of further needs were discussed in detail.         Goals of Care Treatment Preferences:  Code Status: Full Code      Consults:     No new Assessment & Plan notes have been filed under this hospital service since the last note was generated.  Service: Hospital Medicine    Final Active Diagnoses:  "   Diagnosis Date Noted POA    PRINCIPAL PROBLEM:  Acute hypoxemic respiratory failure due to COVID-19 [U07.1, J96.01] 02/01/2022 Yes    Anemia [D64.9] 02/01/2022 Yes    Hypertension associated with diabetes [E11.59, I15.2] 04/07/2017 Yes    Hyperlipidemia associated with type 2 diabetes mellitus [E11.69, E78.5] 04/07/2017 Yes    Controlled type 2 diabetes mellitus with microalbuminuria, without long-term current use of insulin [E11.29, R80.9] 05/23/2012 Yes    BPH (benign prostatic hyperplasia) [N40.0] 05/23/2012 Yes      Problems Resolved During this Admission:       Discharged Condition: good    Disposition: Home or Self Care    Follow Up:   Follow-up Information     Herrera Fisher MD In 1 week.    Specialty: Family Medicine  Contact information:  [address] [address]  [place] [address]  [phone]                       Patient Instructions:      OXYGEN FOR HOME USE     Order Specific Question Answer Comments   Liter Flow 4    Duration Continuous    Qualifying Test Performed at: Activity    Oxygen saturation at rest 87    Oxygen saturation with activity 84    Oxygen saturation with activity on oxygen 92    Portable mode: continuous    Route nasal cannula    Device: home concentrator with portable tanks    Length of need (in months): 3 mos    Patient condition with qualifying saturation Other - List qualifying diagnosis and code    Select a diagnosis & list the code in the comments COVID [[id number]]    Height: 5' 9" (1.753 m)    Weight: 76.2 kg (168 lb)    Alternative treatment measures have been tried or considered and deemed clinically ineffective. Yes      COVID-19 Surveillance Program     Order Specific Question Answer Comments   Does patient have a smartphone? Yes    Does patient have the [facility] jt on their smartphone? Yes    While in surveillance program, will patient be using home oxygen? Yes        Significant Diagnostic Studies: Labs:   BMP:   Recent Labs   Lab 02/04/22  0454   * "      K 4.8      CO2 26   BUN 26*   CREATININE 1.1   CALCIUM 9.3   MG 2.4    and CBC   Recent Labs   Lab 02/04/22  0454   WBC 7.80   HGB 12.3*   HCT 36.2*      Radiology Results (last 7 days)    Procedure Component Value Units Date/Time   X-Ray Chest AP Portable [[id number]] Resulted: 02/01/22 0801   Order Status: Completed Updated: 02/01/22 0804   Narrative:     EXAMINATION:   XR CHEST AP PORTABLE     CLINICAL HISTORY:   COVID-19;     TECHNIQUE:   Single frontal view of the chest was performed.     COMPARISON:   07/20/2009     FINDINGS:   There are strandy infiltrates in the periphery of the left mid to lower lung zone and subtly on the right.  There is no pleural effusion.  The heart size is normal.    Impression:       Mild infiltrates which may represent an atypical infectious process.       Electronically signed by: Vern Bailey MD   Date: 02/01/2022   Time: 08:01         Pending Diagnostic Studies:     None         Medications:  Reconciled Home Medications:      Medication List      START taking these medications    ondansetron 8 MG tablet  Commonly known as: ZOFRAN  Take 1 tablet (8 mg total) by mouth every 8 (eight) hours as needed for Nausea.     pulse oximeter device  Commonly known as: pulse oximeter  by Apply Externally route 2 (two) times a day. Use twice daily at 8 AM and 3 PM and record the value in [facility] as directed.        CONTINUE taking these medications    alfuzosin 10 mg Tb24  Commonly known as: UROXATRAL  Take 1 tablet (10 mg total) by mouth daily with breakfast.     blood sugar diagnostic Strp  One Touch check fasting glucose in morning     blood-glucose meter Misc  One Touch glucometer check fasting glucose in morning     coenzyme Q10 10 mg capsule  Take 10 mg by mouth once daily.     famotidine 20 MG tablet  Commonly known as: PEPCID  Take 20 mg by mouth once daily.     glimepiride 1 MG tablet  Commonly known as: AMARYL  Take 1 tablet (1 mg total) by mouth once  daily.     ibuprofen 800 MG tablet  Commonly known as: ADVIL,MOTRIN  Take 800 mg by mouth every 6 (six) hours as needed.     lancets Misc  One Touch lancets check fasting glucose in morning     losartan 25 MG tablet  Commonly known as: COZAAR  TAKE ONE TABLET BY MOUTH EVERY DAY     meloxicam 15 MG tablet  Commonly known as: MOBIC  Take 15 mg by mouth once daily.     metFORMIN 500 MG ER 24hr tablet  Commonly known as: GLUCOPHAGE-XR  TAKE ONE TABLET BY MOUTH EVERY MORNING AND TAKE TWO TABLETS BY MOUTH EVERY EVENING     rosuvastatin 10 MG tablet  Commonly known as: CRESTOR  Take 1 tablet (10 mg total) by mouth once daily.            Indwelling Lines/Drains at time of discharge:   Lines/Drains/Airways     None                 Time spent on the discharge of patient: 35 minutes         Elaina Tatum MD  Department of Hospital Medicine  [facility]

## 2022-02-05 NOTE — NURSING
Patient alert and oriented X4. Patient on 3 liters nasal cannula. No respiratory distress noted. No complaints of pain voiced. IV d/c, telemetry removed AVS completed. D/C instructions reviewd with wife and patient. Home O2 to be delivered for home use along with concentrator.

## 2022-02-05 NOTE — CARE UPDATE
02/05/22 0945   Home Oxygen Qualification   $ Home O2 Qualification Pulmonary Stress Test/6 min walk   Room Air SpO2 At Rest (!) 87 %   Room Air SpO2 During Ambulation (!) 84 %   SpO2 During Ambulation on O2 92 %   Heart Rate on O2 101 bpm   Ambulation O2 LPM 4 LPM   SpO2 Post Ambulation 94 %   Post Ambulation Heart Rate 56 bpm   Post Ambulation O2 LPM 2.5 LPM

## 2022-02-05 NOTE — PLAN OF CARE
Per Adela with [facility], okay to pull E-tank set up and one extra tank. Oxygen delivered to nurse, signatures obtained being that patient is covid positive. Signed packet returned to DME closet.       02/05/22 1111   Post-Acute Status   Post-Acute Authorization HME   HME Status Set-up Complete/Auth obtained

## 2022-02-05 NOTE — PLAN OF CARE
Plan of care reviewed with patient and verbalized understanding. Tele monitor in place. O2 via NC in place. O2 saturation maintained 96-98%. Patient ambulatory in room independently. VSS, afebrile, and AAO x's 4. IV intact and patent. No c/o pain or discomfort. Safety maintained. Call light in reach and instructed to call for assistance. Will continue to monitor.

## 2022-02-05 NOTE — DISCHARGE INSTRUCTIONS
Discharge Instructions, [facility]    Thank you for choosing [facility] for your medical care. The primary doctor who is taking care of you at the time of your discharge is Elaina Tatum MD.     You were admitted to the hospital with Acute hypoxemic respiratory failure due to COVID-19.     Please note your discharge instructions, including diet/activity restrictions, follow-up appointments, and medication changes.  If you have any questions about your medical issues, prescriptions, or any other questions, please feel free to contact the [facility] Medicine Dept at [phone] and we will help.    If you are previously with Home health, outpatient PT/OT or under a therapy program, you are cleared to return to those programs.    Please direct all long term medication refills and follow up to your primary care provider, Herrera Fisher MD. Thank you again for letting us take care of your health care needs.    Please note the following discharge instructions per your discharging physician-  I hope you continue to feel better! It was a pleasure taking care of you.  -Dr. Tatum

## 2022-02-05 NOTE — PLAN OF CARE
Patient cleared for discharge home from .       02/05/22 1122   Final Note   Assessment Type Final Discharge Note   Anticipated Discharge Disposition Home

## 2022-02-06 ENCOUNTER — NURSE TRIAGE (OUTPATIENT)
Dept: ADMINISTRATIVE | Facility: CLINIC | Age: 72
End: 2022-02-06
Payer: MEDICARE

## 2022-02-06 ENCOUNTER — HOSPITAL ENCOUNTER (INPATIENT)
Facility: HOSPITAL | Age: 72
LOS: 8 days | Discharge: HOME | DRG: 177 | End: 2022-02-14
Attending: EMERGENCY MEDICINE | Admitting: HOSPITALIST
Payer: MEDICARE

## 2022-02-06 DIAGNOSIS — U07.1 PNEUMONIA DUE TO COVID-19 VIRUS: ICD-10-CM

## 2022-02-06 DIAGNOSIS — J12.82 PNEUMONIA DUE TO COVID-19 VIRUS: ICD-10-CM

## 2022-02-06 DIAGNOSIS — R09.02 HYPOXIA: Primary | ICD-10-CM

## 2022-02-06 DIAGNOSIS — U07.1 COVID-19: ICD-10-CM

## 2022-02-06 LAB
ALBUMIN SERPL BCP-MCNC: 3.1 G/DL (ref 3.5–5.2)
ALP SERPL-CCNC: 57 U/L (ref 55–135)
ALT SERPL W/O P-5'-P-CCNC: 56 U/L (ref 10–44)
ANION GAP SERPL CALC-SCNC: 12 MMOL/L (ref 8–16)
AST SERPL-CCNC: 25 U/L (ref 10–40)
BASOPHILS # BLD AUTO: 0.01 K/UL (ref 0–0.2)
BASOPHILS NFR BLD: 0.1 % (ref 0–1.9)
BILIRUB SERPL-MCNC: 1 MG/DL (ref 0.1–1)
BNP SERPL-MCNC: 11 PG/ML (ref 0–99)
BUN SERPL-MCNC: 25 MG/DL (ref 8–23)
CALCIUM SERPL-MCNC: 9.2 MG/DL (ref 8.7–10.5)
CHLORIDE SERPL-SCNC: 102 MMOL/L (ref 95–110)
CK SERPL-CCNC: 29 U/L (ref 20–200)
CO2 SERPL-SCNC: 23 MMOL/L (ref 23–29)
CREAT SERPL-MCNC: 1.3 MG/DL (ref 0.5–1.4)
CRP SERPL-MCNC: 26.4 MG/L (ref 0–8.2)
D DIMER PPP IA.FEU-MCNC: 1.58 MG/L FEU
DIFFERENTIAL METHOD: ABNORMAL
EOSINOPHIL # BLD AUTO: 0 K/UL (ref 0–0.5)
EOSINOPHIL NFR BLD: 0 % (ref 0–8)
ERYTHROCYTE [DISTWIDTH] IN BLOOD BY AUTOMATED COUNT: 11.7 % (ref 11.5–14.5)
EST. GFR  (AFRICAN AMERICAN): >60 ML/MIN/1.73 M^2
EST. GFR  (NON AFRICAN AMERICAN): 55 ML/MIN/1.73 M^2
FERRITIN SERPL-MCNC: 1675 NG/ML (ref 20–300)
GLUCOSE SERPL-MCNC: 258 MG/DL (ref 70–110)
HCT VFR BLD AUTO: 37.2 % (ref 40–54)
HGB BLD-MCNC: 13 G/DL (ref 14–18)
IMM GRANULOCYTES # BLD AUTO: 0.06 K/UL (ref 0–0.04)
IMM GRANULOCYTES NFR BLD AUTO: 0.6 % (ref 0–0.5)
LACTATE SERPL-SCNC: 1.2 MMOL/L (ref 0.5–2.2)
LDH SERPL L TO P-CCNC: 344 U/L (ref 110–260)
LYMPHOCYTES # BLD AUTO: 0.6 K/UL (ref 1–4.8)
LYMPHOCYTES NFR BLD: 6.2 % (ref 18–48)
MCH RBC QN AUTO: 31.7 PG (ref 27–31)
MCHC RBC AUTO-ENTMCNC: 34.9 G/DL (ref 32–36)
MCV RBC AUTO: 91 FL (ref 82–98)
MONOCYTES # BLD AUTO: 0.6 K/UL (ref 0.3–1)
MONOCYTES NFR BLD: 6.1 % (ref 4–15)
NEUTROPHILS # BLD AUTO: 8.1 K/UL (ref 1.8–7.7)
NEUTROPHILS NFR BLD: 87 % (ref 38–73)
NRBC BLD-RTO: 0 /100 WBC
PLATELET # BLD AUTO: 371 K/UL (ref 150–450)
PMV BLD AUTO: 10.2 FL (ref 9.2–12.9)
POCT GLUCOSE: 249 MG/DL (ref 70–110)
POTASSIUM SERPL-SCNC: 3.9 MMOL/L (ref 3.5–5.1)
PROCALCITONIN SERPL IA-MCNC: 0.05 NG/ML
PROT SERPL-MCNC: 6.9 G/DL (ref 6–8.4)
RBC # BLD AUTO: 4.1 M/UL (ref 4.6–6.2)
SODIUM SERPL-SCNC: 137 MMOL/L (ref 136–145)
TROPONIN I SERPL DL<=0.01 NG/ML-MCNC: 0.01 NG/ML (ref 0–0.03)
WBC # BLD AUTO: 9.32 K/UL (ref 3.9–12.7)

## 2022-02-06 PROCEDURE — 85025 COMPLETE CBC W/AUTO DIFF WBC: CPT | Performed by: EMERGENCY MEDICINE

## 2022-02-06 PROCEDURE — 63600175 PHARM REV CODE 636 W HCPCS: Performed by: HOSPITALIST

## 2022-02-06 PROCEDURE — 80053 COMPREHEN METABOLIC PANEL: CPT | Performed by: EMERGENCY MEDICINE

## 2022-02-06 PROCEDURE — 82728 ASSAY OF FERRITIN: CPT | Performed by: EMERGENCY MEDICINE

## 2022-02-06 PROCEDURE — 25000003 PHARM REV CODE 250: Performed by: EMERGENCY MEDICINE

## 2022-02-06 PROCEDURE — 25500020 PHARM REV CODE 255: Performed by: EMERGENCY MEDICINE

## 2022-02-06 PROCEDURE — 99291 CRITICAL CARE FIRST HOUR: CPT

## 2022-02-06 PROCEDURE — 85379 FIBRIN DEGRADATION QUANT: CPT | Performed by: HOSPITALIST

## 2022-02-06 PROCEDURE — 36415 COLL VENOUS BLD VENIPUNCTURE: CPT | Performed by: EMERGENCY MEDICINE

## 2022-02-06 PROCEDURE — 82550 ASSAY OF CK (CPK): CPT | Performed by: EMERGENCY MEDICINE

## 2022-02-06 PROCEDURE — 83605 ASSAY OF LACTIC ACID: CPT | Performed by: EMERGENCY MEDICINE

## 2022-02-06 PROCEDURE — 84484 ASSAY OF TROPONIN QUANT: CPT | Performed by: EMERGENCY MEDICINE

## 2022-02-06 PROCEDURE — 12000002 HC ACUTE/MED SURGE SEMI-PRIVATE ROOM

## 2022-02-06 PROCEDURE — 83880 ASSAY OF NATRIURETIC PEPTIDE: CPT | Performed by: EMERGENCY MEDICINE

## 2022-02-06 PROCEDURE — 84145 PROCALCITONIN (PCT): CPT | Performed by: EMERGENCY MEDICINE

## 2022-02-06 PROCEDURE — 93005 ELECTROCARDIOGRAM TRACING: CPT

## 2022-02-06 PROCEDURE — 20600001 HC STEP DOWN PRIVATE ROOM

## 2022-02-06 PROCEDURE — 83615 LACTATE (LD) (LDH) ENZYME: CPT | Performed by: EMERGENCY MEDICINE

## 2022-02-06 PROCEDURE — 86140 C-REACTIVE PROTEIN: CPT | Performed by: EMERGENCY MEDICINE

## 2022-02-06 PROCEDURE — 99285 EMERGENCY DEPT VISIT HI MDM: CPT | Mod: 25

## 2022-02-06 PROCEDURE — 27000207 HC ISOLATION

## 2022-02-06 RX ORDER — INSULIN ASPART 100 [IU]/ML
1-10 INJECTION, SOLUTION INTRAVENOUS; SUBCUTANEOUS
Status: DISCONTINUED | OUTPATIENT
Start: 2022-02-06 | End: 2022-02-14 | Stop reason: HOSPADM

## 2022-02-06 RX ORDER — SODIUM,POTASSIUM PHOSPHATES 280-250MG
2 POWDER IN PACKET (EA) ORAL
Status: DISCONTINUED | OUTPATIENT
Start: 2022-02-06 | End: 2022-02-14 | Stop reason: HOSPADM

## 2022-02-06 RX ORDER — NALOXONE HCL 0.4 MG/ML
0.02 VIAL (ML) INJECTION
Status: DISCONTINUED | OUTPATIENT
Start: 2022-02-06 | End: 2022-02-14 | Stop reason: HOSPADM

## 2022-02-06 RX ORDER — FAMOTIDINE 20 MG/1
20 TABLET, FILM COATED ORAL DAILY
Status: DISCONTINUED | OUTPATIENT
Start: 2022-02-07 | End: 2022-02-14 | Stop reason: HOSPADM

## 2022-02-06 RX ORDER — GLUCAGON 1 MG
1 KIT INJECTION
Status: DISCONTINUED | OUTPATIENT
Start: 2022-02-06 | End: 2022-02-14 | Stop reason: HOSPADM

## 2022-02-06 RX ORDER — ONDANSETRON 4 MG/1
4 TABLET, ORALLY DISINTEGRATING ORAL EVERY 8 HOURS PRN
Status: DISCONTINUED | OUTPATIENT
Start: 2022-02-06 | End: 2022-02-14 | Stop reason: HOSPADM

## 2022-02-06 RX ORDER — HYDROCODONE POLISTIREX AND CHLORPHENIRAMINE POLISTIREX 10; 8 MG/5ML; MG/5ML
5 SUSPENSION, EXTENDED RELEASE ORAL
Status: COMPLETED | OUTPATIENT
Start: 2022-02-06 | End: 2022-02-06

## 2022-02-06 RX ORDER — ACETAMINOPHEN 325 MG/1
650 TABLET ORAL EVERY 8 HOURS PRN
Status: DISCONTINUED | OUTPATIENT
Start: 2022-02-06 | End: 2022-02-14 | Stop reason: HOSPADM

## 2022-02-06 RX ORDER — IBUPROFEN 200 MG
16 TABLET ORAL
Status: DISCONTINUED | OUTPATIENT
Start: 2022-02-06 | End: 2022-02-14 | Stop reason: HOSPADM

## 2022-02-06 RX ORDER — TAMSULOSIN HYDROCHLORIDE 0.4 MG/1
0.4 CAPSULE ORAL DAILY
Status: DISCONTINUED | OUTPATIENT
Start: 2022-02-07 | End: 2022-02-14 | Stop reason: HOSPADM

## 2022-02-06 RX ORDER — TALC
6 POWDER (GRAM) TOPICAL NIGHTLY PRN
Status: DISCONTINUED | OUTPATIENT
Start: 2022-02-06 | End: 2022-02-14 | Stop reason: HOSPADM

## 2022-02-06 RX ORDER — LOSARTAN POTASSIUM 25 MG/1
25 TABLET ORAL DAILY
Status: DISCONTINUED | OUTPATIENT
Start: 2022-02-07 | End: 2022-02-14 | Stop reason: HOSPADM

## 2022-02-06 RX ORDER — SODIUM CHLORIDE 0.9 % (FLUSH) 0.9 %
10 SYRINGE (ML) INJECTION
Status: DISCONTINUED | OUTPATIENT
Start: 2022-02-06 | End: 2022-02-14 | Stop reason: HOSPADM

## 2022-02-06 RX ORDER — ATORVASTATIN CALCIUM 40 MG/1
40 TABLET, FILM COATED ORAL DAILY
Status: DISCONTINUED | OUTPATIENT
Start: 2022-02-07 | End: 2022-02-14 | Stop reason: HOSPADM

## 2022-02-06 RX ORDER — IBUPROFEN 200 MG
24 TABLET ORAL
Status: DISCONTINUED | OUTPATIENT
Start: 2022-02-06 | End: 2022-02-14 | Stop reason: HOSPADM

## 2022-02-06 RX ORDER — ENOXAPARIN SODIUM 100 MG/ML
40 INJECTION SUBCUTANEOUS EVERY 24 HOURS
Status: DISCONTINUED | OUTPATIENT
Start: 2022-02-06 | End: 2022-02-14 | Stop reason: HOSPADM

## 2022-02-06 RX ADMIN — ENOXAPARIN SODIUM 40 MG: 100 INJECTION SUBCUTANEOUS at 05:02

## 2022-02-06 RX ADMIN — IOHEXOL 75 ML: 350 INJECTION, SOLUTION INTRAVENOUS at 01:02

## 2022-02-06 RX ADMIN — INSULIN ASPART 4 UNITS: 100 INJECTION, SOLUTION INTRAVENOUS; SUBCUTANEOUS at 05:02

## 2022-02-06 RX ADMIN — HYDROCODONE POLISTIREX AND CHLORPHENIRAMINE POLISTIREX 5 ML: 10; 8 SUSPENSION, EXTENDED RELEASE ORAL at 10:02

## 2022-02-06 NOTE — ED PROVIDER NOTES
Encounter Date: 2/6/2022    SCRIBE #1 NOTE: I, Chris Dillard, am scribing for, and in the presence of, Nas Lucero III, MD.       History     Chief Complaint   Patient presents with    Shortness of Breath     Low o2 sats / recent admit pneumonia      Time seen by provider: 10:14 AM on 02/06/2022    Ludwin Gee is a 72 y.o. male who presents to the ED with low oxygent saturation. The Pt tested positive for COVID-19 6 days ago and was hospitalized. He was discharged from the hospital yesterday with home oxygen, but states that his oxygen has remained low, getting as low as 78% on oxygen. He was a smoker who quit 35 years ago and denies any past lung problems. He is not vaccinated for COVID-19. The patient denies any other symptoms at this time. PMHx of DM, HLD, and HTN. PSHx of shoulder surgery.      The history is provided by the patient and the spouse.     Review of patient's allergies indicates:   Allergen Reactions    Pravastatin Other (See Comments)     Joint pain    Lisinopril Other (See Comments)     Cough      Past Medical History:   Diagnosis Date    Diabetes mellitus     Elevated PSA     Hyperlipidemia     Hypertension      Past Surgical History:   Procedure Laterality Date    COLONOSCOPY  12/6/2013    Dr. Martinez, 5 year recheck    SHOULDER SURGERY  12/09/2011    right      Family History   Problem Relation Age of Onset    Diabetes Father     Heart disease Father     Cataracts Mother     Glaucoma Mother     Diabetes Maternal Grandmother     No Known Problems Brother     No Known Problems Daughter     No Known Problems Son     No Known Problems Maternal Grandfather     No Known Problems Paternal Grandmother     No Known Problems Paternal Grandfather     No Known Problems Maternal Aunt     No Known Problems Maternal Uncle     No Known Problems Paternal Aunt     No Known Problems Paternal Uncle     Urolithiasis Neg Hx     Prostate cancer Neg Hx     Kidney cancer Neg Hx      Retinal detachment Neg Hx     Macular degeneration Neg Hx      Social History     Tobacco Use    Smoking status: Former Smoker     Types: Cigarettes     Quit date: 1985     Years since quittin.7    Smokeless tobacco: Never Used   Substance Use Topics    Alcohol use: Yes     Comment: seldom    Drug use: No     Review of Systems   Constitutional: Negative for activity change, appetite change, chills, fatigue and fever.   Eyes: Negative for visual disturbance.   Respiratory: Positive for shortness of breath. Negative for apnea.    Cardiovascular: Negative for chest pain and palpitations.   Gastrointestinal: Negative for abdominal distention and abdominal pain.   Genitourinary: Negative for difficulty urinating.   Musculoskeletal: Negative for neck pain.   Skin: Negative for pallor and rash.   Neurological: Negative for headaches.   Hematological: Does not bruise/bleed easily.   Psychiatric/Behavioral: Negative for agitation.       Physical Exam     Initial Vitals [22 1000]   BP Pulse Resp Temp SpO2   105/62 100 20 98.1 °F (36.7 °C) (!) 85 %      MAP       --         Physical Exam    Nursing note and vitals reviewed.  Constitutional: He appears well-developed and well-nourished.   HENT:   Head: Normocephalic and atraumatic.   Eyes: Conjunctivae are normal.   Neck: Neck supple.   Normal range of motion.  Cardiovascular: Normal rate, regular rhythm and normal heart sounds. Exam reveals no gallop and no friction rub.    No murmur heard.  Pulmonary/Chest: No respiratory distress. He has no wheezes. He has rhonchi. He has no rales.   Bilateral scattered rhonchi.   Abdominal: Abdomen is soft. He exhibits no distension. There is no abdominal tenderness.   Musculoskeletal:         General: Normal range of motion.      Cervical back: Normal range of motion and neck supple.     Neurological: He is alert and oriented to person, place, and time.   Skin: Skin is warm and dry.   Psychiatric: He has a normal mood  and affect.         ED Course   Critical Care    Date/Time: 3/1/2022 10:26 AM  Performed by: Nas Lucero III, MD  Authorized by: Chandana Hancock MD   Direct patient critical care time: 60 minutes  Total critical care time (exclusive of procedural time) : 60 minutes  Critical care was necessary to treat or prevent imminent or life-threatening deterioration of the following conditions: respiratory failure.  Critical care was time spent personally by me on the following activities: review of old charts, pulse oximetry, ordering and review of laboratory studies, obtaining history from patient or surrogate, development of treatment plan with patient or surrogate, discussions with primary provider, examination of patient, ordering and performing treatments and interventions, ordering and review of radiographic studies and re-evaluation of patient's condition.  Subsequent provider of critical care: I assumed direction of critical care for this patient from another provider of my specialty.        Labs Reviewed   CBC W/ AUTO DIFFERENTIAL - Abnormal; Notable for the following components:       Result Value    RBC 4.10 (*)     Hemoglobin 13.0 (*)     Hematocrit 37.2 (*)     MCH 31.7 (*)     Immature Granulocytes 0.6 (*)     Gran # (ANC) 8.1 (*)     Immature Grans (Abs) 0.06 (*)     Lymph # 0.6 (*)     Gran % 87.0 (*)     Lymph % 6.2 (*)     All other components within normal limits   COMPREHENSIVE METABOLIC PANEL - Abnormal; Notable for the following components:    Glucose 258 (*)     BUN 25 (*)     Albumin 3.1 (*)     ALT 56 (*)     eGFR if non  55 (*)     All other components within normal limits   C-REACTIVE PROTEIN - Abnormal; Notable for the following components:    CRP 26.4 (*)     All other components within normal limits   FERRITIN - Abnormal; Notable for the following components:    Ferritin 1,675 (*)     All other components within normal limits   LACTATE DEHYDROGENASE - Abnormal; Notable for the  following components:     (*)     All other components within normal limits   D DIMER, QUANTITATIVE - Abnormal; Notable for the following components:    D-Dimer 1.58 (*)     All other components within normal limits   CK   LACTIC ACID, PLASMA   TROPONIN I   PROCALCITONIN   B-TYPE NATRIURETIC PEPTIDE     EKG Readings: (Independently Interpreted)   Rhythm: Normal Sinus Rhythm. Heart Rate: 91. Ectopy: No Ectopy. Conduction: Normal. ST Segments: Normal ST Segments. T Waves: Normal. Axis: Normal. Clinical Impression: Normal Sinus Rhythm     ECG Results          EKG 12-lead (Final result)  Result time 02/12/22 11:08:16    Final result by Interface, Lab In [facility] (02/12/22 11:08:16)                 Narrative:    Test Reason : U07.1,    Vent. Rate : 091 BPM     Atrial Rate : 091 BPM     P-R Int : 148 ms          QRS Dur : 072 ms      QT Int : 342 ms       P-R-T Axes : 044 014 049 degrees     QTc Int : 420 ms    Normal sinus rhythm  Possible Left atrial enlargement  Borderline Abnormal ECG  When compared with ECG of 01-FEB-2022 08:00,  No significant change was found  Confirmed by Tiffani SAEZ, Saqib FULTON ([id number]) on 2/12/2022 11:07:59 AM    Referred By: [email or web address]   SELF           Confirmed By:Saqib Nixon MD                            Imaging Results          CTA Chest Non Coronary (Final result)  Result time 02/06/22 14:10:04    Final result by Adis Brasher MD (02/06/22 14:10:04)                 Impression:      1. No evidence of pulmonary thromboembolism.  2. Extensive bilateral ground-glass opacities and patchy consolidation throughout both lungs concerning for pulmonary edema versus multifocal pneumonia.  No significant pleural effusion or pneumothorax.  3. Cholelithiasis without evidence of acute cholecystitis.  4. Small hiatal hernia.      Electronically signed by: Adis Brasher  Date:    02/06/2022  Time:    14:10             Narrative:    EXAMINATION:  CTA CHEST NON CORONARY    CLINICAL  HISTORY:  Pulmonary embolism (PE) suspected, positive D-dimer;    TECHNIQUE:  Low dose axial images, sagittal and coronal reformations were obtained from the thoracic inlet to the lung bases following the IV administration of 75 mL of Omnipaque 350.  Contrast timing was optimized to evaluate the pulmonary arteries.  MIP images were performed.    COMPARISON:  None    FINDINGS:  Pulmonary Arteries: This study is adequate for the evaluation of pulmonary thromboembolism.  No evidence of pulmonary embolism to the level of the subsegmental arteries bilaterally.    Soft tissues of the neck:  Visualized portion of the thyroid is normal in appearance.    Thoracic aorta:  Normal in caliber and contour.  No evidence of dissection.    Heart: Heart is mildly enlarged.  No pericardial effusion.    Lymph nodes:  No evidence of mediastinal, hilar, or axillary lymphadenopathy.    Trachea and bronchi: Minimal dependent inspissated secretions versus aspirated material within the distal trachea and right mainstem bronchus.    Lungs:  Extensive scattered bilateral perihilar and peripheral predominant ground-glass opacities and patchy consolidation with interlobular septal thickening throughout both lungs, most pronounced within the dependent portions of the upper and lower lobes.  No pleural effusion or pneumothorax.    Limited evaluation of the upper abdomen:  Single calcified gallstone, measuring 2.3 cm, without CT evidence of acute cholecystitis.  Bilateral nonspecific perinephric fat stranding.  No hydronephrosis.  Small hiatal hernia.    Osseous structures:  No evidence of fractures or suspicious osseous lesions.                               X-Ray Chest AP Portable (Final result)  Result time 02/06/22 10:48:27    Final result by Adis Brasher MD (02/06/22 10:48:27)                 Impression:      Mild worsening bilateral patchy mid and lower lung airspace opacities in comparison to the prior study on 02/01/2022, concerning for  multifocal pneumonia.      Electronically signed by: Adis Brasher  Date:    02/06/2022  Time:    10:48             Narrative:    EXAMINATION:  XR CHEST AP PORTABLE    CLINICAL HISTORY:  COVID-19;.    TECHNIQUE:  Single frontal portable view of the chest was performed.    COMPARISON:  02/01/2022    FINDINGS:  Cardiomediastinal silhouette is within normal limits.Patchy peripheral bilateral mid and lower lung airspace opacities, mildly worsened as compared to the prior study on 02/01/2022.  No pleural effusion or pneumothorax.  Bones are intact.                                 Medications   hydrocodone-chlorpheniramine 10-8 mg/5 mL suspension 5 mL (5 mLs Oral Given 2/6/22 1059)   iohexoL (OMNIPAQUE 350) injection 75 mL (75 mLs Intravenous Given 2/6/22 1349)   tocilizumab 600 mg in sodium chloride 0.9% 100 mL IVPB (0 mg Intravenous Stopped 2/7/22 1454)   methylPREDNISolone sodium succinate injection 40 mg (40 mg Intravenous Given 2/8/22 0834)   insulin detemir U-100 pen 10 Units (10 Units Subcutaneous Given 2/9/22 1711)   insulin detemir U-100 pen 25 Units (25 Units Subcutaneous Given 2/11/22 1331)   sodium zirconium cyclosilicate packet 10 g (10 g Oral Given 2/13/22 1715)   sodium zirconium cyclosilicate packet 5 g (5 g Oral Given 2/14/22 1305)     Medical Decision Making:   History:   Old Medical Records: I decided to obtain old medical records.  Independently Interpreted Test(s):   I have ordered and independently interpreted EKG Reading(s) - see prior notes  Clinical Tests:   Lab Tests: Ordered and Reviewed  Radiological Study: Ordered and Reviewed  Medical Tests: Ordered and Reviewed  ED Management:  72-year-old male discharged yesterday after treatment for COVID returns with worsening shortness of breath.  Oxygen saturations were in the high 70s to low 80s on 4 L of oxygen.  Chest x-ray independently interpreted by me demonstrates bilateral patchy infiltrates consistent with pneumonia that are much more pronounced  than previously seen.  He will be admitted for supplemental oxygen and steroids.  Other:   I have discussed this case with another health care provider.       <> Summary of the Discussion: Discussed with Dr. Tatum who will admit the patient.       APC / Resident Notes:   I, Dr. Nas Lucero III, personally performed the services described in this documentation. All medical record entries made by the scribe were at my direction and in my presence.  I have reviewed the chart and agree that the record reflects my personal performance and is accurate and complete     Scribe Attestation:   Scribe #1: I performed the above scribed service and the documentation accurately describes the services I performed. I attest to the accuracy of the note.                 Clinical Impression:   Final diagnoses:  [U07.1] COVID-19  [R09.02] Hypoxia (Primary)  [U07.1, J12.82] Pneumonia due to COVID-19 virus          ED Disposition Condition    Admit               Nas Lucero III, MD  02/06/22 1123       Nas Lucero III, MD  03/01/22 1027

## 2022-02-06 NOTE — TELEPHONE ENCOUNTER
Enrollment call assessment:    SPO2: 93  Pulse: 95  Temperature: afebrile  SOB at rest (0-5): 1  SOB with activity (0-5): 2  Worsening symptoms?:no  Fever Symptoms: no  Increased home oxygen?: 3-4L home oxygen in progress    Called patient to review COVID-19 Surveillance Program enrollment process.    Smartphone:yes    [facility]: yes    Program consent: yes    Pulse oximeter status: yes    Verified emergency contacts: yes    Program Overview: Reviewed , no response process, and importance of correct emergency contacts in event that well-being check is warranted. Patient will call [phone] 24/7 to speak with an OnCall nurse, if needed. Pt aware that if Sp02 less than or equal to 93% or if they have any worsening symptoms, they need to go to the emergency department. If they are having a medical emergency, they will call [phone]. Otherwise, patient will continue to submit data as scheduled. Reviewed importance of wearing mask if self or family members leave the house.     Patient had no further questions.

## 2022-02-06 NOTE — NURSING
Patient received to 308 from ER. Patient sating 84% on 4 liters nasal cannula upon arrival to the unit. Patient placed on 6 liters nasal cannula and came up to 93%. Patient has a left forearm INT in place. No complaints of pain voiced. Wife at the bedside. Patient glucose checked and he was covered with  Insulin. He is on telemetry, a continuous probe was placed on the monitor and I called for a probe as we are out. Will continue to monitor.

## 2022-02-06 NOTE — TELEPHONE ENCOUNTER
Pt contacted for Surveillance escalation - PO2=90 on retake PO2=85-91 with 4L home oxygen in progress. Covid 19 protocol used and pt advised to call 911 now. Daughter in agreement but may drive pt. Instructed to call 911 d/t need for immediate intervention.  Pt instructed to call OOC for worsening of symptoms or health questions.    Reason for Disposition   Difficult to awaken or acting confused (e.g., disoriented, slurred speech)    Additional Information   Negative: Severe difficulty breathing (e.g., struggling for each breath, speaks in single words)   Negative: Bluish (or gray) lips or face now    Protocols used: CORONAVIRUS (COVID-19) - DIAGNOSED OR [id number]

## 2022-02-06 NOTE — SUBJECTIVE & OBJECTIVE
Past Medical History:   Diagnosis Date    Diabetes mellitus     Elevated PSA     Hyperlipidemia     Hypertension        Past Surgical History:   Procedure Laterality Date    COLONOSCOPY  12/6/2013    Dr. Martinez, 5 year recheck    SHOULDER SURGERY  12/09/2011    right        Review of patient's allergies indicates:   Allergen Reactions    Pravastatin Other (See Comments)     Joint pain    Lisinopril Other (See Comments)     Cough        Current Facility-Administered Medications on File Prior to Encounter   Medication    [DISCONTINUED] acetaminophen tablet 650 mg    [DISCONTINUED] atorvastatin tablet 40 mg    [DISCONTINUED] dexAMETHasone tablet 6 mg    [DISCONTINUED] dextrose 50% injection 12.5 g    [DISCONTINUED] dextrose 50% injection 25 g    [DISCONTINUED] enoxaparin injection 40 mg    [DISCONTINUED] famotidine tablet 20 mg    [DISCONTINUED] glucagon (human recombinant) injection 1 mg    [DISCONTINUED] glucose chewable tablet 16 g    [DISCONTINUED] glucose chewable tablet 24 g    [DISCONTINUED] insulin aspart U-100 pen 1-10 Units    [DISCONTINUED] losartan tablet 25 mg    [DISCONTINUED] melatonin tablet 6 mg    [DISCONTINUED] naloxone 0.4 mg/mL injection 0.02 mg    [DISCONTINUED] ondansetron disintegrating tablet 4 mg    [DISCONTINUED] potassium bicarbonate disintegrating tablet 35 mEq    [DISCONTINUED] potassium bicarbonate disintegrating tablet 50 mEq    [DISCONTINUED] potassium bicarbonate disintegrating tablet 60 mEq    [DISCONTINUED] potassium, sodium phosphates 280-160-250 mg packet 2 packet    [DISCONTINUED] potassium, sodium phosphates 280-160-250 mg packet 2 packet    [DISCONTINUED] potassium, sodium phosphates 280-160-250 mg packet 2 packet    [DISCONTINUED] sodium chloride 0.9% flush 10 mL    [DISCONTINUED] tamsulosin 24 hr capsule 0.4 mg     Current Outpatient Medications on File Prior to Encounter   Medication Sig    alfuzosin (UROXATRAL) 10 mg Tb24 Take 1 tablet (10 mg  total) by mouth daily with breakfast.    blood sugar diagnostic Strp One Touch check fasting glucose in morning    blood-glucose meter Misc One Touch glucometer check fasting glucose in morning    coenzyme Q10 10 mg capsule Take 10 mg by mouth once daily.    famotidine (PEPCID) 20 MG tablet Take 20 mg by mouth once daily.    glimepiride (AMARYL) 1 MG tablet Take 1 tablet (1 mg total) by mouth once daily.    ibuprofen (ADVIL,MOTRIN) 800 MG tablet Take 800 mg by mouth every 6 (six) hours as needed.    lancets Misc One Touch lancets check fasting glucose in morning    losartan (COZAAR) 25 MG tablet TAKE ONE TABLET BY MOUTH EVERY DAY    meloxicam (MOBIC) 15 MG tablet Take 15 mg by mouth once daily.    metFORMIN (GLUCOPHAGE-XR) 500 MG ER 24hr tablet TAKE ONE TABLET BY MOUTH EVERY MORNING AND TAKE TWO TABLETS BY MOUTH EVERY EVENING    ondansetron (ZOFRAN) 8 MG tablet Take 1 tablet (8 mg total) by mouth every 8 (eight) hours as needed for Nausea.    pulse oximeter (PULSE OXIMETER) device by Apply Externally route 2 (two) times a day. Use twice daily at 8 AM and 3 PM and [facility] as directed.    rosuvastatin (CRESTOR) 10 MG tablet Take 1 tablet (10 mg total) by mouth once daily.     Family History     Problem Relation (Age of Onset)    Cataracts Mother    Diabetes Father, Maternal Grandmother    Glaucoma Mother    Heart disease Father    No Known Problems Brother, Daughter, Son, Maternal Grandfather, Paternal Grandmother, Paternal Grandfather, Maternal Aunt, Maternal Uncle, Paternal Aunt, Paternal Uncle        Tobacco Use    Smoking status: Former Smoker     Types: Cigarettes     Quit date: 1985     Years since quittin.7    Smokeless tobacco: Never Used   Substance and Sexual Activity    Alcohol use: Yes     Comment: seldom    Drug use: No    Sexual activity: Not on file     Review of Systems   Constitutional: Negative for chills and fever.   HENT: Negative for congestion and  rhinorrhea.    Respiratory: Negative for cough, shortness of breath and wheezing.    Cardiovascular: Negative for chest pain, palpitations and leg swelling.   Gastrointestinal: Negative for abdominal distention, abdominal pain, nausea and vomiting.   Endocrine: Negative for cold intolerance and heat intolerance.   Genitourinary: Negative for dysuria and urgency.   Musculoskeletal: Negative for arthralgias and neck stiffness.   Skin: Negative for rash and wound.   Allergic/Immunologic: Positive for immunocompromised state.   Neurological: Negative for dizziness and weakness.   All other systems reviewed and are negative.    Objective:     Vital Signs (Most Recent):  Temp: 98.1 °F (36.7 °C) (02/06/22 1000)  Pulse: 74 (02/06/22 1240)  Resp: 19 (02/06/22 1240)  BP: (!) 108/59 (02/06/22 1240)  SpO2: (!) 93 % (02/06/22 1240) Vital Signs (24h Range):  Temp:  [98.1 °F (36.7 °C)] 98.1 °F (36.7 °C)  Pulse:  [] 74  Resp:  [19-20] 19  SpO2:  [85 %-95 %] 93 %  BP: (100-111)/(58-65) 108/59     Weight: 76.2 kg (168 lb)  Body mass index is 24.81 kg/m².    Physical Exam  Vitals and nursing note reviewed.   Constitutional:       Appearance: He is ill-appearing.   HENT:      Head: Normocephalic and atraumatic.      Right Ear: External ear normal.      Left Ear: External ear normal.      Nose: Nose normal.      Mouth/Throat:      Mouth: Mucous membranes are moist.      Pharynx: Oropharynx is clear.   Eyes:      Extraocular Movements: Extraocular movements intact.      Conjunctiva/sclera: Conjunctivae normal.   Cardiovascular:      Rate and Rhythm: Normal rate and regular rhythm.      Pulses: Normal pulses.      Heart sounds: Normal heart sounds.   Pulmonary:      Effort: Pulmonary effort is normal.      Breath sounds: Rhonchi present.   Abdominal:      General: Bowel sounds are normal.      Palpations: Abdomen is soft.   Musculoskeletal:         General: Normal range of motion.      Cervical back: Normal range of motion and neck  supple.      Right lower leg: No edema.      Left lower leg: No edema.   Skin:     General: Skin is warm and dry.   Neurological:      General: No focal deficit present.      Mental Status: He is alert and oriented to person, place, and time. Mental status is at baseline.   Psychiatric:         Mood and Affect: Mood normal.         Behavior: Behavior normal.             Significant Labs: All pertinent labs within the past 24 hours have been reviewed.    Significant Imaging: I have reviewed all pertinent imaging results/findings within the past 24 hours.

## 2022-02-06 NOTE — ASSESSMENT & PLAN NOTE
Patient is chronically on statin.will continue for now. Monitor clinically. Last LDL was   Lab Results   Component Value Date    LDLCALC 84.8 03/01/2021

## 2022-02-06 NOTE — ASSESSMENT & PLAN NOTE
Chronic, controlled.  Latest blood pressure and vitals reviewed-   Temp:  [98.1 °F (36.7 °C)]   Pulse:  []   Resp:  [19-20]   BP: (100-111)/(58-65)   SpO2:  [85 %-95 %] .   Home meds for hypertension were reviewed and noted below.   Hypertension Medications             losartan (COZAAR) 25 MG tablet TAKE ONE TABLET BY MOUTH EVERY DAY          While in the hospital, will manage blood pressure as follows; Continue home antihypertensive regimen    Will utilize p.r.n. blood pressure medication only if patient's blood pressure greater than  180/110 and he develops symptoms such as worsening chest pain or shortness of breath.]

## 2022-02-06 NOTE — TELEPHONE ENCOUNTER
Enrollment PO2= 92-93 on retake PO2=95% with home oxygen in progress at 4L. Pt daughter reports O2 was at 3L. Reviewed increase of home oxygen to 4L as per avs discharge summary with pt daughter. PO@ 95% with increase 4L NC in progress.  Oxygen / hypoxia protocol followed and cg instructed to contact PCP with 24 hours. Encounter routed to pcp.     Reason for Disposition   [1] MILD difficulty breathing  (e.g., minimal/no SOB at rest, SOB with walking) AND [2] worse than normal    Additional Information   Negative: SEVERE difficulty breathing (e.g., struggling for each breath, speaks in single words, pulse > 120)   Negative: Bluish (or gray) lips or face now   Negative: Difficult to awaken or acting confused (e.g., disoriented, slurred speech)   Negative: Slow, shallow and weak breathing   Negative: Sounds like a life-threatening emergency to the triager   Negative: Breathing difficulty is main concern   Negative: [1] Wheezing (high pitched whistling sound) is main concern AND [2] previous asthma attacks or use of asthma medicines   Negative: Diagnosed with Chronic Pulmonary Obstructive Disease (COPD) and on oxygen therapy   Negative: [1] MODERATE difficulty breathing (e.g., speaks in phrases, SOB even at rest, pulse 100 - 120) AND [2] new-onset or worse than normal   Negative: [1] MODERATE difficulty breathing AND [2] oxygen level (e.g., pulse oximetry) 91 to 94 percent   Negative: Oxygen level (e.g., pulse oximetry) 90 percent or lower   Negative: Patient sounds very sick or weak to the triager   Negative: [1] MODERATE difficulty breathing (e.g., speaks in phrases, SOB even at rest, pulse 100 - 120) AND [2] NOT new-onset or worse than normal   Negative: [1] Drinking very little AND [2] dehydration suspected (e.g., no urine > 12 hours, very dry mouth, very lightheaded)   Negative: [1] MILD difficulty breathing (e.g., minimal/no SOB at rest, SOB with walking, pulse <100) AND [2] new-onset   Negative:  Fever > 100.4 F (38.0 C)   Negative: Nurse judgment   Negative: [1] Fall in oxygen level 4% or more (below known patient baseline, while awake and resting) AND [2] new or worse difficulty breathing   Negative: Oxygen level (e.g., pulse oximetry) 91 to 94 percent   Negative: Pulse (heart rate) > 120 beats per minute   Negative: [1] Caller has URGENT question AND [2] triager unable to answer question    Protocols used: OXYGEN MONITORING AND [id number]-U-CA

## 2022-02-06 NOTE — PROGRESS NOTES
[facility]  [facility] Medicine  Progress Note    Patient Name: Ludwin Gee  MRN: [id number]  Patient Class: IP- Inpatient   Admission Date: 2/6/2022  Length of Stay: 0 days  Attending Physician: Nas Lucero III, MD  Primary Care Provider: Herrera Fisher MD        Subjective:     Principal Problem:Acute hypoxemic respiratory failure due to COVID-19        HPI:  Patient is a 72-year-old male with past medical history of diabetes, hypertension, hyperlipidemia, former tobacco use who was diagnosed with COVID on 02/01/2022 and recently hospitalized for hypoxic respiratory failure from COVID during which he completed a course of remdesivir and was discharged yesterday with supplemental oxygen repeat presents today with complaint of hypoxia at home on 4 L nasal cannula.  Wife reports he did well last night however since then has been having trouble maintaining his saturations on 4 L especially with movement.  Denies any fever, chills, chest pain, nausea, vomiting.  He is drowsy at this time from cough medicine received in the ED.  D-dimer has increased from previous.  CTA ordered.  He is currently on 5 L nasal cannula with oxygen saturation at 95%       Overview/Hospital Course:  No notes on file    Past Medical History:   Diagnosis Date    Diabetes mellitus     Elevated PSA     Hyperlipidemia     Hypertension        Past Surgical History:   Procedure Laterality Date    COLONOSCOPY  12/6/2013    Dr. Martinez, 5 year recheck    SHOULDER SURGERY  12/09/2011    right        Review of patient's allergies indicates:   Allergen Reactions    Pravastatin Other (See Comments)     Joint pain    Lisinopril Other (See Comments)     Cough        Current Facility-Administered Medications on File Prior to Encounter   Medication    [DISCONTINUED] acetaminophen tablet 650 mg    [DISCONTINUED] atorvastatin tablet 40 mg    [DISCONTINUED] dexAMETHasone tablet 6 mg    [DISCONTINUED] dextrose 50% injection  12.5 g    [DISCONTINUED] dextrose 50% injection 25 g    [DISCONTINUED] enoxaparin injection 40 mg    [DISCONTINUED] famotidine tablet 20 mg    [DISCONTINUED] glucagon (human recombinant) injection 1 mg    [DISCONTINUED] glucose chewable tablet 16 g    [DISCONTINUED] glucose chewable tablet 24 g    [DISCONTINUED] insulin aspart U-100 pen 1-10 Units    [DISCONTINUED] losartan tablet 25 mg    [DISCONTINUED] melatonin tablet 6 mg    [DISCONTINUED] naloxone 0.4 mg/mL injection 0.02 mg    [DISCONTINUED] ondansetron disintegrating tablet 4 mg    [DISCONTINUED] potassium bicarbonate disintegrating tablet 35 mEq    [DISCONTINUED] potassium bicarbonate disintegrating tablet 50 mEq    [DISCONTINUED] potassium bicarbonate disintegrating tablet 60 mEq    [DISCONTINUED] potassium, sodium phosphates 280-160-250 mg packet 2 packet    [DISCONTINUED] potassium, sodium phosphates 280-160-250 mg packet 2 packet    [DISCONTINUED] potassium, sodium phosphates 280-160-250 mg packet 2 packet    [DISCONTINUED] sodium chloride 0.9% flush 10 mL    [DISCONTINUED] tamsulosin 24 hr capsule 0.4 mg     Current Outpatient Medications on File Prior to Encounter   Medication Sig    alfuzosin (UROXATRAL) 10 mg Tb24 Take 1 tablet (10 mg total) by mouth daily with breakfast.    blood sugar diagnostic Strp One Touch check fasting glucose in morning    blood-glucose meter Misc One Touch glucometer check fasting glucose in morning    coenzyme Q10 10 mg capsule Take 10 mg by mouth once daily.    famotidine (PEPCID) 20 MG tablet Take 20 mg by mouth once daily.    glimepiride (AMARYL) 1 MG tablet Take 1 tablet (1 mg total) by mouth once daily.    ibuprofen (ADVIL,MOTRIN) 800 MG tablet Take 800 mg by mouth every 6 (six) hours as needed.    lancets Misc One Touch lancets check fasting glucose in morning    losartan (COZAAR) 25 MG tablet TAKE ONE TABLET BY MOUTH EVERY DAY    meloxicam (MOBIC) 15 MG tablet Take 15 mg by mouth once  daily.    metFORMIN (GLUCOPHAGE-XR) 500 MG ER 24hr tablet TAKE ONE TABLET BY MOUTH EVERY MORNING AND TAKE TWO TABLETS BY MOUTH EVERY EVENING    ondansetron (ZOFRAN) 8 MG tablet Take 1 tablet (8 mg total) by mouth every 8 (eight) hours as needed for Nausea.    pulse oximeter (PULSE OXIMETER) device by Apply Externally route 2 (two) times a day. Use twice daily at 8 AM and 3 PM and record the value in MyChart as directed.    rosuvastatin (CRESTOR) 10 MG tablet Take 1 tablet (10 mg total) by mouth once daily.     Family History     Problem Relation (Age of Onset)    Cataracts Mother    Diabetes Father, Maternal Grandmother    Glaucoma Mother    Heart disease Father    No Known Problems Brother, Daughter, Son, Maternal Grandfather, Paternal Grandmother, Paternal Grandfather, Maternal Aunt, Maternal Uncle, Paternal Aunt, Paternal Uncle        Tobacco Use    Smoking status: Former Smoker     Types: Cigarettes     Quit date: 1985     Years since quittin.7    Smokeless tobacco: Never Used   Substance and Sexual Activity    Alcohol use: Yes     Comment: seldom    Drug use: No    Sexual activity: Not on file     Review of Systems   Constitutional: Negative for chills and fever.   HENT: Negative for congestion and rhinorrhea.    Respiratory: Negative for cough, shortness of breath and wheezing.    Cardiovascular: Negative for chest pain, palpitations and leg swelling.   Gastrointestinal: Negative for abdominal distention, abdominal pain, nausea and vomiting.   Endocrine: Negative for cold intolerance and heat intolerance.   Genitourinary: Negative for dysuria and urgency.   Musculoskeletal: Negative for arthralgias and neck stiffness.   Skin: Negative for rash and wound.   Allergic/Immunologic: Positive for immunocompromised state.   Neurological: Negative for dizziness and weakness.   All other systems reviewed and are negative.    Objective:     Vital Signs (Most Recent):  Temp: 98.1 °F (36.7 °C) (22  1000)  Pulse: 74 (02/06/22 1240)  Resp: 19 (02/06/22 1240)  BP: (!) 108/59 (02/06/22 1240)  SpO2: (!) 93 % (02/06/22 1240) Vital Signs (24h Range):  Temp:  [98.1 °F (36.7 °C)] 98.1 °F (36.7 °C)  Pulse:  [] 74  Resp:  [19-20] 19  SpO2:  [85 %-95 %] 93 %  BP: (100-111)/(58-65) 108/59     Weight: 76.2 kg (168 lb)  Body mass index is 24.81 kg/m².    Physical Exam  Vitals and nursing note reviewed.   Constitutional:       Appearance: He is ill-appearing.   HENT:      Head: Normocephalic and atraumatic.      Right Ear: External ear normal.      Left Ear: External ear normal.      Nose: Nose normal.      Mouth/Throat:      Mouth: Mucous membranes are moist.      Pharynx: Oropharynx is clear.   Eyes:      Extraocular Movements: Extraocular movements intact.      Conjunctiva/sclera: Conjunctivae normal.   Cardiovascular:      Rate and Rhythm: Normal rate and regular rhythm.      Pulses: Normal pulses.      Heart sounds: Normal heart sounds.   Pulmonary:      Effort: Pulmonary effort is normal.      Breath sounds: Rhonchi present.   Abdominal:      General: Bowel sounds are normal.      Palpations: Abdomen is soft.   Musculoskeletal:         General: Normal range of motion.      Cervical back: Normal range of motion and neck supple.      Right lower leg: No edema.      Left lower leg: No edema.   Skin:     General: Skin is warm and dry.   Neurological:      General: No focal deficit present.      Mental Status: He is alert and oriented to person, place, and time. Mental status is at baseline.   Psychiatric:         Mood and Affect: Mood normal.         Behavior: Behavior normal.             Significant Labs: All pertinent labs within the past 24 hours have been reviewed.    Significant Imaging: I have reviewed all pertinent imaging results/findings within the past 24 hours.      Assessment/Plan:      * Acute hypoxemic respiratory failure due to COVID-19  Completed course of remdesivir  CXR  reviewed, mildly worse  infiltrates  Continue dexamethasone  Given increased D-dimer, will check CTA to rule out PE  Prophylactic Lovenox for now    Hyperlipidemia associated with type 2 diabetes mellitus   Patient is chronically on statin.will continue for now. Monitor clinically. Last LDL was   Lab Results   Component Value Date    LDLCALC 84.8 03/01/2021            Hypertension associated with diabetes  Chronic, controlled.  Latest blood pressure and vitals reviewed-   Temp:  [98.1 °F (36.7 °C)]   Pulse:  []   Resp:  [19-20]   BP: (100-111)/(58-65)   SpO2:  [85 %-95 %] .   Home meds for hypertension were reviewed and noted below.   Hypertension Medications             losartan (COZAAR) 25 MG tablet TAKE ONE TABLET BY MOUTH EVERY DAY          While in the hospital, will manage blood pressure as follows; Continue home antihypertensive regimen    Will utilize p.r.n. blood pressure medication only if patient's blood pressure greater than  180/110 and he develops symptoms such as worsening chest pain or shortness of breath.]      Controlled type 2 diabetes mellitus with microalbuminuria, without long-term current use of insulin  Accu-Cheks and sliding scale insulin.  Hold p.o. anti hyperglycemics        VTE Risk Mitigation (From admission, onward)    None          Discharge Planning   ANTELMO:      Code Status: Prior   Is the patient medically ready for discharge?:     Reason for patient still in hospital (select all that apply): Patient trending condition and Treatment                     Elaina Tatum MD  Department of Hospital Medicine   [facility] - Emergency Dept

## 2022-02-06 NOTE — HPI
Patient is a 72-year-old male with past medical history of diabetes, hypertension, hyperlipidemia, former tobacco use who was diagnosed with COVID on 02/01/2022 and recently hospitalized for hypoxic respiratory failure from COVID during which he completed a course of remdesivir and was discharged yesterday with supplemental oxygen repeat presents today with complaint of hypoxia at home on 4 L nasal cannula.  Wife reports he did well last night however since then has been having trouble maintaining his saturations on 4 L especially with movement.  Denies any fever, chills, chest pain, nausea, vomiting.  He is drowsy at this time from cough medicine received in the ED.  D-dimer has increased from previous.  CTA ordered.  He is currently on 5 L nasal cannula with oxygen saturation at 95%

## 2022-02-06 NOTE — H&P
[facility] Medicine  History & Physical    Patient Name: Ludwin Gee  MRN: [id number]  Patient Class: IP- Inpatient  Admission Date: 2/6/2022  Attending Physician: Elaina Tatum MD  Primary Care Provider: Herrera Fisher MD         Patient information was obtained from patient, spouse/SO, past medical records and ER records.     Subjective:     Principal Problem:Acute hypoxemic respiratory failure due to COVID-19    Chief Complaint:   Chief Complaint   Patient presents with    Shortness of Breath     Low o2 sats / recent admit pneumonia         HPI: Patient is a 72-year-old male with past medical history of diabetes, hypertension, hyperlipidemia, former tobacco use who was diagnosed with COVID on 02/01/2022 and recently hospitalized for hypoxic respiratory failure from COVID during which he completed a course of remdesivir and was discharged yesterday with supplemental oxygen repeat presents today with complaint of hypoxia at home on 4 L nasal cannula.  Wife reports he did well last night however since then has been having trouble maintaining his saturations on 4 L especially with movement.  Denies any fever, chills, chest pain, nausea, vomiting.  He is drowsy at this time from cough medicine received in the ED.  D-dimer has increased from previous.  CTA ordered.  He is currently on 5 L nasal cannula with oxygen saturation at 95%       Past Medical History:   Diagnosis Date    Diabetes mellitus     Elevated PSA     Hyperlipidemia     Hypertension        Past Surgical History:   Procedure Laterality Date    COLONOSCOPY  12/6/2013    Dr. Martinez, 5 year recheck    SHOULDER SURGERY  12/09/2011    right        Review of patient's allergies indicates:   Allergen Reactions    Pravastatin Other (See Comments)     Joint pain    Lisinopril Other (See Comments)     Cough        Current Facility-Administered Medications on File Prior to Encounter   Medication    [DISCONTINUED]  acetaminophen tablet 650 mg    [DISCONTINUED] atorvastatin tablet 40 mg    [DISCONTINUED] dexAMETHasone tablet 6 mg    [DISCONTINUED] dextrose 50% injection 12.5 g    [DISCONTINUED] dextrose 50% injection 25 g    [DISCONTINUED] enoxaparin injection 40 mg    [DISCONTINUED] famotidine tablet 20 mg    [DISCONTINUED] glucagon (human recombinant) injection 1 mg    [DISCONTINUED] glucose chewable tablet 16 g    [DISCONTINUED] glucose chewable tablet 24 g    [DISCONTINUED] insulin aspart U-100 pen 1-10 Units    [DISCONTINUED] losartan tablet 25 mg    [DISCONTINUED] melatonin tablet 6 mg    [DISCONTINUED] naloxone 0.4 mg/mL injection 0.02 mg    [DISCONTINUED] ondansetron disintegrating tablet 4 mg    [DISCONTINUED] potassium bicarbonate disintegrating tablet 35 mEq    [DISCONTINUED] potassium bicarbonate disintegrating tablet 50 mEq    [DISCONTINUED] potassium bicarbonate disintegrating tablet 60 mEq    [DISCONTINUED] potassium, sodium phosphates 280-160-250 mg packet 2 packet    [DISCONTINUED] potassium, sodium phosphates 280-160-250 mg packet 2 packet    [DISCONTINUED] potassium, sodium phosphates 280-160-250 mg packet 2 packet    [DISCONTINUED] sodium chloride 0.9% flush 10 mL    [DISCONTINUED] tamsulosin 24 hr capsule 0.4 mg     Current Outpatient Medications on File Prior to Encounter   Medication Sig    alfuzosin (UROXATRAL) 10 mg Tb24 Take 1 tablet (10 mg total) by mouth daily with breakfast.    blood sugar diagnostic Strp One Touch check fasting glucose in morning    blood-glucose meter Misc One Touch glucometer check fasting glucose in morning    coenzyme Q10 10 mg capsule Take 10 mg by mouth once daily.    famotidine (PEPCID) 20 MG tablet Take 20 mg by mouth once daily.    glimepiride (AMARYL) 1 MG tablet Take 1 tablet (1 mg total) by mouth once daily.    ibuprofen (ADVIL,MOTRIN) 800 MG tablet Take 800 mg by mouth every 6 (six) hours as needed.    lancets Misc One Touch lancets check  fasting glucose in morning    losartan (COZAAR) 25 MG tablet TAKE ONE TABLET BY MOUTH EVERY DAY    meloxicam (MOBIC) 15 MG tablet Take 15 mg by mouth once daily.    metFORMIN (GLUCOPHAGE-XR) 500 MG ER 24hr tablet TAKE ONE TABLET BY MOUTH EVERY MORNING AND TAKE TWO TABLETS BY MOUTH EVERY EVENING    ondansetron (ZOFRAN) 8 MG tablet Take 1 tablet (8 mg total) by mouth every 8 (eight) hours as needed for Nausea.    pulse oximeter (PULSE OXIMETER) device by Apply Externally route 2 (two) times a day. Use twice daily at 8 AM and 3 PM and record the value in [facility] as directed.    rosuvastatin (CRESTOR) 10 MG tablet Take 1 tablet (10 mg total) by mouth once daily.     Family History     Problem Relation (Age of Onset)    Cataracts Mother    Diabetes Father, Maternal Grandmother    Glaucoma Mother    Heart disease Father    No Known Problems Brother, Daughter, Son, Maternal Grandfather, Paternal Grandmother, Paternal Grandfather, Maternal Aunt, Maternal Uncle, Paternal Aunt, Paternal Uncle        Tobacco Use    Smoking status: Former Smoker     Types: Cigarettes     Quit date: 1985     Years since quittin.7    Smokeless tobacco: Never Used   Substance and Sexual Activity    Alcohol use: Yes     Comment: seldom    Drug use: No    Sexual activity: Not on file     Review of Systems   Constitutional: Negative for chills and fever.   HENT: Negative for congestion and rhinorrhea.    Respiratory: Negative for cough, shortness of breath and wheezing.    Cardiovascular: Negative for chest pain, palpitations and leg swelling.   Gastrointestinal: Negative for abdominal distention, abdominal pain, nausea and vomiting.   Endocrine: Negative for cold intolerance and heat intolerance.   Genitourinary: Negative for dysuria and urgency.   Musculoskeletal: Negative for arthralgias and neck stiffness.   Skin: Negative for rash and wound.   Allergic/Immunologic: Positive for immunocompromised state.   Neurological:  Negative for dizziness and weakness.   All other systems reviewed and are negative.    Objective:     Vital Signs (Most Recent):  Temp: 98.1 °F (36.7 °C) (02/06/22 1000)  Pulse: 74 (02/06/22 1240)  Resp: 19 (02/06/22 1240)  BP: (!) 108/59 (02/06/22 1240)  SpO2: (!) 93 % (02/06/22 1240) Vital Signs (24h Range):  Temp:  [98.1 °F (36.7 °C)] 98.1 °F (36.7 °C)  Pulse:  [] 74  Resp:  [19-20] 19  SpO2:  [85 %-95 %] 93 %  BP: (100-111)/(58-65) 108/59     Weight: 76.2 kg (168 lb)  Body mass index is 24.81 kg/m².    Physical Exam  Vitals and nursing note reviewed.   Constitutional:       Appearance: He is ill-appearing.   HENT:      Head: Normocephalic and atraumatic.      Right Ear: External ear normal.      Left Ear: External ear normal.      Nose: Nose normal.      Mouth/Throat:      Mouth: Mucous membranes are moist.      Pharynx: Oropharynx is clear.   Eyes:      Extraocular Movements: Extraocular movements intact.      Conjunctiva/sclera: Conjunctivae normal.   Cardiovascular:      Rate and Rhythm: Normal rate and regular rhythm.      Pulses: Normal pulses.      Heart sounds: Normal heart sounds.   Pulmonary:      Effort: Pulmonary effort is normal.      Breath sounds: Rhonchi present.   Abdominal:      General: Bowel sounds are normal.      Palpations: Abdomen is soft.   Musculoskeletal:         General: Normal range of motion.      Cervical back: Normal range of motion and neck supple.      Right lower leg: No edema.      Left lower leg: No edema.   Skin:     General: Skin is warm and dry.   Neurological:      General: No focal deficit present.      Mental Status: He is alert and oriented to person, place, and time. Mental status is at baseline.   Psychiatric:         Mood and Affect: Mood normal.         Behavior: Behavior normal.             Significant Labs: All pertinent labs within the past 24 hours have been reviewed.    Significant Imaging: I have reviewed all pertinent imaging results/findings within the  past 24 hours.    Assessment/Plan:     * Acute hypoxemic respiratory failure due to COVID-19  Completed course of remdesivir  CXR  reviewed, mildly worse infiltrates  Continue dexamethasone  Given increased D-dimer, will check CTA to rule out PE  Prophylactic Lovenox for now    Hyperlipidemia associated with type 2 diabetes mellitus   Patient is chronically on statin.will continue for now. Monitor clinically. Last LDL was   Lab Results   Component Value Date    LDLCALC 84.8 03/01/2021            Hypertension associated with diabetes  Chronic, controlled.  Latest blood pressure and vitals reviewed-   Temp:  [98.1 °F (36.7 °C)]   Pulse:  []   Resp:  [19-20]   BP: (100-111)/(58-65)   SpO2:  [85 %-95 %] .   Home meds for hypertension were reviewed and noted below.   Hypertension Medications             losartan (COZAAR) 25 MG tablet TAKE ONE TABLET BY MOUTH EVERY DAY          While in the hospital, will manage blood pressure as follows; Continue home antihypertensive regimen    Will utilize p.r.n. blood pressure medication only if patient's blood pressure greater than  180/110 and he develops symptoms such as worsening chest pain or shortness of breath.]      Controlled type 2 diabetes mellitus with microalbuminuria, without long-term current use of insulin  Accu-Cheks and sliding scale insulin.  Hold p.o. anti hyperglycemics        VTE Risk Mitigation (From admission, onward)    None             Elaina Tatum MD  Department of Hospital Medicine   [facility] - Emergency Dept

## 2022-02-06 NOTE — ASSESSMENT & PLAN NOTE
Completed course of remdesivir  CXR  reviewed, mildly worse infiltrates  Continue dexamethasone  Given increased D-dimer, will check CTA to rule out PE  Prophylactic Lovenox for now

## 2022-02-07 ENCOUNTER — TELEPHONE (OUTPATIENT)
Dept: MEDSURG UNIT | Facility: HOSPITAL | Age: 72
End: 2022-02-07
Payer: MEDICARE

## 2022-02-07 PROBLEM — Z77.090 ASBESTOS EXPOSURE: Status: ACTIVE | Noted: 2022-02-07

## 2022-02-07 LAB
ANION GAP SERPL CALC-SCNC: 10 MMOL/L (ref 8–16)
BASOPHILS # BLD AUTO: 0.01 K/UL (ref 0–0.2)
BASOPHILS NFR BLD: 0.1 % (ref 0–1.9)
BUN SERPL-MCNC: 27 MG/DL (ref 8–23)
CALCIUM SERPL-MCNC: 9.2 MG/DL (ref 8.7–10.5)
CHLORIDE SERPL-SCNC: 101 MMOL/L (ref 95–110)
CO2 SERPL-SCNC: 29 MMOL/L (ref 23–29)
CREAT SERPL-MCNC: 1.4 MG/DL (ref 0.5–1.4)
DIFFERENTIAL METHOD: ABNORMAL
EOSINOPHIL # BLD AUTO: 0 K/UL (ref 0–0.5)
EOSINOPHIL NFR BLD: 0.3 % (ref 0–8)
ERYTHROCYTE [DISTWIDTH] IN BLOOD BY AUTOMATED COUNT: 11.9 % (ref 11.5–14.5)
EST. GFR  (AFRICAN AMERICAN): 58 ML/MIN/1.73 M^2
EST. GFR  (NON AFRICAN AMERICAN): 50 ML/MIN/1.73 M^2
GLUCOSE SERPL-MCNC: 209 MG/DL (ref 70–110)
HCT VFR BLD AUTO: 37 % (ref 40–54)
HGB BLD-MCNC: 12.7 G/DL (ref 14–18)
IMM GRANULOCYTES # BLD AUTO: 0.07 K/UL (ref 0–0.04)
IMM GRANULOCYTES NFR BLD AUTO: 0.9 % (ref 0–0.5)
LYMPHOCYTES # BLD AUTO: 0.6 K/UL (ref 1–4.8)
LYMPHOCYTES NFR BLD: 7.7 % (ref 18–48)
MCH RBC QN AUTO: 31 PG (ref 27–31)
MCHC RBC AUTO-ENTMCNC: 34.3 G/DL (ref 32–36)
MCV RBC AUTO: 90 FL (ref 82–98)
MONOCYTES # BLD AUTO: 0.7 K/UL (ref 0.3–1)
MONOCYTES NFR BLD: 9 % (ref 4–15)
NEUTROPHILS # BLD AUTO: 6.5 K/UL (ref 1.8–7.7)
NEUTROPHILS NFR BLD: 82 % (ref 38–73)
NRBC BLD-RTO: 0 /100 WBC
PLATELET # BLD AUTO: 384 K/UL (ref 150–450)
PMV BLD AUTO: 10.7 FL (ref 9.2–12.9)
POCT GLUCOSE: 209 MG/DL (ref 70–110)
POCT GLUCOSE: 219 MG/DL (ref 70–110)
POTASSIUM SERPL-SCNC: 4.6 MMOL/L (ref 3.5–5.1)
RBC # BLD AUTO: 4.1 M/UL (ref 4.6–6.2)
SODIUM SERPL-SCNC: 140 MMOL/L (ref 136–145)
WBC # BLD AUTO: 7.93 K/UL (ref 3.9–12.7)

## 2022-02-07 PROCEDURE — 94761 N-INVAS EAR/PLS OXIMETRY MLT: CPT

## 2022-02-07 PROCEDURE — 80048 BASIC METABOLIC PNL TOTAL CA: CPT | Performed by: HOSPITALIST

## 2022-02-07 PROCEDURE — 20600001 HC STEP DOWN PRIVATE ROOM

## 2022-02-07 PROCEDURE — 36415 COLL VENOUS BLD VENIPUNCTURE: CPT | Performed by: HOSPITALIST

## 2022-02-07 PROCEDURE — 25000242 PHARM REV CODE 250 ALT 637 W/ HCPCS: Performed by: HOSPITALIST

## 2022-02-07 PROCEDURE — 85025 COMPLETE CBC W/AUTO DIFF WBC: CPT | Performed by: HOSPITALIST

## 2022-02-07 PROCEDURE — 86704 HEP B CORE ANTIBODY TOTAL: CPT | Performed by: STUDENT IN AN ORGANIZED HEALTH CARE EDUCATION/TRAINING PROGRAM

## 2022-02-07 PROCEDURE — 63600175 PHARM REV CODE 636 W HCPCS: Performed by: STUDENT IN AN ORGANIZED HEALTH CARE EDUCATION/TRAINING PROGRAM

## 2022-02-07 PROCEDURE — 27000207 HC ISOLATION

## 2022-02-07 PROCEDURE — 86480 TB TEST CELL IMMUN MEASURE: CPT | Performed by: STUDENT IN AN ORGANIZED HEALTH CARE EDUCATION/TRAINING PROGRAM

## 2022-02-07 PROCEDURE — 99222 PR INITIAL HOSPITAL CARE,LEVL II: ICD-10-PCS | Mod: ,,, | Performed by: INTERNAL MEDICINE

## 2022-02-07 PROCEDURE — 99222 1ST HOSP IP/OBS MODERATE 55: CPT | Mod: ,,, | Performed by: INTERNAL MEDICINE

## 2022-02-07 PROCEDURE — 27000221 HC OXYGEN, UP TO 24 HOURS

## 2022-02-07 PROCEDURE — 36415 COLL VENOUS BLD VENIPUNCTURE: CPT | Performed by: STUDENT IN AN ORGANIZED HEALTH CARE EDUCATION/TRAINING PROGRAM

## 2022-02-07 PROCEDURE — 63600175 PHARM REV CODE 636 W HCPCS: Performed by: HOSPITALIST

## 2022-02-07 PROCEDURE — 25000003 PHARM REV CODE 250: Performed by: STUDENT IN AN ORGANIZED HEALTH CARE EDUCATION/TRAINING PROGRAM

## 2022-02-07 PROCEDURE — 87340 HEPATITIS B SURFACE AG IA: CPT | Performed by: STUDENT IN AN ORGANIZED HEALTH CARE EDUCATION/TRAINING PROGRAM

## 2022-02-07 PROCEDURE — 25000003 PHARM REV CODE 250: Performed by: HOSPITALIST

## 2022-02-07 RX ADMIN — DEXAMETHASONE 6 MG: 4 TABLET ORAL at 08:02

## 2022-02-07 RX ADMIN — ACETAMINOPHEN 650 MG: 325 TABLET ORAL at 12:02

## 2022-02-07 RX ADMIN — INSULIN ASPART 8 UNITS: 100 INJECTION, SOLUTION INTRAVENOUS; SUBCUTANEOUS at 05:02

## 2022-02-07 RX ADMIN — ENOXAPARIN SODIUM 40 MG: 100 INJECTION SUBCUTANEOUS at 04:02

## 2022-02-07 RX ADMIN — FAMOTIDINE 20 MG: 20 TABLET, FILM COATED ORAL at 08:02

## 2022-02-07 RX ADMIN — INSULIN ASPART 4 UNITS: 100 INJECTION, SOLUTION INTRAVENOUS; SUBCUTANEOUS at 11:02

## 2022-02-07 RX ADMIN — TOCILIZUMAB 600 MG: 20 INJECTION, SOLUTION, CONCENTRATE INTRAVENOUS at 01:02

## 2022-02-07 RX ADMIN — ATORVASTATIN CALCIUM 40 MG: 40 TABLET, FILM COATED ORAL at 08:02

## 2022-02-07 RX ADMIN — TAMSULOSIN HYDROCHLORIDE 0.4 MG: 0.4 CAPSULE ORAL at 08:02

## 2022-02-07 RX ADMIN — INSULIN ASPART 5 UNITS: 100 INJECTION, SOLUTION INTRAVENOUS; SUBCUTANEOUS at 08:02

## 2022-02-07 RX ADMIN — Medication 6 MG: at 07:02

## 2022-02-07 RX ADMIN — LOSARTAN POTASSIUM 25 MG: 25 TABLET, FILM COATED ORAL at 08:02

## 2022-02-07 NOTE — PROGRESS NOTES
"[facility]  Adult Nutrition  Progress Note    SUMMARY       Recommendations  1) continue eating >75% of meals, Diabetic 2000 diet  2) weigh weekly    Goals: 1) maintain weight by follow up  Nutrition Goal Status: new  Communication of RD Recs: other (comment) (POC, Sticky note)    Intervention: general healthful diet    Assessment and Plan    Inadequate energy intake   Related to COVID  As evidenced by wt loss of 5.5lbs in 1 week (3.27% of body weight).  improving    Malnutrition Assessment      Skin: belkys score 21  Edema: 0        Reason for Assessment    Reason For Assessment: identified at risk by screening criteria  Diagnosis: other (see comments) (acute hypoxemia respiratory failure due to COVID)  Relevant Medical History: DM, HTN, HLD, tobacco use (quit 35 yrs ago), elevated PSA  Interdisciplinary Rounds: did not attend    General Information Comments: Pt is a 73y/o Male admitted with acute hypoxemic respiratory failure due to COVID. PTA pt is eating 2 meals + snacks daily.  Pt wife stated that his UBW is 171lbs and that he began losing weight when he come to the hospital.  NFPE was not conducted due to COVID precautions.      Nutrition Discharge Planning: to be determined- Diabetic 2000 + heart healthy    Nutrition Risk Screen    Nutrition Risk Screen: no indicators present    Nutrition/Diet History    Patient Reported Diet/Restrictions/Preferences: diabetic diet,general  Typical Food/Fluid Intake: 2 meals + snacks daily  Spiritual, Cultural Beliefs, Shinto Practices, Values that Affect Care: no  Food Allergies: NKFA  Factors Affecting Nutritional Intake: None identified at this time    Anthropometrics    Temp: 98.4 °F (36.9 °C)  Height Method: Stated  Height: 5' 8" (172.7 cm)  Height (inches): 68 in  Weight Method: Bed Scale  Weight: 73.7 kg (162 lb 7.7 oz)  Weight (lb): 162.48 lb  Ideal Body Weight (IBW), Male: 154 lb  % Ideal Body Weight, Male (lb): 105.51 %  BMI (Calculated): " 24.7  BMI Grade: 18.5-24.9 - normal      Wt Readings from Last 30 Encounters:   02/07/22 73.7 kg (162 lb 7.7 oz)   02/01/22 76.2 kg (168 lb)   11/20/21 77.1 kg (170 lb)   08/18/21 79.7 kg (175 lb 11.3 oz)   08/04/21 77.6 kg (171 lb 1.2 oz)   03/04/21 77.6 kg (171 lb 1.2 oz)   12/16/20 77.6 kg (171 lb 1.2 oz)   12/04/20 77 kg (169 lb 12.1 oz)   10/07/20 77 kg (169 lb 12.1 oz)   06/15/20 77 kg (169 lb 12.1 oz)   10/31/19 82 kg (180 lb 12.4 oz)   05/07/19 78 kg (172 lb)   01/08/19 78.9 kg (174 lb)   05/03/18 79.8 kg (176 lb)   01/25/18 80.2 kg (176 lb 12.9 oz)   08/15/17 80.2 kg (176 lb 12.9 oz)   04/07/17 80.7 kg (177 lb 14.6 oz)   12/12/16 80.3 kg (177 lb 0.5 oz)   10/22/16 80.6 kg (177 lb 11.1 oz)   09/07/16 78.7 kg (173 lb 8 oz)   06/06/16 78.6 kg (173 lb 4.5 oz)   03/28/16 78.1 kg (172 lb 2.9 oz)   06/18/15 79.1 kg (174 lb 4.8 oz)   07/24/14 79.9 kg (176 lb 4 oz)   06/19/14 79.2 kg (174 lb 8 oz)   03/06/14 78.6 kg (173 lb 5 oz)   01/08/14 78.2 kg (172 lb 8 oz)   12/06/13 76.2 kg (168 lb)   10/04/13 76.4 kg (168 lb 6 oz)   06/27/13 77.5 kg (170 lb 14.4 oz)       Lab/Procedures/Meds    Pertinent Labs Reviewed: reviewed  BMP  Lab Results   Component Value Date     02/07/2022    K 4.6 02/07/2022     02/07/2022    CO2 29 02/07/2022    BUN 27 (H) 02/07/2022    CREATININE 1.4 02/07/2022    CALCIUM 9.2 02/07/2022    ANIONGAP 10 02/07/2022    ESTGFRAFRICA 58 (A) 02/07/2022    EGFRNONAA 50 (A) 02/07/2022     Lab Results   Component Value Date    WBC 7.93 02/07/2022    HGB 12.7 (L) 02/07/2022    HCT 37.0 (L) 02/07/2022    MCV 90 02/07/2022     02/07/2022       POCT Glucose   Date Value Ref Range Status   02/07/2022 219 (H) 70 - 110 mg/dL Final   02/07/2022 209 (H) 70 - 110 mg/dL Final   02/06/2022 249 (H) 70 - 110 mg/dL Final   02/05/2022 205 (H) 70 - 110 mg/dL Final   02/04/2022 355 (H) 70 - 110 mg/dL Final   02/04/2022 329 (H) 70 - 110 mg/dL Final     Lab Results   Component Value Date    HGBA1C 5.8 (H)  08/24/2021     Lab Results   Component Value Date    FERRITIN 1,675 (H) 02/06/2022     Lab Results   Component Value Date    CRP 26.4 (H) 02/06/2022       Pertinent Medications Reviewed: reviewed  Pertinent medications: atorvastatin, famotidine, losartan, dexmethasone, insulin    Estimated/Assessed Needs    Weight Used For Calorie Calculations: 73.7 kg (162 lb 7.7 oz)  Energy Calorie Requirements (kcal): 1900kcal/d (MSJ x 1.3)  Energy Need Method: Sharp-St Jeor  Protein Requirements: 71-95g/d (15-20%)  Weight Used For Protein Calculations: 73.7 kg (162 lb 7.7 oz)  Fluid Requirements (mL): 1900ml/d  Estimated Fluid Requirement Method: RDA Method  RDA Method (mL): 1900  CHO Requirement: 213-261g/d      Nutrition Prescription Ordered    Current Diet Order: Diabetic 2000    Evaluation of Received Nutrient/Fluid Intake     energy calories required: meeting  Protein required: meeting  Fluid required: sometimes meeting  Tolerance: tolerating  % Intake of Estimated Energy Needs: 100%  % Meal Intake: 100%    Nutrition Risk    Level of Risk/Frequency of Follow-up: low - moderate 1x week    Monitor and Evaluation    Food and Nutrient Intake: energy intake,food and beverage intake  Food and Nutrient Adminstration: diet order  Knowledge/Beliefs/Attitudes: food and nutrition knowledge/skill  Anthropometric Measurements: weight  Biochemical Data, Medical Tests and Procedures: glucose/endocrine profile     Nutrition Follow-Up    RD Follow-up?: Yes

## 2022-02-07 NOTE — PLAN OF CARE
Recommendations  1) continue eating >75% of meals, Diabetic 2000 diet  2) weigh weekly     Goals: 1) maintain weight by follow up  Nutrition Goal Status: new  Communication of RD Recs: other (comment) (POC, Sticky note)     Intervention: general healthful diet

## 2022-02-07 NOTE — PLAN OF CARE
[facility]  Initial Discharge Assessment       Primary Care Provider: Herrera Fisher MD    Admission Diagnosis: Hypoxia [R09.02]  Pneumonia due to COVID-19 virus [U07.1, J12.82]  COVID-19 [U07.1]    Admission Date: 2/6/2022  Expected Discharge Date:     Discharge assessment completed over the phone with pt's wife, emily [phone]. She confirms info on facesheet correct. Pt was here over the weekend with portable o2 and concentrator at 4L/min from [facility]. Pt was readmitted 2/6. Pt has o2 and a glucometer at home. Pt is independent with adl's. Pharmacy is [facility] in [place]. PCP is Dr Fisher. Emily to provide ride home. Anticipated to dc home again with o2.        Payor: [employer]BS [place] MEDICARE / Plan: [employer] [place] / Product Type: Medicare Advantage /     Extended Emergency Contact Information  Primary Emergency Contact: Emily Gee  Address: [address] [place] [address] [id number] [place] [address]  Mobile Phone: [phone]  Relation: Spouse  Preferred language: English   needed? No    Discharge Plan A: Home  Discharge Plan B: Home with family      [facility] - [place], [place] - [address] [address]  [address] [address]  [place] [address]  Phone: [phone] Fax: [phone]      Initial Assessment (most recent)     Adult Discharge Assessment - 02/07/22 1039        Discharge Assessment    Assessment Type Discharge Planning Assessment     Confirmed/corrected address, phone number and insurance Yes     Confirmed Demographics Correct on Facesheet     Source of Information family     Communicated ANTELMO with patient/caregiver Yes     Lives With spouse     Do you expect to return to your current living situation? Yes     Do you have help at home or someone to help you manage your care at home? Yes     Prior to hospitilization cognitive status: Alert/Oriented     Current cognitive status: Alert/Oriented     Walking or Climbing Stairs  Difficulty none     Dressing/Bathing Difficulty none     Equipment Currently Used at Home oxygen;glucometer     Readmission within 30 days? Yes     Do you take prescription medications? Yes     Do you have prescription coverage? Yes     Do you have any problems affording any of your prescribed medications? No     Is the patient taking medications as prescribed? yes     Are you on dialysis? No     Do you take coumadin? No     Discharge Plan A Home     Discharge Plan B Home with family     DME Needed Upon Discharge  oxygen     Discharge Plan discussed with: Spouse/sig other

## 2022-02-07 NOTE — CARE UPDATE
02/07/22 0741   PRE-TX-O2   O2 Device (Oxygen Therapy) nasal cannula   $ Is the patient on Low Flow Oxygen? Yes   Flow (L/min) 6   Oxygen Concentration (%) 45   SpO2 (!) 92 %   Pulse Oximetry Type Intermittent   $ Pulse Oximetry - Multiple Charge Pulse Oximetry - Multiple   Probe Placed On (Pulse Ox) finger   Pulse 80   Resp 18   Positioning HOB elevated 30 degrees   POx

## 2022-02-07 NOTE — CONSULTS
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  Had covid  -- was having fevers 101 for few days with sinus and loss sense smell, when temp got to 102 he presented.  Was in hosp til  going home on 3lpm with no steroids.  Pt represented needing higher ox-- sats to 70's walking on 3lpm.  Appetite ok , cough mild and now productive gray material.  Sugars were up last admit needing insulin, usually on metformin 1500/d.      From Dr Tatum hpi 2022:   HPI: Patient is a 72-year-old male with past medical history of diabetes, hypertension, hyperlipidemia, former tobacco use who was diagnosed with COVID on 2022 and recently hospitalized for hypoxic respiratory failure from COVID during which he completed a course of remdesivir and was discharged yesterday with supplemental oxygen repeat presents today with complaint of hypoxia at home on 4 L nasal cannula.  Wife reports he did well last night however since then has been having trouble maintaining his saturations on 4 L especially with movement.  Denies any fever, chills, chest pain, nausea, vomiting.  He is drowsy at this time from cough medicine received in the ED.  D-dimer has increased from previous.  CTA ordered.  He is currently on 5 L nasal cannula with oxygen saturation at 95%        PFSH:  Past Medical History:   Diagnosis Date    Diabetes mellitus     Elevated PSA     Hyperlipidemia     Hypertension      Past Surgical History:   Procedure Laterality Date    COLONOSCOPY  2013    Dr. Martinez, 5 year recheck    SHOULDER SURGERY  2011    right      Social History     Tobacco Use    Smoking status: Former Smoker     Types: Cigarettes     Quit date: 1985     Years since quittin.7     "Smokeless tobacco: Never Used   Substance Use Topics    Alcohol use: Yes     Comment: seldom    Drug use: No     Family History   Problem Relation Age of Onset    Diabetes Father     Heart disease Father     Cataracts Mother     Glaucoma Mother     Diabetes Maternal Grandmother     No Known Problems Brother     No Known Problems Daughter     No Known Problems Son     No Known Problems Maternal Grandfather     No Known Problems Paternal Grandmother     No Known Problems Paternal Grandfather     No Known Problems Maternal Aunt     No Known Problems Maternal Uncle     No Known Problems Paternal Aunt     No Known Problems Paternal Uncle     Urolithiasis Neg Hx     Prostate cancer Neg Hx     Kidney cancer Neg Hx     Retinal detachment Neg Hx     Macular degeneration Neg Hx      Review of patient's allergies indicates:   Allergen Reactions    Pravastatin Other (See Comments)     Joint pain    Lisinopril Other (See Comments)     Cough        Performance Status:Performance Status:The patient's activity level is no limits with regular activity.    Review of Systems:  a review of eleven systems covering constitutional, Psych, Eye, HEENT, Respiratory, Cardiac, GI, , Musculoskeletal, Endocrine, Dermatologicwas negative except the above mentioned abnormalities and for any pertinent findings as listed below:  pertinent positive as above, rest is good         Exam:Comprehensive exam done. BP (!) 101/59 (BP Location: Right arm, Patient Position: Lying)   Pulse 80   Temp 98 °F (36.7 °C) (Oral)   Resp 16   Ht 5' 8" (1.727 m)   Wt 73.7 kg (162 lb 7.7 oz)   SpO2 95%   BMI 24.70 kg/m²   Exam included Vitals as listed, and patient's appearance and affect and alertness and mood, oral exam for yeast and hygiene and pharynx lesions and Mallapatti (M) score, neck with inspection for jvd and masses and thyroid abnormalities and lymph nodes (supraclavicular and infraclavicular nodes also examined and noted if " abn), chest exam included symmetry and effort and fremitus and percussion and auscultation, cardiac exam included rhythm and gallops and murmur and rubs and jvd and edema, abdominal exam for mass and hepatosplenomegaly and tenderness and hernias and bowel sounds, Musculoskeletal exam with muscle tone and posture and mobility/gait and  strenght, and skin for rashes and cyanosis and pallor and turgor, extremity for clubbing.  Findings were normal except as listed below:  Alert, M2, diffuse post rales, chest is symmetric, no distress, normal percussion, normal fremitus and  no edema,     Radiographs reviewed: view by direct vision    No results found for this or any previous visit.  ]    Labs     Recent Labs   Lab 02/07/22  0456   WBC 7.93   HGB 12.7*   HCT 37.0*        Recent Labs   Lab 02/07/22  0456      K 4.6      CO2 29   BUN 27*   CREATININE 1.4   *   CALCIUM 9.2   No results for input(s): PH, PCO2, PO2, HCO3 in the last 24 hours.  Microbiology Results (last 7 days)     ** No results found for the last 168 hours. **          Impression:  Active Hospital Problems    Diagnosis  POA    *Acute hypoxemic respiratory failure due to COVID-19 [U07.1, J96.01]  Yes    Asbestos exposure [Z77.090]  Not Applicable    Hyperlipidemia associated with type 2 diabetes mellitus [E11.69, E78.5]  Yes    Hypertension associated with diabetes [E11.59, I15.2]  Yes    Controlled type 2 diabetes mellitus with microalbuminuria, without long-term current use of insulin [E11.29, R80.9]  Yes      Resolved Hospital Problems   No resolved problems to display.               Plan: tm 101.2, vss, 70% ox, Actrema dosed, wbc 7.9, glu 209,  crp 80 at admit 2/1, down to 26 yesterday 2/6,       cta lungs no pe, posterior dense covid type infiltrates.   Ox needs incesed from 4 to 12 lpm last 24 hrs.     Consider ordering prn bipap.  F/u crp am.     low dose  lovenox.

## 2022-02-07 NOTE — PLAN OF CARE
Pt. Is alert and oriented. Pt is able to verbalize needs. Pt denies any pain. Vitals WNL except slight temp, gave PRN med for fever. Pt is ambulatory. Pt slept throughout night without complication.

## 2022-02-08 LAB
CRP SERPL-MCNC: 95.5 MG/L (ref 0–8.2)
HBV CORE AB SERPL QL IA: NEGATIVE
HBV SURFACE AG SERPL QL IA: NEGATIVE
POCT GLUCOSE: 210 MG/DL (ref 70–110)
POCT GLUCOSE: 256 MG/DL (ref 70–110)
POCT GLUCOSE: 310 MG/DL (ref 70–110)
POCT GLUCOSE: 337 MG/DL (ref 70–110)
POCT GLUCOSE: 392 MG/DL (ref 70–110)
POCT GLUCOSE: 399 MG/DL (ref 70–110)

## 2022-02-08 PROCEDURE — 99233 SBSQ HOSP IP/OBS HIGH 50: CPT | Mod: ,,, | Performed by: INTERNAL MEDICINE

## 2022-02-08 PROCEDURE — 63600175 PHARM REV CODE 636 W HCPCS: Performed by: HOSPITALIST

## 2022-02-08 PROCEDURE — 25000003 PHARM REV CODE 250: Performed by: STUDENT IN AN ORGANIZED HEALTH CARE EDUCATION/TRAINING PROGRAM

## 2022-02-08 PROCEDURE — C9399 UNCLASSIFIED DRUGS OR BIOLOG: HCPCS | Performed by: STUDENT IN AN ORGANIZED HEALTH CARE EDUCATION/TRAINING PROGRAM

## 2022-02-08 PROCEDURE — 94799 UNLISTED PULMONARY SVC/PX: CPT

## 2022-02-08 PROCEDURE — 36415 COLL VENOUS BLD VENIPUNCTURE: CPT | Performed by: INTERNAL MEDICINE

## 2022-02-08 PROCEDURE — 63600175 PHARM REV CODE 636 W HCPCS: Performed by: INTERNAL MEDICINE

## 2022-02-08 PROCEDURE — 94761 N-INVAS EAR/PLS OXIMETRY MLT: CPT

## 2022-02-08 PROCEDURE — 27000221 HC OXYGEN, UP TO 24 HOURS

## 2022-02-08 PROCEDURE — 20600001 HC STEP DOWN PRIVATE ROOM

## 2022-02-08 PROCEDURE — 86140 C-REACTIVE PROTEIN: CPT | Performed by: INTERNAL MEDICINE

## 2022-02-08 PROCEDURE — 25000003 PHARM REV CODE 250: Performed by: HOSPITALIST

## 2022-02-08 PROCEDURE — 27000207 HC ISOLATION

## 2022-02-08 PROCEDURE — 25000242 PHARM REV CODE 250 ALT 637 W/ HCPCS: Performed by: INTERNAL MEDICINE

## 2022-02-08 PROCEDURE — 99233 PR SUBSEQUENT HOSPITAL CARE,LEVL III: ICD-10-PCS | Mod: ,,, | Performed by: INTERNAL MEDICINE

## 2022-02-08 RX ORDER — AMOXICILLIN 250 MG
1 CAPSULE ORAL 2 TIMES DAILY
Status: DISCONTINUED | OUTPATIENT
Start: 2022-02-08 | End: 2022-02-14 | Stop reason: HOSPADM

## 2022-02-08 RX ORDER — POLYETHYLENE GLYCOL 3350 17 G/17G
17 POWDER, FOR SOLUTION ORAL 2 TIMES DAILY
Status: DISCONTINUED | OUTPATIENT
Start: 2022-02-08 | End: 2022-02-14 | Stop reason: HOSPADM

## 2022-02-08 RX ADMIN — DEXAMETHASONE 6 MG: 4 TABLET ORAL at 08:02

## 2022-02-08 RX ADMIN — INSULIN ASPART 10 UNITS: 100 INJECTION, SOLUTION INTRAVENOUS; SUBCUTANEOUS at 05:02

## 2022-02-08 RX ADMIN — TAMSULOSIN HYDROCHLORIDE 0.4 MG: 0.4 CAPSULE ORAL at 08:02

## 2022-02-08 RX ADMIN — DOCUSATE SODIUM AND SENNOSIDES 1 TABLET: 8.6; 5 TABLET, FILM COATED ORAL at 12:02

## 2022-02-08 RX ADMIN — FAMOTIDINE 20 MG: 20 TABLET, FILM COATED ORAL at 08:02

## 2022-02-08 RX ADMIN — POLYETHYLENE GLYCOL 3350 17 G: 17 POWDER, FOR SOLUTION ORAL at 08:02

## 2022-02-08 RX ADMIN — DOCUSATE SODIUM AND SENNOSIDES 1 TABLET: 8.6; 5 TABLET, FILM COATED ORAL at 08:02

## 2022-02-08 RX ADMIN — INSULIN ASPART 6 UNITS: 100 INJECTION, SOLUTION INTRAVENOUS; SUBCUTANEOUS at 08:02

## 2022-02-08 RX ADMIN — POLYETHYLENE GLYCOL 3350 17 G: 17 POWDER, FOR SOLUTION ORAL at 12:02

## 2022-02-08 RX ADMIN — Medication 6 MG: at 08:02

## 2022-02-08 RX ADMIN — METHYLPREDNISOLONE SODIUM SUCCINATE 40 MG: 40 INJECTION, POWDER, FOR SOLUTION INTRAMUSCULAR; INTRAVENOUS at 08:02

## 2022-02-08 RX ADMIN — LOSARTAN POTASSIUM 25 MG: 25 TABLET, FILM COATED ORAL at 08:02

## 2022-02-08 RX ADMIN — INSULIN ASPART 4 UNITS: 100 INJECTION, SOLUTION INTRAVENOUS; SUBCUTANEOUS at 12:02

## 2022-02-08 RX ADMIN — INSULIN DETEMIR 15 UNITS: 100 INJECTION, SOLUTION SUBCUTANEOUS at 12:02

## 2022-02-08 RX ADMIN — ATORVASTATIN CALCIUM 40 MG: 40 TABLET, FILM COATED ORAL at 08:02

## 2022-02-08 RX ADMIN — ENOXAPARIN SODIUM 40 MG: 100 INJECTION SUBCUTANEOUS at 04:02

## 2022-02-08 NOTE — SUBJECTIVE & OBJECTIVE
Interval History: on 8 L HFNC; s/p tocilizumab; remains on steroids.    Review of Systems   Constitutional: Negative for chills and fever.   Respiratory: Positive for shortness of breath. Negative for cough.    Gastrointestinal: Negative for diarrhea.   Allergic/Immunologic: Positive for immunocompromised state.   All other systems reviewed and are negative.    Objective:     Vital Signs (Most Recent):  Temp: 96.3 °F (35.7 °C) (02/08/22 0901)  Pulse: 71 (02/08/22 1015)  Resp: 20 (02/08/22 0901)  BP: (!) 109/59 (02/08/22 1015)  SpO2: (!) 94 % (02/08/22 0901) Vital Signs (24h Range):  Temp:  [96.3 °F (35.7 °C)-98.4 °F (36.9 °C)] 96.3 °F (35.7 °C)  Pulse:  [61-91] 71  Resp:  [16-20] 20  SpO2:  [94 %-97 %] 94 %  BP: ()/(53-73) 109/59     Weight: 73.7 kg (162 lb 7.7 oz)  Body mass index is 24.7 kg/m².    Intake/Output Summary (Last 24 hours) at 2/8/2022 1101  Last data filed at 2/8/2022 0500  Gross per 24 hour   Intake 201.32 ml   Output --   Net 201.32 ml      Physical Exam  Vitals and nursing note reviewed.   Constitutional:       Appearance: He is ill-appearing.   HENT:      Head: Normocephalic and atraumatic.      Right Ear: External ear normal.      Left Ear: External ear normal.      Nose: Nose normal.      Mouth/Throat:      Mouth: Mucous membranes are moist.      Pharynx: Oropharynx is clear.   Eyes:      Extraocular Movements: Extraocular movements intact.      Conjunctiva/sclera: Conjunctivae normal.   Cardiovascular:      Rate and Rhythm: Normal rate and regular rhythm.      Pulses: Normal pulses.      Heart sounds: Normal heart sounds.   Pulmonary:      Effort: Pulmonary effort is normal.      Breath sounds: Rhonchi present.      Comments: NC.  Abdominal:      General: Bowel sounds are normal.      Palpations: Abdomen is soft.   Musculoskeletal:         General: Normal range of motion.      Cervical back: Normal range of motion and neck supple.      Right lower leg: No edema.      Left lower leg: No  edema.   Skin:     General: Skin is warm and dry.   Neurological:      General: No focal deficit present.      Mental Status: He is alert and oriented to person, place, and time. Mental status is at baseline.   Psychiatric:         Mood and Affect: Mood normal.         Behavior: Behavior normal.         Significant Labs: All pertinent labs within the past 24 hours have been reviewed.    Significant Imaging: I have reviewed all pertinent imaging results/findings within the past 24 hours.

## 2022-02-08 NOTE — CARE UPDATE
02/08/22 0811   Patient Assessment/Suction   Level of Consciousness (AVPU) alert   Respiratory Effort Normal;Unlabored   PRE-TX-O2   O2 Device (Oxygen Therapy) High Flow nasal Cannula   $ Is the patient on Low Flow Oxygen? Yes   Flow (L/min) 8   SpO2 (!) 94 %   Pulse Oximetry Type Intermittent   $ Pulse Oximetry - Multiple Charge Pulse Oximetry - Multiple   Pulse 72   Resp 18

## 2022-02-08 NOTE — PROGRESS NOTES
[facility] Hospital Medicine  Progress Note    Patient Name: Ludwin Gee  MRN: [id number]  Patient Class: IP- Inpatient   Admission Date: 2/6/2022  Length of Stay: 1 days  Attending Physician: Adis Field MD  Primary Care Provider: Herrera Fisher MD        Subjective:     Principal Problem:Acute hypoxemic respiratory failure due to COVID-19        HPI:  Patient is a 72-year-old male with past medical history of diabetes, hypertension, hyperlipidemia, former tobacco use who was diagnosed with COVID on 02/01/2022 and recently hospitalized for hypoxic respiratory failure from COVID during which he completed a course of remdesivir and was discharged yesterday with supplemental oxygen repeat presents today with complaint of hypoxia at home on 4 L nasal cannula.  Wife reports he did well last night however since then has been having trouble maintaining his saturations on 4 L especially with movement.  Denies any fever, chills, chest pain, nausea, vomiting.  He is drowsy at this time from cough medicine received in the ED.  D-dimer has increased from previous.  CTA ordered.  He is currently on 5 L nasal cannula with oxygen saturation at 95%       Overview/Hospital Course:  Ludwin Webber is a 72 year old male with a past medical history of diabetes, hypertension, hyperlipidemia, and former tobacco use who was recently diagnosed with COVID-19 pneumonia with recent discharge who re-presented for increasing O2 requirement. He was started on steroids and tocilizumab was ordered. He is currently stable on 8 L HFNC. Pulmonary has been consulted.      Interval History: tocilizumab ordered; Pulmonary following; on steroids and prophylactic Lovenox; 8 L HFNC.    Review of Systems   Constitutional: Negative for chills and fever.   Respiratory: Positive for shortness of breath. Negative for cough.    Gastrointestinal: Negative for diarrhea.   Allergic/Immunologic: Positive for immunocompromised state.   All  other systems reviewed and are negative.    Objective:     Vital Signs (Most Recent):  Temp: 97.1 °F (36.2 °C) (02/07/22 1927)  Pulse: 89 (02/07/22 1927)  Resp: 18 (02/07/22 1927)  BP: (!) 117/58 (02/07/22 1927)  SpO2: 95 % (02/07/22 1927) Vital Signs (24h Range):  Temp:  [97.1 °F (36.2 °C)-101.2 °F (38.4 °C)] 97.1 °F (36.2 °C)  Pulse:  [76-91] 89  Resp:  [16-20] 18  SpO2:  [87 %-97 %] 95 %  BP: ()/(58-63) 117/58     Weight: 73.7 kg (162 lb 7.7 oz)  Body mass index is 24.7 kg/m².    Intake/Output Summary (Last 24 hours) at 2/7/2022 2026  Last data filed at 2/7/2022 1627  Gross per 24 hour   Intake 81.32 ml   Output 300 ml   Net -218.68 ml      Physical Exam  Vitals and nursing note reviewed.   Constitutional:       Appearance: He is ill-appearing.   HENT:      Head: Normocephalic and atraumatic.      Right Ear: External ear normal.      Left Ear: External ear normal.      Nose: Nose normal.      Mouth/Throat:      Mouth: Mucous membranes are moist.      Pharynx: Oropharynx is clear.   Eyes:      Extraocular Movements: Extraocular movements intact.      Conjunctiva/sclera: Conjunctivae normal.   Cardiovascular:      Rate and Rhythm: Normal rate and regular rhythm.      Pulses: Normal pulses.      Heart sounds: Normal heart sounds.   Pulmonary:      Effort: Pulmonary effort is normal.      Breath sounds: Rhonchi present.      Comments: NC.  Abdominal:      General: Bowel sounds are normal.      Palpations: Abdomen is soft.   Musculoskeletal:         General: Normal range of motion.      Cervical back: Normal range of motion and neck supple.      Right lower leg: No edema.      Left lower leg: No edema.   Skin:     General: Skin is warm and dry.   Neurological:      General: No focal deficit present.      Mental Status: He is alert and oriented to person, place, and time. Mental status is at baseline.   Psychiatric:         Mood and Affect: Mood normal.         Behavior: Behavior normal.         Significant Labs:  All pertinent labs within the past 24 hours have been reviewed.    Significant Imaging: I have reviewed all pertinent imaging results/findings within the past 24 hours.      Assessment/Plan:      * Acute hypoxemic respiratory failure due to COVID-19  -Decadron  -Tocilizumab  -Lovenox PPx  -HFNC  -Pulmonary consulted  -Trend inflammatory markers  -Counseled on self-proning  -Airborne, isolation and contact precautions    Asbestos exposure  -May need further workup in outpatient setting      Anemia  -Trend CBC      Hyperlipidemia associated with type 2 diabetes mellitus  -Continue statin    Hypertension associated with diabetes  -Continue home medications    BPH (benign prostatic hyperplasia)  -Continue Flomax      Controlled type 2 diabetes mellitus with microalbuminuria, without long-term current use of insulin  -SSI  -AC HS glucose checks  -Hypoglycemic precautions      VTE Risk Mitigation (From admission, onward)         Ordered     enoxaparin injection 40 mg  Daily         02/06/22 1638     Place sequential compression device  Until discontinued         02/06/22 1638                Discharge Planning   ANTELMO: 2/13/2022    Code Status: Full Code   Is the patient medically ready for discharge?:     Reason for patient still in hospital (select all that apply): Patient trending condition, Laboratory test, Treatment and Consult recommendations  Discharge Plan A: Home                  Adis Field MD  Department of Hospital Medicine   [facility]

## 2022-02-08 NOTE — ASSESSMENT & PLAN NOTE
-Decadron  -S/p tocilizumab  -Lovenox PPx  -HFNC  -Pulmonary consulted  -Trend inflammatory markers  -Counseled on self-proning  -Airborne, isolation and contact precautions

## 2022-02-08 NOTE — PROGRESS NOTES
[facility] Medicine  Progress Note    Patient Name: Ludwin Gee  MRN: [id number]  Patient Class: IP- Inpatient   Admission Date: 2/6/2022  Length of Stay: 2 days  Attending Physician: Adis Field MD  Primary Care Provider: Herrera Fisher MD        Subjective:     Principal Problem:Acute hypoxemic respiratory failure due to COVID-19        HPI:  Patient is a 72-year-old male with past medical history of diabetes, hypertension, hyperlipidemia, former tobacco use who was diagnosed with COVID on 02/01/2022 and recently hospitalized for hypoxic respiratory failure from COVID during which he completed a course of remdesivir and was discharged yesterday with supplemental oxygen repeat presents today with complaint of hypoxia at home on 4 L nasal cannula.  Wife reports he did well last night however since then has been having trouble maintaining his saturations on 4 L especially with movement.  Denies any fever, chills, chest pain, nausea, vomiting.  He is drowsy at this time from cough medicine received in the ED.  D-dimer has increased from previous.  CTA ordered.  He is currently on 5 L nasal cannula with oxygen saturation at 95%       Overview/Hospital Course:  Ludwin Webber is a 72 year old male with a past medical history of diabetes, hypertension, hyperlipidemia, and former tobacco use who was recently diagnosed with COVID-19 pneumonia with recent discharge who re-presented for increasing O2 requirement. He was started on steroids and tocilizumab was ordered (given 2/7). He is currently stable on 8 L HFNC. Pulmonary has been consulted.      Interval History: on 8 L HFNC; s/p tocilizumab; remains on steroids.    Review of Systems   Constitutional: Negative for chills and fever.   Respiratory: Positive for shortness of breath. Negative for cough.    Gastrointestinal: Negative for diarrhea.   Allergic/Immunologic: Positive for immunocompromised state.   All other systems reviewed and  are negative.    Objective:     Vital Signs (Most Recent):  Temp: 96.3 °F (35.7 °C) (02/08/22 0901)  Pulse: 71 (02/08/22 1015)  Resp: 20 (02/08/22 0901)  BP: (!) 109/59 (02/08/22 1015)  SpO2: (!) 94 % (02/08/22 0901) Vital Signs (24h Range):  Temp:  [96.3 °F (35.7 °C)-98.4 °F (36.9 °C)] 96.3 °F (35.7 °C)  Pulse:  [61-91] 71  Resp:  [16-20] 20  SpO2:  [94 %-97 %] 94 %  BP: ()/(53-73) 109/59     Weight: 73.7 kg (162 lb 7.7 oz)  Body mass index is 24.7 kg/m².    Intake/Output Summary (Last 24 hours) at 2/8/2022 1101  Last data filed at 2/8/2022 0500  Gross per 24 hour   Intake 201.32 ml   Output --   Net 201.32 ml      Physical Exam  Vitals and nursing note reviewed.   Constitutional:       Appearance: He is ill-appearing.   HENT:      Head: Normocephalic and atraumatic.      Right Ear: External ear normal.      Left Ear: External ear normal.      Nose: Nose normal.      Mouth/Throat:      Mouth: Mucous membranes are moist.      Pharynx: Oropharynx is clear.   Eyes:      Extraocular Movements: Extraocular movements intact.      Conjunctiva/sclera: Conjunctivae normal.   Cardiovascular:      Rate and Rhythm: Normal rate and regular rhythm.      Pulses: Normal pulses.      Heart sounds: Normal heart sounds.   Pulmonary:      Effort: Pulmonary effort is normal.      Breath sounds: Rhonchi present.      Comments: NC.  Abdominal:      General: Bowel sounds are normal.      Palpations: Abdomen is soft.   Musculoskeletal:         General: Normal range of motion.      Cervical back: Normal range of motion and neck supple.      Right lower leg: No edema.      Left lower leg: No edema.   Skin:     General: Skin is warm and dry.   Neurological:      General: No focal deficit present.      Mental Status: He is alert and oriented to person, place, and time. Mental status is at baseline.   Psychiatric:         Mood and Affect: Mood normal.         Behavior: Behavior normal.         Significant Labs: All pertinent labs within  the past 24 hours have been reviewed.    Significant Imaging: I have reviewed all pertinent imaging results/findings within the past 24 hours.      Assessment/Plan:      * Acute hypoxemic respiratory failure due to COVID-19  -Decadron  -S/p tocilizumab  -Lovenox PPx  -HFNC  -Pulmonary consulted  -Trend inflammatory markers  -Counseled on self-proning  -Airborne, isolation and contact precautions    Asbestos exposure  -May need further workup in outpatient setting      Anemia  -Trend CBC      Hyperlipidemia associated with type 2 diabetes mellitus  -Continue statin    Hypertension associated with diabetes  -Continue home medications    BPH (benign prostatic hyperplasia)  -Continue Flomax      Controlled type 2 diabetes mellitus with microalbuminuria, without long-term current use of insulin  -SSI  -AC HS glucose checks  -Hypoglycemic precautions      VTE Risk Mitigation (From admission, onward)         Ordered     enoxaparin injection 40 mg  Daily         02/06/22 1638     Place sequential compression device  Until discontinued         02/06/22 1638                Discharge Planning   ANTELMO: 2/13/2022    Code Status: Full Code   Is the patient medically ready for discharge?:     Reason for patient still in hospital (select all that apply): Patient trending condition, Laboratory test, Treatment, Imaging and Consult recommendations  Discharge Plan A: Home                  Adis Field MD  Department of Hospital Medicine   [facility]

## 2022-02-08 NOTE — NURSING
Patient alert and oriented X4. No complaints of pain voiced. Patient on telemetry 8698 running NSR. Patient remains on Hi-flow at 9 liters nasal cannula without any distress. Will continue to monitor for any needs. Wife at bedside.

## 2022-02-08 NOTE — PLAN OF CARE
Alert and oriented X4. Skin warm, pink and dry. Oral mucosa pink and moist. Skin turgor good. Heart sounds S1 S2. Telemetry-NSR. Lungs clear with a productive occasional cough. Denies shortness of breath. Abdomen round and soft with active bowel sounds all quadrants. No edema. Wife supportive and at the bedside.

## 2022-02-08 NOTE — PLAN OF CARE
O2 8LPM hi-flow nasal canula.  Sats 95%, resp 18.    Safety maintained, call light within reach, bed locked and in low position, and side rails up. Patient remains free from injury.      Monitoring and following care plan.

## 2022-02-08 NOTE — PROGRESS NOTES
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  Had covid 2/1 -- was having fevers 101 for few days with sinus and loss sense smell, when temp got to 102 he presented.  Was in hosp til 2/5 going home on 3lpm with no steroids.  Pt represented needing higher ox-- sats to 70's walking on 3lpm.  Appetite ok , cough mild and now productive gray material.  Sugars were up last admit needing insulin, usually on metformin 1500/d.      From Dr Tatum hpi 2/6/2022:   HPI: Patient is a 72-year-old male with past medical history of diabetes, hypertension, hyperlipidemia, former tobacco use who was diagnosed with COVID on 02/01/2022 and recently hospitalized for hypoxic respiratory failure from COVID during which he completed a course of remdesivir and was discharged yesterday with supplemental oxygen repeat presents today with complaint of hypoxia at home on 4 L nasal cannula.  Wife reports he did well last night however since then has been having trouble maintaining his saturations on 4 L especially with movement.  Denies any fever, chills, chest pain, nausea, vomiting.  He is drowsy at this time from cough medicine received in the ED.  D-dimer has increased from previous.  CTA ordered.  He is currently on 5 L nasal cannula with oxygen saturation at 95%      Progress Note  PULMONARY    Admit Date: 2/6/2022 02/08/2022      SUBJECTIVE:     2/8 no c/o      PFSH and Allergies reviewed.    OBJECTIVE:     Vitals (Most recent):  Vitals:    02/08/22 0400   BP:    Pulse:    Resp: 18   Temp:        Vitals (24 hour range):  Temp:  [97.1 °F (36.2 °C)-98.4 °F (36.9 °C)]   Pulse:  [61-91]   Resp:  [16-20]   BP: ()/(58-73)   SpO2:  [87 %-97 %]       Intake/Output Summary (Last 24 hours) at  2/8/2022 0639  Last data filed at 2/8/2022 0500  Gross per 24 hour   Intake 201.32 ml   Output --   Net 201.32 ml          Physical Exam:  The patient's neuro status (alertness,orientation,cognitive function,motor skills,), pharyngeal exam (oral lesions, hygiene, abn dentition,), Neck (jvd,mass,thyroid,nodes in neck and above/below clavicle),RESPIRATORY(symmetry,effort,fremitus,percussion,auscultation),  Cor(rhythm,heart tones including gallops,perfusion,edema)ABD(distention,hepatic&splenomegaly,tenderness,masses), Skin(rash,cyanosis),Psyc(affect,judgement,).  Exam negative except for these pertinent findings:    Alert, min rales.    Radiographs reviewed: view by direct vision    No results found for this or any previous visit.  ]    Labs     No results for input(s): WBC, HGB, HCT, PLT, BAND, METAMYELOCYT, MYELOPCT, HGBA1C in the last 24 hours.  Recent Labs   Lab 02/08/22  0457   CRP 95.5*   No results for input(s): PH, PCO2, PO2, HCO3 in the last 24 hours.  Microbiology Results (last 7 days)     ** No results found for the last 168 hours. **          Impression:  Active Hospital Problems    Diagnosis  POA    *Acute hypoxemic respiratory failure due to COVID-19 [U07.1, J96.01]  Yes    Asbestos exposure [Z77.090]  Not Applicable    Anemia [D64.9]  Yes    Hyperlipidemia associated with type 2 diabetes mellitus [E11.69, E78.5]  Yes    Hypertension associated with diabetes [E11.59, I15.2]  Yes    Controlled type 2 diabetes mellitus with microalbuminuria, without long-term current use of insulin [E11.29, R80.9]  Yes    BPH (benign prostatic hyperplasia) [N40.0]  Yes      Resolved Hospital Problems   No resolved problems to display.        .    Plan: tm 101.2, vss, 70% ox, Actrema dosed, wbc 7.9, glu 209,  crp 80 at admit 2/1, down to 26 yesterday 2/6,       cta lungs no pe, posterior dense covid type infiltrates.   Ox needs incesed from 4 to 12 lpm last 24 hrs.     Consider ordering prn bipap.  F/u crp am.     low  dose  lovenox.      2/8   No fever,vss,  8lpm,  crp up to 96 from 26 on 6th.    Had flare off steroids ppt readmit  Will increase decadron to 12/d and dose solumedrol    Discussed need for increased steroids with pt.

## 2022-02-08 NOTE — SUBJECTIVE & OBJECTIVE
Interval History: tocilizumab ordered; Pulmonary following; on steroids and prophylactic Lovenox; 8 L HFNC.    Review of Systems   Constitutional: Negative for chills and fever.   Respiratory: Positive for shortness of breath. Negative for cough.    Gastrointestinal: Negative for diarrhea.   Allergic/Immunologic: Positive for immunocompromised state.   All other systems reviewed and are negative.    Objective:     Vital Signs (Most Recent):  Temp: 97.1 °F (36.2 °C) (02/07/22 1927)  Pulse: 89 (02/07/22 1927)  Resp: 18 (02/07/22 1927)  BP: (!) 117/58 (02/07/22 1927)  SpO2: 95 % (02/07/22 1927) Vital Signs (24h Range):  Temp:  [97.1 °F (36.2 °C)-101.2 °F (38.4 °C)] 97.1 °F (36.2 °C)  Pulse:  [76-91] 89  Resp:  [16-20] 18  SpO2:  [87 %-97 %] 95 %  BP: ()/(58-63) 117/58     Weight: 73.7 kg (162 lb 7.7 oz)  Body mass index is 24.7 kg/m².    Intake/Output Summary (Last 24 hours) at 2/7/2022 2026  Last data filed at 2/7/2022 1627  Gross per 24 hour   Intake 81.32 ml   Output 300 ml   Net -218.68 ml      Physical Exam  Vitals and nursing note reviewed.   Constitutional:       Appearance: He is ill-appearing.   HENT:      Head: Normocephalic and atraumatic.      Right Ear: External ear normal.      Left Ear: External ear normal.      Nose: Nose normal.      Mouth/Throat:      Mouth: Mucous membranes are moist.      Pharynx: Oropharynx is clear.   Eyes:      Extraocular Movements: Extraocular movements intact.      Conjunctiva/sclera: Conjunctivae normal.   Cardiovascular:      Rate and Rhythm: Normal rate and regular rhythm.      Pulses: Normal pulses.      Heart sounds: Normal heart sounds.   Pulmonary:      Effort: Pulmonary effort is normal.      Breath sounds: Rhonchi present.      Comments: NC.  Abdominal:      General: Bowel sounds are normal.      Palpations: Abdomen is soft.   Musculoskeletal:         General: Normal range of motion.      Cervical back: Normal range of motion and neck supple.      Right lower  leg: No edema.      Left lower leg: No edema.   Skin:     General: Skin is warm and dry.   Neurological:      General: No focal deficit present.      Mental Status: He is alert and oriented to person, place, and time. Mental status is at baseline.   Psychiatric:         Mood and Affect: Mood normal.         Behavior: Behavior normal.         Significant Labs: All pertinent labs within the past 24 hours have been reviewed.    Significant Imaging: I have reviewed all pertinent imaging results/findings within the past 24 hours.

## 2022-02-08 NOTE — HOSPITAL COURSE
Ludwin Webber is a 72 year old male with a past medical history of diabetes, hypertension, hyperlipidemia, and former tobacco use who was recently diagnosed with COVID-19 pneumonia with recent discharge who re-presented for increasing O2 requirement. He was started on steroids and tocilizumab was ordered (given 2/7). He is currently stable on 3 L NC. He is also on prophylactic dose Lovenox. Inflammatory markers are decreasing. Pulmonary has been consulted.  Patient improved over the course of his hospitalization, and was weaned down to 2 L oxygen.  He was discharged home on home oxygen and COVID-19 surveillance program.

## 2022-02-08 NOTE — PLAN OF CARE
Maintain adequate glucose control with insulin due to steroid use for treatment of covid pneumonia.

## 2022-02-08 NOTE — ASSESSMENT & PLAN NOTE
-Decadron  -Tocilizumab  -Lovenox PPx  -HFNC  -Pulmonary consulted  -Trend inflammatory markers  -Counseled on self-proning  -Airborne, isolation and contact precautions

## 2022-02-09 PROBLEM — D64.9 ANEMIA: Chronic | Status: ACTIVE | Noted: 2022-02-01

## 2022-02-09 LAB
ALBUMIN SERPL BCP-MCNC: 2.8 G/DL (ref 3.5–5.2)
ALP SERPL-CCNC: 56 U/L (ref 55–135)
ALT SERPL W/O P-5'-P-CCNC: 58 U/L (ref 10–44)
ANION GAP SERPL CALC-SCNC: 9 MMOL/L (ref 8–16)
AST SERPL-CCNC: 33 U/L (ref 10–40)
BASOPHILS # BLD AUTO: 0.01 K/UL (ref 0–0.2)
BASOPHILS NFR BLD: 0.1 % (ref 0–1.9)
BILIRUB SERPL-MCNC: 0.7 MG/DL (ref 0.1–1)
BUN SERPL-MCNC: 29 MG/DL (ref 8–23)
CALCIUM SERPL-MCNC: 9.6 MG/DL (ref 8.7–10.5)
CHLORIDE SERPL-SCNC: 100 MMOL/L (ref 95–110)
CO2 SERPL-SCNC: 28 MMOL/L (ref 23–29)
CREAT SERPL-MCNC: 1.3 MG/DL (ref 0.5–1.4)
DIFFERENTIAL METHOD: ABNORMAL
EOSINOPHIL # BLD AUTO: 0 K/UL (ref 0–0.5)
EOSINOPHIL NFR BLD: 0 % (ref 0–8)
ERYTHROCYTE [DISTWIDTH] IN BLOOD BY AUTOMATED COUNT: 11.7 % (ref 11.5–14.5)
EST. GFR  (AFRICAN AMERICAN): >60 ML/MIN/1.73 M^2
EST. GFR  (NON AFRICAN AMERICAN): 55 ML/MIN/1.73 M^2
GAMMA INTERFERON BACKGROUND BLD IA-ACNC: 0.04 IU/ML
GLUCOSE SERPL-MCNC: 277 MG/DL (ref 70–110)
HCT VFR BLD AUTO: 35.7 % (ref 40–54)
HGB BLD-MCNC: 12 G/DL (ref 14–18)
IMM GRANULOCYTES # BLD AUTO: 0.09 K/UL (ref 0–0.04)
IMM GRANULOCYTES NFR BLD AUTO: 0.8 % (ref 0–0.5)
LYMPHOCYTES # BLD AUTO: 0.4 K/UL (ref 1–4.8)
LYMPHOCYTES NFR BLD: 4 % (ref 18–48)
M TB IFN-G CD4+ BCKGRND COR BLD-ACNC: 0 IU/ML
MCH RBC QN AUTO: 29.9 PG (ref 27–31)
MCHC RBC AUTO-ENTMCNC: 33.6 G/DL (ref 32–36)
MCV RBC AUTO: 89 FL (ref 82–98)
MITOGEN IGNF BCKGRD COR BLD-ACNC: 0.01 IU/ML
MONOCYTES # BLD AUTO: 0.5 K/UL (ref 0.3–1)
MONOCYTES NFR BLD: 4.4 % (ref 4–15)
NEUTROPHILS # BLD AUTO: 10 K/UL (ref 1.8–7.7)
NEUTROPHILS NFR BLD: 90.7 % (ref 38–73)
NRBC BLD-RTO: 0 /100 WBC
PLATELET # BLD AUTO: 481 K/UL (ref 150–450)
PMV BLD AUTO: 10.5 FL (ref 9.2–12.9)
POCT GLUCOSE: 247 MG/DL (ref 70–110)
POCT GLUCOSE: 261 MG/DL (ref 70–110)
POCT GLUCOSE: 392 MG/DL (ref 70–110)
POCT GLUCOSE: 405 MG/DL (ref 70–110)
POCT GLUCOSE: 438 MG/DL (ref 70–110)
POTASSIUM SERPL-SCNC: 5.5 MMOL/L (ref 3.5–5.1)
PROT SERPL-MCNC: 6.9 G/DL (ref 6–8.4)
RBC # BLD AUTO: 4.01 M/UL (ref 4.6–6.2)
SODIUM SERPL-SCNC: 137 MMOL/L (ref 136–145)
TB GOLD PLUS: ABNORMAL
TB2 - NIL: 0 IU/ML
WBC # BLD AUTO: 11.06 K/UL (ref 3.9–12.7)

## 2022-02-09 PROCEDURE — 85025 COMPLETE CBC W/AUTO DIFF WBC: CPT | Performed by: STUDENT IN AN ORGANIZED HEALTH CARE EDUCATION/TRAINING PROGRAM

## 2022-02-09 PROCEDURE — 25000242 PHARM REV CODE 250 ALT 637 W/ HCPCS: Performed by: INTERNAL MEDICINE

## 2022-02-09 PROCEDURE — 27000221 HC OXYGEN, UP TO 24 HOURS

## 2022-02-09 PROCEDURE — 94761 N-INVAS EAR/PLS OXIMETRY MLT: CPT

## 2022-02-09 PROCEDURE — 25000003 PHARM REV CODE 250: Performed by: HOSPITALIST

## 2022-02-09 PROCEDURE — 99232 PR SUBSEQUENT HOSPITAL CARE,LEVL II: ICD-10-PCS | Mod: ,,, | Performed by: INTERNAL MEDICINE

## 2022-02-09 PROCEDURE — 27000207 HC ISOLATION

## 2022-02-09 PROCEDURE — 94799 UNLISTED PULMONARY SVC/PX: CPT

## 2022-02-09 PROCEDURE — 36415 COLL VENOUS BLD VENIPUNCTURE: CPT | Performed by: STUDENT IN AN ORGANIZED HEALTH CARE EDUCATION/TRAINING PROGRAM

## 2022-02-09 PROCEDURE — 63600175 PHARM REV CODE 636 W HCPCS: Performed by: INTERNAL MEDICINE

## 2022-02-09 PROCEDURE — 63600175 PHARM REV CODE 636 W HCPCS: Performed by: HOSPITALIST

## 2022-02-09 PROCEDURE — 25000003 PHARM REV CODE 250: Performed by: STUDENT IN AN ORGANIZED HEALTH CARE EDUCATION/TRAINING PROGRAM

## 2022-02-09 PROCEDURE — 99232 SBSQ HOSP IP/OBS MODERATE 35: CPT | Mod: ,,, | Performed by: INTERNAL MEDICINE

## 2022-02-09 PROCEDURE — C9399 UNCLASSIFIED DRUGS OR BIOLOG: HCPCS | Performed by: STUDENT IN AN ORGANIZED HEALTH CARE EDUCATION/TRAINING PROGRAM

## 2022-02-09 PROCEDURE — 80053 COMPREHEN METABOLIC PANEL: CPT | Performed by: STUDENT IN AN ORGANIZED HEALTH CARE EDUCATION/TRAINING PROGRAM

## 2022-02-09 PROCEDURE — 20600001 HC STEP DOWN PRIVATE ROOM

## 2022-02-09 RX ADMIN — TAMSULOSIN HYDROCHLORIDE 0.4 MG: 0.4 CAPSULE ORAL at 08:02

## 2022-02-09 RX ADMIN — LOSARTAN POTASSIUM 25 MG: 25 TABLET, FILM COATED ORAL at 08:02

## 2022-02-09 RX ADMIN — ATORVASTATIN CALCIUM 40 MG: 40 TABLET, FILM COATED ORAL at 08:02

## 2022-02-09 RX ADMIN — POLYETHYLENE GLYCOL 3350 17 G: 17 POWDER, FOR SOLUTION ORAL at 08:02

## 2022-02-09 RX ADMIN — INSULIN ASPART 4 UNITS: 100 INJECTION, SOLUTION INTRAVENOUS; SUBCUTANEOUS at 11:02

## 2022-02-09 RX ADMIN — DEXAMETHASONE 6 MG: 4 TABLET ORAL at 08:02

## 2022-02-09 RX ADMIN — INSULIN ASPART 6 UNITS: 100 INJECTION, SOLUTION INTRAVENOUS; SUBCUTANEOUS at 08:02

## 2022-02-09 RX ADMIN — INSULIN DETEMIR 15 UNITS: 100 INJECTION, SOLUTION SUBCUTANEOUS at 08:02

## 2022-02-09 RX ADMIN — DOCUSATE SODIUM AND SENNOSIDES 1 TABLET: 8.6; 5 TABLET, FILM COATED ORAL at 08:02

## 2022-02-09 RX ADMIN — Medication 6 MG: at 08:02

## 2022-02-09 RX ADMIN — FAMOTIDINE 20 MG: 20 TABLET, FILM COATED ORAL at 08:02

## 2022-02-09 RX ADMIN — ENOXAPARIN SODIUM 40 MG: 100 INJECTION SUBCUTANEOUS at 04:02

## 2022-02-09 RX ADMIN — INSULIN DETEMIR 10 UNITS: 100 INJECTION, SOLUTION SUBCUTANEOUS at 05:02

## 2022-02-09 RX ADMIN — ACETAMINOPHEN 650 MG: 325 TABLET ORAL at 08:02

## 2022-02-09 NOTE — PROGRESS NOTES
"[facility] Medicine  Progress Note    Patient Name: Ludwin Gee  MRN: [id number]  Patient Class: IP- Inpatient   Admission Date: 2/6/2022  Length of Stay: 3 days  Attending Physician: Adis Field MD  Primary Care Provider: Herrera Fisher MD        Subjective:     Principal Problem:Acute hypoxemic respiratory failure due to COVID-19        HPI:  Patient is a 72-year-old male with past medical history of diabetes, hypertension, hyperlipidemia, former tobacco use who was diagnosed with COVID on 02/01/2022 and recently hospitalized for hypoxic respiratory failure from COVID during which he completed a course of remdesivir and was discharged yesterday with supplemental oxygen repeat presents today with complaint of hypoxia at home on 4 L nasal cannula.  Wife reports he did well last night however since then has been having trouble maintaining his saturations on 4 L especially with movement.  Denies any fever, chills, chest pain, nausea, vomiting.  He is drowsy at this time from cough medicine received in the ED.  D-dimer has increased from previous.  CTA ordered.  He is currently on 5 L nasal cannula with oxygen saturation at 95%       Overview/Hospital Course:  Ludwin Webber is a 72 year old male with a past medical history of diabetes, hypertension, hyperlipidemia, and former tobacco use who was recently diagnosed with COVID-19 pneumonia with recent discharge who re-presented for increasing O2 requirement. He was started on steroids and tocilizumab was ordered (given 2/7). He is currently stable on 8 L HFNC. He is also on prophylactic dose Lovenox. Pulmonary has been consulted.      Interval History: see "Hospital Course."    Review of Systems   Constitutional: Negative for chills and fever.   Respiratory: Positive for shortness of breath. Negative for cough.    Gastrointestinal: Negative for diarrhea.   Allergic/Immunologic: Positive for immunocompromised state.   All other systems " reviewed and are negative.    Objective:     Vital Signs (Most Recent):  Temp: 98.3 °F (36.8 °C) (02/09/22 1612)  Pulse: 67 (02/09/22 1612)  Resp: 16 (02/09/22 1612)  BP: (!) 124/58 (02/09/22 1612)  SpO2: (!) 94 % (02/09/22 1612) Vital Signs (24h Range):  Temp:  [97.3 °F (36.3 °C)-99.9 °F (37.7 °C)] 98.3 °F (36.8 °C)  Pulse:  [60-84] 67  Resp:  [16-18] 16  SpO2:  [90 %-99 %] 94 %  BP: (105-130)/(55-74) 124/58     Weight: 73.7 kg (162 lb 7.7 oz)  Body mass index is 24.7 kg/m².  No intake or output data in the 24 hours ending 02/09/22 1739   Physical Exam  Vitals and nursing note reviewed.   Constitutional:       Appearance: He is ill-appearing.   HENT:      Head: Normocephalic and atraumatic.      Right Ear: External ear normal.      Left Ear: External ear normal.      Nose: Nose normal.      Mouth/Throat:      Mouth: Mucous membranes are moist.      Pharynx: Oropharynx is clear.   Eyes:      Extraocular Movements: Extraocular movements intact.      Conjunctiva/sclera: Conjunctivae normal.   Cardiovascular:      Rate and Rhythm: Normal rate and regular rhythm.      Pulses: Normal pulses.      Heart sounds: Normal heart sounds.   Pulmonary:      Effort: Pulmonary effort is normal.      Breath sounds: Rhonchi present.      Comments: HFNC.  Abdominal:      General: Bowel sounds are normal.      Palpations: Abdomen is soft.   Musculoskeletal:         General: Normal range of motion.      Cervical back: Normal range of motion and neck supple.      Right lower leg: No edema.      Left lower leg: No edema.   Skin:     General: Skin is warm and dry.   Neurological:      General: No focal deficit present.      Mental Status: He is alert and oriented to person, place, and time. Mental status is at baseline.   Psychiatric:         Mood and Affect: Mood normal.         Behavior: Behavior normal.         Significant Labs: All pertinent labs within the past 24 hours have been reviewed.    Significant Imaging: I have reviewed all  pertinent imaging results/findings within the past 24 hours.      Assessment/Plan:      * Acute hypoxemic respiratory failure due to COVID-19  -Decadron  -S/p tocilizumab  -Lovenox PPx  -HFNC  -Pulmonary consulted  -Trend inflammatory markers  -Counseled on self-proning  -Airborne, isolation and contact precautions    Asbestos exposure  -May need further workup in outpatient setting      Anemia  -Trend CBC      Hyperlipidemia associated with type 2 diabetes mellitus  -Continue statin    Hypertension associated with diabetes  -Continue home medications    BPH (benign prostatic hyperplasia)  -Continue Flomax      Controlled type 2 diabetes mellitus with microalbuminuria, without long-term current use of insulin  -SSI  -AC HS glucose checks  -Hypoglycemic precautions      VTE Risk Mitigation (From admission, onward)         Ordered     enoxaparin injection 40 mg  Daily         02/06/22 1638     Place sequential compression device  Until discontinued         02/06/22 1638                Discharge Planning   ANTELMO: 2/13/2021     Code Status: Full Code   Is the patient medically ready for discharge?:     Reason for patient still in hospital (select all that apply): Patient trending condition, Laboratory test, Treatment and Consult recommendations  Discharge Plan A: Home                  Adis Field MD  Department of Hospital Medicine   [facility]

## 2022-02-09 NOTE — PLAN OF CARE
Alert and orriented X4. Skin warm, pink and dry. Heart sounds S1 S2. Telemetry # [id number] NSR. O2 @ 8 Liters. Lungs clear. Occasional non-productive cough. SMI Independently.. Abdomen round and soft with active bowel sounds all quadrants. No edema. Pleasant and cooperative. Family at the bedside.

## 2022-02-09 NOTE — PROGRESS NOTES
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  Had covid 2/1 -- was having fevers 101 for few days with sinus and loss sense smell, when temp got to 102 he presented.  Was in hosp til 2/5 going home on 3lpm with no steroids.  Pt represented needing higher ox-- sats to 70's walking on 3lpm.  Appetite ok , cough mild and now productive gray material.  Sugars were up last admit needing insulin, usually on metformin 1500/d.      From Dr Tatum hpi 2/6/2022:   HPI: Patient is a 72-year-old male with past medical history of diabetes, hypertension, hyperlipidemia, former tobacco use who was diagnosed with COVID on 02/01/2022 and recently hospitalized for hypoxic respiratory failure from COVID during which he completed a course of remdesivir and was discharged yesterday with supplemental oxygen repeat presents today with complaint of hypoxia at home on 4 L nasal cannula.  Wife reports he did well last night however since then has been having trouble maintaining his saturations on 4 L especially with movement.  Denies any fever, chills, chest pain, nausea, vomiting.  He is drowsy at this time from cough medicine received in the ED.  D-dimer has increased from previous.  CTA ordered.  He is currently on 5 L nasal cannula with oxygen saturation at 95%      Progress Note  PULMONARY    Admit Date: 2/6/2022 02/09/2022      SUBJECTIVE:     2/8 no c/o  2/9 no new c/o      PFSH and Allergies reviewed.    OBJECTIVE:     Vitals (Most recent):  Vitals:    02/09/22 0349   BP: 109/64   Pulse: 84   Resp: 18   Temp: 97.5 °F (36.4 °C)       Vitals (24 hour range):  Temp:  [96.3 °F (35.7 °C)-97.9 °F (36.6 °C)]   Pulse:  [60-84]   Resp:  [16-20]   BP: ()/(53-74)   SpO2:  [91 %-99 %]     No  intake or output data in the 24 hours ending 02/09/22 0642       Physical Exam:  The patient's neuro status (alertness,orientation,cognitive function,motor skills,), pharyngeal exam (oral lesions, hygiene, abn dentition,), Neck (jvd,mass,thyroid,nodes in neck and above/below clavicle),RESPIRATORY(symmetry,effort,fremitus,percussion,auscultation),  Cor(rhythm,heart tones including gallops,perfusion,edema)ABD(distention,hepatic&splenomegaly,tenderness,masses), Skin(rash,cyanosis),Psyc(affect,judgement,).  Exam negative except for these pertinent findings:    Alert, min rales.    Radiographs reviewed: view by direct vision    No results found for this or any previous visit.  ]    Labs     Recent Labs   Lab 02/09/22  0435   WBC 11.06   HGB 12.0*   HCT 35.7*   *     Recent Labs   Lab 02/09/22  0435      K 5.5*      CO2 28   BUN 29*   CREATININE 1.3   *   CALCIUM 9.6   AST 33   ALT 58*   ALKPHOS 56   BILITOT 0.7   PROT 6.9   ALBUMIN 2.8*   No results for input(s): PH, PCO2, PO2, HCO3 in the last 24 hours.  Microbiology Results (last 7 days)     ** No results found for the last 168 hours. **          Impression:  Active Hospital Problems    Diagnosis  POA    *Acute hypoxemic respiratory failure due to COVID-19 [U07.1, J96.01]  Yes    Asbestos exposure [Z77.090]  Not Applicable    Anemia [D64.9]  Yes    Hyperlipidemia associated with type 2 diabetes mellitus [E11.69, E78.5]  Yes    Hypertension associated with diabetes [E11.59, I15.2]  Yes    Controlled type 2 diabetes mellitus with microalbuminuria, without long-term current use of insulin [E11.29, R80.9]  Yes    BPH (benign prostatic hyperplasia) [N40.0]  Yes      Resolved Hospital Problems   No resolved problems to display.        .    Plan: tm 101.2, vss, 70% ox, Actrema dosed, wbc 7.9, glu 209,  crp 80 at admit 2/1, down to 26 yesterday 2/6,       cta lungs no pe, posterior dense covid type infiltrates.   Ox needs incesed from 4 to 12  lpm last 24 hrs.     Consider ordering prn bipap.  F/u crp am.     low dose  lovenox.      2/8   No fever,vss,  8lpm,  crp up to 96 from 26 on 6th.    Had flare off steroids ppt readmit  Will increase decadron to 12/d and dose solumedrol    Discussed need for increased steroids with pt.  2/9 no fever, vss, wbc 11k, k 5.5,   Ox needs stable 10 lpm  Monitor , sugars up./

## 2022-02-09 NOTE — SUBJECTIVE & OBJECTIVE
"Interval History: see "Hospital Course."    Review of Systems   Constitutional: Negative for chills and fever.   Respiratory: Positive for shortness of breath. Negative for cough.    Gastrointestinal: Negative for diarrhea.   Allergic/Immunologic: Positive for immunocompromised state.   All other systems reviewed and are negative.    Objective:     Vital Signs (Most Recent):  Temp: 98.3 °F (36.8 °C) (02/09/22 1612)  Pulse: 67 (02/09/22 1612)  Resp: 16 (02/09/22 1612)  BP: (!) 124/58 (02/09/22 1612)  SpO2: (!) 94 % (02/09/22 1612) Vital Signs (24h Range):  Temp:  [97.3 °F (36.3 °C)-99.9 °F (37.7 °C)] 98.3 °F (36.8 °C)  Pulse:  [60-84] 67  Resp:  [16-18] 16  SpO2:  [90 %-99 %] 94 %  BP: (105-130)/(55-74) 124/58     Weight: 73.7 kg (162 lb 7.7 oz)  Body mass index is 24.7 kg/m².  No intake or output data in the 24 hours ending 02/09/22 1739   Physical Exam  Vitals and nursing note reviewed.   Constitutional:       Appearance: He is ill-appearing.   HENT:      Head: Normocephalic and atraumatic.      Right Ear: External ear normal.      Left Ear: External ear normal.      Nose: Nose normal.      Mouth/Throat:      Mouth: Mucous membranes are moist.      Pharynx: Oropharynx is clear.   Eyes:      Extraocular Movements: Extraocular movements intact.      Conjunctiva/sclera: Conjunctivae normal.   Cardiovascular:      Rate and Rhythm: Normal rate and regular rhythm.      Pulses: Normal pulses.      Heart sounds: Normal heart sounds.   Pulmonary:      Effort: Pulmonary effort is normal.      Breath sounds: Rhonchi present.      Comments: HFNC.  Abdominal:      General: Bowel sounds are normal.      Palpations: Abdomen is soft.   Musculoskeletal:         General: Normal range of motion.      Cervical back: Normal range of motion and neck supple.      Right lower leg: No edema.      Left lower leg: No edema.   Skin:     General: Skin is warm and dry.   Neurological:      General: No focal deficit present.      Mental Status: " He is alert and oriented to person, place, and time. Mental status is at baseline.   Psychiatric:         Mood and Affect: Mood normal.         Behavior: Behavior normal.         Significant Labs: All pertinent labs within the past 24 hours have been reviewed.    Significant Imaging: I have reviewed all pertinent imaging results/findings within the past 24 hours.

## 2022-02-09 NOTE — NURSING
Patient's . Second check was 405. 10 units of scheduled detemir and 10 units of sliding scale aspart was administered. Dr. Field notified; no new orders.

## 2022-02-10 LAB
ALBUMIN SERPL BCP-MCNC: 2.8 G/DL (ref 3.5–5.2)
ALP SERPL-CCNC: 55 U/L (ref 55–135)
ALT SERPL W/O P-5'-P-CCNC: 54 U/L (ref 10–44)
ANION GAP SERPL CALC-SCNC: 10 MMOL/L (ref 8–16)
AST SERPL-CCNC: 26 U/L (ref 10–40)
BASOPHILS # BLD AUTO: 0.01 K/UL (ref 0–0.2)
BASOPHILS NFR BLD: 0.1 % (ref 0–1.9)
BILIRUB SERPL-MCNC: 0.6 MG/DL (ref 0.1–1)
BUN SERPL-MCNC: 26 MG/DL (ref 8–23)
CALCIUM SERPL-MCNC: 9.6 MG/DL (ref 8.7–10.5)
CHLORIDE SERPL-SCNC: 100 MMOL/L (ref 95–110)
CO2 SERPL-SCNC: 27 MMOL/L (ref 23–29)
CREAT SERPL-MCNC: 1.3 MG/DL (ref 0.5–1.4)
CRP SERPL-MCNC: 23.2 MG/L (ref 0–8.2)
D DIMER PPP IA.FEU-MCNC: 1.42 MG/L FEU
DIFFERENTIAL METHOD: ABNORMAL
EOSINOPHIL # BLD AUTO: 0 K/UL (ref 0–0.5)
EOSINOPHIL NFR BLD: 0 % (ref 0–8)
ERYTHROCYTE [DISTWIDTH] IN BLOOD BY AUTOMATED COUNT: 11.8 % (ref 11.5–14.5)
EST. GFR  (AFRICAN AMERICAN): >60 ML/MIN/1.73 M^2
EST. GFR  (NON AFRICAN AMERICAN): 55 ML/MIN/1.73 M^2
FERRITIN SERPL-MCNC: 1544 NG/ML (ref 20–300)
GLUCOSE SERPL-MCNC: 174 MG/DL (ref 70–110)
HCT VFR BLD AUTO: 35.3 % (ref 40–54)
HGB BLD-MCNC: 11.8 G/DL (ref 14–18)
IMM GRANULOCYTES # BLD AUTO: 0.09 K/UL (ref 0–0.04)
IMM GRANULOCYTES NFR BLD AUTO: 0.8 % (ref 0–0.5)
LDH SERPL L TO P-CCNC: 305 U/L (ref 110–260)
LYMPHOCYTES # BLD AUTO: 0.5 K/UL (ref 1–4.8)
LYMPHOCYTES NFR BLD: 4.6 % (ref 18–48)
MCH RBC QN AUTO: 29.5 PG (ref 27–31)
MCHC RBC AUTO-ENTMCNC: 33.4 G/DL (ref 32–36)
MCV RBC AUTO: 88 FL (ref 82–98)
MONOCYTES # BLD AUTO: 0.7 K/UL (ref 0.3–1)
MONOCYTES NFR BLD: 6 % (ref 4–15)
NEUTROPHILS # BLD AUTO: 10.1 K/UL (ref 1.8–7.7)
NEUTROPHILS NFR BLD: 88.5 % (ref 38–73)
NRBC BLD-RTO: 0 /100 WBC
PLATELET # BLD AUTO: 461 K/UL (ref 150–450)
PMV BLD AUTO: 10.7 FL (ref 9.2–12.9)
POCT GLUCOSE: 267 MG/DL (ref 70–110)
POCT GLUCOSE: 331 MG/DL (ref 70–110)
POCT GLUCOSE: 398 MG/DL (ref 70–110)
POCT GLUCOSE: 411 MG/DL (ref 70–110)
POCT GLUCOSE: 419 MG/DL (ref 70–110)
POTASSIUM SERPL-SCNC: 5.5 MMOL/L (ref 3.5–5.1)
PROT SERPL-MCNC: 6.6 G/DL (ref 6–8.4)
RBC # BLD AUTO: 4 M/UL (ref 4.6–6.2)
SODIUM SERPL-SCNC: 137 MMOL/L (ref 136–145)
WBC # BLD AUTO: 11.37 K/UL (ref 3.9–12.7)

## 2022-02-10 PROCEDURE — 25000003 PHARM REV CODE 250: Performed by: STUDENT IN AN ORGANIZED HEALTH CARE EDUCATION/TRAINING PROGRAM

## 2022-02-10 PROCEDURE — 97161 PT EVAL LOW COMPLEX 20 MIN: CPT

## 2022-02-10 PROCEDURE — 97535 SELF CARE MNGMENT TRAINING: CPT

## 2022-02-10 PROCEDURE — 25000003 PHARM REV CODE 250: Performed by: HOSPITALIST

## 2022-02-10 PROCEDURE — 83615 LACTATE (LD) (LDH) ENZYME: CPT | Performed by: STUDENT IN AN ORGANIZED HEALTH CARE EDUCATION/TRAINING PROGRAM

## 2022-02-10 PROCEDURE — 27000221 HC OXYGEN, UP TO 24 HOURS

## 2022-02-10 PROCEDURE — 94761 N-INVAS EAR/PLS OXIMETRY MLT: CPT

## 2022-02-10 PROCEDURE — 20600001 HC STEP DOWN PRIVATE ROOM

## 2022-02-10 PROCEDURE — 94799 UNLISTED PULMONARY SVC/PX: CPT

## 2022-02-10 PROCEDURE — 85379 FIBRIN DEGRADATION QUANT: CPT | Performed by: STUDENT IN AN ORGANIZED HEALTH CARE EDUCATION/TRAINING PROGRAM

## 2022-02-10 PROCEDURE — 99232 PR SUBSEQUENT HOSPITAL CARE,LEVL II: ICD-10-PCS | Mod: ,,, | Performed by: INTERNAL MEDICINE

## 2022-02-10 PROCEDURE — 80053 COMPREHEN METABOLIC PANEL: CPT | Performed by: STUDENT IN AN ORGANIZED HEALTH CARE EDUCATION/TRAINING PROGRAM

## 2022-02-10 PROCEDURE — 99232 SBSQ HOSP IP/OBS MODERATE 35: CPT | Mod: ,,, | Performed by: INTERNAL MEDICINE

## 2022-02-10 PROCEDURE — 82728 ASSAY OF FERRITIN: CPT | Performed by: STUDENT IN AN ORGANIZED HEALTH CARE EDUCATION/TRAINING PROGRAM

## 2022-02-10 PROCEDURE — 85025 COMPLETE CBC W/AUTO DIFF WBC: CPT | Performed by: STUDENT IN AN ORGANIZED HEALTH CARE EDUCATION/TRAINING PROGRAM

## 2022-02-10 PROCEDURE — 94664 DEMO&/EVAL PT USE INHALER: CPT

## 2022-02-10 PROCEDURE — 86140 C-REACTIVE PROTEIN: CPT | Performed by: STUDENT IN AN ORGANIZED HEALTH CARE EDUCATION/TRAINING PROGRAM

## 2022-02-10 PROCEDURE — 27000207 HC ISOLATION

## 2022-02-10 PROCEDURE — 97165 OT EVAL LOW COMPLEX 30 MIN: CPT

## 2022-02-10 PROCEDURE — 36415 COLL VENOUS BLD VENIPUNCTURE: CPT | Performed by: STUDENT IN AN ORGANIZED HEALTH CARE EDUCATION/TRAINING PROGRAM

## 2022-02-10 PROCEDURE — 27000646 HC AEROBIKA DEVICE

## 2022-02-10 PROCEDURE — 63600175 PHARM REV CODE 636 W HCPCS: Performed by: INTERNAL MEDICINE

## 2022-02-10 PROCEDURE — 63600175 PHARM REV CODE 636 W HCPCS: Performed by: HOSPITALIST

## 2022-02-10 PROCEDURE — 25000242 PHARM REV CODE 250 ALT 637 W/ HCPCS: Performed by: INTERNAL MEDICINE

## 2022-02-10 PROCEDURE — 99900035 HC TECH TIME PER 15 MIN (STAT)

## 2022-02-10 RX ADMIN — TAMSULOSIN HYDROCHLORIDE 0.4 MG: 0.4 CAPSULE ORAL at 08:02

## 2022-02-10 RX ADMIN — INSULIN ASPART 10 UNITS: 100 INJECTION, SOLUTION INTRAVENOUS; SUBCUTANEOUS at 05:02

## 2022-02-10 RX ADMIN — ATORVASTATIN CALCIUM 40 MG: 40 TABLET, FILM COATED ORAL at 08:02

## 2022-02-10 RX ADMIN — INSULIN DETEMIR 25 UNITS: 100 INJECTION, SOLUTION SUBCUTANEOUS at 08:02

## 2022-02-10 RX ADMIN — DEXAMETHASONE 6 MG: 4 TABLET ORAL at 09:02

## 2022-02-10 RX ADMIN — DOCUSATE SODIUM AND SENNOSIDES 1 TABLET: 8.6; 5 TABLET, FILM COATED ORAL at 09:02

## 2022-02-10 RX ADMIN — ENOXAPARIN SODIUM 40 MG: 100 INJECTION SUBCUTANEOUS at 05:02

## 2022-02-10 RX ADMIN — INSULIN ASPART 8 UNITS: 100 INJECTION, SOLUTION INTRAVENOUS; SUBCUTANEOUS at 08:02

## 2022-02-10 RX ADMIN — Medication 6 MG: at 09:02

## 2022-02-10 RX ADMIN — DOCUSATE SODIUM AND SENNOSIDES 1 TABLET: 8.6; 5 TABLET, FILM COATED ORAL at 08:02

## 2022-02-10 RX ADMIN — POLYETHYLENE GLYCOL 3350 17 G: 17 POWDER, FOR SOLUTION ORAL at 08:02

## 2022-02-10 RX ADMIN — LOSARTAN POTASSIUM 25 MG: 25 TABLET, FILM COATED ORAL at 08:02

## 2022-02-10 RX ADMIN — DEXAMETHASONE 6 MG: 4 TABLET ORAL at 08:02

## 2022-02-10 RX ADMIN — ACETAMINOPHEN 650 MG: 325 TABLET ORAL at 09:02

## 2022-02-10 RX ADMIN — POLYETHYLENE GLYCOL 3350 17 G: 17 POWDER, FOR SOLUTION ORAL at 09:02

## 2022-02-10 RX ADMIN — FAMOTIDINE 20 MG: 20 TABLET, FILM COATED ORAL at 08:02

## 2022-02-10 RX ADMIN — INSULIN ASPART 6 UNITS: 100 INJECTION, SOLUTION INTRAVENOUS; SUBCUTANEOUS at 01:02

## 2022-02-10 NOTE — PLAN OF CARE
Pt denies any pain this shift. Pt has episodes of heart rates between 42- low 50's. Pt is continent of bowel and bladder. Pt given insulin per sliding scale

## 2022-02-10 NOTE — SUBJECTIVE & OBJECTIVE
Interval History: No changes.    Review of Systems   Constitutional: Negative for chills and fever.   Respiratory: Positive for shortness of breath. Negative for cough.    Gastrointestinal: Negative for diarrhea.   Allergic/Immunologic: Positive for immunocompromised state.   All other systems reviewed and are negative.    Objective:     Vital Signs (Most Recent):  Temp: 97 °F (36.1 °C) (02/10/22 1141)  Pulse: 72 (02/10/22 1201)  Resp: 20 (02/10/22 1201)  BP: 116/60 (02/10/22 1141)  SpO2: (!) 93 % (02/10/22 1201) Vital Signs (24h Range):  Temp:  [97 °F (36.1 °C)-98.8 °F (37.1 °C)] 97 °F (36.1 °C)  Pulse:  [52-72] 72  Resp:  [16-20] 20  SpO2:  [92 %-99 %] 93 %  BP: (109-150)/(52-70) 116/60     Weight: 73.7 kg (162 lb 7.7 oz)  Body mass index is 24.7 kg/m².  No intake or output data in the 24 hours ending 02/10/22 1419   Physical Exam  Vitals and nursing note reviewed.   Constitutional:       Appearance: He is ill-appearing.   HENT:      Head: Normocephalic and atraumatic.      Right Ear: External ear normal.      Left Ear: External ear normal.      Nose: Nose normal.      Mouth/Throat:      Mouth: Mucous membranes are moist.      Pharynx: Oropharynx is clear.   Eyes:      Extraocular Movements: Extraocular movements intact.      Conjunctiva/sclera: Conjunctivae normal.   Cardiovascular:      Rate and Rhythm: Normal rate and regular rhythm.      Pulses: Normal pulses.   Pulmonary:      Effort: Pulmonary effort is normal.      Comments: HFNC.  Abdominal:      General: Bowel sounds are normal.      Palpations: Abdomen is soft.   Musculoskeletal:         General: Normal range of motion.      Cervical back: Normal range of motion and neck supple.      Right lower leg: No edema.      Left lower leg: No edema.   Skin:     General: Skin is warm and dry.   Neurological:      General: No focal deficit present.      Mental Status: He is alert and oriented to person, place, and time. Mental status is at baseline.   Psychiatric:          Mood and Affect: Mood normal.         Behavior: Behavior normal.         Significant Labs: All pertinent labs within the past 24 hours have been reviewed.    Significant Imaging: I have reviewed all pertinent imaging results/findings within the past 24 hours.

## 2022-02-10 NOTE — PROGRESS NOTES
[facility] Hospital Medicine  Progress Note    Patient Name: Ludwin Gee  MRN: [id number]  Patient Class: IP- Inpatient   Admission Date: 2/6/2022  Length of Stay: 4 days  Attending Physician: Adis Field MD  Primary Care Provider: Herrera Fisher MD        Subjective:     Principal Problem:Acute hypoxemic respiratory failure due to COVID-19        HPI:  Patient is a 72-year-old male with past medical history of diabetes, hypertension, hyperlipidemia, former tobacco use who was diagnosed with COVID on 02/01/2022 and recently hospitalized for hypoxic respiratory failure from COVID during which he completed a course of remdesivir and was discharged yesterday with supplemental oxygen repeat presents today with complaint of hypoxia at home on 4 L nasal cannula.  Wife reports he did well last night however since then has been having trouble maintaining his saturations on 4 L especially with movement.  Denies any fever, chills, chest pain, nausea, vomiting.  He is drowsy at this time from cough medicine received in the ED.  D-dimer has increased from previous.  CTA ordered.  He is currently on 5 L nasal cannula with oxygen saturation at 95%       Overview/Hospital Course:  Ludwin Webber is a 72 year old male with a past medical history of diabetes, hypertension, hyperlipidemia, and former tobacco use who was recently diagnosed with COVID-19 pneumonia with recent discharge who re-presented for increasing O2 requirement. He was started on steroids and tocilizumab was ordered (given 2/7). He is currently stable on 8 L HFNC. He is also on prophylactic dose Lovenox. Pulmonary has been consulted.      Interval History: No changes.    Review of Systems   Constitutional: Negative for chills and fever.   Respiratory: Positive for shortness of breath. Negative for cough.    Gastrointestinal: Negative for diarrhea.   Allergic/Immunologic: Positive for immunocompromised state.   All other systems reviewed  and are negative.    Objective:     Vital Signs (Most Recent):  Temp: 97 °F (36.1 °C) (02/10/22 1141)  Pulse: 72 (02/10/22 1201)  Resp: 20 (02/10/22 1201)  BP: 116/60 (02/10/22 1141)  SpO2: (!) 93 % (02/10/22 1201) Vital Signs (24h Range):  Temp:  [97 °F (36.1 °C)-98.8 °F (37.1 °C)] 97 °F (36.1 °C)  Pulse:  [52-72] 72  Resp:  [16-20] 20  SpO2:  [92 %-99 %] 93 %  BP: (109-150)/(52-70) 116/60     Weight: 73.7 kg (162 lb 7.7 oz)  Body mass index is 24.7 kg/m².  No intake or output data in the 24 hours ending 02/10/22 1419   Physical Exam  Vitals and nursing note reviewed.   Constitutional:       Appearance: He is ill-appearing.   HENT:      Head: Normocephalic and atraumatic.      Right Ear: External ear normal.      Left Ear: External ear normal.      Nose: Nose normal.      Mouth/Throat:      Mouth: Mucous membranes are moist.      Pharynx: Oropharynx is clear.   Eyes:      Extraocular Movements: Extraocular movements intact.      Conjunctiva/sclera: Conjunctivae normal.   Cardiovascular:      Rate and Rhythm: Normal rate and regular rhythm.      Pulses: Normal pulses.   Pulmonary:      Effort: Pulmonary effort is normal.      Comments: HFNC.  Abdominal:      General: Bowel sounds are normal.      Palpations: Abdomen is soft.   Musculoskeletal:         General: Normal range of motion.      Cervical back: Normal range of motion and neck supple.      Right lower leg: No edema.      Left lower leg: No edema.   Skin:     General: Skin is warm and dry.   Neurological:      General: No focal deficit present.      Mental Status: He is alert and oriented to person, place, and time. Mental status is at baseline.   Psychiatric:         Mood and Affect: Mood normal.         Behavior: Behavior normal.         Significant Labs: All pertinent labs within the past 24 hours have been reviewed.    Significant Imaging: I have reviewed all pertinent imaging results/findings within the past 24 hours.      Assessment/Plan:      * Acute  hypoxemic respiratory failure due to COVID-19  -Decadron  -S/p tocilizumab  -Lovenox PPx  -HFNC  -Pulmonary consulted  -Trend inflammatory markers  -Counseled on self-proning  -Airborne, isolation and contact precautions    Asbestos exposure  -May need further workup in outpatient setting      Anemia  -Trend CBC      Hyperlipidemia associated with type 2 diabetes mellitus  -Continue statin    Hypertension associated with diabetes  -Continue home medications    BPH (benign prostatic hyperplasia)  -Continue Flomax      Controlled type 2 diabetes mellitus with microalbuminuria, without long-term current use of insulin  -SSI  -AC HS glucose checks  -Hypoglycemic precautions      VTE Risk Mitigation (From admission, onward)         Ordered     enoxaparin injection 40 mg  Daily         02/06/22 1638     Place sequential compression device  Until discontinued         02/06/22 1638                Discharge Planning   ANTELMO: 2/15/2022     Code Status: Full Code   Is the patient medically ready for discharge?:     Reason for patient still in hospital (select all that apply): Patient trending condition, Laboratory test, Treatment and Consult recommendations  Discharge Plan A: Home                  Adis Field MD  Department of Hospital Medicine   [facility]

## 2022-02-10 NOTE — PT/OT/SLP EVAL
Occupational Therapy   Evaluation and Discharge Note    Name: Ludwin Gee  MRN: [id number]  Admitting Diagnosis:  Acute hypoxemic respiratory failure due to COVID-19   Recent Surgery: * No surgery found *      Recommendations:     Discharge Recommendations: home  Discharge Equipment Recommendations:  none  Barriers to discharge:  None    Assessment:     Ludwin Gee is a 72 y.o. male with a medical diagnosis of Acute hypoxemic respiratory failure due to COVID-19. At this time, patient is independent with ADLs. Noted patient's SpO2 decreased from 94 to 82 with activity. Patient returned back to low 90s after ~1 min of inactivity. Patient does not require further acute OT services.     Plan:     During this hospitalization, patient does not require further acute OT services.  Please re-consult if situation changes.    · Plan of Care Reviewed with: patient,spouse    Subjective     Chief Complaint: none  Patient/Family Comments/goals: none    Occupational Profile:  Living Environment: Patient lives with wife.   Previous level of function: Patient was independent with ADLs and mobility.   Roles and Routines: Patient is  of [employer].   Equipment Used at home:  none  Assistance upon Discharge: Patient will receive assistance from wife if needed.     Pain/Comfort:  · Pain Rating 1: 0/10  · Pain Rating Post-Intervention 1: 0/10    Patients cultural, spiritual, Mormonism conflicts given the current situation:      Objective:     Communicated with: nurse Crisostomo prior to session.  Patient found up in chair with telemetry,pulse ox (continuous),oxygen upon OT entry to room.    General Precautions: Standard, airborne,droplet,contact,fall   Orthopedic Precautions:N/A   Braces: N/A  Respiratory Status: Nasal cannula, flow 7 L/min     Occupational Performance:    Bed Mobility:    · Not assessed; patient seated in chair upon arrival.      Functional Mobility/Transfers:  · Patient completed Sit <> Stand  Transfer with independence  with  no assistive device   · Patient completed Toilet Transfer Stand Pivot technique with independence with  no AD    Activities of Daily Living:  · Grooming: independence with all grooming tasks  · Lower Body Dressing: independence to don/doff socks while seated in chair    Cognitive/Visual Perceptual:  Cognitive/Psychosocial Skills:     -       Oriented to: x4   -       Follows Commands/attention:Follows multistep  commands  -       Communication: clear/fluent  -       Safety awareness/insight to disability: intact   -       Mood/Affect/Coping skills/emotional control: Appropriate to situation and Cooperative  Visual/Perceptual:      -Intact     Physical Exam:  Postural examination/scapula alignment:    -       Rounded shoulders  -       Forward head  Upper Extremity Range of Motion:     -       Right Upper Extremity: WNL  -       Left Upper Extremity: WNL  Upper Extremity Strength:    -       Right Upper Extremity: WNL  -       Left Upper Extremity: WNL   Strength:    Fine Motor Coordination:    -       Intact  Gross motor coordination:   WNL    AMPAC 6 Click ADL:  AMPAC Total Score: 24    Education:    Patient left up in chair with all lines intact, call button in reach, nurse notified and wife present    GOALS:   Multidisciplinary Problems     Occupational Therapy Goals     Not on file                History:     Past Medical History:   Diagnosis Date    Diabetes mellitus     Elevated PSA     Hyperlipidemia     Hypertension        Past Surgical History:   Procedure Laterality Date    COLONOSCOPY  12/6/2013    Dr. Martinez, 5 year recheck    SHOULDER SURGERY  12/09/2011    right        Time Tracking:     OT Date of Treatment: 02/10/22  OT Start Time: 1048  OT Stop Time: 1103  OT Total Time (min): 15 min    Billable Minutes:Evaluation 5  Self Care/Home Management 10    2/10/2022

## 2022-02-10 NOTE — CARE UPDATE
02/10/22 0811   PRE-TX-O2   O2 Device (Oxygen Therapy) High Flow nasal Cannula   $ Is the patient on Low Flow Oxygen? Yes   Flow (L/min) 8   Oxygen Concentration (%) 55   SpO2 97 %   Pulse Oximetry Type Intermittent   $ Pulse Oximetry - Multiple Charge Pulse Oximetry - Multiple   Probe Placed On (Pulse Ox) finger   Pulse 71   Resp 18   Positioning Sitting in chair   Incentive Spirometer   $ Incentive Spirometer Charges done with encouragement   Incentive Spirometer Predicted Level (mL) 1750   Administration (IS) proper technique demonstrated   Number of Repetitions (IS) 10   Level Incentive Spirometer (mL) 1500   Patient Tolerance (IS) good   POx, IS QS

## 2022-02-10 NOTE — PROGRESS NOTES
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  Had covid 2/1 -- was having fevers 101 for few days with sinus and loss sense smell, when temp got to 102 he presented.  Was in hosp til 2/5 going home on 3lpm with no steroids.  Pt represented needing higher ox-- sats to 70's walking on 3lpm.  Appetite ok , cough mild and now productive gray material.  Sugars were up last admit needing insulin, usually on metformin 1500/d.      From Dr Tatum hpi 2/6/2022:   HPI: Patient is a 72-year-old male with past medical history of diabetes, hypertension, hyperlipidemia, former tobacco use who was diagnosed with COVID on 02/01/2022 and recently hospitalized for hypoxic respiratory failure from COVID during which he completed a course of remdesivir and was discharged yesterday with supplemental oxygen repeat presents today with complaint of hypoxia at home on 4 L nasal cannula.  Wife reports he did well last night however since then has been having trouble maintaining his saturations on 4 L especially with movement.  Denies any fever, chills, chest pain, nausea, vomiting.  He is drowsy at this time from cough medicine received in the ED.  D-dimer has increased from previous.  CTA ordered.  He is currently on 5 L nasal cannula with oxygen saturation at 95%      Progress Note  PULMONARY    Admit Date: 2/6/2022   02/10/2022      SUBJECTIVE:     2/8 no c/o  2/9 no new c/o  2/10 no new c/o        PFSH and Allergies reviewed.    OBJECTIVE:     Vitals (Most recent):  Vitals:    02/10/22 0328   BP: 137/65   Pulse: (!) 58   Resp: 18   Temp: 97.2 °F (36.2 °C)       Vitals (24 hour range):  Temp:  [97.2 °F (36.2 °C)-99.9 °F (37.7 °C)]   Pulse:  [52-73]   Resp:  [16-18]   BP: (105-150)/(55-70)    SpO2:  [90 %-99 %]     No intake or output data in the 24 hours ending 02/10/22 0648       Physical Exam:  The patient's neuro status (alertness,orientation,cognitive function,motor skills,), pharyngeal exam (oral lesions, hygiene, abn dentition,), Neck (jvd,mass,thyroid,nodes in neck and above/below clavicle),RESPIRATORY(symmetry,effort,fremitus,percussion,auscultation),  Cor(rhythm,heart tones including gallops,perfusion,edema)ABD(distention,hepatic&splenomegaly,tenderness,masses), Skin(rash,cyanosis),Psyc(affect,judgement,).  Exam negative except for these pertinent findings:    Alert, min rales.    Radiographs reviewed: view by direct vision    No results found for this or any previous visit.  ]    Labs     Recent Labs   Lab 02/10/22  0519   WBC 11.37   HGB 11.8*   HCT 35.3*   *     Recent Labs   Lab 02/10/22  0519      K 5.5*      CO2 27   BUN 26*   CREATININE 1.3   *   CALCIUM 9.6   AST 26   ALT 54*   ALKPHOS 55   BILITOT 0.6   PROT 6.6   ALBUMIN 2.8*   CRP 23.2*   No results for input(s): PH, PCO2, PO2, HCO3 in the last 24 hours.  Microbiology Results (last 7 days)     ** No results found for the last 168 hours. **          Impression:  Active Hospital Problems    Diagnosis  POA    *Acute hypoxemic respiratory failure due to COVID-19 [U07.1, J96.01]  Yes    Asbestos exposure [Z77.090]  Not Applicable    Anemia [D64.9]  Yes    Hyperlipidemia associated with type 2 diabetes mellitus [E11.69, E78.5]  Yes    Hypertension associated with diabetes [E11.59, I15.2]  Yes    Controlled type 2 diabetes mellitus with microalbuminuria, without long-term current use of insulin [E11.29, R80.9]  Yes    BPH (benign prostatic hyperplasia) [N40.0]  Yes      Resolved Hospital Problems   No resolved problems to display.        .    Plan: tm 101.2, vss, 70% ox, Actrema dosed, wbc 7.9, glu 209,  crp 80 at admit 2/1, down to 26 yesterday 2/6,       cta lungs no pe, posterior dense covid type  infiltrates.   Ox needs incesed from 4 to 12 lpm last 24 hrs.     Consider ordering prn bipap.  F/u crp am.     low dose  lovenox.      2/8   No fever,vss,  8lpm,  crp up to 96 from 26 on 6th.    Had flare off steroids ppt readmit  Will increase decadron to 12/d and dose solumedrol    Discussed need for increased steroids with pt.  2/9 no fever, vss, wbc 11k, k 5.5,   Ox needs stable 10 lpm  Monitor , sugars up./    2/10 no fever, vss,  11lpm,   crp  Now 23 from 96 on 8th,     7 lpm later in day.  Stable/improved

## 2022-02-11 LAB
ALBUMIN SERPL BCP-MCNC: 2.7 G/DL (ref 3.5–5.2)
ALP SERPL-CCNC: 51 U/L (ref 55–135)
ALT SERPL W/O P-5'-P-CCNC: 54 U/L (ref 10–44)
ANION GAP SERPL CALC-SCNC: 9 MMOL/L (ref 8–16)
AST SERPL-CCNC: 22 U/L (ref 10–40)
BASOPHILS # BLD AUTO: 0.01 K/UL (ref 0–0.2)
BASOPHILS NFR BLD: 0.1 % (ref 0–1.9)
BILIRUB SERPL-MCNC: 0.5 MG/DL (ref 0.1–1)
BUN SERPL-MCNC: 29 MG/DL (ref 8–23)
CALCIUM SERPL-MCNC: 9 MG/DL (ref 8.7–10.5)
CHLORIDE SERPL-SCNC: 100 MMOL/L (ref 95–110)
CO2 SERPL-SCNC: 25 MMOL/L (ref 23–29)
CREAT SERPL-MCNC: 1.3 MG/DL (ref 0.5–1.4)
DIFFERENTIAL METHOD: ABNORMAL
EOSINOPHIL # BLD AUTO: 0 K/UL (ref 0–0.5)
EOSINOPHIL NFR BLD: 0.1 % (ref 0–8)
ERYTHROCYTE [DISTWIDTH] IN BLOOD BY AUTOMATED COUNT: 11.9 % (ref 11.5–14.5)
EST. GFR  (AFRICAN AMERICAN): >60 ML/MIN/1.73 M^2
EST. GFR  (NON AFRICAN AMERICAN): 55 ML/MIN/1.73 M^2
GLUCOSE SERPL-MCNC: 294 MG/DL (ref 70–110)
HCT VFR BLD AUTO: 37.2 % (ref 40–54)
HGB BLD-MCNC: 12.4 G/DL (ref 14–18)
IMM GRANULOCYTES # BLD AUTO: 0.18 K/UL (ref 0–0.04)
IMM GRANULOCYTES NFR BLD AUTO: 1.7 % (ref 0–0.5)
LYMPHOCYTES # BLD AUTO: 0.5 K/UL (ref 1–4.8)
LYMPHOCYTES NFR BLD: 4.8 % (ref 18–48)
MCH RBC QN AUTO: 29.7 PG (ref 27–31)
MCHC RBC AUTO-ENTMCNC: 33.3 G/DL (ref 32–36)
MCV RBC AUTO: 89 FL (ref 82–98)
MONOCYTES # BLD AUTO: 0.7 K/UL (ref 0.3–1)
MONOCYTES NFR BLD: 6.3 % (ref 4–15)
NEUTROPHILS # BLD AUTO: 9 K/UL (ref 1.8–7.7)
NEUTROPHILS NFR BLD: 87 % (ref 38–73)
NRBC BLD-RTO: 0 /100 WBC
PLATELET # BLD AUTO: 395 K/UL (ref 150–450)
PMV BLD AUTO: 10.9 FL (ref 9.2–12.9)
POCT GLUCOSE: 267 MG/DL (ref 70–110)
POCT GLUCOSE: 312 MG/DL (ref 70–110)
POCT GLUCOSE: 362 MG/DL (ref 70–110)
POCT GLUCOSE: 384 MG/DL (ref 70–110)
POTASSIUM SERPL-SCNC: 4.8 MMOL/L (ref 3.5–5.1)
PROT SERPL-MCNC: 6.2 G/DL (ref 6–8.4)
RBC # BLD AUTO: 4.18 M/UL (ref 4.6–6.2)
SODIUM SERPL-SCNC: 134 MMOL/L (ref 136–145)
WBC # BLD AUTO: 10.4 K/UL (ref 3.9–12.7)

## 2022-02-11 PROCEDURE — 63600175 PHARM REV CODE 636 W HCPCS: Performed by: HOSPITALIST

## 2022-02-11 PROCEDURE — 85025 COMPLETE CBC W/AUTO DIFF WBC: CPT | Performed by: STUDENT IN AN ORGANIZED HEALTH CARE EDUCATION/TRAINING PROGRAM

## 2022-02-11 PROCEDURE — 36415 COLL VENOUS BLD VENIPUNCTURE: CPT | Performed by: STUDENT IN AN ORGANIZED HEALTH CARE EDUCATION/TRAINING PROGRAM

## 2022-02-11 PROCEDURE — 25000242 PHARM REV CODE 250 ALT 637 W/ HCPCS: Performed by: INTERNAL MEDICINE

## 2022-02-11 PROCEDURE — 99232 PR SUBSEQUENT HOSPITAL CARE,LEVL II: ICD-10-PCS | Mod: ,,, | Performed by: INTERNAL MEDICINE

## 2022-02-11 PROCEDURE — 63600175 PHARM REV CODE 636 W HCPCS: Performed by: STUDENT IN AN ORGANIZED HEALTH CARE EDUCATION/TRAINING PROGRAM

## 2022-02-11 PROCEDURE — 80053 COMPREHEN METABOLIC PANEL: CPT | Performed by: STUDENT IN AN ORGANIZED HEALTH CARE EDUCATION/TRAINING PROGRAM

## 2022-02-11 PROCEDURE — 94799 UNLISTED PULMONARY SVC/PX: CPT

## 2022-02-11 PROCEDURE — 25000003 PHARM REV CODE 250: Performed by: STUDENT IN AN ORGANIZED HEALTH CARE EDUCATION/TRAINING PROGRAM

## 2022-02-11 PROCEDURE — C9399 UNCLASSIFIED DRUGS OR BIOLOG: HCPCS | Performed by: STUDENT IN AN ORGANIZED HEALTH CARE EDUCATION/TRAINING PROGRAM

## 2022-02-11 PROCEDURE — 94664 DEMO&/EVAL PT USE INHALER: CPT

## 2022-02-11 PROCEDURE — 94761 N-INVAS EAR/PLS OXIMETRY MLT: CPT

## 2022-02-11 PROCEDURE — 99232 SBSQ HOSP IP/OBS MODERATE 35: CPT | Mod: ,,, | Performed by: INTERNAL MEDICINE

## 2022-02-11 PROCEDURE — 99900035 HC TECH TIME PER 15 MIN (STAT)

## 2022-02-11 PROCEDURE — 25000003 PHARM REV CODE 250: Performed by: HOSPITALIST

## 2022-02-11 PROCEDURE — 20600001 HC STEP DOWN PRIVATE ROOM

## 2022-02-11 PROCEDURE — 27000221 HC OXYGEN, UP TO 24 HOURS

## 2022-02-11 PROCEDURE — 27000207 HC ISOLATION

## 2022-02-11 PROCEDURE — 63600175 PHARM REV CODE 636 W HCPCS: Performed by: INTERNAL MEDICINE

## 2022-02-11 RX ORDER — INSULIN ASPART 100 [IU]/ML
5 INJECTION, SOLUTION INTRAVENOUS; SUBCUTANEOUS
Status: DISCONTINUED | OUTPATIENT
Start: 2022-02-11 | End: 2022-02-14 | Stop reason: HOSPADM

## 2022-02-11 RX ADMIN — POLYETHYLENE GLYCOL 3350 17 G: 17 POWDER, FOR SOLUTION ORAL at 08:02

## 2022-02-11 RX ADMIN — DOCUSATE SODIUM AND SENNOSIDES 1 TABLET: 8.6; 5 TABLET, FILM COATED ORAL at 08:02

## 2022-02-11 RX ADMIN — FAMOTIDINE 20 MG: 20 TABLET, FILM COATED ORAL at 08:02

## 2022-02-11 RX ADMIN — ENOXAPARIN SODIUM 40 MG: 100 INJECTION SUBCUTANEOUS at 05:02

## 2022-02-11 RX ADMIN — ATORVASTATIN CALCIUM 40 MG: 40 TABLET, FILM COATED ORAL at 08:02

## 2022-02-11 RX ADMIN — INSULIN ASPART 6 UNITS: 100 INJECTION, SOLUTION INTRAVENOUS; SUBCUTANEOUS at 04:02

## 2022-02-11 RX ADMIN — INSULIN ASPART 5 UNITS: 100 INJECTION, SOLUTION INTRAVENOUS; SUBCUTANEOUS at 01:02

## 2022-02-11 RX ADMIN — TAMSULOSIN HYDROCHLORIDE 0.4 MG: 0.4 CAPSULE ORAL at 08:02

## 2022-02-11 RX ADMIN — INSULIN DETEMIR 25 UNITS: 100 INJECTION, SOLUTION SUBCUTANEOUS at 09:02

## 2022-02-11 RX ADMIN — LOSARTAN POTASSIUM 25 MG: 25 TABLET, FILM COATED ORAL at 08:02

## 2022-02-11 RX ADMIN — INSULIN ASPART 8 UNITS: 100 INJECTION, SOLUTION INTRAVENOUS; SUBCUTANEOUS at 08:02

## 2022-02-11 RX ADMIN — INSULIN ASPART 5 UNITS: 100 INJECTION, SOLUTION INTRAVENOUS; SUBCUTANEOUS at 04:02

## 2022-02-11 RX ADMIN — DEXAMETHASONE 6 MG: 4 TABLET ORAL at 08:02

## 2022-02-11 RX ADMIN — INSULIN DETEMIR 25 UNITS: 100 INJECTION, SOLUTION SUBCUTANEOUS at 01:02

## 2022-02-11 RX ADMIN — INSULIN ASPART 10 UNITS: 100 INJECTION, SOLUTION INTRAVENOUS; SUBCUTANEOUS at 01:02

## 2022-02-11 NOTE — CARE UPDATE
02/11/22 0748   PRE-TX-O2   O2 Device (Oxygen Therapy) High Flow nasal Cannula   $ Is the patient on Low Flow Oxygen? Yes   Flow (L/min) 4   Oxygen Concentration (%) 36   SpO2 97 %   Pulse Oximetry Type Intermittent   $ Pulse Oximetry - Multiple Charge Pulse Oximetry - Multiple   Probe Placed On (Pulse Ox) finger   Pulse 63   Resp 15   Positioning Sitting in chair   Incentive Spirometer   $ Incentive Spirometer Charges done with encouragement   Incentive Spirometer Predicted Level (mL) 1750   Administration (IS) proper technique demonstrated   Number of Repetitions (IS) 10   Level Incentive Spirometer (mL) 1500   Patient Tolerance (IS) good   Vibratory PEP Therapy   $ Vibratory PEP Charges [employer] Therapy   $ Vibratory PEP Tech Time Charges 15 min   Type (PEP Therapy) vibratory/oscillatory   Device (PEP Therapy) flutter   Route (PEP Therapy) mouthpiece   Breaths per Cycle (PEP Therapy) 10   Cycles (PEP Therapy) 1   Patient Position (PEP Therapy) sitting in chair   Signs of Intolerance (PEP Therapy) none   POx, IS QS, [employer] Q8

## 2022-02-11 NOTE — ASSESSMENT & PLAN NOTE
-Decadron  -S/p tocilizumab  -Lovenox PPx  -NC  -Pulmonary consulted  -Trend inflammatory markers  -Counseled on self-proning  -Airborne, isolation and contact precautions

## 2022-02-11 NOTE — PROGRESS NOTES
"[facility] Hospital Medicine  Progress Note    Patient Name: Ludwin Gee  MRN: [id number]  Patient Class: IP- Inpatient   Admission Date: 2/6/2022  Length of Stay: 5 days  Attending Physician: Adis Field MD  Primary Care Provider: Herrera Fisher MD        Subjective:     Principal Problem:Acute hypoxemic respiratory failure due to COVID-19        HPI:  Patient is a 72-year-old male with past medical history of diabetes, hypertension, hyperlipidemia, former tobacco use who was diagnosed with COVID on 02/01/2022 and recently hospitalized for hypoxic respiratory failure from COVID during which he completed a course of remdesivir and was discharged yesterday with supplemental oxygen repeat presents today with complaint of hypoxia at home on 4 L nasal cannula.  Wife reports he did well last night however since then has been having trouble maintaining his saturations on 4 L especially with movement.  Denies any fever, chills, chest pain, nausea, vomiting.  He is drowsy at this time from cough medicine received in the ED.  D-dimer has increased from previous.  CTA ordered.  He is currently on 5 L nasal cannula with oxygen saturation at 95%       Overview/Hospital Course:  Ludwin Webber is a 72 year old male with a past medical history of diabetes, hypertension, hyperlipidemia, and former tobacco use who was recently diagnosed with COVID-19 pneumonia with recent discharge who re-presented for increasing O2 requirement. He was started on steroids and tocilizumab was ordered (given 2/7). He is currently stable on 4 L NC. He is also on prophylactic dose Lovenox. Pulmonary has been consulted.      Interval History: see "Hospital Course."    Review of Systems   Constitutional: Negative for chills and fever.   Respiratory: Positive for shortness of breath. Negative for cough.    Gastrointestinal: Negative for diarrhea.   Allergic/Immunologic: Positive for immunocompromised state.   All other systems " reviewed and are negative.    Objective:     Vital Signs (Most Recent):  Temp: 96.9 °F (36.1 °C) (02/11/22 1635)  Pulse: 69 (02/11/22 1635)  Resp: 14 (02/11/22 1635)  BP: (!) 106/57 (02/11/22 1635)  SpO2: (!) 93 % (02/11/22 1635) Vital Signs (24h Range):  Temp:  [96.6 °F (35.9 °C)-98.1 °F (36.7 °C)] 96.9 °F (36.1 °C)  Pulse:  [58-82] 69  Resp:  [14-20] 14  SpO2:  [92 %-97 %] 93 %  BP: (102-137)/(57-62) 106/57     Weight: 73.7 kg (162 lb 7.7 oz)  Body mass index is 24.7 kg/m².  No intake or output data in the 24 hours ending 02/11/22 1647   Physical Exam  Vitals and nursing note reviewed.   Constitutional:       Appearance: He is ill-appearing.   HENT:      Head: Normocephalic and atraumatic.      Right Ear: External ear normal.      Left Ear: External ear normal.      Nose: Nose normal.      Mouth/Throat:      Mouth: Mucous membranes are moist.      Pharynx: Oropharynx is clear.   Eyes:      Extraocular Movements: Extraocular movements intact.      Conjunctiva/sclera: Conjunctivae normal.   Cardiovascular:      Rate and Rhythm: Normal rate and regular rhythm.      Pulses: Normal pulses.   Pulmonary:      Effort: Pulmonary effort is normal.      Comments: NC.  Abdominal:      General: Bowel sounds are normal.      Palpations: Abdomen is soft.   Musculoskeletal:         General: Normal range of motion.      Cervical back: Normal range of motion and neck supple.      Right lower leg: No edema.      Left lower leg: No edema.   Skin:     General: Skin is warm and dry.   Neurological:      General: No focal deficit present.      Mental Status: He is alert and oriented to person, place, and time. Mental status is at baseline.   Psychiatric:         Mood and Affect: Mood normal.         Behavior: Behavior normal.         Significant Labs: All pertinent labs within the past 24 hours have been reviewed.    Significant Imaging: I have reviewed all pertinent imaging results/findings within the past 24  hours.      Assessment/Plan:      * Acute hypoxemic respiratory failure due to COVID-19  -Decadron  -S/p tocilizumab  -Lovenox PPx  -NC  -Pulmonary consulted  -Trend inflammatory markers  -Counseled on self-proning  -Airborne, isolation and contact precautions    Asbestos exposure  -May need further workup in outpatient setting      Anemia  -Trend CBC      Hyperlipidemia associated with type 2 diabetes mellitus  -Continue statin    Hypertension associated with diabetes  -Continue home medications    BPH (benign prostatic hyperplasia)  -Continue Flomax      Controlled type 2 diabetes mellitus with microalbuminuria, without long-term current use of insulin  -SSI  -Continue to titrate scheduled insulin  -AC HS glucose checks  -Hypoglycemic precautions      VTE Risk Mitigation (From admission, onward)         Ordered     enoxaparin injection 40 mg  Daily         02/06/22 1638     Place sequential compression device  Until discontinued         02/06/22 1638                Discharge Planning   ANTELMO: 2/14/2022     Code Status: Full Code   Is the patient medically ready for discharge?:     Reason for patient still in hospital (select all that apply): Patient trending condition and Treatment  Discharge Plan A: Home                  Adis Field MD  Department of Hospital Medicine   [facility]

## 2022-02-11 NOTE — NURSING
Pt glucose was 411 in left hand and 419 in right hand. Notified NP Ankush, advised to give sliding scale and recheck    11:34  Glucose Recheck 269

## 2022-02-11 NOTE — PROGRESS NOTES
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  Had covid 2/1 -- was having fevers 101 for few days with sinus and loss sense smell, when temp got to 102 he presented.  Was in hosp til 2/5 going home on 3lpm with no steroids.  Pt represented needing higher ox-- sats to 70's walking on 3lpm.  Appetite ok , cough mild and now productive gray material.  Sugars were up last admit needing insulin, usually on metformin 1500/d.      From Dr Tatum hpi 2/6/2022:   HPI: Patient is a 72-year-old male with past medical history of diabetes, hypertension, hyperlipidemia, former tobacco use who was diagnosed with COVID on 02/01/2022 and recently hospitalized for hypoxic respiratory failure from COVID during which he completed a course of remdesivir and was discharged yesterday with supplemental oxygen repeat presents today with complaint of hypoxia at home on 4 L nasal cannula.  Wife reports he did well last night however since then has been having trouble maintaining his saturations on 4 L especially with movement.  Denies any fever, chills, chest pain, nausea, vomiting.  He is drowsy at this time from cough medicine received in the ED.  D-dimer has increased from previous.  CTA ordered.  He is currently on 5 L nasal cannula with oxygen saturation at 95%      Progress Note  PULMONARY    Admit Date: 2/6/2022 02/11/2022      SUBJECTIVE:     2/8 no c/o  2/9 no new c/o  2/10 no new c/o  2/11 no new c/o        PFSH and Allergies reviewed.    OBJECTIVE:     Vitals (Most recent):  Vitals:    02/11/22 0354   BP: 137/62   Pulse: 60   Resp: 18   Temp: 97.5 °F (36.4 °C)       Vitals (24 hour range):  Temp:  [96.1 °F (35.6 °C)-98.2 °F (36.8 °C)]   Pulse:  [60-82]   Resp:  [17-20]   BP:  (109-137)/(52-62)   SpO2:  [92 %-97 %]     No intake or output data in the 24 hours ending 02/11/22 0606       Physical Exam:  The patient's neuro status (alertness,orientation,cognitive function,motor skills,), pharyngeal exam (oral lesions, hygiene, abn dentition,), Neck (jvd,mass,thyroid,nodes in neck and above/below clavicle),RESPIRATORY(symmetry,effort,fremitus,percussion,auscultation),  Cor(rhythm,heart tones including gallops,perfusion,edema)ABD(distention,hepatic&splenomegaly,tenderness,masses), Skin(rash,cyanosis),Psyc(affect,judgement,).  Exam negative except for these pertinent findings:    Alert, min rales.    Radiographs reviewed: view by direct vision    No results found for this or any previous visit.  ]    Labs     Recent Labs   Lab 02/11/22  0442   WBC 10.40   HGB 12.4*   HCT 37.2*        Recent Labs   Lab 02/11/22 0442   *   K 4.8      CO2 25   BUN 29*   CREATININE 1.3   *   CALCIUM 9.0   AST 22   ALT 54*   ALKPHOS 51*   BILITOT 0.5   PROT 6.2   ALBUMIN 2.7*   No results for input(s): PH, PCO2, PO2, HCO3 in the last 24 hours.  Microbiology Results (last 7 days)     ** No results found for the last 168 hours. **          Impression:  Active Hospital Problems    Diagnosis  POA    *Acute hypoxemic respiratory failure due to COVID-19 [U07.1, J96.01]  Yes    Asbestos exposure [Z77.090]  Not Applicable    Anemia [D64.9]  Yes    Hyperlipidemia associated with type 2 diabetes mellitus [E11.69, E78.5]  Yes    Hypertension associated with diabetes [E11.59, I15.2]  Yes    Controlled type 2 diabetes mellitus with microalbuminuria, without long-term current use of insulin [E11.29, R80.9]  Yes    BPH (benign prostatic hyperplasia) [N40.0]  Yes      Resolved Hospital Problems   No resolved problems to display.        .    Plan: tm 101.2, vss, 70% ox, Actrema dosed, wbc 7.9, glu 209,  crp 80 at admit 2/1, down to 26 yesterday 2/6,       cta lungs no pe, posterior dense covid type  infiltrates.   Ox needs incesed from 4 to 12 lpm last 24 hrs.     Consider ordering prn bipap.  F/u crp am.     low dose  lovenox.      2/8   No fever,vss,  8lpm,  crp up to 96 from 26 on 6th.    Had flare off steroids ppt readmit  Will increase decadron to 12/d and dose solumedrol    Discussed need for increased steroids with pt.  2/9 no fever, vss, wbc 11k, k 5.5,   Ox needs stable 10 lpm  Monitor , sugars up./    2/10 no fever, vss,  11lpm,   crp  Now 23 from 96 on 8th,     7 lpm later in day.  Stable/improved  2/11 no fever, vss, 4 lpm,  Progressing, sugars ok

## 2022-02-11 NOTE — PLAN OF CARE
Pt is able to verbalize needs. Pt slept throughout shift. Denies any pain. Will continue to monitor

## 2022-02-11 NOTE — PLAN OF CARE
Discussed plan of care with patient and his wife. Informed patient that he will receive chest physiotherapy to assist with decreasing his O2 needs. Patient and spouse agree with plan of care.

## 2022-02-11 NOTE — SUBJECTIVE & OBJECTIVE
"Interval History: see "Hospital Course."    Review of Systems   Constitutional: Negative for chills and fever.   Respiratory: Positive for shortness of breath. Negative for cough.    Gastrointestinal: Negative for diarrhea.   Allergic/Immunologic: Positive for immunocompromised state.   All other systems reviewed and are negative.    Objective:     Vital Signs (Most Recent):  Temp: 96.9 °F (36.1 °C) (02/11/22 1635)  Pulse: 69 (02/11/22 1635)  Resp: 14 (02/11/22 1635)  BP: (!) 106/57 (02/11/22 1635)  SpO2: (!) 93 % (02/11/22 1635) Vital Signs (24h Range):  Temp:  [96.6 °F (35.9 °C)-98.1 °F (36.7 °C)] 96.9 °F (36.1 °C)  Pulse:  [58-82] 69  Resp:  [14-20] 14  SpO2:  [92 %-97 %] 93 %  BP: (102-137)/(57-62) 106/57     Weight: 73.7 kg (162 lb 7.7 oz)  Body mass index is 24.7 kg/m².  No intake or output data in the 24 hours ending 02/11/22 1647   Physical Exam  Vitals and nursing note reviewed.   Constitutional:       Appearance: He is ill-appearing.   HENT:      Head: Normocephalic and atraumatic.      Right Ear: External ear normal.      Left Ear: External ear normal.      Nose: Nose normal.      Mouth/Throat:      Mouth: Mucous membranes are moist.      Pharynx: Oropharynx is clear.   Eyes:      Extraocular Movements: Extraocular movements intact.      Conjunctiva/sclera: Conjunctivae normal.   Cardiovascular:      Rate and Rhythm: Normal rate and regular rhythm.      Pulses: Normal pulses.   Pulmonary:      Effort: Pulmonary effort is normal.      Comments: NC.  Abdominal:      General: Bowel sounds are normal.      Palpations: Abdomen is soft.   Musculoskeletal:         General: Normal range of motion.      Cervical back: Normal range of motion and neck supple.      Right lower leg: No edema.      Left lower leg: No edema.   Skin:     General: Skin is warm and dry.   Neurological:      General: No focal deficit present.      Mental Status: He is alert and oriented to person, place, and time. Mental status is at " baseline.   Psychiatric:         Mood and Affect: Mood normal.         Behavior: Behavior normal.         Significant Labs: All pertinent labs within the past 24 hours have been reviewed.    Significant Imaging: I have reviewed all pertinent imaging results/findings within the past 24 hours.

## 2022-02-12 LAB
ALBUMIN SERPL BCP-MCNC: 2.9 G/DL (ref 3.5–5.2)
ALP SERPL-CCNC: 53 U/L (ref 55–135)
ALT SERPL W/O P-5'-P-CCNC: 48 U/L (ref 10–44)
ANION GAP SERPL CALC-SCNC: 9 MMOL/L (ref 8–16)
AST SERPL-CCNC: 18 U/L (ref 10–40)
BASOPHILS # BLD AUTO: 0.01 K/UL (ref 0–0.2)
BASOPHILS NFR BLD: 0.1 % (ref 0–1.9)
BILIRUB SERPL-MCNC: 0.6 MG/DL (ref 0.1–1)
BUN SERPL-MCNC: 29 MG/DL (ref 8–23)
CALCIUM SERPL-MCNC: 9.4 MG/DL (ref 8.7–10.5)
CHLORIDE SERPL-SCNC: 100 MMOL/L (ref 95–110)
CO2 SERPL-SCNC: 28 MMOL/L (ref 23–29)
CREAT SERPL-MCNC: 1.1 MG/DL (ref 0.5–1.4)
CRP SERPL-MCNC: 7.8 MG/L (ref 0–8.2)
D DIMER PPP IA.FEU-MCNC: 1.33 MG/L FEU
DIFFERENTIAL METHOD: ABNORMAL
EOSINOPHIL # BLD AUTO: 0 K/UL (ref 0–0.5)
EOSINOPHIL NFR BLD: 0.1 % (ref 0–8)
ERYTHROCYTE [DISTWIDTH] IN BLOOD BY AUTOMATED COUNT: 11.9 % (ref 11.5–14.5)
EST. GFR  (AFRICAN AMERICAN): >60 ML/MIN/1.73 M^2
EST. GFR  (NON AFRICAN AMERICAN): >60 ML/MIN/1.73 M^2
FERRITIN SERPL-MCNC: 1015 NG/ML (ref 20–300)
GLUCOSE SERPL-MCNC: 113 MG/DL (ref 70–110)
HCT VFR BLD AUTO: 38.6 % (ref 40–54)
HGB BLD-MCNC: 12.9 G/DL (ref 14–18)
IMM GRANULOCYTES # BLD AUTO: 0.13 K/UL (ref 0–0.04)
IMM GRANULOCYTES NFR BLD AUTO: 1 % (ref 0–0.5)
LDH SERPL L TO P-CCNC: 283 U/L (ref 110–260)
LYMPHOCYTES # BLD AUTO: 0.8 K/UL (ref 1–4.8)
LYMPHOCYTES NFR BLD: 6.3 % (ref 18–48)
MCH RBC QN AUTO: 29.9 PG (ref 27–31)
MCHC RBC AUTO-ENTMCNC: 33.4 G/DL (ref 32–36)
MCV RBC AUTO: 89 FL (ref 82–98)
MONOCYTES # BLD AUTO: 0.8 K/UL (ref 0.3–1)
MONOCYTES NFR BLD: 6 % (ref 4–15)
NEUTROPHILS # BLD AUTO: 10.9 K/UL (ref 1.8–7.7)
NEUTROPHILS NFR BLD: 86.5 % (ref 38–73)
NRBC BLD-RTO: 0 /100 WBC
PLATELET # BLD AUTO: 383 K/UL (ref 150–450)
PMV BLD AUTO: 10.9 FL (ref 9.2–12.9)
POCT GLUCOSE: 130 MG/DL (ref 70–110)
POCT GLUCOSE: 168 MG/DL (ref 70–110)
POCT GLUCOSE: 181 MG/DL (ref 70–110)
POCT GLUCOSE: 240 MG/DL (ref 70–110)
POTASSIUM SERPL-SCNC: 4.9 MMOL/L (ref 3.5–5.1)
PROT SERPL-MCNC: 6.5 G/DL (ref 6–8.4)
RBC # BLD AUTO: 4.32 M/UL (ref 4.6–6.2)
SODIUM SERPL-SCNC: 137 MMOL/L (ref 136–145)
WBC # BLD AUTO: 12.63 K/UL (ref 3.9–12.7)

## 2022-02-12 PROCEDURE — 80053 COMPREHEN METABOLIC PANEL: CPT | Performed by: STUDENT IN AN ORGANIZED HEALTH CARE EDUCATION/TRAINING PROGRAM

## 2022-02-12 PROCEDURE — 94664 DEMO&/EVAL PT USE INHALER: CPT

## 2022-02-12 PROCEDURE — 94799 UNLISTED PULMONARY SVC/PX: CPT

## 2022-02-12 PROCEDURE — 27000221 HC OXYGEN, UP TO 24 HOURS

## 2022-02-12 PROCEDURE — 63600175 PHARM REV CODE 636 W HCPCS: Performed by: INTERNAL MEDICINE

## 2022-02-12 PROCEDURE — 25000003 PHARM REV CODE 250: Performed by: HOSPITALIST

## 2022-02-12 PROCEDURE — 99232 SBSQ HOSP IP/OBS MODERATE 35: CPT | Mod: ,,, | Performed by: INTERNAL MEDICINE

## 2022-02-12 PROCEDURE — 25000003 PHARM REV CODE 250: Performed by: STUDENT IN AN ORGANIZED HEALTH CARE EDUCATION/TRAINING PROGRAM

## 2022-02-12 PROCEDURE — 85025 COMPLETE CBC W/AUTO DIFF WBC: CPT | Performed by: STUDENT IN AN ORGANIZED HEALTH CARE EDUCATION/TRAINING PROGRAM

## 2022-02-12 PROCEDURE — 99232 PR SUBSEQUENT HOSPITAL CARE,LEVL II: ICD-10-PCS | Mod: ,,, | Performed by: INTERNAL MEDICINE

## 2022-02-12 PROCEDURE — 99900035 HC TECH TIME PER 15 MIN (STAT)

## 2022-02-12 PROCEDURE — 63600175 PHARM REV CODE 636 W HCPCS: Performed by: HOSPITALIST

## 2022-02-12 PROCEDURE — 36415 COLL VENOUS BLD VENIPUNCTURE: CPT | Performed by: STUDENT IN AN ORGANIZED HEALTH CARE EDUCATION/TRAINING PROGRAM

## 2022-02-12 PROCEDURE — 86140 C-REACTIVE PROTEIN: CPT | Performed by: STUDENT IN AN ORGANIZED HEALTH CARE EDUCATION/TRAINING PROGRAM

## 2022-02-12 PROCEDURE — 82728 ASSAY OF FERRITIN: CPT | Performed by: STUDENT IN AN ORGANIZED HEALTH CARE EDUCATION/TRAINING PROGRAM

## 2022-02-12 PROCEDURE — 94761 N-INVAS EAR/PLS OXIMETRY MLT: CPT

## 2022-02-12 PROCEDURE — 85379 FIBRIN DEGRADATION QUANT: CPT | Performed by: STUDENT IN AN ORGANIZED HEALTH CARE EDUCATION/TRAINING PROGRAM

## 2022-02-12 PROCEDURE — 27000207 HC ISOLATION

## 2022-02-12 PROCEDURE — 83615 LACTATE (LD) (LDH) ENZYME: CPT | Performed by: STUDENT IN AN ORGANIZED HEALTH CARE EDUCATION/TRAINING PROGRAM

## 2022-02-12 PROCEDURE — 25000242 PHARM REV CODE 250 ALT 637 W/ HCPCS: Performed by: INTERNAL MEDICINE

## 2022-02-12 PROCEDURE — 20600001 HC STEP DOWN PRIVATE ROOM

## 2022-02-12 RX ADMIN — POLYETHYLENE GLYCOL 3350 17 G: 17 POWDER, FOR SOLUTION ORAL at 09:02

## 2022-02-12 RX ADMIN — ATORVASTATIN CALCIUM 40 MG: 40 TABLET, FILM COATED ORAL at 09:02

## 2022-02-12 RX ADMIN — INSULIN ASPART 4 UNITS: 100 INJECTION, SOLUTION INTRAVENOUS; SUBCUTANEOUS at 05:02

## 2022-02-12 RX ADMIN — DOCUSATE SODIUM AND SENNOSIDES 1 TABLET: 8.6; 5 TABLET, FILM COATED ORAL at 09:02

## 2022-02-12 RX ADMIN — INSULIN ASPART 5 UNITS: 100 INJECTION, SOLUTION INTRAVENOUS; SUBCUTANEOUS at 12:02

## 2022-02-12 RX ADMIN — FAMOTIDINE 20 MG: 20 TABLET, FILM COATED ORAL at 09:02

## 2022-02-12 RX ADMIN — TAMSULOSIN HYDROCHLORIDE 0.4 MG: 0.4 CAPSULE ORAL at 09:02

## 2022-02-12 RX ADMIN — DEXAMETHASONE 6 MG: 4 TABLET ORAL at 09:02

## 2022-02-12 RX ADMIN — INSULIN ASPART 5 UNITS: 100 INJECTION, SOLUTION INTRAVENOUS; SUBCUTANEOUS at 05:02

## 2022-02-12 RX ADMIN — INSULIN ASPART 1 UNITS: 100 INJECTION, SOLUTION INTRAVENOUS; SUBCUTANEOUS at 09:02

## 2022-02-12 RX ADMIN — ENOXAPARIN SODIUM 40 MG: 100 INJECTION SUBCUTANEOUS at 05:02

## 2022-02-12 RX ADMIN — INSULIN ASPART 2 UNITS: 100 INJECTION, SOLUTION INTRAVENOUS; SUBCUTANEOUS at 12:02

## 2022-02-12 RX ADMIN — INSULIN ASPART 5 UNITS: 100 INJECTION, SOLUTION INTRAVENOUS; SUBCUTANEOUS at 09:02

## 2022-02-12 NOTE — PROGRESS NOTES
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  Had covid 2/1 -- was having fevers 101 for few days with sinus and loss sense smell, when temp got to 102 he presented.  Was in hosp til 2/5 going home on 3lpm with no steroids.  Pt represented needing higher ox-- sats to 70's walking on 3lpm.  Appetite ok , cough mild and now productive gray material.  Sugars were up last admit needing insulin, usually on metformin 1500/d.      From Dr Tatum hpi 2/6/2022:   HPI: Patient is a 72-year-old male with past medical history of diabetes, hypertension, hyperlipidemia, former tobacco use who was diagnosed with COVID on 02/01/2022 and recently hospitalized for hypoxic respiratory failure from COVID during which he completed a course of remdesivir and was discharged yesterday with supplemental oxygen repeat presents today with complaint of hypoxia at home on 4 L nasal cannula.  Wife reports he did well last night however since then has been having trouble maintaining his saturations on 4 L especially with movement.  Denies any fever, chills, chest pain, nausea, vomiting.  He is drowsy at this time from cough medicine received in the ED.  D-dimer has increased from previous.  CTA ordered.  He is currently on 5 L nasal cannula with oxygen saturation at 95%      Progress Note  PULMONARY    Admit Date: 2/6/2022 02/12/2022      SUBJECTIVE:     2/8 no c/o  2/9 no new c/o  2/10 no new c/o  2/11 no new c/o  2/12 no new c/o        PFSH and Allergies reviewed.    OBJECTIVE:     Vitals (Most recent):  Vitals:    02/12/22 1054   BP: 118/62   Pulse: 68   Resp: 18   Temp: 98.4 °F (36.9 °C)       Vitals (24 hour range):  Temp:  [96.8 °F (36 °C)-98.4 °F (36.9 °C)]   Pulse:  [60-78]   Resp:  [14-20]    BP: ()/(54-65)   SpO2:  [93 %-97 %]       Intake/Output Summary (Last 24 hours) at 2/12/2022 1314  Last data filed at 2/12/2022 0800  Gross per 24 hour   Intake 400 ml   Output --   Net 400 ml          Physical Exam:  The patient's neuro status (alertness,orientation,cognitive function,motor skills,), pharyngeal exam (oral lesions, hygiene, abn dentition,), Neck (jvd,mass,thyroid,nodes in neck and above/below clavicle),RESPIRATORY(symmetry,effort,fremitus,percussion,auscultation),  Cor(rhythm,heart tones including gallops,perfusion,edema)ABD(distention,hepatic&splenomegaly,tenderness,masses), Skin(rash,cyanosis),Psyc(affect,judgement,).  Exam negative except for these pertinent findings:    Alert, min rales.    Radiographs reviewed: view by direct vision    No results found for this or any previous visit.  ]    Labs     Recent Labs   Lab 02/12/22  0521   WBC 12.63   HGB 12.9*   HCT 38.6*        Recent Labs   Lab 02/12/22  0521      K 4.9      CO2 28   BUN 29*   CREATININE 1.1   *   CALCIUM 9.4   AST 18   ALT 48*   ALKPHOS 53*   BILITOT 0.6   PROT 6.5   ALBUMIN 2.9*   CRP 7.8   No results for input(s): PH, PCO2, PO2, HCO3 in the last 24 hours.  Microbiology Results (last 7 days)     ** No results found for the last 168 hours. **          Impression:  Active Hospital Problems    Diagnosis  POA    *Acute hypoxemic respiratory failure due to COVID-19 [U07.1, J96.01]  Yes    Asbestos exposure [Z77.090]  Not Applicable    Anemia [D64.9]  Yes    Hyperlipidemia associated with type 2 diabetes mellitus [E11.69, E78.5]  Yes    Hypertension associated with diabetes [E11.59, I15.2]  Yes    Controlled type 2 diabetes mellitus with microalbuminuria, without long-term current use of insulin [E11.29, R80.9]  Yes    BPH (benign prostatic hyperplasia) [N40.0]  Yes      Resolved Hospital Problems   No resolved problems to display.        .    Plan: tm 101.2, vss, 70% ox, Actrema dosed, wbc 7.9,  glu 209,  crp 80 at admit 2/1, down to 26 yesterday 2/6,       cta lungs no pe, posterior dense covid type infiltrates.   Ox needs incesed from 4 to 12 lpm last 24 hrs.     Consider ordering prn bipap.  F/u crp am.     low dose  lovenox.      2/8   No fever,vss,  8lpm,  crp up to 96 from 26 on 6th.    Had flare off steroids ppt readmit  Will increase decadron to 12/d and dose solumedrol    Discussed need for increased steroids with pt.  2/9 no fever, vss, wbc 11k, k 5.5,   Ox needs stable 10 lpm  Monitor , sugars up./    2/10 no fever, vss,  11lpm,   crp  Now 23 from 96 on 8th,     7 lpm later in day.  Stable/improved  2/11 no fever, vss, 4 lpm,  Progressing, sugars ok  2/12 no fever vss,   4lpm  crp 8 now, monitor

## 2022-02-12 NOTE — SUBJECTIVE & OBJECTIVE
"Interval History: see "Hospital Course."    Review of Systems   Constitutional: Negative for chills and fever.   Respiratory: Positive for shortness of breath. Negative for cough.    Gastrointestinal: Negative for diarrhea.   Allergic/Immunologic: Positive for immunocompromised state.   All other systems reviewed and are negative.    Objective:     Vital Signs (Most Recent):  Temp: 98.4 °F (36.9 °C) (02/12/22 1054)  Pulse: 68 (02/12/22 1054)  Resp: 18 (02/12/22 1054)  BP: 118/62 (02/12/22 1054)  SpO2: (!) 94 % (02/12/22 1054) Vital Signs (24h Range):  Temp:  [96.8 °F (36 °C)-98.4 °F (36.9 °C)] 98.4 °F (36.9 °C)  Pulse:  [60-78] 68  Resp:  [14-20] 18  SpO2:  [93 %-97 %] 94 %  BP: ()/(54-65) 118/62     Weight: 73.7 kg (162 lb 7.7 oz)  Body mass index is 24.7 kg/m².    Intake/Output Summary (Last 24 hours) at 2/12/2022 1448  Last data filed at 2/12/2022 1300  Gross per 24 hour   Intake 800 ml   Output --   Net 800 ml      Physical Exam  Vitals and nursing note reviewed.   Constitutional:       Appearance: He is ill-appearing.   HENT:      Head: Normocephalic and atraumatic.      Right Ear: External ear normal.      Left Ear: External ear normal.      Nose: Nose normal.      Mouth/Throat:      Mouth: Mucous membranes are moist.      Pharynx: Oropharynx is clear.   Eyes:      Extraocular Movements: Extraocular movements intact.      Conjunctiva/sclera: Conjunctivae normal.   Cardiovascular:      Rate and Rhythm: Normal rate and regular rhythm.      Pulses: Normal pulses.   Pulmonary:      Effort: Pulmonary effort is normal.      Comments: NC.  Abdominal:      General: Bowel sounds are normal.      Palpations: Abdomen is soft.   Musculoskeletal:         General: Normal range of motion.      Cervical back: Normal range of motion and neck supple.      Right lower leg: No edema.      Left lower leg: No edema.   Skin:     General: Skin is warm and dry.   Neurological:      General: No focal deficit present.      Mental " Status: He is alert and oriented to person, place, and time. Mental status is at baseline.   Psychiatric:         Mood and Affect: Mood normal.         Behavior: Behavior normal.         Significant Labs: All pertinent labs within the past 24 hours have been reviewed.    Significant Imaging: I have reviewed all pertinent imaging results/findings within the past 24 hours.

## 2022-02-12 NOTE — PROGRESS NOTES
"[facility] Medicine  Progress Note    Patient Name: Ludwin Gee  MRN: [id number]  Patient Class: IP- Inpatient   Admission Date: 2/6/2022  Length of Stay: 6 days  Attending Physician: Adis Field MD  Primary Care Provider: Herrera Fisher MD        Subjective:     Principal Problem:Acute hypoxemic respiratory failure due to COVID-19        HPI:  Patient is a 72-year-old male with past medical history of diabetes, hypertension, hyperlipidemia, former tobacco use who was diagnosed with COVID on 02/01/2022 and recently hospitalized for hypoxic respiratory failure from COVID during which he completed a course of remdesivir and was discharged yesterday with supplemental oxygen repeat presents today with complaint of hypoxia at home on 4 L nasal cannula.  Wife reports he did well last night however since then has been having trouble maintaining his saturations on 4 L especially with movement.  Denies any fever, chills, chest pain, nausea, vomiting.  He is drowsy at this time from cough medicine received in the ED.  D-dimer has increased from previous.  CTA ordered.  He is currently on 5 L nasal cannula with oxygen saturation at 95%       Overview/Hospital Course:  Ludwin Webber is a 72 year old male with a past medical history of diabetes, hypertension, hyperlipidemia, and former tobacco use who was recently diagnosed with COVID-19 pneumonia with recent discharge who re-presented for increasing O2 requirement. He was started on steroids and tocilizumab was ordered (given 2/7). He is currently stable on 4 L NC. He is also on prophylactic dose Lovenox. Inflammatory markers are decreasing. Pulmonary has been consulted.      Interval History: see "Hospital Course."    Review of Systems   Constitutional: Negative for chills and fever.   Respiratory: Positive for shortness of breath. Negative for cough.    Gastrointestinal: Negative for diarrhea.   Allergic/Immunologic: Positive for " immunocompromised state.   All other systems reviewed and are negative.    Objective:     Vital Signs (Most Recent):  Temp: 98.4 °F (36.9 °C) (02/12/22 1054)  Pulse: 68 (02/12/22 1054)  Resp: 18 (02/12/22 1054)  BP: 118/62 (02/12/22 1054)  SpO2: (!) 94 % (02/12/22 1054) Vital Signs (24h Range):  Temp:  [96.8 °F (36 °C)-98.4 °F (36.9 °C)] 98.4 °F (36.9 °C)  Pulse:  [60-78] 68  Resp:  [14-20] 18  SpO2:  [93 %-97 %] 94 %  BP: ()/(54-65) 118/62     Weight: 73.7 kg (162 lb 7.7 oz)  Body mass index is 24.7 kg/m².    Intake/Output Summary (Last 24 hours) at 2/12/2022 1448  Last data filed at 2/12/2022 1300  Gross per 24 hour   Intake 800 ml   Output --   Net 800 ml      Physical Exam  Vitals and nursing note reviewed.   Constitutional:       Appearance: He is ill-appearing.   HENT:      Head: Normocephalic and atraumatic.      Right Ear: External ear normal.      Left Ear: External ear normal.      Nose: Nose normal.      Mouth/Throat:      Mouth: Mucous membranes are moist.      Pharynx: Oropharynx is clear.   Eyes:      Extraocular Movements: Extraocular movements intact.      Conjunctiva/sclera: Conjunctivae normal.   Cardiovascular:      Rate and Rhythm: Normal rate and regular rhythm.      Pulses: Normal pulses.   Pulmonary:      Effort: Pulmonary effort is normal.      Comments: NC.  Abdominal:      General: Bowel sounds are normal.      Palpations: Abdomen is soft.   Musculoskeletal:         General: Normal range of motion.      Cervical back: Normal range of motion and neck supple.      Right lower leg: No edema.      Left lower leg: No edema.   Skin:     General: Skin is warm and dry.   Neurological:      General: No focal deficit present.      Mental Status: He is alert and oriented to person, place, and time. Mental status is at baseline.   Psychiatric:         Mood and Affect: Mood normal.         Behavior: Behavior normal.         Significant Labs: All pertinent labs within the past 24 hours have been  reviewed.    Significant Imaging: I have reviewed all pertinent imaging results/findings within the past 24 hours.      Assessment/Plan:      * Acute hypoxemic respiratory failure due to COVID-19  -Decadron  -S/p tocilizumab  -Lovenox PPx  -NC  -Pulmonary consulted  -Trend inflammatory markers  -Counseled on self-proning  -Airborne, isolation and contact precautions    Asbestos exposure  -May need further workup in outpatient setting      Anemia  -Trend CBC      Hyperlipidemia associated with type 2 diabetes mellitus  -Continue statin    Hypertension associated with diabetes  -Continue home medications    BPH (benign prostatic hyperplasia)  -Continue Flomax      Controlled type 2 diabetes mellitus with microalbuminuria, without long-term current use of insulin  -SSI  -Continue to titrate scheduled insulin  -AC HS glucose checks  -Hypoglycemic precautions      VTE Risk Mitigation (From admission, onward)         Ordered     enoxaparin injection 40 mg  Daily         02/06/22 1638     Place sequential compression device  Until discontinued         02/06/22 1638                Discharge Planning   ANTELMO: 2/14/2022     Code Status: Full Code   Is the patient medically ready for discharge?:     Reason for patient still in hospital (select all that apply): Patient trending condition and Consult recommendations  Discharge Plan A: Home                  Adis Field MD  Department of Hospital Medicine   [facility]

## 2022-02-13 LAB
ALBUMIN SERPL BCP-MCNC: 3.2 G/DL (ref 3.5–5.2)
ALP SERPL-CCNC: 60 U/L (ref 55–135)
ALT SERPL W/O P-5'-P-CCNC: 49 U/L (ref 10–44)
ANION GAP SERPL CALC-SCNC: 10 MMOL/L (ref 8–16)
AST SERPL-CCNC: 23 U/L (ref 10–40)
BASOPHILS # BLD AUTO: 0.02 K/UL (ref 0–0.2)
BASOPHILS NFR BLD: 0.1 % (ref 0–1.9)
BILIRUB SERPL-MCNC: 0.7 MG/DL (ref 0.1–1)
BUN SERPL-MCNC: 27 MG/DL (ref 8–23)
CALCIUM SERPL-MCNC: 9.7 MG/DL (ref 8.7–10.5)
CHLORIDE SERPL-SCNC: 98 MMOL/L (ref 95–110)
CO2 SERPL-SCNC: 29 MMOL/L (ref 23–29)
CREAT SERPL-MCNC: 1.2 MG/DL (ref 0.5–1.4)
DIFFERENTIAL METHOD: ABNORMAL
EOSINOPHIL # BLD AUTO: 0 K/UL (ref 0–0.5)
EOSINOPHIL NFR BLD: 0.1 % (ref 0–8)
ERYTHROCYTE [DISTWIDTH] IN BLOOD BY AUTOMATED COUNT: 12.1 % (ref 11.5–14.5)
EST. GFR  (AFRICAN AMERICAN): >60 ML/MIN/1.73 M^2
EST. GFR  (NON AFRICAN AMERICAN): >60 ML/MIN/1.73 M^2
GLUCOSE SERPL-MCNC: 111 MG/DL (ref 70–110)
HCT VFR BLD AUTO: 41.8 % (ref 40–54)
HGB BLD-MCNC: 14 G/DL (ref 14–18)
IMM GRANULOCYTES # BLD AUTO: 0.17 K/UL (ref 0–0.04)
IMM GRANULOCYTES NFR BLD AUTO: 1.2 % (ref 0–0.5)
LYMPHOCYTES # BLD AUTO: 0.8 K/UL (ref 1–4.8)
LYMPHOCYTES NFR BLD: 5.9 % (ref 18–48)
MCH RBC QN AUTO: 30.2 PG (ref 27–31)
MCHC RBC AUTO-ENTMCNC: 33.5 G/DL (ref 32–36)
MCV RBC AUTO: 90 FL (ref 82–98)
MONOCYTES # BLD AUTO: 0.7 K/UL (ref 0.3–1)
MONOCYTES NFR BLD: 5.1 % (ref 4–15)
NEUTROPHILS # BLD AUTO: 12 K/UL (ref 1.8–7.7)
NEUTROPHILS NFR BLD: 87.6 % (ref 38–73)
NRBC BLD-RTO: 0 /100 WBC
PLATELET # BLD AUTO: 420 K/UL (ref 150–450)
PMV BLD AUTO: 11.1 FL (ref 9.2–12.9)
POCT GLUCOSE: 127 MG/DL (ref 70–110)
POCT GLUCOSE: 241 MG/DL (ref 70–110)
POCT GLUCOSE: 268 MG/DL (ref 70–110)
POCT GLUCOSE: 309 MG/DL (ref 70–110)
POTASSIUM SERPL-SCNC: 5.2 MMOL/L (ref 3.5–5.1)
POTASSIUM SERPL-SCNC: 5.7 MMOL/L (ref 3.5–5.1)
PROT SERPL-MCNC: 7.1 G/DL (ref 6–8.4)
RBC # BLD AUTO: 4.63 M/UL (ref 4.6–6.2)
SODIUM SERPL-SCNC: 137 MMOL/L (ref 136–145)
WBC # BLD AUTO: 13.66 K/UL (ref 3.9–12.7)

## 2022-02-13 PROCEDURE — 99232 SBSQ HOSP IP/OBS MODERATE 35: CPT | Mod: ,,, | Performed by: INTERNAL MEDICINE

## 2022-02-13 PROCEDURE — 94761 N-INVAS EAR/PLS OXIMETRY MLT: CPT

## 2022-02-13 PROCEDURE — 20600001 HC STEP DOWN PRIVATE ROOM

## 2022-02-13 PROCEDURE — 63600175 PHARM REV CODE 636 W HCPCS: Performed by: HOSPITALIST

## 2022-02-13 PROCEDURE — 25000003 PHARM REV CODE 250: Performed by: HOSPITALIST

## 2022-02-13 PROCEDURE — 25000003 PHARM REV CODE 250: Performed by: STUDENT IN AN ORGANIZED HEALTH CARE EDUCATION/TRAINING PROGRAM

## 2022-02-13 PROCEDURE — 36415 COLL VENOUS BLD VENIPUNCTURE: CPT | Performed by: STUDENT IN AN ORGANIZED HEALTH CARE EDUCATION/TRAINING PROGRAM

## 2022-02-13 PROCEDURE — 27000207 HC ISOLATION

## 2022-02-13 PROCEDURE — 80053 COMPREHEN METABOLIC PANEL: CPT | Performed by: STUDENT IN AN ORGANIZED HEALTH CARE EDUCATION/TRAINING PROGRAM

## 2022-02-13 PROCEDURE — 84132 ASSAY OF SERUM POTASSIUM: CPT | Performed by: STUDENT IN AN ORGANIZED HEALTH CARE EDUCATION/TRAINING PROGRAM

## 2022-02-13 PROCEDURE — 63600175 PHARM REV CODE 636 W HCPCS: Performed by: INTERNAL MEDICINE

## 2022-02-13 PROCEDURE — 27000221 HC OXYGEN, UP TO 24 HOURS

## 2022-02-13 PROCEDURE — 25000242 PHARM REV CODE 250 ALT 637 W/ HCPCS: Performed by: INTERNAL MEDICINE

## 2022-02-13 PROCEDURE — 99900035 HC TECH TIME PER 15 MIN (STAT)

## 2022-02-13 PROCEDURE — 94799 UNLISTED PULMONARY SVC/PX: CPT

## 2022-02-13 PROCEDURE — 94664 DEMO&/EVAL PT USE INHALER: CPT

## 2022-02-13 PROCEDURE — 99232 PR SUBSEQUENT HOSPITAL CARE,LEVL II: ICD-10-PCS | Mod: ,,, | Performed by: INTERNAL MEDICINE

## 2022-02-13 PROCEDURE — 85025 COMPLETE CBC W/AUTO DIFF WBC: CPT | Performed by: STUDENT IN AN ORGANIZED HEALTH CARE EDUCATION/TRAINING PROGRAM

## 2022-02-13 RX ADMIN — TAMSULOSIN HYDROCHLORIDE 0.4 MG: 0.4 CAPSULE ORAL at 09:02

## 2022-02-13 RX ADMIN — INSULIN ASPART 4 UNITS: 100 INJECTION, SOLUTION INTRAVENOUS; SUBCUTANEOUS at 12:02

## 2022-02-13 RX ADMIN — DEXAMETHASONE 6 MG: 4 TABLET ORAL at 09:02

## 2022-02-13 RX ADMIN — FAMOTIDINE 20 MG: 20 TABLET, FILM COATED ORAL at 09:02

## 2022-02-13 RX ADMIN — INSULIN ASPART 6 UNITS: 100 INJECTION, SOLUTION INTRAVENOUS; SUBCUTANEOUS at 05:02

## 2022-02-13 RX ADMIN — INSULIN ASPART 5 UNITS: 100 INJECTION, SOLUTION INTRAVENOUS; SUBCUTANEOUS at 12:02

## 2022-02-13 RX ADMIN — ATORVASTATIN CALCIUM 40 MG: 40 TABLET, FILM COATED ORAL at 09:02

## 2022-02-13 RX ADMIN — ENOXAPARIN SODIUM 40 MG: 100 INJECTION SUBCUTANEOUS at 05:02

## 2022-02-13 RX ADMIN — INSULIN ASPART 4 UNITS: 100 INJECTION, SOLUTION INTRAVENOUS; SUBCUTANEOUS at 08:02

## 2022-02-13 RX ADMIN — INSULIN ASPART 5 UNITS: 100 INJECTION, SOLUTION INTRAVENOUS; SUBCUTANEOUS at 07:02

## 2022-02-13 RX ADMIN — INSULIN ASPART 5 UNITS: 100 INJECTION, SOLUTION INTRAVENOUS; SUBCUTANEOUS at 05:02

## 2022-02-13 RX ADMIN — SODIUM ZIRCONIUM CYCLOSILICATE 10 G: 5 POWDER, FOR SUSPENSION ORAL at 05:02

## 2022-02-13 NOTE — SUBJECTIVE & OBJECTIVE
"Interval History: see "Hospital Course."    Review of Systems   Constitutional: Negative for chills and fever.   Respiratory: Positive for shortness of breath. Negative for cough.    Gastrointestinal: Negative for diarrhea.   Allergic/Immunologic: Positive for immunocompromised state.   All other systems reviewed and are negative.    Objective:     Vital Signs (Most Recent):  Temp: 96.2 °F (35.7 °C) (02/13/22 0744)  Pulse: 69 (02/13/22 0752)  Resp: 15 (02/13/22 0752)  BP: 116/63 (02/13/22 0744)  SpO2: 96 % (02/13/22 0752) Vital Signs (24h Range):  Temp:  [96.2 °F (35.7 °C)-98.8 °F (37.1 °C)] 96.2 °F (35.7 °C)  Pulse:  [57-85] 69  Resp:  [15-20] 15  SpO2:  [94 %-98 %] 96 %  BP: (105-148)/(58-72) 116/63     Weight: 73.7 kg (162 lb 7.7 oz)  Body mass index is 24.7 kg/m².    Intake/Output Summary (Last 24 hours) at 2/13/2022 1049  Last data filed at 2/13/2022 0646  Gross per 24 hour   Intake 640 ml   Output --   Net 640 ml      Physical Exam  Vitals and nursing note reviewed.   Constitutional:       Appearance: He is ill-appearing.   HENT:      Head: Normocephalic and atraumatic.      Right Ear: External ear normal.      Left Ear: External ear normal.      Nose: Nose normal.      Mouth/Throat:      Mouth: Mucous membranes are moist.      Pharynx: Oropharynx is clear.   Eyes:      Extraocular Movements: Extraocular movements intact.      Conjunctiva/sclera: Conjunctivae normal.   Cardiovascular:      Rate and Rhythm: Normal rate and regular rhythm.      Pulses: Normal pulses.   Pulmonary:      Effort: Pulmonary effort is normal.      Comments: NC.  Abdominal:      General: Bowel sounds are normal.      Palpations: Abdomen is soft.   Musculoskeletal:         General: Normal range of motion.      Cervical back: Normal range of motion and neck supple.      Right lower leg: No edema.      Left lower leg: No edema.   Skin:     General: Skin is warm and dry.   Neurological:      General: No focal deficit present.      Mental " Status: He is alert and oriented to person, place, and time. Mental status is at baseline.   Psychiatric:         Mood and Affect: Mood normal.         Behavior: Behavior normal.         Significant Labs: All pertinent labs within the past 24 hours have been reviewed.    Significant Imaging: I have reviewed all pertinent imaging results/findings within the past 24 hours.

## 2022-02-13 NOTE — CARE UPDATE
02/13/22 0752   PRE-TX-O2   O2 Device (Oxygen Therapy) nasal cannula w/ humidification   $ Is the patient on Low Flow Oxygen? Yes   Flow (L/min) 3   Oxygen Concentration (%) 32   SpO2 96 %   Pulse Oximetry Type Continuous   $ Pulse Oximetry - Multiple Charge Pulse Oximetry - Multiple   Probe Placed On (Pulse Ox) finger   Pulse 69   Resp 15   Positioning Sitting in chair   Incentive Spirometer   $ Incentive Spirometer Charges done with encouragement   Incentive Spirometer Predicted Level (mL) 1750   Administration (IS) proper technique demonstrated   Number of Repetitions (IS) 10   Level Incentive Spirometer (mL) 2000   Patient Tolerance (IS) good   Vibratory PEP Therapy   $ Vibratory PEP Charges Aerobika Therapy   $ Vibratory PEP Tech Time Charges 15 min   Type (PEP Therapy) vibratory/oscillatory   Device (PEP Therapy) flutter   Route (PEP Therapy) mouthpiece   Breaths per Cycle (PEP Therapy) 10   Cycles (PEP Therapy) 1   Patient Position (PEP Therapy) sitting in chair   Signs of Intolerance (PEP Therapy) none   POx, Aerobika Q8, IS QS

## 2022-02-13 NOTE — PLAN OF CARE
Plan of care reviewed with patient. SB/SR on telemetry. O2- 4L per NC in use, pulse ox 94-98%. HS bg 168. Remains free from falls/injury. Instructed to call for assistance as needed during night, verbalized understanding. Call light in reach.

## 2022-02-13 NOTE — PLAN OF CARE
Plan of care continues; patient oxygen titrated down to 3L NC. VSS, afebrile. Up ad vick. Blood glucose monitored and covered per orders. Safety and isolation precautions maintained.

## 2022-02-13 NOTE — PROGRESS NOTES
"[facility] Medicine  Progress Note    Patient Name: Ludwin Gee  MRN: [id number]  Patient Class: IP- Inpatient   Admission Date: 2/6/2022  Length of Stay: 7 days  Attending Physician: Adis Field MD  Primary Care Provider: Herrera Fisher MD        Subjective:     Principal Problem:Acute hypoxemic respiratory failure due to COVID-19        HPI:  Patient is a 72-year-old male with past medical history of diabetes, hypertension, hyperlipidemia, former tobacco use who was diagnosed with COVID on 02/01/2022 and recently hospitalized for hypoxic respiratory failure from COVID during which he completed a course of remdesivir and was discharged yesterday with supplemental oxygen repeat presents today with complaint of hypoxia at home on 4 L nasal cannula.  Wife reports he did well last night however since then has been having trouble maintaining his saturations on 4 L especially with movement.  Denies any fever, chills, chest pain, nausea, vomiting.  He is drowsy at this time from cough medicine received in the ED.  D-dimer has increased from previous.  CTA ordered.  He is currently on 5 L nasal cannula with oxygen saturation at 95%       Overview/Hospital Course:  Ludwin Webber is a 72 year old male with a past medical history of diabetes, hypertension, hyperlipidemia, and former tobacco use who was recently diagnosed with COVID-19 pneumonia with recent discharge who re-presented for increasing O2 requirement. He was started on steroids and tocilizumab was ordered (given 2/7). He is currently stable on 3 L NC. He is also on prophylactic dose Lovenox. Inflammatory markers are decreasing. Pulmonary has been consulted.      Interval History: see "Hospital Course."    Review of Systems   Constitutional: Negative for chills and fever.   Respiratory: Positive for shortness of breath. Negative for cough.    Gastrointestinal: Negative for diarrhea.   Allergic/Immunologic: Positive for " immunocompromised state.   All other systems reviewed and are negative.    Objective:     Vital Signs (Most Recent):  Temp: 96.2 °F (35.7 °C) (02/13/22 0744)  Pulse: 69 (02/13/22 0752)  Resp: 15 (02/13/22 0752)  BP: 116/63 (02/13/22 0744)  SpO2: 96 % (02/13/22 0752) Vital Signs (24h Range):  Temp:  [96.2 °F (35.7 °C)-98.8 °F (37.1 °C)] 96.2 °F (35.7 °C)  Pulse:  [57-85] 69  Resp:  [15-20] 15  SpO2:  [94 %-98 %] 96 %  BP: (105-148)/(58-72) 116/63     Weight: 73.7 kg (162 lb 7.7 oz)  Body mass index is 24.7 kg/m².    Intake/Output Summary (Last 24 hours) at 2/13/2022 1049  Last data filed at 2/13/2022 0646  Gross per 24 hour   Intake 640 ml   Output --   Net 640 ml      Physical Exam  Vitals and nursing note reviewed.   Constitutional:       Appearance: He is ill-appearing.   HENT:      Head: Normocephalic and atraumatic.      Right Ear: External ear normal.      Left Ear: External ear normal.      Nose: Nose normal.      Mouth/Throat:      Mouth: Mucous membranes are moist.      Pharynx: Oropharynx is clear.   Eyes:      Extraocular Movements: Extraocular movements intact.      Conjunctiva/sclera: Conjunctivae normal.   Cardiovascular:      Rate and Rhythm: Normal rate and regular rhythm.      Pulses: Normal pulses.   Pulmonary:      Effort: Pulmonary effort is normal.      Comments: NC.  Abdominal:      General: Bowel sounds are normal.      Palpations: Abdomen is soft.   Musculoskeletal:         General: Normal range of motion.      Cervical back: Normal range of motion and neck supple.      Right lower leg: No edema.      Left lower leg: No edema.   Skin:     General: Skin is warm and dry.   Neurological:      General: No focal deficit present.      Mental Status: He is alert and oriented to person, place, and time. Mental status is at baseline.   Psychiatric:         Mood and Affect: Mood normal.         Behavior: Behavior normal.         Significant Labs: All pertinent labs within the past 24 hours have been  reviewed.    Significant Imaging: I have reviewed all pertinent imaging results/findings within the past 24 hours.      Assessment/Plan:      * Acute hypoxemic respiratory failure due to COVID-19  -Decadron  -S/p tocilizumab  -Lovenox PPx  -NC  -Pulmonary consulted  -Trend inflammatory markers  -Counseled on self-proning  -Airborne, isolation and contact precautions    Asbestos exposure  -May need further workup in outpatient setting      Anemia  -Trend CBC      Hyperlipidemia associated with type 2 diabetes mellitus  -Continue statin    Hypertension associated with diabetes  -Continue home medications    BPH (benign prostatic hyperplasia)  -Continue Flomax      Controlled type 2 diabetes mellitus with microalbuminuria, without long-term current use of insulin  -SSI  -Continue to titrate scheduled insulin  -AC HS glucose checks  -Hypoglycemic precautions      VTE Risk Mitigation (From admission, onward)         Ordered     enoxaparin injection 40 mg  Daily         02/06/22 1638     Place sequential compression device  Until discontinued         02/06/22 1638                Discharge Planning   ANTELMO: 2/14/2022     Code Status: Full Code   Is the patient medically ready for discharge?:     Reason for patient still in hospital (select all that apply): Patient trending condition and Consult recommendations  Discharge Plan A: Home                  Adis Field MD  Department of Hospital Medicine   [facility]

## 2022-02-14 ENCOUNTER — TELEPHONE (OUTPATIENT)
Dept: FAMILY MEDICINE | Facility: CLINIC | Age: 72
End: 2022-02-14
Payer: MEDICARE

## 2022-02-14 ENCOUNTER — TELEPHONE (OUTPATIENT)
Dept: PULMONOLOGY | Facility: CLINIC | Age: 72
End: 2022-02-14
Payer: MEDICARE

## 2022-02-14 ENCOUNTER — PATIENT MESSAGE (OUTPATIENT)
Dept: ADMINISTRATIVE | Facility: CLINIC | Age: 72
End: 2022-02-14
Payer: MEDICARE

## 2022-02-14 VITALS
DIASTOLIC BLOOD PRESSURE: 59 MMHG | OXYGEN SATURATION: 81 % | RESPIRATION RATE: 17 BRPM | SYSTOLIC BLOOD PRESSURE: 103 MMHG | WEIGHT: 162.5 LBS | BODY MASS INDEX: 24.63 KG/M2 | HEIGHT: 68 IN | TEMPERATURE: 98 F | HEART RATE: 92 BPM

## 2022-02-14 LAB
ALBUMIN SERPL BCP-MCNC: 3.1 G/DL (ref 3.5–5.2)
ALP SERPL-CCNC: 49 U/L (ref 55–135)
ALT SERPL W/O P-5'-P-CCNC: 47 U/L (ref 10–44)
ANION GAP SERPL CALC-SCNC: 9 MMOL/L (ref 8–16)
AST SERPL-CCNC: 20 U/L (ref 10–40)
BASOPHILS # BLD AUTO: 0.01 K/UL (ref 0–0.2)
BASOPHILS NFR BLD: 0.1 % (ref 0–1.9)
BILIRUB SERPL-MCNC: 0.6 MG/DL (ref 0.1–1)
BUN SERPL-MCNC: 27 MG/DL (ref 8–23)
CALCIUM SERPL-MCNC: 9.3 MG/DL (ref 8.7–10.5)
CHLORIDE SERPL-SCNC: 100 MMOL/L (ref 95–110)
CO2 SERPL-SCNC: 31 MMOL/L (ref 23–29)
CREAT SERPL-MCNC: 1.2 MG/DL (ref 0.5–1.4)
CRP SERPL-MCNC: 3.4 MG/L (ref 0–8.2)
D DIMER PPP IA.FEU-MCNC: 1.57 MG/L FEU
DIFFERENTIAL METHOD: ABNORMAL
EOSINOPHIL # BLD AUTO: 0.1 K/UL (ref 0–0.5)
EOSINOPHIL NFR BLD: 0.9 % (ref 0–8)
ERYTHROCYTE [DISTWIDTH] IN BLOOD BY AUTOMATED COUNT: 12.2 % (ref 11.5–14.5)
EST. GFR  (AFRICAN AMERICAN): >60 ML/MIN/1.73 M^2
EST. GFR  (NON AFRICAN AMERICAN): >60 ML/MIN/1.73 M^2
FERRITIN SERPL-MCNC: 979 NG/ML (ref 20–300)
GLUCOSE SERPL-MCNC: 50 MG/DL (ref 70–110)
HCT VFR BLD AUTO: 39.4 % (ref 40–54)
HGB BLD-MCNC: 13.2 G/DL (ref 14–18)
IMM GRANULOCYTES # BLD AUTO: 0.17 K/UL (ref 0–0.04)
IMM GRANULOCYTES NFR BLD AUTO: 1.3 % (ref 0–0.5)
LDH SERPL L TO P-CCNC: 291 U/L (ref 110–260)
LYMPHOCYTES # BLD AUTO: 1.7 K/UL (ref 1–4.8)
LYMPHOCYTES NFR BLD: 12.6 % (ref 18–48)
MCH RBC QN AUTO: 30 PG (ref 27–31)
MCHC RBC AUTO-ENTMCNC: 33.5 G/DL (ref 32–36)
MCV RBC AUTO: 90 FL (ref 82–98)
MONOCYTES # BLD AUTO: 1 K/UL (ref 0.3–1)
MONOCYTES NFR BLD: 7.8 % (ref 4–15)
NEUTROPHILS # BLD AUTO: 10.2 K/UL (ref 1.8–7.7)
NEUTROPHILS NFR BLD: 77.3 % (ref 38–73)
NRBC BLD-RTO: 0 /100 WBC
PLATELET # BLD AUTO: 353 K/UL (ref 150–450)
PMV BLD AUTO: 10.9 FL (ref 9.2–12.9)
POCT GLUCOSE: 115 MG/DL (ref 70–110)
POCT GLUCOSE: 237 MG/DL (ref 70–110)
POTASSIUM SERPL-SCNC: 5.2 MMOL/L (ref 3.5–5.1)
PROT SERPL-MCNC: 6.5 G/DL (ref 6–8.4)
RBC # BLD AUTO: 4.4 M/UL (ref 4.6–6.2)
SODIUM SERPL-SCNC: 140 MMOL/L (ref 136–145)
WBC # BLD AUTO: 13.25 K/UL (ref 3.9–12.7)

## 2022-02-14 PROCEDURE — 83615 LACTATE (LD) (LDH) ENZYME: CPT | Performed by: STUDENT IN AN ORGANIZED HEALTH CARE EDUCATION/TRAINING PROGRAM

## 2022-02-14 PROCEDURE — 99232 SBSQ HOSP IP/OBS MODERATE 35: CPT | Mod: ,,, | Performed by: INTERNAL MEDICINE

## 2022-02-14 PROCEDURE — 25000242 PHARM REV CODE 250 ALT 637 W/ HCPCS: Performed by: INTERNAL MEDICINE

## 2022-02-14 PROCEDURE — 86140 C-REACTIVE PROTEIN: CPT | Performed by: STUDENT IN AN ORGANIZED HEALTH CARE EDUCATION/TRAINING PROGRAM

## 2022-02-14 PROCEDURE — 94664 DEMO&/EVAL PT USE INHALER: CPT

## 2022-02-14 PROCEDURE — 25000003 PHARM REV CODE 250: Performed by: HOSPITALIST

## 2022-02-14 PROCEDURE — 99232 PR SUBSEQUENT HOSPITAL CARE,LEVL II: ICD-10-PCS | Mod: ,,, | Performed by: INTERNAL MEDICINE

## 2022-02-14 PROCEDURE — 82728 ASSAY OF FERRITIN: CPT | Performed by: STUDENT IN AN ORGANIZED HEALTH CARE EDUCATION/TRAINING PROGRAM

## 2022-02-14 PROCEDURE — 94761 N-INVAS EAR/PLS OXIMETRY MLT: CPT

## 2022-02-14 PROCEDURE — 85025 COMPLETE CBC W/AUTO DIFF WBC: CPT | Performed by: STUDENT IN AN ORGANIZED HEALTH CARE EDUCATION/TRAINING PROGRAM

## 2022-02-14 PROCEDURE — 99900035 HC TECH TIME PER 15 MIN (STAT)

## 2022-02-14 PROCEDURE — 63600175 PHARM REV CODE 636 W HCPCS: Performed by: INTERNAL MEDICINE

## 2022-02-14 PROCEDURE — 80053 COMPREHEN METABOLIC PANEL: CPT | Performed by: STUDENT IN AN ORGANIZED HEALTH CARE EDUCATION/TRAINING PROGRAM

## 2022-02-14 PROCEDURE — 36415 COLL VENOUS BLD VENIPUNCTURE: CPT | Performed by: STUDENT IN AN ORGANIZED HEALTH CARE EDUCATION/TRAINING PROGRAM

## 2022-02-14 PROCEDURE — 27000221 HC OXYGEN, UP TO 24 HOURS

## 2022-02-14 PROCEDURE — 94799 UNLISTED PULMONARY SVC/PX: CPT

## 2022-02-14 PROCEDURE — 85379 FIBRIN DEGRADATION QUANT: CPT | Performed by: STUDENT IN AN ORGANIZED HEALTH CARE EDUCATION/TRAINING PROGRAM

## 2022-02-14 RX ORDER — INSULIN LISPRO 100 [IU]/ML
10 INJECTION, SOLUTION INTRAVENOUS; SUBCUTANEOUS
Qty: 9 ML | Refills: 11 | Status: SHIPPED | OUTPATIENT
Start: 2022-02-14 | End: 2022-09-06

## 2022-02-14 RX ORDER — PREDNISONE 20 MG/1
40 TABLET ORAL DAILY
Status: DISCONTINUED | OUTPATIENT
Start: 2022-02-14 | End: 2022-02-14 | Stop reason: HOSPADM

## 2022-02-14 RX ORDER — PREDNISONE 20 MG/1
TABLET ORAL
Qty: 30 TABLET | Refills: 0 | Status: SHIPPED | OUTPATIENT
Start: 2022-02-14 | End: 2022-03-14

## 2022-02-14 RX ADMIN — INSULIN ASPART 5 UNITS: 100 INJECTION, SOLUTION INTRAVENOUS; SUBCUTANEOUS at 11:02

## 2022-02-14 RX ADMIN — FAMOTIDINE 20 MG: 20 TABLET, FILM COATED ORAL at 08:02

## 2022-02-14 RX ADMIN — TAMSULOSIN HYDROCHLORIDE 0.4 MG: 0.4 CAPSULE ORAL at 08:02

## 2022-02-14 RX ADMIN — INSULIN ASPART 4 UNITS: 100 INJECTION, SOLUTION INTRAVENOUS; SUBCUTANEOUS at 11:02

## 2022-02-14 RX ADMIN — ATORVASTATIN CALCIUM 40 MG: 40 TABLET, FILM COATED ORAL at 08:02

## 2022-02-14 RX ADMIN — DEXAMETHASONE 6 MG: 4 TABLET ORAL at 08:02

## 2022-02-14 RX ADMIN — SODIUM ZIRCONIUM CYCLOSILICATE 5 G: 5 POWDER, FOR SUSPENSION ORAL at 01:02

## 2022-02-14 RX ADMIN — PREDNISONE 40 MG: 20 TABLET ORAL at 01:02

## 2022-02-14 NOTE — TELEPHONE ENCOUNTER
Advised patient's wife is for 2 weeks out. Verbalized understanding.       ----- Message from Lizzy Quiroz sent at 2/14/2022  4:07 PM CST -----  Regarding: advice  Contact: wife  Type: Needs Medical Advice  Who Called:  wife- Emily Gee  Symptoms (please be specific):    How long has patient had these symptoms:   Pharmacy name and phone #:    Best Call Back Number: [phone]  Additional Information: The wife want to know if the patient should schedule  sooner for hospital follow up with the doctor.

## 2022-02-14 NOTE — PROGRESS NOTES
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  Had covid 2/1 -- was having fevers 101 for few days with sinus and loss sense smell, when temp got to 102 he presented.  Was in hosp til 2/5 going home on 3lpm with no steroids.  Pt represented needing higher ox-- sats to 70's walking on 3lpm.  Appetite ok , cough mild and now productive gray material.  Sugars were up last admit needing insulin, usually on metformin 1500/d.      From Dr Tatum hpi 2/6/2022:   HPI: Patient is a 72-year-old male with past medical history of diabetes, hypertension, hyperlipidemia, former tobacco use who was diagnosed with COVID on 02/01/2022 and recently hospitalized for hypoxic respiratory failure from COVID during which he completed a course of remdesivir and was discharged yesterday with supplemental oxygen repeat presents today with complaint of hypoxia at home on 4 L nasal cannula.  Wife reports he did well last night however since then has been having trouble maintaining his saturations on 4 L especially with movement.  Denies any fever, chills, chest pain, nausea, vomiting.  He is drowsy at this time from cough medicine received in the ED.  D-dimer has increased from previous.  CTA ordered.  He is currently on 5 L nasal cannula with oxygen saturation at 95%      Progress Note  PULMONARY    Admit Date: 2/6/2022 02/14/2022      SUBJECTIVE:     2/8 no c/o  2/9 no new c/o  2/10 no new c/o  2/11 no new c/o  2/12 no new c/o  2/13 no new c/o  2/14 no new c/o        PFSH and Allergies reviewed.    OBJECTIVE:     Vitals (Most recent):  Vitals:    02/14/22 0336   BP: 107/69   Pulse: (!) 58   Resp: 19   Temp: 97.6 °F (36.4 °C)       Vitals (24 hour range):  Temp:  [96.2 °F (35.7 °C)-97.9 °F (36.6  °C)]   Pulse:  [54-76]   Resp:  [15-19]   BP: (104-120)/(57-69)   SpO2:  [93 %-98 %]       Intake/Output Summary (Last 24 hours) at 2/14/2022 0536  Last data filed at 2/13/2022 0646  Gross per 24 hour   Intake 240 ml   Output --   Net 240 ml          Physical Exam:  The patient's neuro status (alertness,orientation,cognitive function,motor skills,), pharyngeal exam (oral lesions, hygiene, abn dentition,), Neck (jvd,mass,thyroid,nodes in neck and above/below clavicle),RESPIRATORY(symmetry,effort,fremitus,percussion,auscultation),  Cor(rhythm,heart tones including gallops,perfusion,edema)ABD(distention,hepatic&splenomegaly,tenderness,masses), Skin(rash,cyanosis),Psyc(affect,judgement,).  Exam negative except for these pertinent findings:    Alert, min rales.    Radiographs reviewed: view by direct vision    No results found for this or any previous visit.  ]    Labs     Recent Labs   Lab 02/13/22  0730   WBC 13.66*   HGB 14.0   HCT 41.8        Recent Labs   Lab 02/13/22  0730 02/13/22  0730 02/13/22  1316     --   --    K 5.7*   < > 5.2*   CL 98  --   --    CO2 29  --   --    BUN 27*  --   --    CREATININE 1.2  --   --    *  --   --    CALCIUM 9.7  --   --    AST 23  --   --    ALT 49*  --   --    ALKPHOS 60  --   --    BILITOT 0.7  --   --    PROT 7.1  --   --    ALBUMIN 3.2*  --   --     < > = values in this interval not displayed.   No results for input(s): PH, PCO2, PO2, HCO3 in the last 24 hours.  Microbiology Results (last 7 days)     ** No results found for the last 168 hours. **          Impression:  Active Hospital Problems    Diagnosis  POA    *Acute hypoxemic respiratory failure due to COVID-19 [U07.1, J96.01]  Yes    Asbestos exposure [Z77.090]  Not Applicable    Anemia [D64.9]  Yes    Hyperlipidemia associated with type 2 diabetes mellitus [E11.69, E78.5]  Yes    Hypertension associated with diabetes [E11.59, I15.2]  Yes    Controlled type 2 diabetes mellitus with  microalbuminuria, without long-term current use of insulin [E11.29, R80.9]  Yes    BPH (benign prostatic hyperplasia) [N40.0]  Yes      Resolved Hospital Problems   No resolved problems to display.        .    Plan: tm 101.2, vss, 70% ox, Actrema dosed, wbc 7.9, glu 209,  crp 80 at admit 2/1, down to 26 yesterday 2/6,       cta lungs no pe, posterior dense covid type infiltrates.   Ox needs incesed from 4 to 12 lpm last 24 hrs.     Consider ordering prn bipap.  F/u crp am.     low dose  lovenox.      2/8   No fever,vss,  8lpm,  crp up to 96 from 26 on 6th.    Had flare off steroids ppt readmit  Will increase decadron to 12/d and dose solumedrol    Discussed need for increased steroids with pt.  2/9 no fever, vss, wbc 11k, k 5.5,   Ox needs stable 10 lpm  Monitor , sugars up./    2/10 no fever, vss,  11lpm,   crp  Now 23 from 96 on 8th,     7 lpm later in day.  Stable/improved  2/11 no fever, vss, 4 lpm,  Progressing, sugars ok  2/12 no fever vss,   4lpm  crp 8 now, monitor  2/13 now 3lpm, hope home am , rest good, decrease decadron to 6/d.  2/14 no fever,vss,     Pt had been dced on 5th after 5 day stay off steroids with relapse ppt re admit on 6th, then worsened with inflammatory markers high.  Steroids increased and now resolving.  Would dc on prednisone 40/d x 5 then 30/d x 5 then 20/d x 5 then 10/d x 5.  F/u office in 2 wks.  Will need home ox-- arranged from last admit    Will have my staff set up f/u 2 wks.     Needs diabetes rx to keep sugars reasonable, usually on metformin only- amaryl also

## 2022-02-14 NOTE — CARE UPDATE
02/14/22 1015   Patient Assessment/Suction   Level of Consciousness (AVPU) alert   Respiratory Effort Normal;Unlabored   Expansion/Accessory Muscles/Retractions no use of accessory muscles;no retractions   All Lung Fields Breath Sounds clear   Cough Frequency infrequent   Cough Type nonproductive   PRE-TX-O2   O2 Device (Oxygen Therapy) nasal cannula w/ humidification   $ Is the patient on Low Flow Oxygen? Yes   Flow (L/min) 2   SpO2 97 %   Pulse Oximetry Type Intermittent   $ Pulse Oximetry - Multiple Charge Pulse Oximetry - Multiple   Probe Placed On (Pulse Ox) finger   Pulse 72   Resp 18   Incentive Spirometer   $ Incentive Spirometer Charges done with encouragement   Administration (IS) instruction provided, follow-up   Number of Repetitions (IS) 5   Level Incentive Spirometer (mL) 1500   Patient Tolerance (IS) good   Vibratory PEP Therapy   $ Vibratory PEP Charges Aerobika Therapy   $ Vibratory PEP Tech Time Charges 15 min   Type (PEP Therapy) vibratory/oscillatory   Device (PEP Therapy) flutter   Route (PEP Therapy) mouthpiece   Breaths per Cycle (PEP Therapy) 10   Cycles (PEP Therapy) 1   Settings (PEP Therapy) PEP 5   Patient Position (PEP Therapy) sitting in chair   Post Treatment Assessment (PEP) breath sounds unchanged   Signs of Intolerance (PEP Therapy) none

## 2022-02-14 NOTE — PLAN OF CARE
Pt is cleared from  for discharge.  Justice spoke with Taan in scheduling, she sent a message to the nurse,  Because a 1 week appointment wasn't available.  Nurse will call pt with appointment.     02/14/22 4167   Final Note   Assessment Type Final Discharge Note   Anticipated Discharge Disposition Home   Hospital Resources/Appts/Education Provided Appointments scheduled by Navigator/Coordinator

## 2022-02-14 NOTE — TELEPHONE ENCOUNTER
----- Message from Tana Villalpando sent at 2/14/2022 11:38 AM CST -----  Contact: [facility] f/u appt  Patient needs a  1 to 2 wk [facility] f/u appt call patient back at [phone] (home). Thanks

## 2022-02-14 NOTE — PROGRESS NOTES
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  Had covid 2/1 -- was having fevers 101 for few days with sinus and loss sense smell, when temp got to 102 he presented.  Was in hosp til 2/5 going home on 3lpm with no steroids.  Pt represented needing higher ox-- sats to 70's walking on 3lpm.  Appetite ok , cough mild and now productive gray material.  Sugars were up last admit needing insulin, usually on metformin 1500/d.      From Dr Tatum hpi 2/6/2022:   HPI: Patient is a 72-year-old male with past medical history of diabetes, hypertension, hyperlipidemia, former tobacco use who was diagnosed with COVID on 02/01/2022 and recently hospitalized for hypoxic respiratory failure from COVID during which he completed a course of remdesivir and was discharged yesterday with supplemental oxygen repeat presents today with complaint of hypoxia at home on 4 L nasal cannula.  Wife reports he did well last night however since then has been having trouble maintaining his saturations on 4 L especially with movement.  Denies any fever, chills, chest pain, nausea, vomiting.  He is drowsy at this time from cough medicine received in the ED.  D-dimer has increased from previous.  CTA ordered.  He is currently on 5 L nasal cannula with oxygen saturation at 95%      Progress Note  PULMONARY    Admit Date: 2/6/2022 02/13/2022      SUBJECTIVE:     2/8 no c/o  2/9 no new c/o  2/10 no new c/o  2/11 no new c/o  2/12 no new c/o  2/13 no new c/o        PFSH and Allergies reviewed.    OBJECTIVE:     Vitals (Most recent):  Vitals:    02/13/22 1544   BP: (!) 104/57   Pulse: 70   Resp: 18   Temp: 97.7 °F (36.5 °C)       Vitals (24 hour range):  Temp:  [96.2 °F (35.7 °C)-98.8 °F (37.1 °C)]   Pulse:   [57-85]   Resp:  [15-20]   BP: (104-148)/(57-72)   SpO2:  [94 %-98 %]       Intake/Output Summary (Last 24 hours) at 2/13/2022 1856  Last data filed at 2/13/2022 0646  Gross per 24 hour   Intake 240 ml   Output --   Net 240 ml          Physical Exam:  The patient's neuro status (alertness,orientation,cognitive function,motor skills,), pharyngeal exam (oral lesions, hygiene, abn dentition,), Neck (jvd,mass,thyroid,nodes in neck and above/below clavicle),RESPIRATORY(symmetry,effort,fremitus,percussion,auscultation),  Cor(rhythm,heart tones including gallops,perfusion,edema)ABD(distention,hepatic&splenomegaly,tenderness,masses), Skin(rash,cyanosis),Psyc(affect,judgement,).  Exam negative except for these pertinent findings:    Alert, min rales.    Radiographs reviewed: view by direct vision    No results found for this or any previous visit.  ]    Labs     Recent Labs   Lab 02/13/22  0730   WBC 13.66*   HGB 14.0   HCT 41.8        Recent Labs   Lab 02/13/22  0730 02/13/22  0730 02/13/22  1316     --   --    K 5.7*   < > 5.2*   CL 98  --   --    CO2 29  --   --    BUN 27*  --   --    CREATININE 1.2  --   --    *  --   --    CALCIUM 9.7  --   --    AST 23  --   --    ALT 49*  --   --    ALKPHOS 60  --   --    BILITOT 0.7  --   --    PROT 7.1  --   --    ALBUMIN 3.2*  --   --     < > = values in this interval not displayed.   No results for input(s): PH, PCO2, PO2, HCO3 in the last 24 hours.  Microbiology Results (last 7 days)     ** No results found for the last 168 hours. **          Impression:  Active Hospital Problems    Diagnosis  POA    *Acute hypoxemic respiratory failure due to COVID-19 [U07.1, J96.01]  Yes    Asbestos exposure [Z77.090]  Not Applicable    Anemia [D64.9]  Yes    Hyperlipidemia associated with type 2 diabetes mellitus [E11.69, E78.5]  Yes    Hypertension associated with diabetes [E11.59, I15.2]  Yes    Controlled type 2 diabetes mellitus with microalbuminuria, without  long-term current use of insulin [E11.29, R80.9]  Yes    BPH (benign prostatic hyperplasia) [N40.0]  Yes      Resolved Hospital Problems   No resolved problems to display.        .    Plan: tm 101.2, vss, 70% ox, Actrema dosed, wbc 7.9, glu 209,  crp 80 at admit 2/1, down to 26 yesterday 2/6,       cta lungs no pe, posterior dense covid type infiltrates.   Ox needs incesed from 4 to 12 lpm last 24 hrs.     Consider ordering prn bipap.  F/u crp am.     low dose  lovenox.      2/8   No fever,vss,  8lpm,  crp up to 96 from 26 on 6th.    Had flare off steroids ppt readmit  Will increase decadron to 12/d and dose solumedrol    Discussed need for increased steroids with pt.  2/9 no fever, vss, wbc 11k, k 5.5,   Ox needs stable 10 lpm  Monitor , sugars up./    2/10 no fever, vss,  11lpm,   crp  Now 23 from 96 on 8th,     7 lpm later in day.  Stable/improved  2/11 no fever, vss, 4 lpm,  Progressing, sugars ok  2/12 no fever vss,   4lpm  crp 8 now, monitor  2/13 now 3lpm, hope home am , rest good, decrease decadron to 6/d.

## 2022-02-14 NOTE — TELEPHONE ENCOUNTER
Call placed to patient on contact number [phone]. No answer received. Voicemail full; unable to leave message. Call placed to patient on contact number [phone]. No answer received. Left message requesting return call to office.

## 2022-02-14 NOTE — NURSING
Pt discharged home with family. Left floor with home O2 tank. DC instructions reviewed with patient. PT verbalized understanding. Knows of scripts called in. Educated concerning insulin pen use. Pt demonstrated good technique.

## 2022-02-15 ENCOUNTER — NURSE TRIAGE (OUTPATIENT)
Dept: ADMINISTRATIVE | Facility: CLINIC | Age: 72
End: 2022-02-15
Payer: MEDICARE

## 2022-02-15 ENCOUNTER — PATIENT OUTREACH (OUTPATIENT)
Dept: ADMINISTRATIVE | Facility: CLINIC | Age: 72
End: 2022-02-15
Payer: MEDICARE

## 2022-02-15 ENCOUNTER — TELEPHONE (OUTPATIENT)
Dept: ADMINISTRATIVE | Facility: CLINIC | Age: 72
End: 2022-02-15
Payer: MEDICARE

## 2022-02-15 ENCOUNTER — PATIENT MESSAGE (OUTPATIENT)
Dept: ADMINISTRATIVE | Facility: OTHER | Age: 72
End: 2022-02-15
Payer: MEDICARE

## 2022-02-15 DIAGNOSIS — E11.29 CONTROLLED TYPE 2 DIABETES MELLITUS WITH MICROALBUMINURIA, WITHOUT LONG-TERM CURRENT USE OF INSULIN: Primary | ICD-10-CM

## 2022-02-15 DIAGNOSIS — U07.1 ACUTE HYPOXEMIC RESPIRATORY FAILURE DUE TO COVID-19: Primary | ICD-10-CM

## 2022-02-15 DIAGNOSIS — R80.9 CONTROLLED TYPE 2 DIABETES MELLITUS WITH MICROALBUMINURIA, WITHOUT LONG-TERM CURRENT USE OF INSULIN: Primary | ICD-10-CM

## 2022-02-15 DIAGNOSIS — J96.01 ACUTE HYPOXEMIC RESPIRATORY FAILURE DUE TO COVID-19: Primary | ICD-10-CM

## 2022-02-15 RX ORDER — PEN NEEDLE, DIABETIC 30 GX3/16"
NEEDLE, DISPOSABLE MISCELLANEOUS
Qty: 400 EACH | Refills: 4 | Status: SHIPPED | OUTPATIENT
Start: 2022-02-15 | End: 2023-06-28

## 2022-02-15 NOTE — TELEPHONE ENCOUNTER
Pt stated hospital follow up will be performed by [facility] at home and will call if anything is needed.

## 2022-02-15 NOTE — PROGRESS NOTES
C3 nurse spoke with Ludwin Gee for a TCC post hospital discharge follow up call. NP referral placed for [facility].

## 2022-02-15 NOTE — PATIENT INSTRUCTIONS
Instructions for Patients with Confirmed or Suspected COVID-19    If you are awaiting your test result, you will either be called or it will be released to the patient portal.  If you have any questions about your test, please visit [email or web address] or call our COVID-19 information line at [phone].      Please isolate yourself at home.  You may leave home and/or return to work once the following conditions are met:    If you have symptoms and tested positive:   More than 5 days since symptoms first appeared AND   More than 24 hours fever free without medications AND       symptoms have improved   · For five days after ending isolation, masks are required.    If you had no symptoms but tested positive:   More than 5 days since the date of the first positive test. If you develop symptoms, then use the guidelines above  · For five days after ending isolation, masks are required.      Testing is not recommended if you are symptom free after completing isolation.Patient Education       COVID-19 Discharge Instructions   About this topic   Coronavirus disease 2019 is also known as COVID-19. It is a viral illness that infects the lungs. It is caused by a virus called SARS-associated coronavirus (SARS-CoV-2).  The signs of COVID-19 most often start a few days after you have been infected. In some people, it takes longer to show signs. Others never show signs of the infection. You may have a cough, fever, shaking chills and it may be hard to breathe. You may be very tired, have muscle aches, a headache or sore throat. Some people have an upset stomach or loose stools. Others lose their sense of smell or taste. You may not have these signs all the time and they may come and go while you are sick.  The virus spreads easily through droplets when you talk, sneeze, or cough. You can pass the virus to others when you are talking close together, singing, hugging, sharing food, or shaking hands. Doctors believe  the germs also survive on surfaces like tables, door handles, and telephones. However, this is not a common way that COVID-19 spreads. Doctors believe you can also spread the infection even if you dont have any symptoms, but they do not know how that happens. This is why getting vaccinated is one of the best ways to keep you healthy and slow the spread of the virus.  Some people have a mild case of COVID-19 and are able to stay at home and away from others until they feel better. Others may need to be in the hospital if they are very sick. Some people with COVID-19 can have some symptoms for weeks or months. People with COVID-19 must isolate themselves. You can start to be around others when your doctor says it is safe to do so.       What care is needed at home?   · Ask your doctor what you need to do when you go home. Make sure you ask questions if you do not understand what the doctor says.  · Drink lots of water, juice, or broth to replace fluids lost from a fever.  · You may use cool mist humidifiers to help ease congestion and coughing.  · Use 2 to 3 pillows to prop yourself up when you lie down to make it easier to breathe and sleep.  · Do not smoke and do not drink beer, wine, and mixed drinks (alcohol).  · To lower the chance of passing the infection to others, get a COVID-19 vaccine after your infection has resolved.  · If you have not been fully vaccinated:  ? Wear a mask over your mouth and nose if you are around others who are not sick. Cloth masks work best if they have more than one layer of fabric.  ? Wash your hands often.  ? Stay home in a separate room, if possible, away from others. Only go out to get medical care.  ? Use a separate bathroom if possible.  ? Do not make food for others.  What follow-up care is needed?   · Your doctor may ask you to make visits to the office to check on your progress. Be sure to keep these visits. Make sure you wear a mask at these visits.  · If you can, tell the  staff you have COVID-19 ahead of time so they can take extra care to stop the disease from spreading.  · It may take a few weeks before your health returns to normal.  What drugs may be needed?   The doctor may order drugs to:  · Help with breathing  · Help with fever  · Help with swelling in your airways and lungs  · Control coughing  · Ease a sore throat  · Help a runny or stuffy nose  Will physical activity be limited?   You may have to limit your physical activity. Talk to your doctor about the right amount of activity for you. If you have been very sick with COVID-19, it can take some time to get your strength back.  Will there be any other care needed?   Doctors do not know how long you can pass the virus on to others after you are sick. This is why it is important to stay in a separate room, if possible, when you are sick. For now, doctors are giving general guidelines for you to follow after you have been sick. Before you go around other people, you should:  · Be fever free for 24 hours without taking any drugs to lower the fever  · Have no symptoms of cough or shortness of breath  · Wait at least 10 days after first having symptoms or your first positive test, and you need to be symptom free as above. Some experts suggest waiting 20 days if you have had a more severe infection.  Talk with your doctor about getting a COVID-19 vaccine.  What problems could happen?   · Fluid loss. This is dehydration.  · Short-term or long-term lung damage  · Heart problems  · Death  When do I need to call the doctor?   · You are having so much trouble breathing that you can only say one or two words at a time.  · You need to sit upright at all times to be able to breathe and/or cannot lie down.  · You are very confused or cannot stay awake.  · Your lips or skin start to turn blue or grey.  · You think you might be having a medical emergency. Some examples of medical emergencies are:  ? Severe chest pain.  ? Not able to speak  or move normally.  · You have trouble breathing when talking or sitting still.  · You have new shortness of breath.  · You become weak or dizzy.  · You have very dark urine or do not pass urine for more than 8 hours.  · You have new or worsening COVID-19 symptoms like:  ? Fever  ? Cough  ? Feeling very tired  ? Shaking chills  ? Headache  ? Trouble swallowing  ? Throwing up  ? Loose stools  ? Reddish purple spots on your fingers or toes  Teach Back: Helping You Understand   The Teach Back Method helps you understand the information we are giving you. After you talk with the staff, tell them in your own words what you learned. This helps to make sure the staff has described each thing clearly. It also helps to explain things that may have been confusing. Before going home, make sure you can do these:  · I can tell you about my condition.  · I can tell you what may help ease my breathing.  · I can tell you what I can do to help avoid passing the infection to others.  · I can tell you what I will do if I have trouble breathing; feel sleepy or confused; or my fingertips, fingernails, skin, or lips are blue.  Where can I learn more?   Centers for Disease Control and Prevention  https://www.cdc.gov/coronavirus/2019-ncov/about/index.html   Centers for Disease Control and Prevention  https://www.cdc.gov/coronavirus/2019-ncov/hcp/disposition-in-home-patients.html   World Health Organization  https://www.who.int/news-room/q-a-detail/f-s-zzqginlrpdmqr   Last Reviewed Date   2021-10-05  Consumer Information Use and Disclaimer   This information is not specific medical advice and does not replace information you receive from your health care provider. This is only a brief summary of general information. It does NOT include all information about conditions, illnesses, injuries, tests, procedures, treatments, therapies, discharge instructions or life-style choices that may apply to you. You must talk with your health care provider  for complete information about your health and treatment options. This information should not be used to decide whether or not to accept your health care providers advice, instructions or recommendations. Only your health care provider has the knowledge and training to provide advice that is right for you.  Copyright   Copyright © 2021 UpToDate, Inc. and its affiliates and/or licensors. All rights reserved.  Patient Education       Respiratory Distress Syndrome Discharge Instructions, Adult   About this topic   ARDS stands for acute respiratory distress syndrome. It is a very serious lung problem that may lead to death. This illness keeps you from getting enough oxygen into your blood because your lungs are filled with fluid. Then, you are not able to get enough oxygen to other parts of your body. People may get ARDS if they are very sick. They may have other serious illnesses or have very bad injuries. ARDS must be treated right away. This may help stop more damage to the body.     What care is needed at home?   · Ask your doctor what you need to do when you go home. Make sure you ask questions if you do not understand what the doctor says. This way you will know what you need to do.  · You may need to have oxygen therapy at home. You may have been shown breathing exercise while in the hospital. Keep doing them when you get home.  · Make your family and friends aware of your condition. Let them know how they can help you.  · Do not smoke and do not drink beer, wine, and mixed drinks (alcohol).  · Keep doing your breathing exercises.  · Do not go outside in very cold or very hot weather. Do not go outside when the air quality is bad.  What follow-up care is needed?   Your doctor may ask you to make visits to the office to check on your progress. Be sure to keep these visits. You may need to go to a lung rehab center for more care. This may help you get your strength back. Talk with your doctor about any concerns or  worries you may have.  What drugs may be needed?   The doctor may order drugs to:  · Fight an infection  · Help with swelling  · Get rid of extra fluid from the lungs  · Prevent blood clots  Will physical activity be limited?   You may have to limit your activity. Talk to your doctor about the right amount of activity for you. Doing exercises to help your lungs get stronger will be important.  What problems could happen?   · Short-term or long-term lung damage  · Infection  · Weakening of other organs  · Problems with brain functions like decreased memory or concentration  · Muscle wasting and weakness  · Collapsed lung. This is a pneumothorax.  · Emotional problems, such as depression, anxiety, and post-traumatic stress disorder  What can be done to prevent this health problem?   ARDS most often happens to people who are already in the hospital for illness or trauma. Keep your lungs healthy so if it happens, you have a better chance of recovery. Here are some things you may do to prevent illnesses.  · Wash your hands often with soap and water for at least 20 seconds, especially after coughing or sneezing. Alcohol-based hand sanitizers also work to kill the virus.  · If you are sick, cover your mouth and nose with tissue when you cough or sneeze. You can also cough into your elbow. Throw away tissues in the trash and wash your hands after touching used tissues.  · Do not get too close (kissing, hugging) to people who are sick.  · Do not share towels or hankies with anyone who is sick.  · Stay away from crowded places.  · Get a flu shot each year.  · Get a pneumonia shot if you havent already, and ask your doctor how often this needs to be repeated.  When do I need to call the doctor?   · Signs of infection. These include a fever of 100.4°F (38°C) or higher, chills, very bad sore throat, cough, more sputum or change in color of sputum.  · Harder time breathing or if you get dizzy or lightheaded  · New pain or  swelling in your legs  Teach Back: Helping You Understand   The Teach Back Method helps you understand the information we are giving you. After you talk with the staff, tell them in your own words what you learned. This helps to make sure the staff has described each thing clearly. It also helps to explain things that may have been confusing. Before going home, make sure you can do these:  · I can tell you about my condition.  · I can tell you what may help ease my breathing.  · I can tell you what I will do if I have trouble breathing or get dizzy.  Where can I learn more?   National Organization of Rare Disorders  http://www.rarediseases.org/rare-disease-information/rare-diseases/byID/611/viewAbstract   NHS Choices  https://www.nhs.uk/conditions/acute-respiratory-distress-syndrome/   Last Reviewed Date   2020-08-24  Consumer Information Use and Disclaimer   This information is not specific medical advice and does not replace information you receive from your health care provider. This is only a brief summary of general information. It does NOT include all information about conditions, illnesses, injuries, tests, procedures, treatments, therapies, discharge instructions or life-style choices that may apply to you. You must talk with your health care provider for complete information about your health and treatment options. This information should not be used to decide whether or not to accept your health care providers advice, instructions or recommendations. Only your health care provider has the knowledge and training to provide advice that is right for you.  Copyright   Copyright © 2021 UpToDate, Inc. and its affiliates and/or licensors. All rights reserved.  [facility]yna teaching reviewed with Ludwin Gee  . Ludwin Gee   verbalized understanding.    Education was provided based on the patient's discharge diagnosis using the attached Ch[facility] patient education as a reference.

## 2022-02-15 NOTE — TELEPHONE ENCOUNTER
Call placed to [facility]; spoke to pharmacist (Christina) who indicated patient received order for insulin (2 different types); one in pen form and other in vial form. Pharmacy received order for pen needles that were e-scribed by Dr Fisher. States need order for insulin needles for vial. Writer called in order for insulin needles 31 g/0.3 ml to be used as directed; quantity of 300 with 4 additional refills. Will send follow up message to Dr Fisher for notification.

## 2022-02-15 NOTE — TELEPHONE ENCOUNTER
Called patient due to RN escalation in COVID Surveillance program. Pt escalated due to hypoxia.    Patient location: [place], [place]    Vitals: Sp02: 91-94% on 2 L. P: 64. Temp: 97.7 F  Ambulatory Sp02 on the phone (if applicable): 93% on 2 L    72 y.o. male with pertinent PMHx of hypoxemic resp failure due to covid-19, HTN, DM type II on day 14 of Covid symptoms. Positive Covid screen 2/1/22. CXR on 2/11. Home oxygen: yes (2 L). COVID-19 Hospitalization History: admitted 2/6-2/14. Received antibody infusion: no. Fully vaccinated: no. Remdesivir treatment day: NA. SpO2 goal on hospital discharge: >93%.    HPI: Pt with above PMH and hospital d/c yesterday escalated for reported SpO2 of 94% on 2 L. Repeat on phone with triage RN was 91% on 2 L and pt reported cold hands. Also took his oxygen off when he went to the restroom right before submitting vitals. Denies any new or worsening symptoms since hospital discharge. Denies any significant dyspnea. No issues with new insulin regimen.     ROS: Denies worsening cough, light headedness, fever, chills, diaphoresis, chest pain, abdominal pain, emesis, diarrhea or further symptoms.     Assessment: Vitals appear WNL. During phone call, patient appears alert and oriented. Able to speak in full sentences without difficulty. No audible wheezing heard during call.    Plan:    Reviewed with patient the reasons for seeking emergency care. Pt aware that if Sp02 <94% or if they have any worsening symptoms, they need to go to the emergency department. If they are having a medical emergency, they will call 911. Otherwise, patient will continue to submit data as scheduled. Reviewed importance of wearing mask if self or family members leave the house.     Advised next steps: Continue care at home

## 2022-02-15 NOTE — TELEPHONE ENCOUNTER
COVID POSITIVE 2/1/221        Patient wad dc'\d from hospital  yesterday and looking for needles for prescription for insulin  Did not get a prescription for the Levemir needles.  Or  Novolog.  Patient which insulin to take only one needle.  Today. Please advise patient.  For any worsening problems or symptoms to call us back OOC RN.   Care advise is to call pcp when open.       Reason for Disposition   [1] Prescription not at pharmacy AND [2] was prescribed by PCP recently (Exception: triager has access to EMR and prescription is recorded there. Go to Home Care and confirm for pharmacy.)    Additional Information   Negative: [1] Intentional drug overdose AND [2] suicidal thoughts or ideas   Negative: MORE THAN A DOUBLE DOSE of a prescription or over-the-counter (OTC) drug   Negative: [1] DOUBLE DOSE (an extra dose or lesser amount) of prescription drug AND [2] any symptoms (e.g., dizziness, nausea, pain, sleepiness)   Negative: [1] DOUBLE DOSE (an extra dose or lesser amount) of over-the-counter (OTC) drug AND [2] any symptoms (e.g., dizziness, nausea, pain, sleepiness)   Negative: Took another person's prescription drug   Negative: [1] DOUBLE DOSE (an extra dose or lesser amount) of prescription drug AND [2] NO symptoms (Exception: a double dose of antibiotics)   Negative: Diabetes drug error or overdose (e.g., took wrong type of insulin or took extra dose)   Negative: [1] Prescription refill request for ESSENTIAL medicine (i.e., likelihood of harm to patient if not taken) AND [2] triager unable to refill per department policy    Protocols used: MEDICATION QUESTION CALL-A-

## 2022-02-15 NOTE — TELEPHONE ENCOUNTER
Surveillance patient escalated for SpO2 94% on 2L O2.  On recheck SpO2 91% on 2L O2..  Pt stated hands were cold and attempted to warm them up.     RN escalated call to REGINA for further assessment and directives.  Secure chat sent to REGINA, chat acknowledged.  REGINA to call patient.    Reason for Disposition   Oxygen level (e.g., pulse oximetry) 91 to 94 percent    Additional Information   Negative: SEVERE difficulty breathing (e.g., struggling for each breath, speaks in single words, pulse > 120)   Negative: Bluish (or gray) lips or face now   Negative: Difficult to awaken or acting confused (e.g., disoriented, slurred speech)   Negative: Slow, shallow and weak breathing   Negative: Sounds like a life-threatening emergency to the triager   Negative: [1] MODERATE difficulty breathing (e.g., speaks in phrases, SOB even at rest, pulse 100 - 120) AND [2] new-onset or worse than normal   Negative: [1] MODERATE difficulty breathing AND [2] oxygen level (e.g., pulse oximetry) 91 to 94 percent   Negative: Oxygen level (e.g., pulse oximetry) 90 percent or lower   Negative: Patient sounds very sick or weak to the triager   Negative: [1] MODERATE difficulty breathing (e.g., speaks in phrases, SOB even at rest, pulse 100 - 120) AND [2] NOT new-onset or worse than normal   Negative: [1] Drinking very little AND [2] dehydration suspected (e.g., no urine > 12 hours, very dry mouth, very lightheaded)   Negative: [1] MILD difficulty breathing (e.g., minimal/no SOB at rest, SOB with walking, pulse <100) AND [2] new-onset   Negative: Fever > 100.4 F (38.0 C)   Negative: Nurse judgment   Negative: [1] Fall in oxygen level 4% or more (below known patient baseline, while awake and resting) AND [2] new or worse difficulty breathing    Protocols used: OXYGEN MONITORING AND EEAIOHP-P-DO

## 2022-02-16 ENCOUNTER — NURSE TRIAGE (OUTPATIENT)
Dept: ADMINISTRATIVE | Facility: CLINIC | Age: 72
End: 2022-02-16
Payer: MEDICARE

## 2022-02-16 ENCOUNTER — TELEPHONE (OUTPATIENT)
Dept: ADMINISTRATIVE | Facility: CLINIC | Age: 72
End: 2022-02-16
Payer: MEDICARE

## 2022-02-16 ENCOUNTER — PATIENT MESSAGE (OUTPATIENT)
Dept: ADMINISTRATIVE | Facility: OTHER | Age: 72
End: 2022-02-16
Payer: MEDICARE

## 2022-02-16 NOTE — TELEPHONE ENCOUNTER
Called patient due to RN escalation in COVID Surveillance program. Pt escalated due to hypoxia.     Patient location: [place], [place]     Vitals: SpO2: 93% on 2 L. P: 84. Temp: 97.9 F  Ambulatory SpO2 on the phone (if applicable):      72 y.o. male with pertinent PMHx of hypoxemic resp failure due to covid-19, HTN, DM type II on day 15 of Covid symptoms. Positive Covid screen 2/1/22. CXR on 2/11. Home oxygen: yes (2 L). COVID-19 Hospitalization History: admitted 2/6-2/14. Received antibody infusion: no. Fully vaccinated: no. Remdesivir treatment day: NA. SpO2 goal on hospital discharge: >93%.    HPI: Pt escalated for reported SpO2 of 93% on 2 L. States oxygen levels fluctuate throughout the day but mostly remain around 93-95%. Denies any acute or worsening symptoms.       ROS: Denies worsening cough, light headedness, fever, chills, diaphoresis, chest pain, abdominal pain, emesis, diarrhea or further symptoms.     Assessment: Vitals appear WNL. During phone call, patient appears alert and oriented. Able to speak in full sentences without difficulty. No audible wheezing heard during call.    Plan:    Reviewed with patient the reasons for seeking emergency care. Pt aware that if Sp02 <94% or if they have any worsening symptoms, they need to go to the emergency department. If they are having a medical emergency, they will call 911. Otherwise, patient will continue to submit data as scheduled. Reviewed importance of wearing mask if self or family members leave the house.     Advised next steps: Continue care at home

## 2022-02-16 NOTE — TELEPHONE ENCOUNTER
Pt called and he said that he is feeling great but O2 this am 91-92 but sounds amazing pt has a little SOB 1/1 and no baltazar will pass to jt for eval  Reason for Disposition   Nursing judgment    Protocols used: INFORMATION ONLY CALL - NO TRIAGE-A-OH

## 2022-02-16 NOTE — TELEPHONE ENCOUNTER
Called patient due to RN escalation in COVID Surveillance program. Pt escalated due to hypoxia.     Patient location: [place], [place]     Vitals: Sp02: 93% on 2 L. P: 89. Temp: 98.1 F  Ambulatory Sp02 on the phone (if applicable): 93-94% on 2 L     72 y.o. male with pertinent PMHx of hypoxemic resp failure due to covid-19, HTN, DM type II on day 15 of Covid symptoms. Positive Covid screen 2/1/22. CXR on 2/11. Home oxygen: yes (2 L). COVID-19 Hospitalization History: admitted 2/6-2/14. Received antibody infusion: no. Fully vaccinated: no. Remdesivir treatment day: NA. SpO2 goal on hospital discharge: >93%.    HPI: Pt reports his hands are ice cold. States his pulse oximeter has always read a little lower even when he compared it to the pulse ox in the hospital. Denies any worsening symptoms or dyspnea. He is even able to walk to the bathroom without oxygen on without difficulty. Still taking prednisone as prescribed. Blood sugar this morning was 153.     ROS: Denies worsening cough, light headedness, fever, chills, diaphoresis, chest pain, abdominal pain, emesis, diarrhea or further symptoms.     Assessment: Vitals appear WNL. During phone call, patient appears alert and oriented. Able to speak in full sentences without difficulty. No audible wheezing heard during call.    Plan:    Reviewed with patient the reasons for seeking emergency care. Pt aware that if Sp02 <94% or if they have any worsening symptoms, they need to go to the emergency department. If they are having a medical emergency, they will call 911. Otherwise, patient will continue to submit data as scheduled. Reviewed importance of wearing mask if self or family members leave the house.     Advised next steps: Continue care at home

## 2022-02-16 NOTE — DISCHARGE SUMMARY
[facility] Hospital Medicine  Discharge Summary      Patient Name: Ludwin Gee  MRN: [id number]  Patient Class: IP- Inpatient  Admission Date: 2/6/2022  Hospital Length of Stay: 8 days  Discharge Date and Time: 2/14/2022  1:30 PM  Attending Physician: No att. providers found   Discharging Provider: Chandana Hanccok MD  Primary Care Provider: Herrera Fisher MD      HPI:   Patient is a 72-year-old male with past medical history of diabetes, hypertension, hyperlipidemia, former tobacco use who was diagnosed with COVID on 02/01/2022 and recently hospitalized for hypoxic respiratory failure from COVID during which he completed a course of remdesivir and was discharged yesterday with supplemental oxygen repeat presents today with complaint of hypoxia at home on 4 L nasal cannula.  Wife reports he did well last night however since then has been having trouble maintaining his saturations on 4 L especially with movement.  Denies any fever, chills, chest pain, nausea, vomiting.  He is drowsy at this time from cough medicine received in the ED.  D-dimer has increased from previous.  CTA ordered.  He is currently on 5 L nasal cannula with oxygen saturation at 95%       * No surgery found *      Hospital Course:   Ludwin Webber is a 72 year old male with a past medical history of diabetes, hypertension, hyperlipidemia, and former tobacco use who was recently diagnosed with COVID-19 pneumonia with recent discharge who re-presented for increasing O2 requirement. He was started on steroids and tocilizumab was ordered (given 2/7). He is currently stable on 3 L NC. He is also on prophylactic dose Lovenox. Inflammatory markers are decreasing. Pulmonary has been consulted.  Patient improved over the course of his hospitalization, and was weaned down to 2 L oxygen.  He was discharged home on home oxygen and COVID-19 surveillance program.       Goals of Care Treatment Preferences:  Code Status: Full  Code      Consults:   Consults (From admission, onward)        Status Ordering Provider     Inpatient consult to Pulmonology  Once        Provider:  Oma Roland MD    Completed CARMEN SELF          No new Assessment & Plan notes have been filed under this hospital service since the last note was generated.  Service: Hospital Medicine    Final Active Diagnoses:    Diagnosis Date Noted POA    PRINCIPAL PROBLEM:  Acute hypoxemic respiratory failure due to COVID-19 [U07.1, J96.01] 02/01/2022 Yes    Asbestos exposure [Z77.090] 02/07/2022 Not Applicable    Anemia [D64.9] 02/01/2022 Yes    Hyperlipidemia associated with type 2 diabetes mellitus [E11.69, E78.5] 04/07/2017 Yes    Hypertension associated with diabetes [E11.59, I15.2] 04/07/2017 Yes    Controlled type 2 diabetes mellitus with microalbuminuria, without long-term current use of insulin [E11.29, R80.9] 05/23/2012 Yes    BPH (benign prostatic hyperplasia) [N40.0] 05/23/2012 Yes      Problems Resolved During this Admission:       Discharged Condition: stable    Disposition: Home or Self Care    Follow Up:   Follow-up Information     Jef Pacheco MD In 2 weeks.    Specialty: Pulmonary Disease  Why: apt in avs  Contact information:  [address] [facility]  [address]  [place] [address]  [phone]             Herrera Fisher MD On 2/14/2022.    Specialty: Family Medicine  Why:  spoke with Tana, she sent a message to the nurse to schedule appt  Contact information:  [address]  [address]  [place] [address]  [phone]                       Patient Instructions:      Diet diabetic     COVID-19 Surveillance Program     Order Specific Question Answer Comments   Does patient have a smartphone? Yes    Does patient have the [facility] jt on their smartphone? Yes    While in surveillance program, will patient be using home oxygen? Yes      Notify your health care provider if you experience any of the following:  temperature >100.4     Notify your health  care provider if you experience any of the following:  severe uncontrolled pain     Notify your health care provider if you experience any of the following:  difficulty breathing or increased cough     Notify your health care provider if you experience any of the following:  increased confusion or weakness     Activity as tolerated       Significant Diagnostic Studies: Labs:   BMP:   Recent Labs   Lab 02/14/22  0503   GLU 50*      K 5.2*      CO2 31*   BUN 27*   CREATININE 1.2   CALCIUM 9.3    and CBC   Recent Labs   Lab 02/14/22  0503   WBC 13.25*   HGB 13.2*   HCT 39.4*          Pending Diagnostic Studies:     None         Medications:  Reconciled Home Medications:      Medication List      START taking these medications    insulin detemir U-100 100 unit/mL (3 mL) Inpn pen  Commonly known as: Levemir FLEXTOUCH  Inject 20 Units into the skin once daily.     insulin lispro 100 unit/mL injection  Commonly known as: HumaLOG U-100 Insulin  Inject 10 Units into the skin 3 (three) times daily before meals. **MODERATE CORRECTION DOSE** Blood Glucose mg/dL                  Pre-meal                Bedtime 151-200                2 units                    1 unit 201-250                4 units                    2 units 251-300                6 units                    3 units  301-350                8 units                    4 units >350                     10 units                  5 units **CALL MD for BG >350**     predniSONE 20 MG tablet  Commonly known as: DELTASONE  Take 2 tablets (40 mg total) by mouth once daily for 7 days, THEN 1.5 tablets (30 mg total) once daily for 7 days, THEN 1 tablet (20 mg total) once daily for 7 days, THEN 0.5 tablets (10 mg total) once daily for 7 days.  Start taking on: February 14, 2022        CHANGE how you take these medications    * pulse oximeter device  Commonly known as: pulse oximeter  by Apply Externally route 2 (two) times a day. Use twice daily at 8 AM and 3  PM and [facility] as directed.  What changed: Another medication with the same name was added. Make sure you understand how and when to take each.     * pulse oximeter device  Commonly known as: pulse oximeter  by Apply Externally route 2 (two) times a day. Use twice daily at 8 AM and 3 PM and record the value in [facility] as directed.  What changed: You were already taking a medication with the same name, and this prescription was added. Make sure you understand how and when to take each.         * This list has 2 medication(s) that are the same as other medications prescribed for you. Read the directions carefully, and ask your doctor or other care provider to review them with you.            CONTINUE taking these medications    alfuzosin 10 mg Tb24  Commonly known as: UROXATRAL  Take 1 tablet (10 mg total) by mouth daily with breakfast.     blood sugar diagnostic Strp  One Touch check fasting glucose in morning     blood-glucose meter Misc  One Touch glucometer check fasting glucose in morning     coenzyme Q10 10 mg capsule  Take 10 mg by mouth once daily.     famotidine 20 MG tablet  Commonly known as: PEPCID  Take 20 mg by mouth once daily.     glimepiride 1 MG tablet  Commonly known as: AMARYL  Take 1 tablet (1 mg total) by mouth once daily.     ibuprofen 800 MG tablet  Commonly known as: ADVIL,MOTRIN  Take 800 mg by mouth every 6 (six) hours as needed.     lancets Misc  One Touch lancets check fasting glucose in morning     losartan 25 MG tablet  Commonly known as: COZAAR  TAKE ONE TABLET BY MOUTH EVERY DAY     meloxicam 15 MG tablet  Commonly known as: MOBIC  Take 15 mg by mouth once daily.     metFORMIN 500 MG ER 24hr tablet  Commonly known as: GLUCOPHAGE-XR  TAKE ONE TABLET BY MOUTH EVERY MORNING AND TAKE TWO TABLETS BY MOUTH EVERY EVENING     ondansetron 8 MG tablet  Commonly known as: ZOFRAN  Take 1 tablet (8 mg total) by mouth every 8 (eight) hours as needed for Nausea.     rosuvastatin  10 MG tablet  Commonly known as: CRESTOR  Take 1 tablet (10 mg total) by mouth once daily.            Indwelling Lines/Drains at time of discharge:   Lines/Drains/Airways     None                 Time spent on the discharge of patient: 37 minutes         Chandana Hancock MD  Department of Hospital Medicine  [facility]

## 2022-02-16 NOTE — TELEPHONE ENCOUNTER
Patient spoke with REGINA this morning due to 8am triage outcome. Given that vitals and symptoms are the same as the 8am triage, the REGINA will review the chart and will determine if outreach is needed. Secure chat sent to KELLIE LEMON. Please see REGINA note.      Reason for Disposition   Caller has cancelled the call before the first contact    Protocols used: NO CONTACT OR DUPLICATE CONTACT CALL-A-AH

## 2022-02-17 ENCOUNTER — PATIENT MESSAGE (OUTPATIENT)
Dept: ADMINISTRATIVE | Facility: OTHER | Age: 72
End: 2022-02-17
Payer: MEDICARE

## 2022-02-17 ENCOUNTER — NURSE TRIAGE (OUTPATIENT)
Dept: ADMINISTRATIVE | Facility: CLINIC | Age: 72
End: 2022-02-17
Payer: MEDICARE

## 2022-02-17 ENCOUNTER — TELEPHONE (OUTPATIENT)
Dept: FAMILY MEDICINE | Facility: CLINIC | Age: 72
End: 2022-02-17
Payer: MEDICARE

## 2022-02-17 DIAGNOSIS — E11.29 CONTROLLED TYPE 2 DIABETES MELLITUS WITH MICROALBUMINURIA, WITHOUT LONG-TERM CURRENT USE OF INSULIN: Primary | ICD-10-CM

## 2022-02-17 DIAGNOSIS — R80.9 CONTROLLED TYPE 2 DIABETES MELLITUS WITH MICROALBUMINURIA, WITHOUT LONG-TERM CURRENT USE OF INSULIN: Primary | ICD-10-CM

## 2022-02-17 NOTE — TELEPHONE ENCOUNTER
----- Message from Philomena Garcia sent at 2/17/2022  3:40 PM CST -----  Contact: pt wife  Type: Needs Medical Advice    Who Called: pt wife  Best Call Back Number: [phone]  Inquiry/Question: calling to clarify dosage on insulin.  Please contact pt.  Thank you~

## 2022-02-17 NOTE — TELEPHONE ENCOUNTER
Pt called for escalation of O2 sat 94 and he said that he was feeling good pts vitals WNL and will continue to monitor and call as needed. Pt denies needing anything and will call for any issues   Reason for Disposition   Information only question and nurse able to answer    Protocols used: INFORMATION ONLY CALL - NO TRIAGE-A-OH

## 2022-02-17 NOTE — TELEPHONE ENCOUNTER
Pt was discharged from the hospital on 2/14. His metformin was changed to insulin in the hospital. Pt is almost out of Levemir and still has Humalog. Pt states he has had blood sugars over 350 a few times. Pt F/U is scheduled for 2/23. Pt wants to know what you would like for him to do in the meantime since he is almost out of Levemir. Please advise.

## 2022-02-18 ENCOUNTER — PATIENT MESSAGE (OUTPATIENT)
Dept: FAMILY MEDICINE | Facility: CLINIC | Age: 72
End: 2022-02-18
Payer: MEDICARE

## 2022-02-18 ENCOUNTER — NURSE TRIAGE (OUTPATIENT)
Dept: ADMINISTRATIVE | Facility: CLINIC | Age: 72
End: 2022-02-18
Payer: MEDICARE

## 2022-02-18 ENCOUNTER — PATIENT MESSAGE (OUTPATIENT)
Dept: ADMINISTRATIVE | Facility: OTHER | Age: 72
End: 2022-02-18
Payer: MEDICARE

## 2022-02-18 RX ORDER — INSULIN GLARGINE 100 [IU]/ML
20 INJECTION, SOLUTION SUBCUTANEOUS NIGHTLY
Qty: 6 ML | Refills: 0 | Status: SHIPPED | OUTPATIENT
Start: 2022-02-18 | End: 2022-09-06

## 2022-02-18 NOTE — TELEPHONE ENCOUNTER
Pt contacted for Surveillance escalation - PO2=94 on retake pt unable to get good reading reporting his hands are very cold but he feels wonderful.  Denies any fever or SOB, pt able to speak in full sentences without noted SOB or cough. Denies any need for triage at this time. Info only  protocol used and pt advised on home care. Pt instructed to call [facility] for worsening of symptoms or health questions.    Reason for Disposition   Information only question and nurse able to answer    Protocols used: INFORMATION ONLY CALL - NO TRIAGE-A-OH

## 2022-02-18 NOTE — TELEPHONE ENCOUNTER
According to our computer which is often incorrect, Lantus is covered on his insurance at a level three co-pay.  It is used the same as Levemir, 20 units at bedtime.  [facility] am [facility]    Patient notified by e-mail

## 2022-02-18 NOTE — TELEPHONE ENCOUNTER
Patient confirms he received discharge instructions. States it was not discussed with him that he was to continue Metformin. Patient states he was given Levemir at the hospital to take home with him. States Levemir is not covered by his insurance. Patient requested a copy of message be sent to him via [facility]. Message was sent as per patient request. Will send follow up message to Dr Fisher for notification.

## 2022-02-19 ENCOUNTER — PATIENT MESSAGE (OUTPATIENT)
Dept: ADMINISTRATIVE | Facility: OTHER | Age: 72
End: 2022-02-19
Payer: MEDICARE

## 2022-02-19 ENCOUNTER — NURSE TRIAGE (OUTPATIENT)
Dept: ADMINISTRATIVE | Facility: CLINIC | Age: 72
End: 2022-02-19
Payer: MEDICARE

## 2022-02-19 NOTE — TELEPHONE ENCOUNTER
Pt called for O2 sat and he said that his fingers are cold but he is not experiencing any set back or worsening. Pt rechecked and O2 sat 95 pt will call back if having any problems.    Reason for Disposition   Health Information question, no triage required and triager able to answer question    Protocols used: INFORMATION ONLY CALL - NO TRIAGE-A-

## 2022-02-20 ENCOUNTER — PATIENT MESSAGE (OUTPATIENT)
Dept: ADMINISTRATIVE | Facility: OTHER | Age: 72
End: 2022-02-20
Payer: MEDICARE

## 2022-02-21 ENCOUNTER — PES CALL (OUTPATIENT)
Dept: ADMINISTRATIVE | Facility: CLINIC | Age: 72
End: 2022-02-21
Payer: MEDICARE

## 2022-02-21 ENCOUNTER — PATIENT MESSAGE (OUTPATIENT)
Dept: ADMINISTRATIVE | Facility: OTHER | Age: 72
End: 2022-02-21
Payer: MEDICARE

## 2022-02-22 ENCOUNTER — TELEPHONE (OUTPATIENT)
Dept: FAMILY MEDICINE | Facility: CLINIC | Age: 72
End: 2022-02-22
Payer: MEDICARE

## 2022-02-22 ENCOUNTER — PATIENT MESSAGE (OUTPATIENT)
Dept: ADMINISTRATIVE | Facility: OTHER | Age: 72
End: 2022-02-22
Payer: MEDICARE

## 2022-02-22 NOTE — TELEPHONE ENCOUNTER
Jennifer, pt's wife, notified she would need to contact billing department or insurance to verify this.

## 2022-02-22 NOTE — TELEPHONE ENCOUNTER
----- Message from Lizzy Quiroz sent at 2/22/2022  3:16 PM CST -----  Regarding: advice  Contact: wife  Type: Needs Medical Advice  Who Called:  wife-Emily   Symptoms (please be specific):    How long has patient had these symptoms:    Pharmacy name and phone #:    Best Call Back Number: [phone]  Additional Information: The wife want to know if the insurance will cover the home visit for the pt.

## 2022-02-23 ENCOUNTER — PATIENT MESSAGE (OUTPATIENT)
Dept: ADMINISTRATIVE | Facility: OTHER | Age: 72
End: 2022-02-23
Payer: MEDICARE

## 2022-02-24 ENCOUNTER — PATIENT MESSAGE (OUTPATIENT)
Dept: ADMINISTRATIVE | Facility: OTHER | Age: 72
End: 2022-02-24
Payer: MEDICARE

## 2022-02-24 ENCOUNTER — OFFICE VISIT (OUTPATIENT)
Dept: HOME HEALTH SERVICES | Facility: CLINIC | Age: 72
End: 2022-02-24
Payer: MEDICARE

## 2022-02-24 VITALS
TEMPERATURE: 98 F | SYSTOLIC BLOOD PRESSURE: 132 MMHG | HEART RATE: 78 BPM | DIASTOLIC BLOOD PRESSURE: 76 MMHG | RESPIRATION RATE: 18 BRPM | OXYGEN SATURATION: 98 %

## 2022-02-24 DIAGNOSIS — J96.01 ACUTE HYPOXEMIC RESPIRATORY FAILURE DUE TO COVID-19: ICD-10-CM

## 2022-02-24 DIAGNOSIS — U07.1 ACUTE HYPOXEMIC RESPIRATORY FAILURE DUE TO COVID-19: ICD-10-CM

## 2022-02-24 PROCEDURE — 99497 ADVNCD CARE PLAN 30 MIN: CPT | Mod: ,,, | Performed by: NURSE PRACTITIONER

## 2022-02-24 PROCEDURE — 1159F PR MEDICATION LIST DOCUMENTED IN MEDICAL RECORD: ICD-10-PCS | Mod: CPTII,,, | Performed by: NURSE PRACTITIONER

## 2022-02-24 PROCEDURE — 1160F PR REVIEW ALL MEDS BY PRESCRIBER/CLIN PHARMACIST DOCUMENTED: ICD-10-PCS | Mod: CPTII,,, | Performed by: NURSE PRACTITIONER

## 2022-02-24 PROCEDURE — 1159F MED LIST DOCD IN RCRD: CPT | Mod: CPTII,,, | Performed by: NURSE PRACTITIONER

## 2022-02-24 PROCEDURE — 3078F PR MOST RECENT DIASTOLIC BLOOD PRESSURE < 80 MM HG: ICD-10-PCS | Mod: CPTII,,, | Performed by: NURSE PRACTITIONER

## 2022-02-24 PROCEDURE — 99350 PR HOME VISIT,ESTAB PATIENT,LEVEL IV: ICD-10-PCS | Mod: ,,, | Performed by: NURSE PRACTITIONER

## 2022-02-24 PROCEDURE — 3078F DIAST BP <80 MM HG: CPT | Mod: CPTII,,, | Performed by: NURSE PRACTITIONER

## 2022-02-24 PROCEDURE — 3288F PR FALLS RISK ASSESSMENT DOCUMENTED: ICD-10-PCS | Mod: CPTII,,, | Performed by: NURSE PRACTITIONER

## 2022-02-24 PROCEDURE — 1160F RVW MEDS BY RX/DR IN RCRD: CPT | Mod: CPTII,,, | Performed by: NURSE PRACTITIONER

## 2022-02-24 PROCEDURE — 1126F PR PAIN SEVERITY QUANTIFIED, NO PAIN PRESENT: ICD-10-PCS | Mod: CPTII,,, | Performed by: NURSE PRACTITIONER

## 2022-02-24 PROCEDURE — 99497 PR ADVNCD CARE PLAN 30 MIN: ICD-10-PCS | Mod: ,,, | Performed by: NURSE PRACTITIONER

## 2022-02-24 PROCEDURE — 1101F PR PT FALLS ASSESS DOC 0-1 FALLS W/OUT INJ PAST YR: ICD-10-PCS | Mod: CPTII,,, | Performed by: NURSE PRACTITIONER

## 2022-02-24 PROCEDURE — 3075F SYST BP GE 130 - 139MM HG: CPT | Mod: CPTII,,, | Performed by: NURSE PRACTITIONER

## 2022-02-24 PROCEDURE — 1101F PT FALLS ASSESS-DOCD LE1/YR: CPT | Mod: CPTII,,, | Performed by: NURSE PRACTITIONER

## 2022-02-24 PROCEDURE — 3075F PR MOST RECENT SYSTOLIC BLOOD PRESS GE 130-139MM HG: ICD-10-PCS | Mod: CPTII,,, | Performed by: NURSE PRACTITIONER

## 2022-02-24 PROCEDURE — 99350 HOME/RES VST EST HIGH MDM 60: CPT | Mod: ,,, | Performed by: NURSE PRACTITIONER

## 2022-02-24 PROCEDURE — 3072F PR LOW RISK FOR RETINOPATHY: ICD-10-PCS | Mod: CPTII,,, | Performed by: NURSE PRACTITIONER

## 2022-02-24 PROCEDURE — 3288F FALL RISK ASSESSMENT DOCD: CPT | Mod: CPTII,,, | Performed by: NURSE PRACTITIONER

## 2022-02-24 PROCEDURE — 3072F LOW RISK FOR RETINOPATHY: CPT | Mod: CPTII,,, | Performed by: NURSE PRACTITIONER

## 2022-02-24 PROCEDURE — 1126F AMNT PAIN NOTED NONE PRSNT: CPT | Mod: CPTII,,, | Performed by: NURSE PRACTITIONER

## 2022-02-24 NOTE — PROGRESS NOTES
"[facility]  Transition of Care Home Visit    Visit Date: 2/24/22  Encounter Provider: Chandrakant Mcdonald NP  PCP:  Herrera Fisher MD    PRESENTING HISTORY   Patient ID: Ludwin Gee    Consult Requested By:  Dr. Herrera Fisher    Reason for Consult:  Transitional Care Coordination    Chief Complaint: Transitional Care     The patient is being seen at home due to a physical debility that presents a taxing effort to leave the home, to mitigate high risk of hospital readmission or due to the limited availability of reliable or safe options for transportation to the point of access to the provider. The visit meets the criteria for medical necessity as defined by CMS as "health-care services needed to prevent, diagnose, or treat an illness, injury, condition, disease, or its symptoms and that meet accepted standards of medicine." Prior to treatment on this visit the chart was reviewed and patient consent was obtained.    History of Present Illness:   Patient is a 72-year-old male with past medical history of diabetes, hypertension, hyperlipidemia, former tobacco use who was diagnosed with COVID on 02/01/2022 and recently hospitalized for hypoxic respiratory failure from COVID during which he completed a course of remdesivir and was discharged yesterday with supplemental oxygen repeat presents today with complaint of hypoxia at home on 4 L nasal cannula.  Wife reports he did well last night however since then has been having trouble maintaining his saturations on 4 L especially with movement.  Denies any fever, chills, chest pain, nausea, vomiting.  He is drowsy at this time from cough medicine received in the ED.  D-dimer has increased from previous.  CTA ordered.  He is currently on 5 L nasal cannula with oxygen saturation at 95%     * No surgery found *       Hospital Course:   Ludwin Webber is a 72 year old male with a past medical history of diabetes, hypertension, hyperlipidemia, and former tobacco use " who was recently diagnosed with COVID-19 pneumonia with recent discharge who re-presented for increasing O2 requirement. He was started on steroids and tocilizumab was ordered (given 2/7). He is currently stable on 3 L NC. He is also on prophylactic dose Lovenox. Inflammatory markers are decreasing. Pulmonary has been consulted. Patient improved over the course of his hospitalization, and was weaned down to 2 L oxygen.  He was discharged home on home oxygen and COVID-19 surveillance program.     Admit Date: 02/06/22  Discharge Date: 02/14/22  _______________________________  Today: Mr. Ludwin Gee is a 72 y.o. male is being seen and examined at home today for transitional care visit to the home environment post-discharge from inpatient hospitalization encounter described above. Patient presents ambulating freely in home without oxygen intact. He had just removed the nasal cannula to walk to answer the door. He reports no air hunger or distress. No evidence of dyspnea. VSS. No reported or observed signs of distress noted. Denies any acute issues, concerns or complaints to address on today's visit. He has followed all discharge instructions and treatments as ordered. Reports taking all medications as prescribed. He has established an appointment with his PCP of record for follow-up. Risks of environmental exposure to coronavirus discussed including: social distancing, hand hygiene, and limiting departures from the home for necessities only. Reports understanding and willingness to comply.      Review of Systems   Constitutional: Positive for fatigue (improving). Negative for appetite change.   HENT: Positive for congestion. Negative for sinus pressure.    Eyes: Negative.    Respiratory: Positive for cough and shortness of breath (LOPEZ - improving).    Cardiovascular: Negative.  Negative for chest pain and palpitations.   Gastrointestinal: Negative.    Genitourinary: Negative.    Musculoskeletal: Negative.    Skin:  Negative.    Neurological: Negative.    Hematological: Negative.    Psychiatric/Behavioral: Negative.    All other systems reviewed and are negative.      Assessments:  · Environmental: private home with adequate lighting and temperature control  · Functional Status: Requires moderate assistance with ADL's/IADL's, ambulates with assistance of a cane/walker, continent of bowel and bladder  · Safety: Fall Precautions, COVID Precautions/Social Distancing/Mask Use  · Nutritional: Adequate  · Home Health: none  · DME/Supplies: oxygen concentrator and tanks     Ethical / Legal: Advance Care Planning   Capacity to make medical decisions: yes, Conflict: no  · Surrogate decision maker:  Name: Emily Stroud, Relationship: wife  · Advance Directives:  none  · HCPOA: none  · [place]:  Signed this visit  · Code Status: Full Code    Advanced Care Directive Informational packet and forms left in the home for family review, discussion and signing with instructions to return upon their next provider encounter for inclusion to the medical record.   PAST HISTORY:     Past Medical History:   Diagnosis Date    Diabetes mellitus     Elevated PSA     Hyperlipidemia     Hypertension        Past Surgical History:   Procedure Laterality Date    COLONOSCOPY  12/6/2013    Dr. Martinez, 5 year recheck    SHOULDER SURGERY  12/09/2011    right        Family History   Problem Relation Age of Onset    Diabetes Father     Heart disease Father     Cataracts Mother     Glaucoma Mother     Diabetes Maternal Grandmother     No Known Problems Brother     No Known Problems Daughter     No Known Problems Son     No Known Problems Maternal Grandfather     No Known Problems Paternal Grandmother     No Known Problems Paternal Grandfather     No Known Problems Maternal Aunt     No Known Problems Maternal Uncle     No Known Problems Paternal Aunt     No Known Problems Paternal Uncle     Urolithiasis Neg Hx     Prostate cancer Neg Hx      Kidney cancer Neg Hx     Retinal detachment Neg Hx     Macular degeneration Neg Hx        Social History     Socioeconomic History    Marital status:    Tobacco Use    Smoking status: Former Smoker     Types: Cigarettes     Quit date: 1985     Years since quittin.7    Smokeless tobacco: Never Used   Substance and Sexual Activity    Alcohol use: Yes     Comment: seldom    Drug use: No       MEDICATIONS & ALLERGIES:     Current Outpatient Medications on File Prior to Visit   Medication Sig Dispense Refill    alfuzosin (UROXATRAL) 10 mg Tb24 Take 1 tablet (10 mg total) by mouth daily with breakfast. 30 tablet 11    blood sugar diagnostic Strp One Touch check fasting glucose in morning 100 each 3    blood-glucose meter Misc One Touch glucometer check fasting glucose in morning 1 each 0    coenzyme Q10 10 mg capsule Take 10 mg by mouth once daily.      famotidine (PEPCID) 20 MG tablet Take 20 mg by mouth once daily.      glimepiride (AMARYL) 1 MG tablet Take 1 tablet (1 mg total) by mouth once daily. (Patient not taking: Reported on 2/15/2022) 90 tablet 1    ibuprofen (ADVIL,MOTRIN) 800 MG tablet Take 800 mg by mouth every 6 (six) hours as needed.      insulin (LANTUS SOLOSTAR U-100 INSULIN) glargine 100 units/mL (3mL) SubQ pen Inject 20 Units into the skin every evening. 6 mL 0    insulin lispro (HUMALOG U-100 INSULIN) 100 unit/mL injection Inject 10 Units into the skin 3 (three) times daily before meals. **MODERATE CORRECTION DOSE** Blood Glucose mg/dL                  Pre-meal                Bedtime 151-200                2 units                    1 unit 201-250                4 units                    2 units 251-300                6 units                    3 units  301-350                8 units                    4 units >350                     10 units                  5 units **CALL MD for BG >350** 9 mL 11    lancets Misc One Touch lancets check fasting glucose in  "morning 100 each 3    losartan (COZAAR) 25 MG tablet TAKE ONE TABLET BY MOUTH EVERY DAY 90 tablet 3    meloxicam (MOBIC) 15 MG tablet Take 15 mg by mouth once daily.      metFORMIN (GLUCOPHAGE-XR) 500 MG ER 24hr tablet TAKE ONE TABLET BY MOUTH EVERY MORNING AND TAKE TWO TABLETS BY MOUTH EVERY EVENING (Patient not taking: Reported on 2/15/2022) 270 tablet 1    ondansetron (ZOFRAN) 8 MG tablet Take 1 tablet (8 mg total) by mouth every 8 (eight) hours as needed for Nausea. (Patient not taking: Reported on 2/15/2022) 12 tablet 1    pen needle, diabetic 30 gauge x 5/16" Ndle Use with levemir once daily and novolog before each meal three times daily 400 each 4    predniSONE (DELTASONE) 20 MG tablet Take 2 tablets (40 mg total) by mouth once daily for 7 days, THEN 1.5 tablets (30 mg total) once daily for 7 days, THEN 1 tablet (20 mg total) once daily for 7 days, THEN 0.5 tablets (10 mg total) once daily for 7 days. 30 tablet 0    pulse oximeter (PULSE OXIMETER) device by Apply Externally route 2 (two) times a day. Use twice daily at 8 AM and 3 PM and record the value in MyChart as directed. 1 each 0    pulse oximeter (PULSE OXIMETER) device by Apply Externally route 2 (two) times a day. Use twice daily at 8 AM and 3 PM and record the value in MyChart as directed. 1 each 0    rosuvastatin (CRESTOR) 10 MG tablet Take 1 tablet (10 mg total) by mouth once daily. 90 tablet 3     No current facility-administered medications on file prior to visit.      Review of patient's allergies indicates:   Allergen Reactions    Pravastatin Other (See Comments)     Joint pain    Lisinopril Other (See Comments)     Cough      OBJECTIVE:   Vital Signs:  There were no vitals filed for this visit.  There is no height or weight on file to calculate BMI.     Physical Exam:  Physical Exam  Vitals reviewed.   Constitutional:       General: He is awake. He is not in acute distress.     Appearance: Normal appearance. He is well-developed " and well-groomed. He is not ill-appearing or toxic-appearing.      Interventions: Nasal cannula in place.   HENT:      Head: Normocephalic.      Nose: Nose normal.      Mouth/Throat:      Mouth: Mucous membranes are moist.      Pharynx: Oropharynx is clear.   Eyes:      Extraocular Movements: Extraocular movements intact.      Conjunctiva/sclera: Conjunctivae normal.      Pupils: Pupils are equal, round, and reactive to light.   Cardiovascular:      Rate and Rhythm: Normal rate and regular rhythm.      Pulses: Normal pulses.      Heart sounds: Normal heart sounds.   Pulmonary:      Effort: Pulmonary effort is normal. No accessory muscle usage, respiratory distress or retractions.      Breath sounds: No decreased air movement.   Abdominal:      General: Abdomen is flat. Bowel sounds are normal.      Palpations: Abdomen is soft.   Musculoskeletal:         General: Normal range of motion.      Cervical back: Normal range of motion and neck supple.      Right lower leg: No edema.      Left lower leg: No edema.   Skin:     General: Skin is warm and dry.      Capillary Refill: Capillary refill takes less than 2 seconds.   Neurological:      General: No focal deficit present.      Mental Status: He is alert and oriented to person, place, and time.      Motor: Weakness (gen) present.   Psychiatric:         Mood and Affect: Mood normal.         Behavior: Behavior normal. Behavior is cooperative.         Thought Content: Thought content normal.         Judgment: Judgment normal.       Laboratory  Lab Results   Component Value Date    WBC 13.25 (H) 02/14/2022    HGB 13.2 (L) 02/14/2022    HCT 39.4 (L) 02/14/2022    MCV 90 02/14/2022     02/14/2022     No results found for: INR, PROTIME  Lab Results   Component Value Date    HGBA1C 5.8 (H) 08/24/2021     No results for input(s): POCTGLUCOSE in the last 72 hours.    TRANSITION OF CARE:   Family and/or Caretaker present at visit?  Yes.  Diagnostic tests  reviewed/disposition: No diagnosic tests pending after this hospitalization.  Home health/community services discussion/referrals: Patient does not have home health established from hospital visit.  They do not need home health.  If needed, we will set up home health for the patient.   Establishment or re-establishment of referral orders for community resources: No other necessary community resources.   Discussion with other health care providers: No discussion with other health care providers necessary.     Transition of Care Visit:  I have reviewed and updated the history and problem list. I have reconciled the medication list. I have discussed the hospitalization and current medical issues, prognosis and plans with the patient/family. I spent more than 50% of time discussing the care with the patient/family. Total Face-to-Face Encounter: 60 minutes.    Medications Reconciliation:   I have reconciled the patient's home medications and discharge medications with the patient/family. I have updated all changes. Refer to After-Visit Medication List.    Disease/illness education: Dx-specific illness, importance of compliance with discharge instructions including all prescribed medications changes, treatments, follow-up appointments and all other provider recommendations to maximally affect post-discharge recovery and return to baseline, directives to ensure patient safety including COVID precautions: social distancing, mask use in public, vigilant hand hygiene and timely compliance with recommended immunization/booster regimen as recommended by the CDC at the time of the visit.  Instructions for the patient:    Scheduled Follow-up :  Future Appointments   Date Time Provider Department Center   2/28/2022 10:20 AM [facility] [place]   2/28/2022  2:20 PM Jef Pacheco MD [facility] PUL [facility]   3/2/2022  9:30 AM BENNY [facility] UROLOGY [place]     After Visit Medication List:    "  Medication List          Accurate as of February 24, 2022  9:46 AM. If you have any questions, ask your nurse or doctor.            CONTINUE taking these medications    alfuzosin 10 mg Tb24  Commonly known as: UROXATRAL  Take 1 tablet (10 mg total) by mouth daily with breakfast.     blood sugar diagnostic Strp  One Touch check fasting glucose in morning     blood-glucose meter Misc  One Touch glucometer check fasting glucose in morning     coenzyme Q10 10 mg capsule     famotidine 20 MG tablet  Commonly known as: PEPCID     glimepiride 1 MG tablet  Commonly known as: AMARYL  Take 1 tablet (1 mg total) by mouth once daily.     ibuprofen 800 MG tablet  Commonly known as: ADVIL,MOTRIN     insulin lispro 100 unit/mL injection  Commonly known as: HumaLOG U-100 Insulin  Inject 10 Units into the skin 3 (three) times daily before meals. **MODERATE CORRECTION DOSE** Blood Glucose mg/dL                  Pre-meal                Bedtime 151-200                2 units                    1 unit 201-250                4 units                    2 units 251-300                6 units                    3 units  301-350                8 units                    4 units >350                     10 units                  5 units **CALL MD for BG >350**     lancets Misc  One Touch lancets check fasting glucose in morning     LANTUS SOLOSTAR U-100 INSULIN glargine 100 units/mL (3mL) SubQ pen  Generic drug: insulin  Inject 20 Units into the skin every evening.     losartan 25 MG tablet  Commonly known as: COZAAR  TAKE ONE TABLET BY MOUTH EVERY DAY     meloxicam 15 MG tablet  Commonly known as: MOBIC     metFORMIN 500 MG ER 24hr tablet  Commonly known as: GLUCOPHAGE-XR  TAKE ONE TABLET BY MOUTH EVERY MORNING AND TAKE TWO TABLETS BY MOUTH EVERY EVENING     ondansetron 8 MG tablet  Commonly known as: ZOFRAN  Take 1 tablet (8 mg total) by mouth every 8 (eight) hours as needed for Nausea.     pen needle, diabetic 30 gauge x 5/16" Ndle  Use " with levemir once daily and novolog before each meal three times daily     predniSONE 20 MG tablet  Commonly known as: DELTASONE  Take 2 tablets (40 mg total) by mouth once daily for 7 days, THEN 1.5 tablets (30 mg total) once daily for 7 days, THEN 1 tablet (20 mg total) once daily for 7 days, THEN 0.5 tablets (10 mg total) once daily for 7 days.  Start taking on: February 14, 2022     * pulse oximeter device  Commonly known as: pulse oximeter  by Apply Externally route 2 (two) times a day. Use twice daily at 8 AM and 3 PM and record the value in MyChart as directed.     * pulse oximeter device  Commonly known as: pulse oximeter  by Apply Externally route 2 (two) times a day. Use twice daily at 8 AM and 3 PM and record the value in MyChart as directed.     rosuvastatin 10 MG tablet  Commonly known as: CRESTOR  Take 1 tablet (10 mg total) by mouth once daily.         * This list has 2 medication(s) that are the same as other medications prescribed for you. Read the directions carefully, and ask your doctor or other care provider to review them with you.              ASSESSMENT & PLAN:   Diagnoses and all orders for this visit:    Acute hypoxemic respiratory failure due to COVID-19  -     Follow up with PCP/pulmonoloist as scheduled    Encounter for Support and Coordination of Transition of Care   - [facility] Nurse Practitioner to schedule home visit with patient PRN.    Advanced Directives  Goals of Care, counseling/discussion  Advanced Care Directives Informational Packet with forms left in the home for family review, discussion and signing with instructions to return upon their next provider encounter for inclusion to the medical record.     Patient Instructions Given:  - Continue all medications, treatments and therapies as ordered.   - Follow all instructions, recommendations as discussed.  - Maintain Safety Precautions at all times.  - Attend all medical appointments as scheduled.  - For worsening  symptoms: call Primary Care Physician or Nurse Practitioner.  - For emergencies, call 911 or immediately report to the nearest emergency room.    Were controlled substances prescribed?  NO    Patient consent was obtained prior to treatment on this visit.    Attestation: Screening criteria to assess the level of the patient's risk for infection with COVID-19 as recommended by the CDC at the time of the above documented home visit concluded appropriateness to proceed. Universal precautions were maintained at all times, including provider use of >60% alcohol gel hand  immediately prior to entry and upon departing the patient's home as well as cleaning of equipment used in home visit with antibacterial/germicidal disposable wipes.     Signature:      Chandrakant Mcdonald, MSN, APRN, FNP-C  [facility]    Total face-to-face time was 60 min, >50% of this was spent on counseling and coordination of care. The following issues were discussed: primary and secondary diagnoses, co-morbidities, prescribed medications, treatment modalities, importance of compliance with medical advice and directives for follow-up care.    With the consent of the patient and/or caregiver(s), 20 of the total face to face minutes included a comprehensive discussion regarding Advanced Care Planning. Sources of emotional and spiritual support were identified and encouraged for involvement to assist in finalization of decisions regarding the patient's goals of care (Living Will). Topics discussed include statutory guidelines of the [facility] to determine/delineate the legal surrogate medical decision maker(s) should the patient be deemed incompetent to self-direct medical care (next of kin vs. HCPOA) and the required documentation to ensure legal validity thereof (Advanced Directives/[place] Post).

## 2022-02-24 NOTE — TELEPHONE ENCOUNTER
Care Due:                  Date            Visit Type   Department     Provider  --------------------------------------------------------------------------------                                EP -                              PRIMARY      [facility]  Last Visit: 08-      CARE ([facility])   PRACTICE       Herrera Fisher  Next Visit: None Scheduled  None         None Found                                                            Last  Test          Frequency    Reason                     Performed    Due Date  --------------------------------------------------------------------------------    HBA1C.......  6 months...  glimepiride, insulin,      08- 02-                             metFORMIN................    Lipid Panel.  12 months..  rosuvastatin.............  03- 02-    Powered by [employer]. Reference number: [id number].   2/24/2022 8:50:09 AM CST

## 2022-02-25 ENCOUNTER — NURSE TRIAGE (OUTPATIENT)
Dept: ADMINISTRATIVE | Facility: CLINIC | Age: 72
End: 2022-02-25
Payer: MEDICARE

## 2022-02-25 ENCOUNTER — PATIENT MESSAGE (OUTPATIENT)
Dept: ADMINISTRATIVE | Facility: OTHER | Age: 72
End: 2022-02-25
Payer: MEDICARE

## 2022-02-25 ENCOUNTER — PATIENT MESSAGE (OUTPATIENT)
Dept: ADMINISTRATIVE | Facility: CLINIC | Age: 72
End: 2022-02-25
Payer: MEDICARE

## 2022-02-25 NOTE — TELEPHONE ENCOUNTER
Pt escalated for no response. Pt states he had family over and just forgot to submit vitals. He is doing fine. Denies any new or worsening symptoms. Will get vitals submitted. Pt has number for OOC for further concerns. Will continue to monitor.    Reason for Disposition   Information only question and nurse able to answer    Protocols used: INFORMATION ONLY CALL - NO TRIAGE-A-[place]

## 2022-02-25 NOTE — PATIENT INSTRUCTIONS
- [facility] to schedule follow-up visit with patient in 4-6 weeks or as needed.  - Continue all medications, treatments and therapies as ordered.   - Follow all instructions, recommendations as discussed.  - Maintain Safety Precautions at all times.  - Attend all medical appointments as scheduled.  - For worsening symptoms: call Primary Care Physician or Nurse Practitioner.  - For emergencies, call 911 or immediately report to the nearest emergency room.  - Limit Risks of environmental exposure to coronavirus as discussed including: social distancing, hand hygiene, and limiting departures from the home for necessities only.     Your care is important to us. If your provider recommended a follow-up appointment or test, we are happy to help you coordinate your recommended care. It is important that you complete your recommended follow-up. If you need help scheduling, please call [phone]. Appointments can also be made online through the patient portal.  While scheduling and attending your appointments is your responsibility, our goal is to support and empower you throughout that process.    [facility] Care Line - 24/7 Assistance  Unless otherwise directed by your provider, please contact [facility], our nurse care line that is available for 24/7 assistance.  Registered nurses in the [facility] provide: appointment scheduling, clinical advisement, health education, and other advisory services.  Call: [phone] (toll free)    COVID-19 Prevention   Avoid close contact with people and stay home if youre sick, except to get medical care.   Cover coughs and sneezes with a tissue, or use the inside of your elbow. Immediately wash your hands or use hand .  For more information, see CDC link below:  https://www.cdc.gov/coronavirus/2019-ncov/hcp/guidance-prevent-spread.html#precautions     The following information is provided to all patients.  This information is to  help you find resources for any of the problems found today that may be affecting your health:            Living healthy guide: www.[facility].[place].[facility]    Understanding Diabetes: www.diabetes.org   Eating healthy: www.cdc.gov/healthyweight   CDC home safety checklist: www.cdc.gov/steadi/patient.html  Agency on Aging: www.goea.[place].[facility]    Alcoholics anonymous (AA): www.aa.org   Physical Activity: www.rich.nih.gov/lw7dset    Tobacco use: www.quitwithusla.org

## 2022-02-25 NOTE — TELEPHONE ENCOUNTER
Pt called for verification of vitals and O2 sat 94 and he said he is fine. Pt took O2 off so that was without and he is doing really well. Pt denies needing anything and he will call if anything is needed   Reason for Disposition   Information only question and nurse able to answer    Protocols used: INFORMATION ONLY CALL - NO TRIAGE-A-[place]

## 2022-02-26 ENCOUNTER — NURSE TRIAGE (OUTPATIENT)
Dept: ADMINISTRATIVE | Facility: CLINIC | Age: 72
End: 2022-02-26
Payer: MEDICARE

## 2022-02-26 ENCOUNTER — PATIENT MESSAGE (OUTPATIENT)
Dept: ADMINISTRATIVE | Facility: OTHER | Age: 72
End: 2022-02-26
Payer: MEDICARE

## 2022-02-26 NOTE — TELEPHONE ENCOUNTER
Pt escalated for SpO2 of 94. Pt states he has been doing pretty well. Slowly improving. He has been taking times where he has been going without wearing his home O2 and been doing well. He actually woke up this morning without his home O2 on even though he went to bed wearing it. He is currently not wearing it and recheck SpO2 pt is maintaining 94%. Pt states when he wakes up in the morning he feels like his chest is a little tighter but then he coughs a little and gets up and moves around and it feels better. Pt has been staying active and denies trouble breathing or worsening symptoms. All VS and symptoms WNL. Pt is alert and able to speak in complete sentences without audible wheezing. Pt has number to [facility] for further concerns. Will continue to monitor.    Reason for Disposition   [1] Follow-up call to recent contact AND [2] information only call, no triage required    Protocols used: INFORMATION ONLY CALL - NO TRIAGE-A-

## 2022-02-27 ENCOUNTER — PATIENT MESSAGE (OUTPATIENT)
Dept: ADMINISTRATIVE | Facility: OTHER | Age: 72
End: 2022-02-27
Payer: MEDICARE

## 2022-02-27 ENCOUNTER — NURSE TRIAGE (OUTPATIENT)
Dept: ADMINISTRATIVE | Facility: CLINIC | Age: 72
End: 2022-02-27
Payer: MEDICARE

## 2022-02-27 NOTE — TELEPHONE ENCOUNTER
Surveillance pt escalated for SpO2 94% on room air. Pt was not wearing oxygen when VS entered. Pt is supposed to be on 1.5 L O2 per NC. On retest SpO2 95% on 1.5L O2 and HR 89. Pt denies any new or worsening symptoms. Denies any changes in SOB. Speaking in complete sentences without difficulty. No audible wheezes noted.     Advised to call back with concerns or worsening symptoms. Verbalized understanding.     Reason for Disposition   General information question, no triage required and triager able to answer question    Additional Information   Negative: [1] Caller is not with the adult (patient) AND [2] reporting urgent symptoms   Negative: Lab result questions   Negative: Medication questions   Negative: Caller can't be reached by phone   Negative: Caller has already spoken to PCP or another triager   Negative: RN needs further essential information from caller in order to complete triage   Negative: Requesting regular office appointment   Negative: [1] Caller requesting NON-URGENT health information AND [2] PCP's office is the best resource   Negative: Health Information question, no triage required and triager able to answer question    Protocols used: INFORMATION ONLY CALL - NO TRIAGE-A-

## 2022-02-28 ENCOUNTER — PATIENT MESSAGE (OUTPATIENT)
Dept: ADMINISTRATIVE | Facility: OTHER | Age: 72
End: 2022-02-28
Payer: MEDICARE

## 2022-02-28 ENCOUNTER — OFFICE VISIT (OUTPATIENT)
Dept: PULMONOLOGY | Facility: CLINIC | Age: 72
End: 2022-02-28
Payer: MEDICARE

## 2022-02-28 ENCOUNTER — LAB VISIT (OUTPATIENT)
Dept: LAB | Facility: HOSPITAL | Age: 72
End: 2022-02-28
Attending: FAMILY MEDICINE
Payer: MEDICARE

## 2022-02-28 ENCOUNTER — NURSE TRIAGE (OUTPATIENT)
Dept: ADMINISTRATIVE | Facility: CLINIC | Age: 72
End: 2022-02-28
Payer: MEDICARE

## 2022-02-28 VITALS
BODY MASS INDEX: 28.14 KG/M2 | HEART RATE: 80 BPM | HEIGHT: 64 IN | RESPIRATION RATE: 18 BRPM | OXYGEN SATURATION: 93 % | DIASTOLIC BLOOD PRESSURE: 58 MMHG | WEIGHT: 164.81 LBS | SYSTOLIC BLOOD PRESSURE: 115 MMHG

## 2022-02-28 DIAGNOSIS — R97.20 BPH WITH ELEVATED PSA: ICD-10-CM

## 2022-02-28 DIAGNOSIS — J44.9 CHRONIC OBSTRUCTIVE PULMONARY DISEASE, UNSPECIFIED COPD TYPE: ICD-10-CM

## 2022-02-28 DIAGNOSIS — N40.0 BPH WITH ELEVATED PSA: ICD-10-CM

## 2022-02-28 DIAGNOSIS — U07.1 PNEUMONIA DUE TO COVID-19 VIRUS: ICD-10-CM

## 2022-02-28 DIAGNOSIS — M21.372 LEFT FOOT DROP: ICD-10-CM

## 2022-02-28 DIAGNOSIS — U07.1 ACUTE HYPOXEMIC RESPIRATORY FAILURE DUE TO COVID-19: Primary | ICD-10-CM

## 2022-02-28 DIAGNOSIS — J12.82 PNEUMONIA DUE TO COVID-19 VIRUS: ICD-10-CM

## 2022-02-28 DIAGNOSIS — J96.01 ACUTE HYPOXEMIC RESPIRATORY FAILURE DUE TO COVID-19: Primary | ICD-10-CM

## 2022-02-28 LAB — COMPLEXED PSA SERPL-MCNC: 6.5 NG/ML (ref 0–4)

## 2022-02-28 PROCEDURE — 1111F PR DISCHARGE MEDS RECONCILED W/ CURRENT OUTPATIENT MED LIST: ICD-10-PCS | Mod: CPTII,S$GLB,, | Performed by: INTERNAL MEDICINE

## 2022-02-28 PROCEDURE — 3008F BODY MASS INDEX DOCD: CPT | Mod: CPTII,S$GLB,, | Performed by: INTERNAL MEDICINE

## 2022-02-28 PROCEDURE — 99214 OFFICE O/P EST MOD 30 MIN: CPT | Mod: S$GLB,,, | Performed by: INTERNAL MEDICINE

## 2022-02-28 PROCEDURE — 99214 PR OFFICE/OUTPT VISIT, EST, LEVL IV, 30-39 MIN: ICD-10-PCS | Mod: S$GLB,,, | Performed by: INTERNAL MEDICINE

## 2022-02-28 PROCEDURE — 1159F MED LIST DOCD IN RCRD: CPT | Mod: CPTII,S$GLB,, | Performed by: INTERNAL MEDICINE

## 2022-02-28 PROCEDURE — 84153 ASSAY OF PSA TOTAL: CPT | Performed by: NURSE PRACTITIONER

## 2022-02-28 PROCEDURE — 3074F PR MOST RECENT SYSTOLIC BLOOD PRESSURE < 130 MM HG: ICD-10-PCS | Mod: CPTII,S$GLB,, | Performed by: INTERNAL MEDICINE

## 2022-02-28 PROCEDURE — 99999 PR PBB SHADOW E&M-EST. PATIENT-LVL IV: CPT | Mod: PBBFAC,,, | Performed by: INTERNAL MEDICINE

## 2022-02-28 PROCEDURE — 3072F PR LOW RISK FOR RETINOPATHY: ICD-10-PCS | Mod: CPTII,S$GLB,, | Performed by: INTERNAL MEDICINE

## 2022-02-28 PROCEDURE — 36415 COLL VENOUS BLD VENIPUNCTURE: CPT | Mod: PO | Performed by: NURSE PRACTITIONER

## 2022-02-28 PROCEDURE — 3074F SYST BP LT 130 MM HG: CPT | Mod: CPTII,S$GLB,, | Performed by: INTERNAL MEDICINE

## 2022-02-28 PROCEDURE — 1101F PT FALLS ASSESS-DOCD LE1/YR: CPT | Mod: CPTII,S$GLB,, | Performed by: INTERNAL MEDICINE

## 2022-02-28 PROCEDURE — 1111F DSCHRG MED/CURRENT MED MERGE: CPT | Mod: CPTII,S$GLB,, | Performed by: INTERNAL MEDICINE

## 2022-02-28 PROCEDURE — 1159F PR MEDICATION LIST DOCUMENTED IN MEDICAL RECORD: ICD-10-PCS | Mod: CPTII,S$GLB,, | Performed by: INTERNAL MEDICINE

## 2022-02-28 PROCEDURE — 3288F FALL RISK ASSESSMENT DOCD: CPT | Mod: CPTII,S$GLB,, | Performed by: INTERNAL MEDICINE

## 2022-02-28 PROCEDURE — 1101F PR PT FALLS ASSESS DOC 0-1 FALLS W/OUT INJ PAST YR: ICD-10-PCS | Mod: CPTII,S$GLB,, | Performed by: INTERNAL MEDICINE

## 2022-02-28 PROCEDURE — 3078F PR MOST RECENT DIASTOLIC BLOOD PRESSURE < 80 MM HG: ICD-10-PCS | Mod: CPTII,S$GLB,, | Performed by: INTERNAL MEDICINE

## 2022-02-28 PROCEDURE — 3072F LOW RISK FOR RETINOPATHY: CPT | Mod: CPTII,S$GLB,, | Performed by: INTERNAL MEDICINE

## 2022-02-28 PROCEDURE — 3078F DIAST BP <80 MM HG: CPT | Mod: CPTII,S$GLB,, | Performed by: INTERNAL MEDICINE

## 2022-02-28 PROCEDURE — 3008F PR BODY MASS INDEX (BMI) DOCUMENTED: ICD-10-PCS | Mod: CPTII,S$GLB,, | Performed by: INTERNAL MEDICINE

## 2022-02-28 PROCEDURE — 3288F PR FALLS RISK ASSESSMENT DOCUMENTED: ICD-10-PCS | Mod: CPTII,S$GLB,, | Performed by: INTERNAL MEDICINE

## 2022-02-28 PROCEDURE — 99999 PR PBB SHADOW E&M-EST. PATIENT-LVL IV: ICD-10-PCS | Mod: PBBFAC,,, | Performed by: INTERNAL MEDICINE

## 2022-02-28 RX ORDER — PREDNISONE 10 MG/1
10 TABLET ORAL DAILY
Qty: 60 TABLET | Refills: 1 | Status: SHIPPED | OUTPATIENT
Start: 2022-02-28 | End: 2022-09-06

## 2022-02-28 NOTE — TELEPHONE ENCOUNTER
Pt called for escalation and he said that he was doing well was just seeing the pulmonologist and making progress slowly but surely., Pt's last day is today and will call out to OOC if has any issues. pts program is us. Pt escalated for O2 sat 94 and said he is good,   Reason for Disposition   Health Information question, no triage required and triager able to answer question    Protocols used: INFORMATION ONLY CALL - NO TRIAGE-A-

## 2022-02-28 NOTE — PROGRESS NOTES
2/28/2022    [facility] Follow Up    Chief Complaint   Patient presents with    Follow-up     hopsital    Medication Refill       HPI:2/28/2022 pt was in hosp for covid - went home and returned with flare needing more ox. Home last 2 wks with rm air sat 84, needed 3 lpm at dc, uses ox nearly 24/7. Prednisone down to 20/d.  Sugars are stable on amaryl and metfromin. Cough violent with occ yellow mucous.  No fever and good appetite.     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  Had covid 2/1 -- was having fevers 101 for few days with sinus and loss sense smell, when temp got to 102 he presented.  Was in hosp til 2/5 going home on 3lpm with no steroids.  Pt represented needing higher ox-- sats to 70's walking on 3lpm.  Appetite ok , cough mild and now productive gray material.  Sugars were up last admit needing insulin, usually on metformin 1500/d.     Plan: tm 101.2, vss, 70% ox, Actrema dosed, wbc 7.9, glu 209,  crp 80 at admit 2/1, down to 26 yesterday 2/6,    cta lungs no pe, posterior dense covid type infiltrates.   Ox needs incesed from 4 to 12 lpm last 24 hrs.    Consider ordering prn bipap.  F/u crp am.     low dose  lovenox.      From Dr Tatum hpi 2/6/2022:   HPI: Patient is a 72-year-old male with past medical history of diabetes, hypertension, hyperlipidemia, former tobacco use who was diagnosed with COVID on 02/01/2022 and recently hospitalized for hypoxic respiratory failure from COVID during which he completed a course of remdesivir and was discharged yesterday with supplemental oxygen repeat presents today with complaint of hypoxia at home on 4 L nasal cannula.  Wife reports he did well last night however since then has been having trouble maintaining his saturations on 4 L especially with movement.  Denies any fever, chills, chest pain, nausea, vomiting.  He is drowsy at this time from cough medicine  "received in the ED.  D-dimer has increased from previous.  CTA ordered.  He is currently on 5 L nasal cannula with oxygen saturation at 95%     The chief compliant  problem is "stable   PFSH:  Past Medical History:   Diagnosis Date    Diabetes mellitus     Elevated PSA     Hyperlipidemia     Hypertension          Past Surgical History:   Procedure Laterality Date    COLONOSCOPY  2013    Dr. Martinez, 5 year recheck    SHOULDER SURGERY  2011    right      Social History     Tobacco Use    Smoking status: Former Smoker     Types: Cigarettes     Quit date: 1985     Years since quittin.7    Smokeless tobacco: Never Used   Substance Use Topics    Alcohol use: Yes     Comment: seldom    Drug use: No     Family History   Problem Relation Age of Onset    Diabetes Father     Heart disease Father     Cataracts Mother     Glaucoma Mother     Diabetes Maternal Grandmother     No Known Problems Brother     No Known Problems Daughter     No Known Problems Son     No Known Problems Maternal Grandfather     No Known Problems Paternal Grandmother     No Known Problems Paternal Grandfather     No Known Problems Maternal Aunt     No Known Problems Maternal Uncle     No Known Problems Paternal Aunt     No Known Problems Paternal Uncle     Urolithiasis Neg Hx     Prostate cancer Neg Hx     Kidney cancer Neg Hx     Retinal detachment Neg Hx     Macular degeneration Neg Hx      Review of patient's allergies indicates:   Allergen Reactions    Pravastatin Other (See Comments)     Joint pain    Lisinopril Other (See Comments)     Cough        Performance Status:The patient's activity level is housebound activities.      Review of Systems:  a review of eleven systems covering constitutional, Eye, HEENT, Psych, Respiratory, Cardiac, GI, , Musculoskeletal, Endocrine, Dermatologic was negative except for pertinent findings as listed ABOVE and below:  pertinent positive as above, rest is " "good       Exam:Comprehensive exam done. BP (!) 115/58   Pulse 80   Resp 18   Ht 5' 4" (1.626 m)   Wt 74.7 kg (164 lb 12.7 oz)   SpO2 (!) 93% Comment: on room air at rest  BMI 28.29 kg/m²   Exam included Vitals as listed, and patient's appearance and affect and alertness and mood, oral exam for yeast and hygiene and pharynx lesions and Mallapatti (M) score, neck with inspection for jvd and masses and thyroid abnormalities and lymph nodes (supraclavicular and infraclavicular nodes and axillary also examined and noted if abn), chest exam included symmetry and effort and fremitus and percussion and auscultation, cardiac exam included rhythm and gallops and murmur and rubs and jvd and edema, abdominal exam for mass and hepatosplenomegaly and tenderness and hernias and bowel sounds, Musculoskeletal exam with muscle tone and posture and mobility/gait and  strength, and skin for rashes and cyanosis and pallor and turgor, extremity for clubbing.  Findings were normal except for pertinent findings listed below:  Alert, M1, rales rll post, rest good, toe down goingn on left with dysesthesia dorsum left foot            Radiographs (ct chest and cxr) reviewed: view by direct vision      Labs reviewed           PFT was not done       Plan:  Clinical impression is apparently straight forward and impression with management as below.    Ludwin was seen today for follow-up and medication refill.    Diagnoses and all orders for this visit:    Acute hypoxemic respiratory failure due to COVID-19  -     X-Ray Chest PA And Lateral; Standing    Pneumonia due to COVID-19 virus  -     C-REACTIVE PROTEIN; Future  -     Comprehensive Metabolic Panel; Future  -     predniSONE (DELTASONE) 10 MG tablet; Take 1 tablet (10 mg total) by mouth once daily.  -     CBC auto differential; Future    Left foot drop  -     PT evaluate and treat; Future    Chronic obstructive pulmonary disease, unspecified COPD type  -     " fluticasone-umeclidin-vilanter (TRELEGY ELLIPTA) 100-62.5-25 mcg DsDv; Inhale 1 puff into the lungs once daily.        Follow up in about 4 weeks (around 3/28/2022), or if symptoms worsen or fail to improve.    Discussed with patient above for education the following:      Patient Instructions   Lung tissue still abnormal- with rales on exam.  Usually clears with time,  steroids may hasten clearing. May have some long lasting damage?    Check chest xray and markers.      Foot drop on left noted. Would be good to have physical therapy. Exam doesn't suggest stoke, should be nerve damage from covid.  Could do ct brain.     Would keep prednisone 20 daily til able to go around house without ox with sat 90 or above-- go to 10 mg.  Will need instuctions to wean off if doing well on prednisone 10 mg daily for a week.        Use trelegy once daily til out, may continue if helps.     Will place standing order for chest xray -- may do chest xray every 2 wks for total of 4 -- do within next wk.  Do after off steroids and oxygen.    Check blood counts with chest xray.

## 2022-02-28 NOTE — PATIENT INSTRUCTIONS
Lung tissue still abnormal- with rales on exam.  Usually clears with time,  steroids may hasten clearing. May have some long lasting damage?    Check chest xray and markers.      Foot drop on left noted. Would be good to have physical therapy. Exam doesn't suggest stoke, should be nerve damage from covid.  Could do ct brain.     Would keep prednisone 20 daily til able to go around house without ox with sat 90 or above-- go to 10 mg.  Will need instuctions to wean off if doing well on prednisone 10 mg daily for a week.        Use trelegy once daily til out, may continue if helps.     Will place standing order for chest xray -- may do chest xray every 2 wks for total of 4 -- do within next wk.  Do after off steroids and oxygen.    Check blood counts with chest xray.

## 2022-03-02 ENCOUNTER — TELEPHONE (OUTPATIENT)
Dept: FAMILY MEDICINE | Facility: CLINIC | Age: 72
End: 2022-03-02
Payer: MEDICARE

## 2022-03-03 ENCOUNTER — PATIENT MESSAGE (OUTPATIENT)
Dept: UROLOGY | Facility: CLINIC | Age: 72
End: 2022-03-03
Payer: MEDICARE

## 2022-03-04 ENCOUNTER — TELEPHONE (OUTPATIENT)
Dept: MEDSURG UNIT | Facility: HOSPITAL | Age: 72
End: 2022-03-04
Payer: MEDICARE

## 2022-03-07 ENCOUNTER — TELEPHONE (OUTPATIENT)
Dept: FAMILY MEDICINE | Facility: CLINIC | Age: 72
End: 2022-03-07
Payer: MEDICARE

## 2022-03-07 ENCOUNTER — TELEPHONE (OUTPATIENT)
Dept: PULMONOLOGY | Facility: CLINIC | Age: 72
End: 2022-03-07
Payer: MEDICARE

## 2022-03-07 NOTE — TELEPHONE ENCOUNTER
Did he lose a lot of weight with the COVID?  I would suggest discontinuing the glimepiride which will cause him to produce insulin even when he has not been eating.  It is very common cause of low blood sugars.  If he lost weight he may need a reduced insulin dose but once he stops the glimepiride he probably will need to take at least some.  The steroids will increase his need for insulin at least to some extent.  He can probably handle the insulin need with the Humalog and sliding scale.

## 2022-03-07 NOTE — TELEPHONE ENCOUNTER
Called patient and spoke with him regarding scheudling follow up appt for 4 weeks. Got patient scheduled. Confirms date and time.    ----- Message from Mariah Barger sent at 3/7/2022  1:44 PM CST -----  Contact: Emily  Type:  Sooner Appointment Request  Caller is requesting a sooner appointment.  Caller declined first available appointment listed below.  Caller will not accept being placed on the waitlist and is requesting a message be sent to doctor.  Name of Caller:  Patient wife Emily  When is the first available appointment?  05/02/22  Symptoms:  1 mth follow up for the end of March  Best Call Back Number:  [phone]  Additional Information:  Pt wife requesting a call back for an appt at the end of March.First available was denied for 05/02/22.

## 2022-03-07 NOTE — TELEPHONE ENCOUNTER
----- Message from Normaradha López sent at 3/7/2022  8:27 AM CST -----  Contact: pt  Type: Needs Medical Advice    Who Called: pt  Best Call Back Number: [phone]    Inquiry/Question: Was in hospital with covid and now his sugar is running low. He wants to know what he can do. In the mornings its 55. Please call and advise       Thank you~

## 2022-03-07 NOTE — TELEPHONE ENCOUNTER
Patient says he lost 9 lbs with covid- he agrees to stop the Glimeperide- follow up appt made 5/6/22.

## 2022-03-07 NOTE — TELEPHONE ENCOUNTER
Today 66 fbs- after coffee 115- is taking Metformin     Yesterday it was 75 fbs    Has not used Lantus hs in last 2 days-  Last night he got 190 and took 1.5u Humalog. Taking Prednisone 10mg per Dr. Pacheco. Patient denies any feelings of weakness, shakiness, nausea or sweating with low glucose. He is asking what he should do with the insulin.

## 2022-03-09 DIAGNOSIS — E11.9 TYPE 2 DIABETES MELLITUS WITHOUT COMPLICATION: ICD-10-CM

## 2022-03-14 ENCOUNTER — PATIENT MESSAGE (OUTPATIENT)
Dept: ADMINISTRATIVE | Facility: HOSPITAL | Age: 72
End: 2022-03-14
Payer: MEDICARE

## 2022-03-24 ENCOUNTER — OFFICE VISIT (OUTPATIENT)
Dept: UROLOGY | Facility: CLINIC | Age: 72
End: 2022-03-24
Payer: MEDICARE

## 2022-03-24 VITALS
HEIGHT: 64 IN | BODY MASS INDEX: 28 KG/M2 | RESPIRATION RATE: 18 BRPM | HEART RATE: 109 BPM | SYSTOLIC BLOOD PRESSURE: 130 MMHG | WEIGHT: 164 LBS | DIASTOLIC BLOOD PRESSURE: 68 MMHG

## 2022-03-24 DIAGNOSIS — N40.0 BPH WITH ELEVATED PSA: Primary | ICD-10-CM

## 2022-03-24 DIAGNOSIS — R97.20 BPH WITH ELEVATED PSA: Primary | ICD-10-CM

## 2022-03-24 PROCEDURE — 3008F BODY MASS INDEX DOCD: CPT | Mod: CPTII,S$GLB,, | Performed by: NURSE PRACTITIONER

## 2022-03-24 PROCEDURE — 99999 PR PBB SHADOW E&M-EST. PATIENT-LVL IV: ICD-10-PCS | Mod: PBBFAC,,, | Performed by: NURSE PRACTITIONER

## 2022-03-24 PROCEDURE — 3075F PR MOST RECENT SYSTOLIC BLOOD PRESS GE 130-139MM HG: ICD-10-PCS | Mod: CPTII,S$GLB,, | Performed by: NURSE PRACTITIONER

## 2022-03-24 PROCEDURE — 1159F MED LIST DOCD IN RCRD: CPT | Mod: CPTII,S$GLB,, | Performed by: NURSE PRACTITIONER

## 2022-03-24 PROCEDURE — 1160F RVW MEDS BY RX/DR IN RCRD: CPT | Mod: CPTII,S$GLB,, | Performed by: NURSE PRACTITIONER

## 2022-03-24 PROCEDURE — 3008F PR BODY MASS INDEX (BMI) DOCUMENTED: ICD-10-PCS | Mod: CPTII,S$GLB,, | Performed by: NURSE PRACTITIONER

## 2022-03-24 PROCEDURE — 3288F FALL RISK ASSESSMENT DOCD: CPT | Mod: CPTII,S$GLB,, | Performed by: NURSE PRACTITIONER

## 2022-03-24 PROCEDURE — 3078F PR MOST RECENT DIASTOLIC BLOOD PRESSURE < 80 MM HG: ICD-10-PCS | Mod: CPTII,S$GLB,, | Performed by: NURSE PRACTITIONER

## 2022-03-24 PROCEDURE — 3288F PR FALLS RISK ASSESSMENT DOCUMENTED: ICD-10-PCS | Mod: CPTII,S$GLB,, | Performed by: NURSE PRACTITIONER

## 2022-03-24 PROCEDURE — 99213 OFFICE O/P EST LOW 20 MIN: CPT | Mod: S$GLB,,, | Performed by: NURSE PRACTITIONER

## 2022-03-24 PROCEDURE — 1126F PR PAIN SEVERITY QUANTIFIED, NO PAIN PRESENT: ICD-10-PCS | Mod: CPTII,S$GLB,, | Performed by: NURSE PRACTITIONER

## 2022-03-24 PROCEDURE — 1160F PR REVIEW ALL MEDS BY PRESCRIBER/CLIN PHARMACIST DOCUMENTED: ICD-10-PCS | Mod: CPTII,S$GLB,, | Performed by: NURSE PRACTITIONER

## 2022-03-24 PROCEDURE — 99999 PR PBB SHADOW E&M-EST. PATIENT-LVL IV: CPT | Mod: PBBFAC,,, | Performed by: NURSE PRACTITIONER

## 2022-03-24 PROCEDURE — 3075F SYST BP GE 130 - 139MM HG: CPT | Mod: CPTII,S$GLB,, | Performed by: NURSE PRACTITIONER

## 2022-03-24 PROCEDURE — 1126F AMNT PAIN NOTED NONE PRSNT: CPT | Mod: CPTII,S$GLB,, | Performed by: NURSE PRACTITIONER

## 2022-03-24 PROCEDURE — 1159F PR MEDICATION LIST DOCUMENTED IN MEDICAL RECORD: ICD-10-PCS | Mod: CPTII,S$GLB,, | Performed by: NURSE PRACTITIONER

## 2022-03-24 PROCEDURE — 1101F PT FALLS ASSESS-DOCD LE1/YR: CPT | Mod: CPTII,S$GLB,, | Performed by: NURSE PRACTITIONER

## 2022-03-24 PROCEDURE — 99213 PR OFFICE/OUTPT VISIT, EST, LEVL III, 20-29 MIN: ICD-10-PCS | Mod: S$GLB,,, | Performed by: NURSE PRACTITIONER

## 2022-03-24 PROCEDURE — 3078F DIAST BP <80 MM HG: CPT | Mod: CPTII,S$GLB,, | Performed by: NURSE PRACTITIONER

## 2022-03-24 PROCEDURE — 3072F LOW RISK FOR RETINOPATHY: CPT | Mod: CPTII,S$GLB,, | Performed by: NURSE PRACTITIONER

## 2022-03-24 PROCEDURE — 1101F PR PT FALLS ASSESS DOC 0-1 FALLS W/OUT INJ PAST YR: ICD-10-PCS | Mod: CPTII,S$GLB,, | Performed by: NURSE PRACTITIONER

## 2022-03-24 PROCEDURE — 3072F PR LOW RISK FOR RETINOPATHY: ICD-10-PCS | Mod: CPTII,S$GLB,, | Performed by: NURSE PRACTITIONER

## 2022-03-24 RX ORDER — PEN NEEDLE, DIABETIC 29 G X1/2"
NEEDLE, DISPOSABLE MISCELLANEOUS
COMMUNITY
Start: 2022-02-15 | End: 2023-06-28

## 2022-03-24 RX ORDER — ALFUZOSIN HYDROCHLORIDE 10 MG/1
10 TABLET, EXTENDED RELEASE ORAL
Qty: 90 TABLET | Refills: 3 | Status: SHIPPED | OUTPATIENT
Start: 2022-03-24 | End: 2023-03-09 | Stop reason: SDUPTHER

## 2022-03-24 NOTE — PROGRESS NOTES
[facility] Note  Staff: JESSE Bhakta    PCP: ELIU Fisher  Urologist:  ELIU Michel    Chief Complaint: BPH with hx of elevated PSA levels    Subjective:        HPI: Ludwin Gee is a 72 y.o. male presents today for f/up.    Pt was last seen by me on 8/4/21.  Pt was last evaluated by Dr. Michel on 3/4/21.  Pt has hx of elevated PSA, BPH     HISTORY OF PRESENT ILLNESS:   this 72-year-old male returns routine recheck.  He has history of elevated PSA for which he had undergone prostate ultrasound biopsy in 2014 that proved to be negative for malignancy.  He subsequently had undergone MRI of the prostate and the 4K score both of which also revealed low probability of prostate cancer.  He continues to take Uroxatral for management of his BPH and is more effective than the Flomax he was on before.     No gross hematuria, no dysuria complaints noted by pt in office today.    *Was in hospital for 3 weeks in February with Covid.    REVIEW OF SYSTEMS:  A comprehensive 10 system review was performed and is negative except as noted above in HPI    PMHx:  Past Medical History:   Diagnosis Date    Diabetes mellitus     Elevated PSA     Hyperlipidemia     Hypertension      PSHx:  Past Surgical History:   Procedure Laterality Date    COLONOSCOPY  12/6/2013    Dr. Martinez, 5 year recheck    SHOULDER SURGERY  12/09/2011    right      Allergies:  Pravastatin and Lisinopril    Medications: reviewed     Objective:     Vitals:    03/24/22 1359   BP: 130/68   Pulse: 109   Resp: 18     General:WDWN in NAD  Eyes: PERRLA, normal conjunctiva  Respiratory: no increased work on breathing, clear to auscultation  Cardiovascular: regular rate and rhythm. No obvious extremity edema.  GI: palpation of masses. No tenderness. No hepatosplenomegaly to palpation.  Musculoskeletal: normal range of motion of bilateral upper extremities. Normal muscle strength and tone.  Skin: no obvious rashes or lesions. No  tightening of skin noted.  Neurologic: CN grossly normal. Normal sensation.   Psychiatric: awake, alert and oriented x 3. Mood and affect normal. Cooperative.    Assessment:       1. BPH with elevated PSA          Plan:     PSA, Total and Free to be repeated in 3 months at this time.  Continue Uroxatral at this time until he gets off the steroids due to post Covid pneumonia at this time.    [facility]: Active    Simin Ordoñez, JUNEP-C

## 2022-03-25 ENCOUNTER — PATIENT OUTREACH (OUTPATIENT)
Dept: ADMINISTRATIVE | Facility: OTHER | Age: 72
End: 2022-03-25
Payer: MEDICARE

## 2022-03-25 NOTE — PROGRESS NOTES
Chart was reviewed for overdue [facility]active [facility] Encounters (IRISH)  topics  Updates were requested from care everywhere  Health Maintenance has been updated  LIN[place] immunization registry triggered  Personal hx of Colon polyps

## 2022-03-26 ENCOUNTER — LAB VISIT (OUTPATIENT)
Dept: LAB | Facility: HOSPITAL | Age: 72
End: 2022-03-26
Attending: INTERNAL MEDICINE
Payer: MEDICARE

## 2022-03-26 ENCOUNTER — HOSPITAL ENCOUNTER (OUTPATIENT)
Dept: RADIOLOGY | Facility: HOSPITAL | Age: 72
Discharge: HOME | End: 2022-03-26
Attending: INTERNAL MEDICINE
Payer: MEDICARE

## 2022-03-26 DIAGNOSIS — U07.1 ACUTE HYPOXEMIC RESPIRATORY FAILURE DUE TO COVID-19: ICD-10-CM

## 2022-03-26 DIAGNOSIS — U07.1 PNEUMONIA DUE TO COVID-19 VIRUS: ICD-10-CM

## 2022-03-26 DIAGNOSIS — J12.82 PNEUMONIA DUE TO COVID-19 VIRUS: ICD-10-CM

## 2022-03-26 DIAGNOSIS — J96.01 ACUTE HYPOXEMIC RESPIRATORY FAILURE DUE TO COVID-19: ICD-10-CM

## 2022-03-26 LAB
ALBUMIN SERPL BCP-MCNC: 3.8 G/DL (ref 3.5–5.2)
ALP SERPL-CCNC: 34 U/L (ref 55–135)
ALT SERPL W/O P-5'-P-CCNC: 16 U/L (ref 10–44)
ANION GAP SERPL CALC-SCNC: 9 MMOL/L (ref 8–16)
AST SERPL-CCNC: 16 U/L (ref 10–40)
BASOPHILS # BLD AUTO: 0.04 K/UL (ref 0–0.2)
BASOPHILS NFR BLD: 0.6 % (ref 0–1.9)
BILIRUB SERPL-MCNC: 0.7 MG/DL (ref 0.1–1)
BUN SERPL-MCNC: 11 MG/DL (ref 8–23)
CALCIUM SERPL-MCNC: 10.4 MG/DL (ref 8.7–10.5)
CHLORIDE SERPL-SCNC: 103 MMOL/L (ref 95–110)
CO2 SERPL-SCNC: 30 MMOL/L (ref 23–29)
CREAT SERPL-MCNC: 1.2 MG/DL (ref 0.5–1.4)
CRP SERPL-MCNC: 1.5 MG/L (ref 0–8.2)
DIFFERENTIAL METHOD: ABNORMAL
EOSINOPHIL # BLD AUTO: 0.1 K/UL (ref 0–0.5)
EOSINOPHIL NFR BLD: 1.3 % (ref 0–8)
ERYTHROCYTE [DISTWIDTH] IN BLOOD BY AUTOMATED COUNT: 14 % (ref 11.5–14.5)
EST. GFR  (AFRICAN AMERICAN): >60 ML/MIN/1.73 M^2
EST. GFR  (NON AFRICAN AMERICAN): >60 ML/MIN/1.73 M^2
GLUCOSE SERPL-MCNC: 85 MG/DL (ref 70–110)
HCT VFR BLD AUTO: 40.3 % (ref 40–54)
HGB BLD-MCNC: 12.8 G/DL (ref 14–18)
IMM GRANULOCYTES # BLD AUTO: 0.03 K/UL (ref 0–0.04)
IMM GRANULOCYTES NFR BLD AUTO: 0.4 % (ref 0–0.5)
LYMPHOCYTES # BLD AUTO: 2.1 K/UL (ref 1–4.8)
LYMPHOCYTES NFR BLD: 29.5 % (ref 18–48)
MCH RBC QN AUTO: 30.2 PG (ref 27–31)
MCHC RBC AUTO-ENTMCNC: 31.8 G/DL (ref 32–36)
MCV RBC AUTO: 95 FL (ref 82–98)
MONOCYTES # BLD AUTO: 0.8 K/UL (ref 0.3–1)
MONOCYTES NFR BLD: 11 % (ref 4–15)
NEUTROPHILS # BLD AUTO: 4.1 K/UL (ref 1.8–7.7)
NEUTROPHILS NFR BLD: 57.2 % (ref 38–73)
NRBC BLD-RTO: 0 /100 WBC
PLATELET # BLD AUTO: 202 K/UL (ref 150–450)
PMV BLD AUTO: 12.2 FL (ref 9.2–12.9)
POTASSIUM SERPL-SCNC: 4.6 MMOL/L (ref 3.5–5.1)
PROT SERPL-MCNC: 6.6 G/DL (ref 6–8.4)
RBC # BLD AUTO: 4.24 M/UL (ref 4.6–6.2)
SODIUM SERPL-SCNC: 142 MMOL/L (ref 136–145)
WBC # BLD AUTO: 7.12 K/UL (ref 3.9–12.7)

## 2022-03-26 PROCEDURE — 85025 COMPLETE CBC W/AUTO DIFF WBC: CPT | Performed by: INTERNAL MEDICINE

## 2022-03-26 PROCEDURE — 86140 C-REACTIVE PROTEIN: CPT | Performed by: INTERNAL MEDICINE

## 2022-03-26 PROCEDURE — 71046 X-RAY EXAM CHEST 2 VIEWS: CPT | Mod: TC,FY

## 2022-03-26 PROCEDURE — 71046 X-RAY EXAM CHEST 2 VIEWS: CPT | Mod: 26,,, | Performed by: RADIOLOGY

## 2022-03-26 PROCEDURE — 71046 XR CHEST PA AND LATERAL: ICD-10-PCS | Mod: 26,,, | Performed by: RADIOLOGY

## 2022-03-26 PROCEDURE — 80053 COMPREHEN METABOLIC PANEL: CPT | Performed by: INTERNAL MEDICINE

## 2022-03-29 ENCOUNTER — PATIENT MESSAGE (OUTPATIENT)
Dept: PULMONOLOGY | Facility: CLINIC | Age: 72
End: 2022-03-29

## 2022-03-29 ENCOUNTER — TELEPHONE (OUTPATIENT)
Dept: PULMONOLOGY | Facility: CLINIC | Age: 72
End: 2022-03-29

## 2022-03-29 ENCOUNTER — OFFICE VISIT (OUTPATIENT)
Dept: PULMONOLOGY | Facility: CLINIC | Age: 72
End: 2022-03-29
Payer: MEDICARE

## 2022-03-29 VITALS
DIASTOLIC BLOOD PRESSURE: 63 MMHG | BODY MASS INDEX: 28.24 KG/M2 | HEIGHT: 64 IN | SYSTOLIC BLOOD PRESSURE: 133 MMHG | WEIGHT: 165.38 LBS | OXYGEN SATURATION: 97 % | HEART RATE: 85 BPM

## 2022-03-29 DIAGNOSIS — U07.1 ACUTE HYPOXEMIC RESPIRATORY FAILURE DUE TO COVID-19: Primary | ICD-10-CM

## 2022-03-29 DIAGNOSIS — J96.01 ACUTE HYPOXEMIC RESPIRATORY FAILURE DUE TO COVID-19: Primary | ICD-10-CM

## 2022-03-29 DIAGNOSIS — U09.9 COVID-19 LONG HAULER: ICD-10-CM

## 2022-03-29 DIAGNOSIS — Z77.090 ASBESTOS EXPOSURE: ICD-10-CM

## 2022-03-29 PROCEDURE — 3008F BODY MASS INDEX DOCD: CPT | Mod: CPTII,S$GLB,, | Performed by: INTERNAL MEDICINE

## 2022-03-29 PROCEDURE — 99213 OFFICE O/P EST LOW 20 MIN: CPT | Mod: S$GLB,,, | Performed by: INTERNAL MEDICINE

## 2022-03-29 PROCEDURE — 3288F FALL RISK ASSESSMENT DOCD: CPT | Mod: CPTII,S$GLB,, | Performed by: INTERNAL MEDICINE

## 2022-03-29 PROCEDURE — 3288F PR FALLS RISK ASSESSMENT DOCUMENTED: ICD-10-PCS | Mod: CPTII,S$GLB,, | Performed by: INTERNAL MEDICINE

## 2022-03-29 PROCEDURE — 1126F AMNT PAIN NOTED NONE PRSNT: CPT | Mod: CPTII,S$GLB,, | Performed by: INTERNAL MEDICINE

## 2022-03-29 PROCEDURE — 3072F PR LOW RISK FOR RETINOPATHY: ICD-10-PCS | Mod: CPTII,S$GLB,, | Performed by: INTERNAL MEDICINE

## 2022-03-29 PROCEDURE — 3008F PR BODY MASS INDEX (BMI) DOCUMENTED: ICD-10-PCS | Mod: CPTII,S$GLB,, | Performed by: INTERNAL MEDICINE

## 2022-03-29 PROCEDURE — 99999 PR PBB SHADOW E&M-EST. PATIENT-LVL IV: CPT | Mod: PBBFAC,,, | Performed by: INTERNAL MEDICINE

## 2022-03-29 PROCEDURE — 1101F PT FALLS ASSESS-DOCD LE1/YR: CPT | Mod: CPTII,S$GLB,, | Performed by: INTERNAL MEDICINE

## 2022-03-29 PROCEDURE — 99999 PR PBB SHADOW E&M-EST. PATIENT-LVL IV: ICD-10-PCS | Mod: PBBFAC,,, | Performed by: INTERNAL MEDICINE

## 2022-03-29 PROCEDURE — 1159F PR MEDICATION LIST DOCUMENTED IN MEDICAL RECORD: ICD-10-PCS | Mod: CPTII,S$GLB,, | Performed by: INTERNAL MEDICINE

## 2022-03-29 PROCEDURE — 3072F LOW RISK FOR RETINOPATHY: CPT | Mod: CPTII,S$GLB,, | Performed by: INTERNAL MEDICINE

## 2022-03-29 PROCEDURE — 3075F SYST BP GE 130 - 139MM HG: CPT | Mod: CPTII,S$GLB,, | Performed by: INTERNAL MEDICINE

## 2022-03-29 PROCEDURE — 1126F PR PAIN SEVERITY QUANTIFIED, NO PAIN PRESENT: ICD-10-PCS | Mod: CPTII,S$GLB,, | Performed by: INTERNAL MEDICINE

## 2022-03-29 PROCEDURE — 1101F PR PT FALLS ASSESS DOC 0-1 FALLS W/OUT INJ PAST YR: ICD-10-PCS | Mod: CPTII,S$GLB,, | Performed by: INTERNAL MEDICINE

## 2022-03-29 PROCEDURE — 3051F HG A1C>EQUAL 7.0%<8.0%: CPT | Mod: CPTII,S$GLB,, | Performed by: INTERNAL MEDICINE

## 2022-03-29 PROCEDURE — 3051F PR MOST RECENT HEMOGLOBIN A1C LEVEL 7.0 - < 8.0%: ICD-10-PCS | Mod: CPTII,S$GLB,, | Performed by: INTERNAL MEDICINE

## 2022-03-29 PROCEDURE — 99213 PR OFFICE/OUTPT VISIT, EST, LEVL III, 20-29 MIN: ICD-10-PCS | Mod: S$GLB,,, | Performed by: INTERNAL MEDICINE

## 2022-03-29 PROCEDURE — 3075F PR MOST RECENT SYSTOLIC BLOOD PRESS GE 130-139MM HG: ICD-10-PCS | Mod: CPTII,S$GLB,, | Performed by: INTERNAL MEDICINE

## 2022-03-29 PROCEDURE — 1159F MED LIST DOCD IN RCRD: CPT | Mod: CPTII,S$GLB,, | Performed by: INTERNAL MEDICINE

## 2022-03-29 PROCEDURE — 3078F DIAST BP <80 MM HG: CPT | Mod: CPTII,S$GLB,, | Performed by: INTERNAL MEDICINE

## 2022-03-29 PROCEDURE — 3078F PR MOST RECENT DIASTOLIC BLOOD PRESSURE < 80 MM HG: ICD-10-PCS | Mod: CPTII,S$GLB,, | Performed by: INTERNAL MEDICINE

## 2022-03-29 RX ORDER — PREDNISONE 5 MG/1
5 TABLET ORAL DAILY
Qty: 60 TABLET | Refills: 1 | Status: SHIPPED | OUTPATIENT
Start: 2022-03-29 | End: 2022-09-06

## 2022-03-29 NOTE — TELEPHONE ENCOUNTER
Returned call to patient to number provided in message. No answer x 2. Goes straight to voice mail. Unable to leave message due to voice mail being full. Message sent to provider informing provider of patient's wishes to discontinue oxygen.     ----- Message from Colleen Torres sent at 3/29/2022  4:16 PM CDT -----  Type: Needs Medical Advice  Who Called:  Pt wife  Best Call Back Number: [phone]  Additional Information: Pt wife requesting approval to have oxygen picked up from home as pt is no longer needing it--please advise--thank you

## 2022-03-29 NOTE — PATIENT INSTRUCTIONS
Continue Prednisone 10 mg per day for the next month. Then will decrease to 5mg per day.     Follow up in clinic in 6 weeks.    Get Chest Xray prior to next appointment, approximately 6 weeks.     Continue the use of supplemental oxygen as needed.     Continue current medication regiment. Keep follow up appointment as scheduled. Please call the office if you have any questions or concerns.

## 2022-03-29 NOTE — PROGRESS NOTES
3/29/2022    Ludwin Gee  In office visit     Chief Complaint   Patient presents with    Follow-up     4 week f/u   Been off oxygen 2-3 weeks - Stats have been running 93 and above.        HPI:  3/29/2022:  Shortness of breath has improved overall - hasn't used supplemental oxygen since prior office visit. Does get short winded if working in heat, improves with rest.  Prednisone 10 mg daily with improvement. (Has been on for approx 3 weeks). Sugars are well controlled.  Cough: Productive in the morning, at times difficult to expectorate. Initially with yellow mucous, however has turned to clear. Using Mucinex with benefit.   Spo2 93-94% during home checks.   Still working as .       2/28/2022 pt was in hosp for covid - went home and returned with flare needing more ox. Home last 2 wks with rm air sat 84, needed 3 lpm at dc, uses ox nearly 24/7. Prednisone down to 20/d.  Sugars are stable on amaryl and metfromin. Cough violent with occ yellow mucous.  No fever and good appetite.   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  Had covid 2/1 -- was having fevers 101 for few days with sinus and loss sense smell, when temp got to 102 he presented.  Was in hosp til 2/5 going home on 3lpm with no steroids.  Pt represented needing higher ox-- sats to 70's walking on 3lpm.  Appetite ok , cough mild and now productive gray material.  Sugars were up last admit needing insulin, usually on metformin 1500/d.     Plan: tm 101.2, vss, 70% ox, Actrema dosed, wbc 7.9, glu 209,  crp 80 at admit 2/1, down to 26 yesterday 2/6,    cta lungs no pe, posterior dense covid type infiltrates.   Ox needs incesed from 4 to 12 lpm last 24 hrs.    Consider ordering prn bipap.  F/u crp am.     low dose  lovenox.      Patient Instructions   Lung tissue still abnormal- with rales on exam.  Usually clears with time,  steroids may hasten clearing. May have some  long lasting damage?  Check chest xray and markers.    Foot drop on left noted. Would be good to have physical therapy. Exam doesn't suggest stoke, should be nerve damage from covid.  Could do ct brain.   Would keep prednisone 20 daily til able to go around house without ox with sat 90 or above-- go to 10 mg.  Will need instuctions to wean off if doing well on prednisone 10 mg daily for a week.    Use trelegy once daily til out, may continue if helps.   Will place standing order for chest xray -- may do chest xray every 2 wks for total of 4 -- do within next wk.  Do after off steroids and oxygen.  Check blood counts with chest xray.        From Dr Tatum hpi 2/6/2022:   HPI: Patient is a 72-year-old male with past medical history of diabetes, hypertension, hyperlipidemia, former tobacco use who was diagnosed with COVID on 02/01/2022 and recently hospitalized for hypoxic respiratory failure from COVID during which he completed a course of remdesivir and was discharged yesterday with supplemental oxygen repeat presents today with complaint of hypoxia at home on 4 L nasal cannula.  Wife reports he did well last night however since then has been having trouble maintaining his saturations on 4 L especially with movement.  Denies any fever, chills, chest pain, nausea, vomiting.  He is drowsy at this time from cough medicine received in the ED.  D-dimer has increased from previous.  CTA ordered.  He is currently on 5 L nasal cannula with oxygen saturation at 95%     The chief compliant  problem is stable   PFSH:  Past Medical History:   Diagnosis Date    Diabetes mellitus     Elevated PSA     Hyperlipidemia     Hypertension     Personal history of colonic polyps          Past Surgical History:   Procedure Laterality Date    COLONOSCOPY  12/6/2013    Dr. Martinez, 5 year recheck    SHOULDER SURGERY  12/09/2011    right      Social History     Tobacco Use    Smoking status: Former Smoker     Types: Cigarettes     Quit  "date: 1985     Years since quittin.8    Smokeless tobacco: Never Used   Substance Use Topics    Alcohol use: Yes     Comment: seldom    Drug use: No     Family History   Problem Relation Age of Onset    Diabetes Father     Heart disease Father     Cataracts Mother     Glaucoma Mother     Diabetes Maternal Grandmother     No Known Problems Brother     No Known Problems Daughter     No Known Problems Son     No Known Problems Maternal Grandfather     No Known Problems Paternal Grandmother     No Known Problems Paternal Grandfather     No Known Problems Maternal Aunt     No Known Problems Maternal Uncle     No Known Problems Paternal Aunt     No Known Problems Paternal Uncle     Urolithiasis Neg Hx     Prostate cancer Neg Hx     Kidney cancer Neg Hx     Retinal detachment Neg Hx     Macular degeneration Neg Hx      Review of patient's allergies indicates:   Allergen Reactions    Pravastatin Other (See Comments)     Joint pain    Lisinopril Other (See Comments)     Cough        Performance Status:The patient's activity level is housebound activities.      Review of Systems:  a review of eleven systems covering constitutional, Eye, HEENT, Psych, Respiratory, Cardiac, GI, , Musculoskeletal, Endocrine, Dermatologic was negative except for pertinent findings as listed ABOVE and below:  pertinent positive as above, rest is good       Exam:Comprehensive exam done. /63 (BP Location: Left arm, Patient Position: Sitting, BP Method: Medium (Automatic)) Comment (BP Method): long cuff  Pulse 85   Ht 5' 4" (1.626 m)   Wt 75 kg (165 lb 5.5 oz)   SpO2 97% Comment: on room air at rest  BMI 28.38 kg/m²   Exam included Vitals as listed, and patient's appearance and affect and alertness and mood, oral exam for yeast and hygiene and pharynx lesions and Mallapatti (M) score, neck with inspection for jvd and masses and thyroid abnormalities and lymph nodes (supraclavicular and infraclavicular " nodes and axillary also examined and noted if abn), chest exam included symmetry and effort and fremitus and percussion and auscultation, cardiac exam included rhythm and gallops and murmur and rubs and jvd and edema, abdominal exam for mass and hepatosplenomegaly and tenderness and hernias and bowel sounds, Musculoskeletal exam with muscle tone and posture and mobility/gait and  strength, and skin for rashes and cyanosis and pallor and turgor, extremity for clubbing.  Findings were normal except for pertinent findings listed below:  Alert, M1, Crackles noted to bilateral lower lung fields R>L, rest good, toe down goingn on left with dysesthesia dorsum left foot.         Radiographs (ct chest and cxr) reviewed: view by direct vision      Chest Xray 3/26/2022:  FINDINGS:  There are persistent bilateral peripheral predominant interstitial opacities within the lungs possibly representing sequela of prior COVID-19 infection.  Opacities appear minimally improved compared to prior chest radiograph from February 12, 2022.  No acute opacity identified within either lung.  No pneumothorax or pleural effusion.  Cardiomediastinal silhouette is within normal limits.  No acute osseous abnormality.  Rounded 2 cm calcification within the right upper quadrant is consistent with a gallstone.  (Overall improvement in bilateral lung opacities comparison to prior CXR from Feb 2022.)    Labs reviewed     Lab Results   Component Value Date    WBC 7.12 03/26/2022    HGB 12.8 (L) 03/26/2022    HCT 40.3 03/26/2022    MCV 95 03/26/2022     03/26/2022       CMP  Sodium   Date Value Ref Range Status   03/26/2022 142 136 - 145 mmol/L Final     Potassium   Date Value Ref Range Status   03/26/2022 4.6 3.5 - 5.1 mmol/L Final     Chloride   Date Value Ref Range Status   03/26/2022 103 95 - 110 mmol/L Final     CO2   Date Value Ref Range Status   03/26/2022 30 (H) 23 - 29 mmol/L Final     Glucose   Date Value Ref Range Status   03/26/2022  85 70 - 110 mg/dL Final     BUN   Date Value Ref Range Status   03/26/2022 11 8 - 23 mg/dL Final     Creatinine   Date Value Ref Range Status   03/26/2022 1.2 0.5 - 1.4 mg/dL Final     Calcium   Date Value Ref Range Status   03/26/2022 10.4 8.7 - 10.5 mg/dL Final     Total Protein   Date Value Ref Range Status   03/26/2022 6.6 6.0 - 8.4 g/dL Final     Albumin   Date Value Ref Range Status   03/26/2022 3.8 3.5 - 5.2 g/dL Final     Total Bilirubin   Date Value Ref Range Status   03/26/2022 0.7 0.1 - 1.0 mg/dL Final     Comment:     For infants and newborns, interpretation of results should be based  on gestational age, weight and in agreement with clinical  observations.    Premature Infant recommended reference ranges:  Up to 24 hours.............<8.0 mg/dL  Up to 48 hours............<12.0 mg/dL  3-5 days..................<15.0 mg/dL  6-29 days.................<15.0 mg/dL       Alkaline Phosphatase   Date Value Ref Range Status   03/26/2022 34 (L) 55 - 135 U/L Final     AST   Date Value Ref Range Status   03/26/2022 16 10 - 40 U/L Final     ALT   Date Value Ref Range Status   03/26/2022 16 10 - 44 U/L Final     Anion Gap   Date Value Ref Range Status   03/26/2022 9 8 - 16 mmol/L Final     eGFR if    Date Value Ref Range Status   03/26/2022 >60.0 >60 mL/min/1.73 m^2 Final     eGFR if non    Date Value Ref Range Status   03/26/2022 >60.0 >60 mL/min/1.73 m^2 Final     Comment:     Calculation used to obtain the estimated glomerular filtration  rate (eGFR) is the CKD-EPI equation.                PFT was not done       Plan:  Clinical impression is apparently straight forward and impression with management as below.    Ludwin was seen today for follow-up.    Diagnoses and all orders for this visit:    Acute hypoxemic respiratory failure due to COVID-19    Asbestos exposure    COVID-19 long hafern  -     predniSONE (DELTASONE) 5 MG tablet; Take 1 tablet (5 mg total) by mouth once daily.  -      X-Ray Chest PA And Lateral; Future        Follow up in about 6 weeks (around 5/10/2022), or if symptoms worsen or fail to improve.    Discussed with patient above for education the following:      Patient Instructions   Continue Prednisone 10 mg per day for the next month. Then will decrease to 5mg per day.     Follow up in clinic in 6 weeks.    Get Chest Xray prior to next appointment, approximately 6 weeks.     Continue the use of supplemental oxygen as needed.     Continue current medication regiment. Keep follow up appointment as scheduled. Please call the office if you have any questions or concerns.

## 2022-03-31 ENCOUNTER — TELEPHONE (OUTPATIENT)
Dept: PULMONOLOGY | Facility: CLINIC | Age: 72
End: 2022-03-31
Payer: MEDICARE

## 2022-03-31 NOTE — TELEPHONE ENCOUNTER
Faxed discontinue to [facility] for oxygen. Spoke with patient's wife and let her know we had sent discontinue. She verbalized understanding.     ----- Message from Mariah Barger sent at 3/31/2022  9:27 AM CDT -----  Contact: Emily  Type: Needs Medical Advice  Who Called: Pt wife Emily  Symptoms (please be specific):   How long has patient had these symptoms:    Pharmacy name and phone #:    Best Call Back Number: [phone] or [phone]  Additional Information: Pt wife requesting a call back concerning the patient needs his oxygen machine picked up.

## 2022-04-02 DIAGNOSIS — E11.59 HYPERTENSION ASSOCIATED WITH DIABETES: ICD-10-CM

## 2022-04-02 DIAGNOSIS — I15.2 HYPERTENSION ASSOCIATED WITH DIABETES: ICD-10-CM

## 2022-04-02 NOTE — TELEPHONE ENCOUNTER
No new care gaps identified.  Powered by [employer]. Reference number: [id number].   4/02/2022 8:50:28 AM CDT

## 2022-04-03 NOTE — TELEPHONE ENCOUNTER
This Rx Request does not qualify for assessment with the [facility]   Please review protocol details and the Care Due Message for extra clinical information    Reasons Rx Request may be deferred:  Patient has been seen in the ED/Hospital since the last PCP visit    Note composed:9:52 PM 04/02/2022

## 2022-04-04 RX ORDER — LOSARTAN POTASSIUM 25 MG/1
TABLET ORAL
Qty: 90 TABLET | Refills: 3 | Status: SHIPPED | OUTPATIENT
Start: 2022-04-04 | End: 2023-04-03

## 2022-04-05 ENCOUNTER — TELEPHONE (OUTPATIENT)
Dept: FAMILY MEDICINE | Facility: CLINIC | Age: 72
End: 2022-04-05
Payer: MEDICARE

## 2022-04-05 DIAGNOSIS — M21.372 LEFT FOOT DROP: Primary | ICD-10-CM

## 2022-04-05 NOTE — TELEPHONE ENCOUNTER
Patient saw Dr. Pacheco for post covid symptoms- he is having a problem with numbness on top of left foot and unable to lift foot but he can point down. Dr. Pacheco told him it's foot drop from being in hospital. Patient asking who he should see for this.

## 2022-04-05 NOTE — TELEPHONE ENCOUNTER
----- Message from Rosmery Sanchez sent at 4/5/2022  8:16 AM CDT -----  Type: Needs Medical Advice  Who Called:  Pt wife  Symptoms (please be specific):  Pt was told he has Drop foot and is asking who to see for that    Best Call Back Number: [phone]  Additional Information: Please call and advise

## 2022-04-05 NOTE — TELEPHONE ENCOUNTER
Footdrop can result from compression neuropathy usually occurring at the knee, in the thigh, or at the spine from disc disease.  It can also result from neuropathy which can be hereditary, nutritional, diabetic or others.  Rarely it may be caused by surgical injury to a nerve or trauma which can be treated surgically but it has to be done within three days of the injury to be successful.  Nerve conduction testing can usually pinpoint the location of the injury.  Treatment can include splinting and physical therapy and as above rarely surgical correction.  Treatment can be directed by Neurology but physical therapy/rehab specialist also treated and they usually are the ones who do the nerve conduction testing

## 2022-04-06 NOTE — TELEPHONE ENCOUNTER
Actually I would recommend Dr. Goode or Dr. Bailey.  Dr. Goode is leaving but I do not know when or if he has any time in his schedule before he leaves.

## 2022-04-06 NOTE — TELEPHONE ENCOUNTER
Call placed to patient for notification. Patient verbalized understanding. Patient requesting to know who Dr Fisher specifically recommends he go to see related to this matter, or if patient should find someone on his own. Please advise. Thank you.

## 2022-04-18 ENCOUNTER — TELEPHONE (OUTPATIENT)
Dept: PULMONOLOGY | Facility: CLINIC | Age: 72
End: 2022-04-18
Payer: MEDICARE

## 2022-04-18 NOTE — TELEPHONE ENCOUNTER
Called and spoke with patient. Informed of [facility] dme.    ----- Message from Tana Villalpando sent at 4/18/2022  9:56 AM CDT -----  Contact: wife luis Diaz is requesting you reach out to the company that provided oxygen, he has not used it since march and needs to make sure that his insurance is still not being charged.  Luis asked for the phone number for the company and she can schedule it, call back at [phone] and thanks

## 2022-04-25 ENCOUNTER — PATIENT OUTREACH (OUTPATIENT)
Dept: ADMINISTRATIVE | Facility: OTHER | Age: 72
End: 2022-04-25
Payer: MEDICARE

## 2022-04-25 NOTE — PROGRESS NOTES
Chart was reviewed for overdue Proactive [facility] Encounters ([place])  topics  Updates were requested from care everywhere  Health Maintenance has been updated  LINKS immunization registry triggered

## 2022-04-29 ENCOUNTER — OFFICE VISIT (OUTPATIENT)
Dept: PHYSICAL MEDICINE AND REHAB | Facility: CLINIC | Age: 72
End: 2022-04-29
Attending: FAMILY MEDICINE
Payer: MEDICARE

## 2022-04-29 VITALS
DIASTOLIC BLOOD PRESSURE: 68 MMHG | BODY MASS INDEX: 28.17 KG/M2 | SYSTOLIC BLOOD PRESSURE: 129 MMHG | HEIGHT: 64 IN | WEIGHT: 165 LBS | HEART RATE: 85 BPM

## 2022-04-29 DIAGNOSIS — M21.372 LEFT FOOT DROP: ICD-10-CM

## 2022-04-29 PROCEDURE — 4010F PR ACE/ARB THEARPY RXD/TAKEN: ICD-10-PCS | Mod: CPTII,S$GLB,, | Performed by: PHYSICAL MEDICINE & REHABILITATION

## 2022-04-29 PROCEDURE — 1101F PT FALLS ASSESS-DOCD LE1/YR: CPT | Mod: CPTII,S$GLB,, | Performed by: PHYSICAL MEDICINE & REHABILITATION

## 2022-04-29 PROCEDURE — 3008F BODY MASS INDEX DOCD: CPT | Mod: CPTII,S$GLB,, | Performed by: PHYSICAL MEDICINE & REHABILITATION

## 2022-04-29 PROCEDURE — 99204 PR OFFICE/OUTPT VISIT, NEW, LEVL IV, 45-59 MIN: ICD-10-PCS | Mod: S$GLB,,, | Performed by: PHYSICAL MEDICINE & REHABILITATION

## 2022-04-29 PROCEDURE — 3074F PR MOST RECENT SYSTOLIC BLOOD PRESSURE < 130 MM HG: ICD-10-PCS | Mod: CPTII,S$GLB,, | Performed by: PHYSICAL MEDICINE & REHABILITATION

## 2022-04-29 PROCEDURE — 3072F LOW RISK FOR RETINOPATHY: CPT | Mod: CPTII,S$GLB,, | Performed by: PHYSICAL MEDICINE & REHABILITATION

## 2022-04-29 PROCEDURE — 3051F PR MOST RECENT HEMOGLOBIN A1C LEVEL 7.0 - < 8.0%: ICD-10-PCS | Mod: CPTII,S$GLB,, | Performed by: PHYSICAL MEDICINE & REHABILITATION

## 2022-04-29 PROCEDURE — 99204 OFFICE O/P NEW MOD 45 MIN: CPT | Mod: S$GLB,,, | Performed by: PHYSICAL MEDICINE & REHABILITATION

## 2022-04-29 PROCEDURE — 3078F DIAST BP <80 MM HG: CPT | Mod: CPTII,S$GLB,, | Performed by: PHYSICAL MEDICINE & REHABILITATION

## 2022-04-29 PROCEDURE — 3072F PR LOW RISK FOR RETINOPATHY: ICD-10-PCS | Mod: CPTII,S$GLB,, | Performed by: PHYSICAL MEDICINE & REHABILITATION

## 2022-04-29 PROCEDURE — 99999 PR PBB SHADOW E&M-EST. PATIENT-LVL IV: ICD-10-PCS | Mod: PBBFAC,,, | Performed by: PHYSICAL MEDICINE & REHABILITATION

## 2022-04-29 PROCEDURE — 1126F PR PAIN SEVERITY QUANTIFIED, NO PAIN PRESENT: ICD-10-PCS | Mod: CPTII,S$GLB,, | Performed by: PHYSICAL MEDICINE & REHABILITATION

## 2022-04-29 PROCEDURE — 3078F PR MOST RECENT DIASTOLIC BLOOD PRESSURE < 80 MM HG: ICD-10-PCS | Mod: CPTII,S$GLB,, | Performed by: PHYSICAL MEDICINE & REHABILITATION

## 2022-04-29 PROCEDURE — 1126F AMNT PAIN NOTED NONE PRSNT: CPT | Mod: CPTII,S$GLB,, | Performed by: PHYSICAL MEDICINE & REHABILITATION

## 2022-04-29 PROCEDURE — 1101F PR PT FALLS ASSESS DOC 0-1 FALLS W/OUT INJ PAST YR: ICD-10-PCS | Mod: CPTII,S$GLB,, | Performed by: PHYSICAL MEDICINE & REHABILITATION

## 2022-04-29 PROCEDURE — 1159F PR MEDICATION LIST DOCUMENTED IN MEDICAL RECORD: ICD-10-PCS | Mod: CPTII,S$GLB,, | Performed by: PHYSICAL MEDICINE & REHABILITATION

## 2022-04-29 PROCEDURE — 3008F PR BODY MASS INDEX (BMI) DOCUMENTED: ICD-10-PCS | Mod: CPTII,S$GLB,, | Performed by: PHYSICAL MEDICINE & REHABILITATION

## 2022-04-29 PROCEDURE — 3288F FALL RISK ASSESSMENT DOCD: CPT | Mod: CPTII,S$GLB,, | Performed by: PHYSICAL MEDICINE & REHABILITATION

## 2022-04-29 PROCEDURE — 4010F ACE/ARB THERAPY RXD/TAKEN: CPT | Mod: CPTII,S$GLB,, | Performed by: PHYSICAL MEDICINE & REHABILITATION

## 2022-04-29 PROCEDURE — 3074F SYST BP LT 130 MM HG: CPT | Mod: CPTII,S$GLB,, | Performed by: PHYSICAL MEDICINE & REHABILITATION

## 2022-04-29 PROCEDURE — 3288F PR FALLS RISK ASSESSMENT DOCUMENTED: ICD-10-PCS | Mod: CPTII,S$GLB,, | Performed by: PHYSICAL MEDICINE & REHABILITATION

## 2022-04-29 PROCEDURE — 99999 PR PBB SHADOW E&M-EST. PATIENT-LVL IV: CPT | Mod: PBBFAC,,, | Performed by: PHYSICAL MEDICINE & REHABILITATION

## 2022-04-29 PROCEDURE — 3051F HG A1C>EQUAL 7.0%<8.0%: CPT | Mod: CPTII,S$GLB,, | Performed by: PHYSICAL MEDICINE & REHABILITATION

## 2022-04-29 PROCEDURE — 1159F MED LIST DOCD IN RCRD: CPT | Mod: CPTII,S$GLB,, | Performed by: PHYSICAL MEDICINE & REHABILITATION

## 2022-04-29 NOTE — PROGRESS NOTES
HPI:  Patient is a 72 y.o. year old male who developed left foot drop over 3 wks ago. He was diagnosed w. COVID and was hospitilized for about 2 wks. When he came out of the hospital, he noticed he could not lift his left foot. He had difficulty walking and when he would walk a long distance his lateral aspect of his foot would hurt. He has also noticed tingling coming from below his knee and wrapping around his left foot. He does not have any symptoms on his right side. He lost about 10 lbs after being hospitilized w. COVID. He denies any pain.    Imaging  No recent pertinent imaging    Labs  hgba1c 7.6  egfr cr lfts gluc nl    Past Medical History:   Diagnosis Date    Diabetes mellitus     Elevated PSA     Hyperlipidemia     Hypertension     Personal history of colonic polyps      Past Surgical History:   Procedure Laterality Date    COLONOSCOPY  2013    Dr. Martinez, 5 year recheck    SHOULDER SURGERY  2011    right      Family History   Problem Relation Age of Onset    Diabetes Father     Heart disease Father     Cataracts Mother     Glaucoma Mother     Diabetes Maternal Grandmother     No Known Problems Brother     No Known Problems Daughter     No Known Problems Son     No Known Problems Maternal Grandfather     No Known Problems Paternal Grandmother     No Known Problems Paternal Grandfather     No Known Problems Maternal Aunt     No Known Problems Maternal Uncle     No Known Problems Paternal Aunt     No Known Problems Paternal Uncle     Urolithiasis Neg Hx     Prostate cancer Neg Hx     Kidney cancer Neg Hx     Retinal detachment Neg Hx     Macular degeneration Neg Hx      Social History     Socioeconomic History    Marital status:    Tobacco Use    Smoking status: Former Smoker     Types: Cigarettes     Quit date: 1985     Years since quittin.9    Smokeless tobacco: Never Used   Substance and Sexual Activity    Alcohol use: Yes     Comment: seldom  "   Drug use: No       Review of patient's allergies indicates:   Allergen Reactions    Pravastatin Other (See Comments)     Joint pain    Lisinopril Other (See Comments)     Cough        Current Outpatient Medications:     alfuzosin (UROXATRAL) 10 mg Tb24, Take 1 tablet (10 mg total) by mouth daily with breakfast., Disp: 90 tablet, Rfl: 3    BD INSULIN SYRINGE ULTRA-FINE 0.5 mL 31 gauge x 5/16" Syrg, , Disp: , Rfl:     blood sugar diagnostic (ONETOUCH ULTRA TEST) Strp, USE TO CHECK FASTING GLUCOSE IN THE MORNING, Disp: 100 strip, Rfl: 3    blood-glucose meter Misc, One Touch glucometer check fasting glucose in morning, Disp: 1 each, Rfl: 0    coenzyme Q10 10 mg capsule, Take 10 mg by mouth once daily., Disp: , Rfl:     famotidine (PEPCID) 20 MG tablet, Take 20 mg by mouth once daily., Disp: , Rfl:     fluticasone-umeclidin-vilanter (TRELEGY ELLIPTA) 100-62.5-25 mcg DsDv, Inhale 1 puff into the lungs once daily., Disp: 1 each, Rfl: 11    ibuprofen (ADVIL,MOTRIN) 800 MG tablet, Take 800 mg by mouth every 6 (six) hours as needed., Disp: , Rfl:     insulin (LANTUS SOLOSTAR U-100 INSULIN) glargine 100 units/mL (3mL) SubQ pen, Inject 20 Units into the skin every evening., Disp: 6 mL, Rfl: 0    insulin lispro (HUMALOG U-100 INSULIN) 100 unit/mL injection, Inject 10 Units into the skin 3 (three) times daily before meals. **MODERATE CORRECTION DOSE** Blood Glucose mg/dL                  Pre-meal                Bedtime 151-200                2 units                    1 unit 201-250                4 units                    2 units 251-300                6 units                    3 units  301-350                8 units                    4 units >350                     10 units                  5 units **CALL MD for BG >350**, Disp: 9 mL, Rfl: 11    lancets Misc, One Touch lancets check fasting glucose in morning, Disp: 100 each, Rfl: 3    losartan (COZAAR) 25 MG tablet, TAKE ONE TABLET BY MOUTH ONCE A DAY, " "Disp: 90 tablet, Rfl: 3    meloxicam (MOBIC) 15 MG tablet, Take 15 mg by mouth once daily., Disp: , Rfl:     metFORMIN (GLUCOPHAGE-XR) 500 MG ER 24hr tablet, TAKE ONE TABLET BY MOUTH EVERY MORNING AND TAKE TWO TABLETS BY MOUTH EVERY EVENING, Disp: 270 tablet, Rfl: 1    ondansetron (ZOFRAN) 8 MG tablet, Take 1 tablet (8 mg total) by mouth every 8 (eight) hours as needed for Nausea., Disp: 12 tablet, Rfl: 1    pen needle, diabetic 30 gauge x 5/16" Ndle, Use with levemir once daily and novolog before each meal three times daily, Disp: 400 each, Rfl: 4    predniSONE (DELTASONE) 10 MG tablet, Take 1 tablet (10 mg total) by mouth once daily., Disp: 60 tablet, Rfl: 1    predniSONE (DELTASONE) 5 MG tablet, Take 1 tablet (5 mg total) by mouth once daily., Disp: 60 tablet, Rfl: 1    pulse oximeter (PULSE OXIMETER) device, by Apply Externally route 2 (two) times a day. Use twice daily at 8 AM and 3 PM and record the value in MyChart as directed., Disp: 1 each, Rfl: 0    pulse oximeter (PULSE OXIMETER) device, by Apply Externally route 2 (two) times a day. Use twice daily at 8 AM and 3 PM and record the value in MyChart as directed., Disp: 1 each, Rfl: 0    rosuvastatin (CRESTOR) 10 MG tablet, Take 1 tablet (10 mg total) by mouth once daily., Disp: 90 tablet, Rfl: 3      Review of Systems:         No nausea, vomiting, fevers, chills , contipation, diarrhea or sweats,no weight change, NO back pain or stiffness, no chest pain, no sob, no change of bowel or bladder habits,no coordination issues      Physical Exam:      Vitals:    04/29/22 1023   BP: 129/68   Pulse: 85     alert and oriented ×4 follows commands answers all questions appropriately,affect wnl  Manual muscle test 5 out of 5 EXCEPT EHL AND ADF 2-3/5, sensation to light touch decreased on the dorsum of foot and lateral aspect of his left lower extremity  Ambulates w. Left foot drag  +slR on left mofified  babinsky down  No clonus  No C/C/E  Full ROM of lumbar " spine    Assessment:  Lumbar radiculitis vs left peroneal nerve entrapment  DM2-avoid cortisone  S/p recent COVID hospitilization  Plan:  AFO to left foot, to avoid ankle sprain and falls d/t foot drop  EMG/NCS of LLE to pinpoint entrapment  Lumbar spine xray    ,Thank you for this interesting referral-note will be sent via Epic to referring provider (Dr. Fisher)

## 2022-05-05 ENCOUNTER — TELEPHONE (OUTPATIENT)
Dept: FAMILY MEDICINE | Facility: CLINIC | Age: 72
End: 2022-05-05
Payer: MEDICARE

## 2022-05-05 NOTE — TELEPHONE ENCOUNTER
----- Message from Theresa Fuchs sent at 5/5/2022  4:42 PM CDT -----  Type:  Patient Returning Call    Who Called:Patient     Who Left Message for Patient:Jeanne Boeckelman, MA    Does the patient know what this is regarding?:not sure     Would the patient rather a call back or a response via [facility]? Call back     Best Call Back Number:[phone] (mobile)     Additional Information:

## 2022-05-05 NOTE — TELEPHONE ENCOUNTER
I called the patient to confirm his appointment that he has scheduled with Dr. Fisher on 05/06/2022 at 9:40. No answer no voicemail. I will send the patient a portal message as well

## 2022-05-06 ENCOUNTER — OFFICE VISIT (OUTPATIENT)
Dept: FAMILY MEDICINE | Facility: CLINIC | Age: 72
End: 2022-05-06
Attending: FAMILY MEDICINE
Payer: MEDICARE

## 2022-05-06 ENCOUNTER — HOSPITAL ENCOUNTER (OUTPATIENT)
Dept: RADIOLOGY | Facility: HOSPITAL | Age: 72
Discharge: HOME | End: 2022-05-06
Attending: INTERNAL MEDICINE
Payer: MEDICARE

## 2022-05-06 ENCOUNTER — HOSPITAL ENCOUNTER (OUTPATIENT)
Dept: RADIOLOGY | Facility: HOSPITAL | Age: 72
Discharge: HOME | End: 2022-05-06
Attending: PHYSICAL MEDICINE & REHABILITATION
Payer: MEDICARE

## 2022-05-06 ENCOUNTER — PATIENT MESSAGE (OUTPATIENT)
Dept: PULMONOLOGY | Facility: CLINIC | Age: 72
End: 2022-05-06
Payer: MEDICARE

## 2022-05-06 VITALS
HEART RATE: 75 BPM | SYSTOLIC BLOOD PRESSURE: 116 MMHG | DIASTOLIC BLOOD PRESSURE: 66 MMHG | TEMPERATURE: 98 F | WEIGHT: 167.13 LBS | RESPIRATION RATE: 18 BRPM | BODY MASS INDEX: 28.53 KG/M2 | OXYGEN SATURATION: 97 % | HEIGHT: 64 IN

## 2022-05-06 DIAGNOSIS — E78.5 HYPERLIPIDEMIA ASSOCIATED WITH TYPE 2 DIABETES MELLITUS: ICD-10-CM

## 2022-05-06 DIAGNOSIS — E11.69 HYPERLIPIDEMIA ASSOCIATED WITH TYPE 2 DIABETES MELLITUS: ICD-10-CM

## 2022-05-06 DIAGNOSIS — M21.372 LEFT FOOT DROP: ICD-10-CM

## 2022-05-06 DIAGNOSIS — E11.29 CONTROLLED TYPE 2 DIABETES MELLITUS WITH MICROALBUMINURIA, WITHOUT LONG-TERM CURRENT USE OF INSULIN: Primary | ICD-10-CM

## 2022-05-06 DIAGNOSIS — U07.1 ACUTE HYPOXEMIC RESPIRATORY FAILURE DUE TO COVID-19: ICD-10-CM

## 2022-05-06 DIAGNOSIS — J96.01 ACUTE HYPOXEMIC RESPIRATORY FAILURE DUE TO COVID-19: ICD-10-CM

## 2022-05-06 DIAGNOSIS — I15.2 HYPERTENSION ASSOCIATED WITH DIABETES: ICD-10-CM

## 2022-05-06 DIAGNOSIS — E11.59 HYPERTENSION ASSOCIATED WITH DIABETES: ICD-10-CM

## 2022-05-06 DIAGNOSIS — R80.9 CONTROLLED TYPE 2 DIABETES MELLITUS WITH MICROALBUMINURIA, WITHOUT LONG-TERM CURRENT USE OF INSULIN: Primary | ICD-10-CM

## 2022-05-06 PROCEDURE — 99214 PR OFFICE/OUTPT VISIT, EST, LEVL IV, 30-39 MIN: ICD-10-PCS | Mod: S$GLB,,, | Performed by: FAMILY MEDICINE

## 2022-05-06 PROCEDURE — 71046 X-RAY EXAM CHEST 2 VIEWS: CPT | Mod: 26,,, | Performed by: RADIOLOGY

## 2022-05-06 PROCEDURE — 72110 X-RAY EXAM L-2 SPINE 4/>VWS: CPT | Mod: TC,FY

## 2022-05-06 PROCEDURE — 1160F PR REVIEW ALL MEDS BY PRESCRIBER/CLIN PHARMACIST DOCUMENTED: ICD-10-PCS | Mod: CPTII,S$GLB,, | Performed by: FAMILY MEDICINE

## 2022-05-06 PROCEDURE — 3078F DIAST BP <80 MM HG: CPT | Mod: CPTII,S$GLB,, | Performed by: FAMILY MEDICINE

## 2022-05-06 PROCEDURE — 3074F PR MOST RECENT SYSTOLIC BLOOD PRESSURE < 130 MM HG: ICD-10-PCS | Mod: CPTII,S$GLB,, | Performed by: FAMILY MEDICINE

## 2022-05-06 PROCEDURE — 1126F AMNT PAIN NOTED NONE PRSNT: CPT | Mod: CPTII,S$GLB,, | Performed by: FAMILY MEDICINE

## 2022-05-06 PROCEDURE — 3074F SYST BP LT 130 MM HG: CPT | Mod: CPTII,S$GLB,, | Performed by: FAMILY MEDICINE

## 2022-05-06 PROCEDURE — 99999 PR PBB SHADOW E&M-EST. PATIENT-LVL V: ICD-10-PCS | Mod: PBBFAC,,, | Performed by: FAMILY MEDICINE

## 2022-05-06 PROCEDURE — 1159F PR MEDICATION LIST DOCUMENTED IN MEDICAL RECORD: ICD-10-PCS | Mod: CPTII,S$GLB,, | Performed by: FAMILY MEDICINE

## 2022-05-06 PROCEDURE — 71046 XR CHEST PA AND LATERAL: ICD-10-PCS | Mod: 26,,, | Performed by: RADIOLOGY

## 2022-05-06 PROCEDURE — 99999 PR PBB SHADOW E&M-EST. PATIENT-LVL V: CPT | Mod: PBBFAC,,, | Performed by: FAMILY MEDICINE

## 2022-05-06 PROCEDURE — 1160F RVW MEDS BY RX/DR IN RCRD: CPT | Mod: CPTII,S$GLB,, | Performed by: FAMILY MEDICINE

## 2022-05-06 PROCEDURE — 3051F HG A1C>EQUAL 7.0%<8.0%: CPT | Mod: CPTII,S$GLB,, | Performed by: FAMILY MEDICINE

## 2022-05-06 PROCEDURE — 3072F LOW RISK FOR RETINOPATHY: CPT | Mod: CPTII,S$GLB,, | Performed by: FAMILY MEDICINE

## 2022-05-06 PROCEDURE — 4010F ACE/ARB THERAPY RXD/TAKEN: CPT | Mod: CPTII,S$GLB,, | Performed by: FAMILY MEDICINE

## 2022-05-06 PROCEDURE — 1101F PT FALLS ASSESS-DOCD LE1/YR: CPT | Mod: CPTII,S$GLB,, | Performed by: FAMILY MEDICINE

## 2022-05-06 PROCEDURE — 3008F PR BODY MASS INDEX (BMI) DOCUMENTED: ICD-10-PCS | Mod: CPTII,S$GLB,, | Performed by: FAMILY MEDICINE

## 2022-05-06 PROCEDURE — 3078F PR MOST RECENT DIASTOLIC BLOOD PRESSURE < 80 MM HG: ICD-10-PCS | Mod: CPTII,S$GLB,, | Performed by: FAMILY MEDICINE

## 2022-05-06 PROCEDURE — 1101F PR PT FALLS ASSESS DOC 0-1 FALLS W/OUT INJ PAST YR: ICD-10-PCS | Mod: CPTII,S$GLB,, | Performed by: FAMILY MEDICINE

## 2022-05-06 PROCEDURE — 71046 X-RAY EXAM CHEST 2 VIEWS: CPT | Mod: TC,FY

## 2022-05-06 PROCEDURE — 3051F PR MOST RECENT HEMOGLOBIN A1C LEVEL 7.0 - < 8.0%: ICD-10-PCS | Mod: CPTII,S$GLB,, | Performed by: FAMILY MEDICINE

## 2022-05-06 PROCEDURE — 3008F BODY MASS INDEX DOCD: CPT | Mod: CPTII,S$GLB,, | Performed by: FAMILY MEDICINE

## 2022-05-06 PROCEDURE — 72110 XR LUMBAR SPINE COMPLETE 5 VIEW: ICD-10-PCS | Mod: 26,,, | Performed by: RADIOLOGY

## 2022-05-06 PROCEDURE — 4010F PR ACE/ARB THEARPY RXD/TAKEN: ICD-10-PCS | Mod: CPTII,S$GLB,, | Performed by: FAMILY MEDICINE

## 2022-05-06 PROCEDURE — 99214 OFFICE O/P EST MOD 30 MIN: CPT | Mod: S$GLB,,, | Performed by: FAMILY MEDICINE

## 2022-05-06 PROCEDURE — 1126F PR PAIN SEVERITY QUANTIFIED, NO PAIN PRESENT: ICD-10-PCS | Mod: CPTII,S$GLB,, | Performed by: FAMILY MEDICINE

## 2022-05-06 PROCEDURE — 3288F FALL RISK ASSESSMENT DOCD: CPT | Mod: CPTII,S$GLB,, | Performed by: FAMILY MEDICINE

## 2022-05-06 PROCEDURE — 1159F MED LIST DOCD IN RCRD: CPT | Mod: CPTII,S$GLB,, | Performed by: FAMILY MEDICINE

## 2022-05-06 PROCEDURE — 3072F PR LOW RISK FOR RETINOPATHY: ICD-10-PCS | Mod: CPTII,S$GLB,, | Performed by: FAMILY MEDICINE

## 2022-05-06 PROCEDURE — 72110 X-RAY EXAM L-2 SPINE 4/>VWS: CPT | Mod: 26,,, | Performed by: RADIOLOGY

## 2022-05-06 PROCEDURE — 3288F PR FALLS RISK ASSESSMENT DOCUMENTED: ICD-10-PCS | Mod: CPTII,S$GLB,, | Performed by: FAMILY MEDICINE

## 2022-05-06 RX ORDER — METFORMIN HYDROCHLORIDE 500 MG/1
TABLET, EXTENDED RELEASE ORAL
Qty: 270 TABLET | Refills: 1 | Status: SHIPPED | OUTPATIENT
Start: 2022-05-06 | End: 2022-07-19

## 2022-05-06 RX ORDER — ROSUVASTATIN CALCIUM 10 MG/1
10 TABLET, COATED ORAL DAILY
Qty: 90 TABLET | Refills: 3 | Status: SHIPPED | OUTPATIENT
Start: 2022-05-06 | End: 2023-06-12

## 2022-05-06 NOTE — PROGRESS NOTES
"Subjective:       Patient ID: Ludwin Gee is a 72 y.o. male.    Chief Complaint: Follow-up (From covid)    72-year-old male coming in for a follow-up on diabetes and hypertension.  He has a history of respiratory failure secondary to COVID pneumonia with underlying asbestos exposure and is being followed by Dr. Pacheco.  His prednisone has now been tapered down to 5 mg daily and his blood sugar this morning was 109. His last A1c was 7.6 March 26, 2022 and he was on higher doses of prednisone for most of the three months before that.  He has history of diabetes with microalbuminuria, hypertension, hyperlipidemia, BPH, anemia, asbestosis and colon polyps.  His last colonoscopy was December 6, 2013 by Dr. Martinez with a five year recheck and he has declined to do a colonoscopy since that time.  He is due for a Pneumovax 23 but is still having respiratory problems from the COVID and would like to defer it until he is breathing normally.  He is due for a foot check and will be due for a microalbumin and an A1c in June, he has a PSA scheduled June 24th and we will linked to that appointment.  His eye exam is due in September.    Past Medical History:  No date: Diabetes mellitus  No date: Elevated PSA  No date: Hyperlipidemia  No date: Hypertension  No date: Personal history of colonic polyps    Past Surgical History:  12/6/2013: COLONOSCOPY      Comment:  Dr. Martinez, 5 year recheck  12/09/2011: SHOULDER SURGERY      Comment:  right     Current Outpatient Medications on File Prior to Visit:  alfuzosin (UROXATRAL) 10 mg Tb24, Take 1 tablet (10 mg total) by mouth daily with breakfast., Disp: 90 tablet, Rfl: 3  BD INSULIN SYRINGE ULTRA-FINE 0.5 mL 31 gauge x 5/16" Syrg, , Disp: , Rfl:   blood sugar diagnostic (ONETOUCH ULTRA TEST) Str, USE TO CHECK FASTING GLUCOSE IN THE MORNING, Disp: 100 strip, Rfl: 3  blood-glucose meter Misc, One Touch glucometer check fasting glucose in morning, Disp: 1 each, Rfl: 0  coenzyme Q10 10 " "mg capsule, Take 10 mg by mouth once daily., Disp: , Rfl:   famotidine (PEPCID) 20 MG tablet, Take 20 mg by mouth once daily., Disp: , Rfl:   fluticasone-umeclidin-vilanter (TRELEGY ELLIPTA) 100-62.5-25 mcg DsDv, Inhale 1 puff into the lungs once daily., Disp: 1 each, Rfl: 11  ibuprofen (ADVIL,MOTRIN) 800 MG tablet, Take 800 mg by mouth every 6 (six) hours as needed., Disp: , Rfl:   insulin (LANTUS SOLOSTAR U-100 INSULIN) glargine 100 units/mL (3mL) SubQ pen, Inject 20 Units into the skin every evening., Disp: 6 mL, Rfl: 0  insulin lispro (HUMALOG U-100 INSULIN) 100 unit/mL injection, Inject 10 Units into the skin 3 (three) times daily before meals. **MODERATE CORRECTION DOSE** Blood Glucose mg/dL                  Pre-meal                Bedtime 151-200                2 units                    1 unit 201-250                4 units                    2 units 251-300                6 units                    3 units  301-350                8 units                    4 units >350                     10 units                  5 units **CALL MD for BG >350**, Disp: 9 mL, Rfl: 11  lancets Misc, One Touch lancets check fasting glucose in morning, Disp: 100 each, Rfl: 3  losartan (COZAAR) 25 MG tablet, TAKE ONE TABLET BY MOUTH ONCE A DAY, Disp: 90 tablet, Rfl: 3  meloxicam (MOBIC) 15 MG tablet, Take 15 mg by mouth once daily., Disp: , Rfl:   ondansetron (ZOFRAN) 8 MG tablet, Take 1 tablet (8 mg total) by mouth every 8 (eight) hours as needed for Nausea., Disp: 12 tablet, Rfl: 1  pen needle, diabetic 30 gauge x 5/16" Ndle, Use with levemir once daily and novolog before each meal three times daily, Disp: 400 each, Rfl: 4  predniSONE (DELTASONE) 5 MG tablet, Take 1 tablet (5 mg total) by mouth once daily., Disp: 60 tablet, Rfl: 1  pulse oximeter (PULSE OXIMETER) device, by Apply Externally route 2 (two) times a day. Use twice daily at 8 AM and 3 PM and record the value in [facility] as directed., Disp: 1 each, Rfl: 0  pulse " oximeter (PULSE OXIMETER) device, by Apply Externally route 2 (two) times a day. Use twice daily at 8 AM and 3 PM and [facility]., Disp: 1 each, Rfl: 0  (DISCONTINUED) metFORMIN (GLUCOPHAGE-XR) 500 MG ER 24hr tablet, TAKE ONE TABLET BY MOUTH EVERY MORNING AND TAKE TWO TABLETS BY MOUTH EVERY EVENING, Disp: 270 tablet, Rfl: 1  (DISCONTINUED) rosuvastatin (CRESTOR) 10 MG tablet, Take 1 tablet (10 mg total) by mouth once daily., Disp: 90 tablet, Rfl: 3  predniSONE (DELTASONE) 10 MG tablet, Take 1 tablet (10 mg total) by mouth once daily. (Patient not taking: Reported on 5/6/2022), Disp: 60 tablet, Rfl: 1    No current facility-administered medications on file prior to visit.        Review of Systems   Constitutional: Positive for fatigue. Negative for diaphoresis and fever.   Respiratory: Positive for cough and chest tightness (Mild an intermittent you, typically in the morning). Negative for shortness of breath.    Cardiovascular: Negative for chest pain and palpitations.   Endocrine: Negative for polydipsia and polyuria.   Neurological: Positive for weakness (He has a slight left foot drop which is improving and followed by Dr. Bailey.  She is planning on doing a nerve conduction test in a few months and does not feel that he needs a brace and will probably recover.  This also a seems to be associated with the). Negative for dizziness, light-headedness, numbness and headaches.       Objective:      Physical Exam  Vitals and nursing note reviewed.   Constitutional:       General: He is not in acute distress.     Appearance: Normal appearance. He is not ill-appearing, toxic-appearing or diaphoretic.      Comments: Good blood pressure control  Mildly overweight with a BMI of 28.7 he is down 8.6 lb from his August 18, 2021 visit   HENT:      Head: Normocephalic and atraumatic.   Cardiovascular:      Rate and Rhythm: Normal rate and regular rhythm.      Pulses:           Dorsalis pedis pulses  are 2+ on the right side and 2+ on the left side.        Posterior tibial pulses are 2+ on the right side and 2+ on the left side.      Heart sounds: Normal heart sounds. No murmur heard.    No friction rub. No gallop.   Pulmonary:      Effort: Pulmonary effort is normal. No respiratory distress.      Breath sounds: Normal breath sounds. No stridor. No wheezing, rhonchi or rales.      Comments: Lungs are clear with good air exchange, about a month ago when he saw Dr. Pacheco he was still having some rales  Abdominal:      General: Abdomen is flat. Bowel sounds are normal. There is no distension.      Palpations: Abdomen is soft. There is no mass.      Tenderness: There is no abdominal tenderness. There is no guarding or rebound.      Hernia: No hernia is present.   Musculoskeletal:      Right foot: Normal range of motion. No deformity, bunion, Charcot foot, foot drop or prominent metatarsal heads.      Left foot: Normal range of motion. No deformity, bunion, Charcot foot, foot drop or prominent metatarsal heads.   Feet:      Right foot:      Protective Sensation: 9 sites tested. 9 sites sensed.      Skin integrity: Skin integrity normal. No ulcer, blister, skin breakdown, erythema, warmth, callus, dry skin or fissure.      Toenail Condition: Right toenails are normal.      Left foot:      Protective Sensation: 9 sites tested. 9 sites sensed.      Skin integrity: Skin integrity normal. No ulcer, blister, skin breakdown, erythema, warmth, callus or dry skin.      Toenail Condition: Left toenails are normal.   Skin:     General: Skin is warm and dry.      Capillary Refill: Capillary refill takes less than 2 seconds.      Coloration: Skin is not pale.      Findings: No erythema or rash.   Neurological:      General: No focal deficit present.      Mental Status: He is alert and oriented to person, place, and time. Mental status is at baseline.      Comments: Proprioception intact all 10 toes   Psychiatric:         Mood and  Affect: Mood normal.         Behavior: Behavior normal.         Thought Content: Thought content normal.         Judgment: Judgment normal.         Assessment:       1. Controlled type 2 diabetes mellitus with microalbuminuria, without long-term current use of insulin    2. Hypertension associated with diabetes    3. Hyperlipidemia associated with type 2 diabetes mellitus    4. Acute hypoxemic respiratory failure due to COVID-19        Plan:       1. Controlled type 2 diabetes mellitus with microalbuminuria, without long-term current use of insulin  Await A1c at the end of June.  Hopefully we will be able to start backing down his insulin use within a few months with his reduction in steroid use  - Hemoglobin A1C; Future  - Microalbumin/Creatinine Ratio, Urine; Future  - metFORMIN (GLUCOPHAGE-XR) 500 MG ER 24hr tablet; TAKE ONE TABLET BY MOUTH EVERY MORNING AND TAKE TWO TABLETS BY MOUTH EVERY EVENING  Dispense: 270 tablet; Refill: 1    2. Hypertension associated with diabetes  Good control, no changes needed    3. Hyperlipidemia associated with type 2 diabetes mellitus  Lab Results   Component Value Date    CHOL 137 03/26/2022    CHOL 152 03/01/2021    CHOL 128 06/24/2020     Lab Results   Component Value Date    HDL 61 03/26/2022    HDL 48 03/01/2021    HDL 44 06/24/2020     Lab Results   Component Value Date    LDLCALC 56.2 (L) 03/26/2022    LDLCALC 84.8 03/01/2021    LDLCALC 66.4 06/24/2020     Lab Results   Component Value Date    TRIG 99 03/26/2022    TRIG 96 03/01/2021    TRIG 88 06/24/2020     Lab Results   Component Value Date    CHOLHDL 44.5 03/26/2022    CHOLHDL 31.6 03/01/2021    CHOLHDL 34.4 06/24/2020     Good control, no changes needed in Crestor 10 mg  - rosuvastatin (CRESTOR) 10 MG tablet; Take 1 tablet (10 mg total) by mouth once daily.  Dispense: 90 tablet; Refill: 3    4. Acute hypoxemic respiratory failure due to COVID-19  Improving, followed by Dr. Pacheco

## 2022-05-10 ENCOUNTER — OFFICE VISIT (OUTPATIENT)
Dept: PULMONOLOGY | Facility: CLINIC | Age: 72
End: 2022-05-10
Payer: MEDICARE

## 2022-05-10 VITALS
HEART RATE: 69 BPM | SYSTOLIC BLOOD PRESSURE: 134 MMHG | HEIGHT: 64 IN | BODY MASS INDEX: 28.91 KG/M2 | DIASTOLIC BLOOD PRESSURE: 73 MMHG | WEIGHT: 169.31 LBS | OXYGEN SATURATION: 97 %

## 2022-05-10 DIAGNOSIS — U09.9 LONG COVID: Primary | ICD-10-CM

## 2022-05-10 PROCEDURE — 3072F PR LOW RISK FOR RETINOPATHY: ICD-10-PCS | Mod: CPTII,S$GLB,, | Performed by: INTERNAL MEDICINE

## 2022-05-10 PROCEDURE — 3078F PR MOST RECENT DIASTOLIC BLOOD PRESSURE < 80 MM HG: ICD-10-PCS | Mod: CPTII,S$GLB,, | Performed by: INTERNAL MEDICINE

## 2022-05-10 PROCEDURE — 3051F HG A1C>EQUAL 7.0%<8.0%: CPT | Mod: CPTII,S$GLB,, | Performed by: INTERNAL MEDICINE

## 2022-05-10 PROCEDURE — 3078F DIAST BP <80 MM HG: CPT | Mod: CPTII,S$GLB,, | Performed by: INTERNAL MEDICINE

## 2022-05-10 PROCEDURE — 3075F PR MOST RECENT SYSTOLIC BLOOD PRESS GE 130-139MM HG: ICD-10-PCS | Mod: CPTII,S$GLB,, | Performed by: INTERNAL MEDICINE

## 2022-05-10 PROCEDURE — 3072F LOW RISK FOR RETINOPATHY: CPT | Mod: CPTII,S$GLB,, | Performed by: INTERNAL MEDICINE

## 2022-05-10 PROCEDURE — 1126F PR PAIN SEVERITY QUANTIFIED, NO PAIN PRESENT: ICD-10-PCS | Mod: CPTII,S$GLB,, | Performed by: INTERNAL MEDICINE

## 2022-05-10 PROCEDURE — 99999 PR PBB SHADOW E&M-EST. PATIENT-LVL IV: ICD-10-PCS | Mod: PBBFAC,,, | Performed by: INTERNAL MEDICINE

## 2022-05-10 PROCEDURE — 1101F PR PT FALLS ASSESS DOC 0-1 FALLS W/OUT INJ PAST YR: ICD-10-PCS | Mod: CPTII,S$GLB,, | Performed by: INTERNAL MEDICINE

## 2022-05-10 PROCEDURE — 4010F PR ACE/ARB THEARPY RXD/TAKEN: ICD-10-PCS | Mod: CPTII,S$GLB,, | Performed by: INTERNAL MEDICINE

## 2022-05-10 PROCEDURE — 3288F FALL RISK ASSESSMENT DOCD: CPT | Mod: CPTII,S$GLB,, | Performed by: INTERNAL MEDICINE

## 2022-05-10 PROCEDURE — 3075F SYST BP GE 130 - 139MM HG: CPT | Mod: CPTII,S$GLB,, | Performed by: INTERNAL MEDICINE

## 2022-05-10 PROCEDURE — 3008F BODY MASS INDEX DOCD: CPT | Mod: CPTII,S$GLB,, | Performed by: INTERNAL MEDICINE

## 2022-05-10 PROCEDURE — 3008F PR BODY MASS INDEX (BMI) DOCUMENTED: ICD-10-PCS | Mod: CPTII,S$GLB,, | Performed by: INTERNAL MEDICINE

## 2022-05-10 PROCEDURE — 3051F PR MOST RECENT HEMOGLOBIN A1C LEVEL 7.0 - < 8.0%: ICD-10-PCS | Mod: CPTII,S$GLB,, | Performed by: INTERNAL MEDICINE

## 2022-05-10 PROCEDURE — 1159F PR MEDICATION LIST DOCUMENTED IN MEDICAL RECORD: ICD-10-PCS | Mod: CPTII,S$GLB,, | Performed by: INTERNAL MEDICINE

## 2022-05-10 PROCEDURE — 99213 OFFICE O/P EST LOW 20 MIN: CPT | Mod: S$GLB,,, | Performed by: INTERNAL MEDICINE

## 2022-05-10 PROCEDURE — 1101F PT FALLS ASSESS-DOCD LE1/YR: CPT | Mod: CPTII,S$GLB,, | Performed by: INTERNAL MEDICINE

## 2022-05-10 PROCEDURE — 3288F PR FALLS RISK ASSESSMENT DOCUMENTED: ICD-10-PCS | Mod: CPTII,S$GLB,, | Performed by: INTERNAL MEDICINE

## 2022-05-10 PROCEDURE — 99213 PR OFFICE/OUTPT VISIT, EST, LEVL III, 20-29 MIN: ICD-10-PCS | Mod: S$GLB,,, | Performed by: INTERNAL MEDICINE

## 2022-05-10 PROCEDURE — 1159F MED LIST DOCD IN RCRD: CPT | Mod: CPTII,S$GLB,, | Performed by: INTERNAL MEDICINE

## 2022-05-10 PROCEDURE — 1126F AMNT PAIN NOTED NONE PRSNT: CPT | Mod: CPTII,S$GLB,, | Performed by: INTERNAL MEDICINE

## 2022-05-10 PROCEDURE — 99999 PR PBB SHADOW E&M-EST. PATIENT-LVL IV: CPT | Mod: PBBFAC,,, | Performed by: INTERNAL MEDICINE

## 2022-05-10 PROCEDURE — 4010F ACE/ARB THERAPY RXD/TAKEN: CPT | Mod: CPTII,S$GLB,, | Performed by: INTERNAL MEDICINE

## 2022-05-10 NOTE — PROGRESS NOTES
5/10/2022    Ludwin Gee  In office visit     Chief Complaint   Patient presents with    Follow-up     6 week f/u       HPI:      5/10/2022 sl rubber band sense across chest-- no recent stress test. Had min cough prior to covid, sl worse now.  Rubber band not exertional related.  Sugar runs 100's.        From np Dominga:  3/29/2022:  Shortness of breath has improved overall - hasn't used supplemental oxygen since prior office visit. Does get short winded if working in heat, improves with rest.  Prednisone 10 mg daily with improvement. (Has been on for approx 3 weeks). Sugars are well controlled.  Cough: Productive in the morning, at times difficult to expectorate. Initially with yellow mucous, however has turned to clear. Using Mucinex with benefit.   Spo2 93-94% during home checks.   Still working as .   Patient Instructions   Continue Prednisone 10 mg per day for the next month. Then will decrease to 5mg per day.     Follow up in clinic in 6 weeks.    Get Chest Xray prior to next appointment, approximately 6 weeks.     Continue the use of supplemental oxygen as needed.     Continue current medication regiment. Keep follow up appointment as scheduled. Please call the office if you have any questions or concerns.       2/28/2022 pt was in hosp for covid - went home and returned with flare needing more ox. Home last 2 wks with rm air sat 84, needed 3 lpm at dc, uses ox nearly 24/7. Prednisone down to 20/d.  Sugars are stable on amaryl and metfromin. Cough violent with occ yellow mucous.  No fever and good appetite.   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  Had covid 2/1 -- was having fevers 101 for few days with sinus and loss sense smell, when temp got to 102 he presented.  Was in hosp til 2/5 going home on 3lpm with no steroids.  Pt represented needing higher ox-- sats to 70's walking on 3lpm.  Appetite ok , cough mild and  now productive gray material.  Sugars were up last admit needing insulin, usually on metformin 1500/d.     Plan: tm 101.2, vss, 70% ox, Actrema dosed, wbc 7.9, glu 209,  crp 80 at admit 2/1, down to 26 yesterday 2/6,    cta lungs no pe, posterior dense covid type infiltrates.   Ox needs incesed from 4 to 12 lpm last 24 hrs.    Consider ordering prn bipap.  F/u crp am.     low dose  lovenox.      Patient Instructions   Lung tissue still abnormal- with rales on exam.  Usually clears with time,  steroids may hasten clearing. May have some long lasting damage?  Check chest xray and markers.    Foot drop on left noted. Would be good to have physical therapy. Exam doesn't suggest stoke, should be nerve damage from covid.  Could do ct brain.   Would keep prednisone 20 daily til able to go around house without ox with sat 90 or above-- go to 10 mg.  Will need instuctions to wean off if doing well on prednisone 10 mg daily for a week.    Use trelegy once daily til out, may continue if helps.   Will place standing order for chest xray -- may do chest xray every 2 wks for total of 4 -- do within next wk.  Do after off steroids and oxygen.  Check blood counts with chest xray.        From Dr Tatum hpi 2/6/2022:   HPI: Patient is a 72-year-old male with past medical history of diabetes, hypertension, hyperlipidemia, former tobacco use who was diagnosed with COVID on 02/01/2022 and recently hospitalized for hypoxic respiratory failure from COVID during which he completed a course of remdesivir and was discharged yesterday with supplemental oxygen repeat presents today with complaint of hypoxia at home on 4 L nasal cannula.  Wife reports he did well last night however since then has been having trouble maintaining his saturations on 4 L especially with movement.  Denies any fever, chills, chest pain, nausea, vomiting.  He is drowsy at this time from cough medicine received in the ED.  D-dimer has increased from previous.  CTA  ordered.  He is currently on 5 L nasal cannula with oxygen saturation at 95%     The chief compliant  problem is stable   PFSH:  Past Medical History:   Diagnosis Date    Diabetes mellitus     Elevated PSA     Hyperlipidemia     Hypertension     Personal history of colonic polyps          Past Surgical History:   Procedure Laterality Date    COLONOSCOPY  2013    Dr. Martinez, 5 year recheck    SHOULDER SURGERY  2011    right      Social History     Tobacco Use    Smoking status: Former Smoker     Types: Cigarettes     Quit date: 1985     Years since quittin.9    Smokeless tobacco: Never Used   Substance Use Topics    Alcohol use: Yes     Comment: seldom    Drug use: No     Family History   Problem Relation Age of Onset    Diabetes Father     Heart disease Father     Cataracts Mother     Glaucoma Mother     Diabetes Maternal Grandmother     No Known Problems Brother     No Known Problems Daughter     No Known Problems Son     No Known Problems Maternal Grandfather     No Known Problems Paternal Grandmother     No Known Problems Paternal Grandfather     No Known Problems Maternal Aunt     No Known Problems Maternal Uncle     No Known Problems Paternal Aunt     No Known Problems Paternal Uncle     Urolithiasis Neg Hx     Prostate cancer Neg Hx     Kidney cancer Neg Hx     Retinal detachment Neg Hx     Macular degeneration Neg Hx      Review of patient's allergies indicates:   Allergen Reactions    Pravastatin Other (See Comments)     Joint pain    Lisinopril Other (See Comments)     Cough        Performance Status:The patient's activity level is housebound activities.      Review of Systems:  a review of eleven systems covering constitutional, Eye, HEENT, Psych, Respiratory, Cardiac, GI, , Musculoskeletal, Endocrine, Dermatologic was negative except for pertinent findings as listed ABOVE and below:  pertinent positive as above, rest is good       Exam:Comprehensive  "exam done. /73 (BP Location: Left arm, Patient Position: Sitting, BP Method: Medium (Automatic))   Pulse 69   Ht 5' 4" (1.626 m)   Wt 76.8 kg (169 lb 5 oz)   SpO2 97% Comment: on room air at rest  BMI 29.06 kg/m²   Exam included Vitals as listed, and patient's appearance and affect and alertness and mood, oral exam for yeast and hygiene and pharynx lesions and Mallapatti (M) score, neck with inspection for jvd and masses and thyroid abnormalities and lymph nodes (supraclavicular and infraclavicular nodes and axillary also examined and noted if abn), chest exam included symmetry and effort and fremitus and percussion and auscultation, cardiac exam included rhythm and gallops and murmur and rubs and jvd and edema, abdominal exam for mass and hepatosplenomegaly and tenderness and hernias and bowel sounds, Musculoskeletal exam with muscle tone and posture and mobility/gait and  strength, and skin for rashes and cyanosis and pallor and turgor, extremity for clubbing.  Findings were normal except for pertinent findings listed below:  Alert, M1, faint crackles noted to bilateral lower lung fields R>L, rest good, toe down goingn on left with dysesthesia dorsum left foot.         Radiographs (ct chest and cxr) reviewed: view by direct vision    cxr 5/6/2022 similar to covid presentation early feb  Chest Xray 3/26/2022:  FINDINGS:  There are persistent bilateral peripheral predominant interstitial opacities within the lungs possibly representing sequela of prior COVID-19 infection.  Opacities appear minimally improved compared to prior chest radiograph from February 12, 2022.  No acute opacity identified within either lung.  No pneumothorax or pleural effusion.  Cardiomediastinal silhouette is within normal limits.  No acute osseous abnormality.  Rounded 2 cm calcification within the right upper quadrant is consistent with a gallstone.  (Overall improvement in bilateral lung opacities comparison to prior CXR " from Feb 2022.)    Labs reviewed     Lab Results   Component Value Date    WBC 7.12 03/26/2022    HGB 12.8 (L) 03/26/2022    HCT 40.3 03/26/2022    MCV 95 03/26/2022     03/26/2022       CMP  Sodium   Date Value Ref Range Status   03/26/2022 142 136 - 145 mmol/L Final     Potassium   Date Value Ref Range Status   03/26/2022 4.6 3.5 - 5.1 mmol/L Final     Chloride   Date Value Ref Range Status   03/26/2022 103 95 - 110 mmol/L Final     CO2   Date Value Ref Range Status   03/26/2022 30 (H) 23 - 29 mmol/L Final     Glucose   Date Value Ref Range Status   03/26/2022 85 70 - 110 mg/dL Final     BUN   Date Value Ref Range Status   03/26/2022 11 8 - 23 mg/dL Final     Creatinine   Date Value Ref Range Status   03/26/2022 1.2 0.5 - 1.4 mg/dL Final     Calcium   Date Value Ref Range Status   03/26/2022 10.4 8.7 - 10.5 mg/dL Final     Total Protein   Date Value Ref Range Status   03/26/2022 6.6 6.0 - 8.4 g/dL Final     Albumin   Date Value Ref Range Status   03/26/2022 3.8 3.5 - 5.2 g/dL Final     Total Bilirubin   Date Value Ref Range Status   03/26/2022 0.7 0.1 - 1.0 mg/dL Final     Comment:     For infants and newborns, interpretation of results should be based  on gestational age, weight and in agreement with clinical  observations.    Premature Infant recommended reference ranges:  Up to 24 hours.............<8.0 mg/dL  Up to 48 hours............<12.0 mg/dL  3-5 days..................<15.0 mg/dL  6-29 days.................<15.0 mg/dL       Alkaline Phosphatase   Date Value Ref Range Status   03/26/2022 34 (L) 55 - 135 U/L Final     AST   Date Value Ref Range Status   03/26/2022 16 10 - 40 U/L Final     ALT   Date Value Ref Range Status   03/26/2022 16 10 - 44 U/L Final     Anion Gap   Date Value Ref Range Status   03/26/2022 9 8 - 16 mmol/L Final     eGFR if    Date Value Ref Range Status   03/26/2022 >60.0 >60 mL/min/1.73 m^2 Final     eGFR if non    Date Value Ref Range Status    03/26/2022 >60.0 >60 mL/min/1.73 m^2 Final     Comment:     Calculation used to obtain the estimated glomerular filtration  rate (eGFR) is the CKD-EPI equation.                PFT was not done       Plan:  Clinical impression is apparently straight forward and impression with management as below.    Ludwin was seen today for follow-up.    Diagnoses and all orders for this visit:    Long COVID        Follow up if symptoms worsen or fail to improve.    Discussed with patient above for education the following:      Patient Instructions   Lungs are improved.  Will have some minimal scarring.    Could do stress testing with rubber band sense upper chest-- sensation likely from resolving covid.    Call if needed.    Go to 5 mg m - w  - for a wk, then 1/2  m - w - f for 2 wks then stop.  If feels weak -- could

## 2022-05-10 NOTE — PATIENT INSTRUCTIONS
Lungs are improved.  Will have some minimal scarring.    Could do stress testing with rubber band sense upper chest-- sensation likely from resolving covid.    Call if needed.    Go to 5 mg m - w  - for a wk, then 1/2  m - w - f for 2 wks then stop.  If feels weak -- could

## 2022-05-20 ENCOUNTER — OFFICE VISIT (OUTPATIENT)
Dept: PHYSICAL MEDICINE AND REHAB | Facility: CLINIC | Age: 72
End: 2022-05-20
Payer: MEDICARE

## 2022-05-20 VITALS
HEIGHT: 64 IN | WEIGHT: 169 LBS | DIASTOLIC BLOOD PRESSURE: 75 MMHG | SYSTOLIC BLOOD PRESSURE: 139 MMHG | HEART RATE: 80 BPM | BODY MASS INDEX: 28.85 KG/M2

## 2022-05-20 DIAGNOSIS — M21.372 LEFT FOOT DROP: ICD-10-CM

## 2022-05-20 DIAGNOSIS — M54.16 LUMBAR RADICULOPATHY: Primary | ICD-10-CM

## 2022-05-20 DIAGNOSIS — M54.9 DORSALGIA, UNSPECIFIED: ICD-10-CM

## 2022-05-20 DIAGNOSIS — M54.16 LUMBAR RADICULOPATHY: ICD-10-CM

## 2022-05-20 PROCEDURE — 99213 OFFICE O/P EST LOW 20 MIN: CPT | Mod: S$GLB,,, | Performed by: PHYSICAL MEDICINE & REHABILITATION

## 2022-05-20 PROCEDURE — 3051F HG A1C>EQUAL 7.0%<8.0%: CPT | Mod: CPTII,S$GLB,, | Performed by: PHYSICAL MEDICINE & REHABILITATION

## 2022-05-20 PROCEDURE — 1126F PR PAIN SEVERITY QUANTIFIED, NO PAIN PRESENT: ICD-10-PCS | Mod: CPTII,S$GLB,, | Performed by: PHYSICAL MEDICINE & REHABILITATION

## 2022-05-20 PROCEDURE — 99499 NO LOS: ICD-10-PCS | Mod: S$GLB,,, | Performed by: PHYSICAL MEDICINE & REHABILITATION

## 2022-05-20 PROCEDURE — 4010F PR ACE/ARB THEARPY RXD/TAKEN: ICD-10-PCS | Mod: CPTII,S$GLB,, | Performed by: PHYSICAL MEDICINE & REHABILITATION

## 2022-05-20 PROCEDURE — 3072F PR LOW RISK FOR RETINOPATHY: ICD-10-PCS | Mod: CPTII,S$GLB,, | Performed by: PHYSICAL MEDICINE & REHABILITATION

## 2022-05-20 PROCEDURE — 99213 PR OFFICE/OUTPT VISIT, EST, LEVL III, 20-29 MIN: ICD-10-PCS | Mod: S$GLB,,, | Performed by: PHYSICAL MEDICINE & REHABILITATION

## 2022-05-20 PROCEDURE — 3078F DIAST BP <80 MM HG: CPT | Mod: CPTII,S$GLB,, | Performed by: PHYSICAL MEDICINE & REHABILITATION

## 2022-05-20 PROCEDURE — 3078F PR MOST RECENT DIASTOLIC BLOOD PRESSURE < 80 MM HG: ICD-10-PCS | Mod: CPTII,S$GLB,, | Performed by: PHYSICAL MEDICINE & REHABILITATION

## 2022-05-20 PROCEDURE — 4010F ACE/ARB THERAPY RXD/TAKEN: CPT | Mod: CPTII,S$GLB,, | Performed by: PHYSICAL MEDICINE & REHABILITATION

## 2022-05-20 PROCEDURE — 99499 UNLISTED E&M SERVICE: CPT | Mod: S$GLB,,, | Performed by: PHYSICAL MEDICINE & REHABILITATION

## 2022-05-20 PROCEDURE — 3288F PR FALLS RISK ASSESSMENT DOCUMENTED: ICD-10-PCS | Mod: CPTII,S$GLB,, | Performed by: PHYSICAL MEDICINE & REHABILITATION

## 2022-05-20 PROCEDURE — 3051F PR MOST RECENT HEMOGLOBIN A1C LEVEL 7.0 - < 8.0%: ICD-10-PCS | Mod: CPTII,S$GLB,, | Performed by: PHYSICAL MEDICINE & REHABILITATION

## 2022-05-20 PROCEDURE — 1159F MED LIST DOCD IN RCRD: CPT | Mod: CPTII,S$GLB,, | Performed by: PHYSICAL MEDICINE & REHABILITATION

## 2022-05-20 PROCEDURE — 3075F SYST BP GE 130 - 139MM HG: CPT | Mod: CPTII,S$GLB,, | Performed by: PHYSICAL MEDICINE & REHABILITATION

## 2022-05-20 PROCEDURE — 95886 PR EMG COMPLETE, W/ NERVE CONDUCTION STUDIES, 5+ MUSCLES: ICD-10-PCS | Mod: S$GLB,,, | Performed by: PHYSICAL MEDICINE & REHABILITATION

## 2022-05-20 PROCEDURE — 3008F BODY MASS INDEX DOCD: CPT | Mod: CPTII,S$GLB,, | Performed by: PHYSICAL MEDICINE & REHABILITATION

## 2022-05-20 PROCEDURE — 3008F PR BODY MASS INDEX (BMI) DOCUMENTED: ICD-10-PCS | Mod: CPTII,S$GLB,, | Performed by: PHYSICAL MEDICINE & REHABILITATION

## 2022-05-20 PROCEDURE — 1101F PT FALLS ASSESS-DOCD LE1/YR: CPT | Mod: CPTII,S$GLB,, | Performed by: PHYSICAL MEDICINE & REHABILITATION

## 2022-05-20 PROCEDURE — 3072F LOW RISK FOR RETINOPATHY: CPT | Mod: CPTII,S$GLB,, | Performed by: PHYSICAL MEDICINE & REHABILITATION

## 2022-05-20 PROCEDURE — 3075F PR MOST RECENT SYSTOLIC BLOOD PRESS GE 130-139MM HG: ICD-10-PCS | Mod: CPTII,S$GLB,, | Performed by: PHYSICAL MEDICINE & REHABILITATION

## 2022-05-20 PROCEDURE — 3288F FALL RISK ASSESSMENT DOCD: CPT | Mod: CPTII,S$GLB,, | Performed by: PHYSICAL MEDICINE & REHABILITATION

## 2022-05-20 PROCEDURE — 95910 PR NERVE CONDUCTION STUDY; 7-8 STUDIES: ICD-10-PCS | Mod: S$GLB,,, | Performed by: PHYSICAL MEDICINE & REHABILITATION

## 2022-05-20 PROCEDURE — 95910 NRV CNDJ TEST 7-8 STUDIES: CPT | Mod: S$GLB,,, | Performed by: PHYSICAL MEDICINE & REHABILITATION

## 2022-05-20 PROCEDURE — 95886 MUSC TEST DONE W/N TEST COMP: CPT | Mod: S$GLB,,, | Performed by: PHYSICAL MEDICINE & REHABILITATION

## 2022-05-20 PROCEDURE — 99999 PR PBB SHADOW E&M-EST. PATIENT-LVL I: ICD-10-PCS | Mod: PBBFAC,,, | Performed by: PHYSICAL MEDICINE & REHABILITATION

## 2022-05-20 PROCEDURE — 1126F AMNT PAIN NOTED NONE PRSNT: CPT | Mod: CPTII,S$GLB,, | Performed by: PHYSICAL MEDICINE & REHABILITATION

## 2022-05-20 PROCEDURE — 1101F PR PT FALLS ASSESS DOC 0-1 FALLS W/OUT INJ PAST YR: ICD-10-PCS | Mod: CPTII,S$GLB,, | Performed by: PHYSICAL MEDICINE & REHABILITATION

## 2022-05-20 PROCEDURE — 99999 PR PBB SHADOW E&M-EST. PATIENT-LVL III: CPT | Mod: PBBFAC,,, | Performed by: PHYSICAL MEDICINE & REHABILITATION

## 2022-05-20 PROCEDURE — 1159F PR MEDICATION LIST DOCUMENTED IN MEDICAL RECORD: ICD-10-PCS | Mod: CPTII,S$GLB,, | Performed by: PHYSICAL MEDICINE & REHABILITATION

## 2022-05-20 PROCEDURE — 99999 PR PBB SHADOW E&M-EST. PATIENT-LVL I: CPT | Mod: PBBFAC,,, | Performed by: PHYSICAL MEDICINE & REHABILITATION

## 2022-05-20 PROCEDURE — 99999 PR PBB SHADOW E&M-EST. PATIENT-LVL III: ICD-10-PCS | Mod: PBBFAC,,, | Performed by: PHYSICAL MEDICINE & REHABILITATION

## 2022-05-20 NOTE — PROGRESS NOTES
HPI:  Patient is a 72 y.o. year old male  who developed left foot drop after being hospitilized with  COVID. His EMG results are below. He has continued to see improvement of his foot drop, but he is still dragging his foot.  He did not get an AFO, which I had prescribed.  He did obtain the xray which I had ordered- results below.     Imaging     XR LUMBAR SPINE COMPLETE 5 VIEW     CLINICAL HISTORY:  Foot drop, left foot     TECHNIQUE:  AP, lateral, spot and bilateral oblique views of the lumbar spine were preformed.     COMPARISON:  None     FINDINGS:  Bone density appears normal.  The lumbar vertebral bodies maintain normal height.  There is no fracture.  There is mild retrolisthesis of L2 on L3 of approximately 5 mm.  There is moderate disc space narrowing at this level.  There is 4 mm anterolisthesis of L4 on L5.  There is multilevel facet joint arthropathy.  There is mild disc space narrowing at the L5-S1 level.  Minimal anterior wedging of L1 is likely physiologic.  There is mild-to-moderate atherosclerosis.     Impression:     As above       EMG RESULTS    Left multilevel moderate chronic polyradiculopathies at L3/L4 and L4/L5 with NO active denervation  Right chronic mild L4/L5 radiculopathy with NO active denervation  Superimposed sensorimotor predominantly axonal neuropathy         Labs  hgba1c 7.6  egfr cr lfts gluc nl         Past Medical History:   Diagnosis Date    Diabetes mellitus     Elevated PSA     Hyperlipidemia     Hypertension     Personal history of colonic polyps      Past Surgical History:   Procedure Laterality Date    COLONOSCOPY  12/6/2013    Dr. Martinez, 5 year recheck    SHOULDER SURGERY  12/09/2011    right      Family History   Problem Relation Age of Onset    Diabetes Father     Heart disease Father     Cataracts Mother     Glaucoma Mother     Diabetes Maternal Grandmother     No Known Problems Brother     No Known Problems Daughter     No Known Problems Son     No  "Known Problems Maternal Grandfather     No Known Problems Paternal Grandmother     No Known Problems Paternal Grandfather     No Known Problems Maternal Aunt     No Known Problems Maternal Uncle     No Known Problems Paternal Aunt     No Known Problems Paternal Uncle     Urolithiasis Neg Hx     Prostate cancer Neg Hx     Kidney cancer Neg Hx     Retinal detachment Neg Hx     Macular degeneration Neg Hx      Social History     Socioeconomic History    Marital status:    Tobacco Use    Smoking status: Former Smoker     Types: Cigarettes     Quit date: 1985     Years since quittin.0    Smokeless tobacco: Never Used   Substance and Sexual Activity    Alcohol use: Yes     Comment: seldom    Drug use: No       Review of patient's allergies indicates:   Allergen Reactions    Pravastatin Other (See Comments)     Joint pain    Lisinopril Other (See Comments)     Cough        Current Outpatient Medications:     alfuzosin (UROXATRAL) 10 mg Tb24, Take 1 tablet (10 mg total) by mouth daily with breakfast., Disp: 90 tablet, Rfl: 3    BD INSULIN SYRINGE ULTRA-FINE 0.5 mL 31 gauge x 5/16" Syrg, , Disp: , Rfl:     blood sugar diagnostic (ONETOUCH ULTRA TEST) Strp, USE TO CHECK FASTING GLUCOSE IN THE MORNING, Disp: 100 strip, Rfl: 3    blood-glucose meter Misc, One Touch glucometer check fasting glucose in morning, Disp: 1 each, Rfl: 0    coenzyme Q10 10 mg capsule, Take 10 mg by mouth once daily., Disp: , Rfl:     famotidine (PEPCID) 20 MG tablet, Take 20 mg by mouth once daily., Disp: , Rfl:     ibuprofen (ADVIL,MOTRIN) 800 MG tablet, Take 800 mg by mouth every 6 (six) hours as needed., Disp: , Rfl:     insulin (LANTUS SOLOSTAR U-100 INSULIN) glargine 100 units/mL (3mL) SubQ pen, Inject 20 Units into the skin every evening., Disp: 6 mL, Rfl: 0    insulin lispro (HUMALOG U-100 INSULIN) 100 unit/mL injection, Inject 10 Units into the skin 3 (three) times daily before meals. **MODERATE " "CORRECTION DOSE** Blood Glucose mg/dL                  Pre-meal                Bedtime 151-200                2 units                    1 unit 201-250                4 units                    2 units 251-300                6 units                    3 units  301-350                8 units                    4 units >350                     10 units                  5 units **CALL MD for BG >350**, Disp: 9 mL, Rfl: 11    lancets Misc, One Touch lancets check fasting glucose in morning, Disp: 100 each, Rfl: 3    losartan (COZAAR) 25 MG tablet, TAKE ONE TABLET BY MOUTH ONCE A DAY, Disp: 90 tablet, Rfl: 3    meloxicam (MOBIC) 15 MG tablet, Take 15 mg by mouth once daily., Disp: , Rfl:     metFORMIN (GLUCOPHAGE-XR) 500 MG ER 24hr tablet, TAKE ONE TABLET BY MOUTH EVERY MORNING AND TAKE TWO TABLETS BY MOUTH EVERY EVENING, Disp: 270 tablet, Rfl: 1    ondansetron (ZOFRAN) 8 MG tablet, Take 1 tablet (8 mg total) by mouth every 8 (eight) hours as needed for Nausea., Disp: 12 tablet, Rfl: 1    pen needle, diabetic 30 gauge x 5/16" Ndle, Use with levemir once daily and novolog before each meal three times daily, Disp: 400 each, Rfl: 4    predniSONE (DELTASONE) 10 MG tablet, Take 1 tablet (10 mg total) by mouth once daily., Disp: 60 tablet, Rfl: 1    predniSONE (DELTASONE) 5 MG tablet, Take 1 tablet (5 mg total) by mouth once daily., Disp: 60 tablet, Rfl: 1    pulse oximeter (PULSE OXIMETER) device, by Apply Externally route 2 (two) times a day. Use twice daily at 8 AM and 3 PM and record the value in MyChart as directed., Disp: 1 each, Rfl: 0    pulse oximeter (PULSE OXIMETER) device, by Apply Externally route 2 (two) times a day. Use twice daily at 8 AM and 3 PM and record the value in MyChart as directed., Disp: 1 each, Rfl: 0    rosuvastatin (CRESTOR) 10 MG tablet, Take 1 tablet (10 mg total) by mouth once daily., Disp: 90 tablet, Rfl: 3    Review of Systems  No nausea, vomiting, fevers, Chills , contipation, " diarrhea or sweats    Physical Exam:      Vitals:    05/20/22 1028   BP: 139/75   Pulse: 80     alert and oriented ×4 follows commands answers all questions appropriately,affect wnl  Manual muscle test 5 out of 5 EXCEPT EHL AND ADF 2-3/5, sensation to light touch decreased on the dorsum of foot and lateral aspect of his left lower extremity  Ambulates w. Left foot drag  +slR on left mofified  babinsky down  No clonus  No C/C/E  Full ROM of lumbar spine     Assessment:    Left multilevel moderate chronic polyradiculopathies at L3/L4 and L4/L5 with NO active denervation  Right chronic mild L4/L5 radiculopathy with NO active denervation  Superimposed sensorimotor predominantly axonal neuropathy  DM2-avoid cortisone  S/p recent COVID hospitilization    Plan:  Reviewed lumbar spine xray images with patient. I will order  Flexion/extension films to assess for stability of anterolisthesis.  Will obtain an MRI of L spine due to findings on EMG  RTC 3 wks

## 2022-05-20 NOTE — PROGRESS NOTES
[facility]  Physical Medicine and Rehabilitation   [address] [place] [address], [address]  [place] [address]             Patient: Ludwin Gee   Patient ID: [id number]   Sex:     Date of Birth:     Age:     Notes:     Last visit date: 5/20/2022         Visit date and time: 5/20/2022 10:24   Patient Age on Visit Date:     Referring Physician:     Diagnoses:         Left foot drop    Sensory NCS      Nerve / Sites Rec. Site Onset Lat Peak Lat Ref. NP Amp Ref. PP Amp Ref. Segments Distance Velocity     ms ms ms µV µV µV µV  cm m/s   L Sural - Ankle (Calf)      Calf Ankle NR NR ?4.20 NR ?5.0 NR ?5.0 Calf - Ankle 14 NR   R Sural - Ankle (Calf)      Calf Ankle 3.13 4.43 ?4.20 8.2 ?5.0 1.5 ?5.0 Calf - Ankle 14 45   L Superficial peroneal - Ankle      Lat leg Ankle NR NR ?4.40 NR ?5.0 NR ?5.0 Lat leg - Ankle 14 NR   R Superficial peroneal - Ankle      Lat leg Ankle 3.49 5.78 ?4.40 19.8 ?5.0 3.4 ?5.0 Lat leg - Ankle 14 40       Motor NCS      Nerve / Sites Muscle Latency Ref. Amplitude Ref. Amp % Duration Segments Distance Lat Diff Velocity Ref.     ms ms mV mV % ms  cm ms m/s m/s   L Peroneal - EDB      Ankle EDB 6.25 ?6.20 1.1 ?2.0 100 8.96 Ankle - EDB 8         Fib head EDB 16.25      Fib head - Ankle 32 10.00 32 ?39      Pop fossa EDB 18.85      Pop fossa - Fib head 10 2.60 38 ?39   R Peroneal - EDB      Ankle EDB 4.79 ?6.20 1.7 ?2.0 100 8.75 Ankle - EDB 8         Fib head EDB 14.06  1.9  110  Fib head - Ankle 33 9.27 36 ?39      Pop fossa EDB 17.14      Pop fossa - Fib head 10 3.07 33 ?39   L Tibial - AH      Ankle AH 5.99 ?6.00 1.5 ?3.0 100 6.46 Ankle - AH 8         Pop fossa AH 16.93  0.0  1.89  Pop fossa - Ankle 34 10.94 31 ?39   R Tibial - AH      Ankle AH 5.63 ?6.00 4.9 ?3.0 100 8.65 Ankle - AH 8         Pop fossa AH 15.36  0.3  6.02  Pop fossa - Ankle 37 9.74 38 ?39       F  Wave      Nerve Fmin Ref.    ms ms   L Tibial - AH 75.83 ?58.00   R Tibial - AH 75.42 ?58.00       EMG Summary Table      Spontaneous MUAP Recruitment   Muscle IA Fib PSW Fasc H.F. Amp Dur. PPP Pattern   L. Biceps femoris (short head) N None None None None N N N Reduced   R. Biceps femoris (short head) N None None None None N N N N   L. Vastus lateralis N None None None None 1+ 1+ 1+ Reduced   R. Vastus lateralis N None None None None N N N N   L. Gastrocnemius (Medial head) N None None None None N N N N   R. Gastrocnemius (Medial head) N None None None None N N N N   L. Tibialis anterior N None None None None 1+ 1+ 2+ Reduced   R. Tibialis anterior N None None None None 1+ 1+ 1+ Reduced   L. Iliopsoas N None None None None 1+ 1+ 1+ Reduced   R. Iliopsoas N None None None None N N N N   L. Gluteus medius N None None None None 1+ 1+ 1+ Reduced   R. Gluteus medius N None None None None N N N N   L. Lumbar paraspinals (mid) 1+ None None None None N N N N   R. Lumbar paraspinals (mid) 1+ None None None None N N N N   L. Lumbar paraspinals (low) 1+ None None None None N N N N   R. Lumbar paraspinals (low) 1+ None None None None N N N N   L. Rectus femoris N None None None None 1+ 1+ 1+ Reduced   R. Rectus femoris N None None None None 1+ 1+ 1+ Reduced       Summary    The motor conduction test had results outside of the specified normal range in all 4 of the tested nerves:   In the L Peroneal - EDB study  o the take off latency result was increased for Ankle stimulation  o the peak amplitude result was reduced for Ankle stimulation  o the take off velocity result was reduced for Fib head - Ankle segment  o the take off velocity result was reduced for Pop fossa - Fib head segment   In the R Peroneal - EDB study  o the peak amplitude result was reduced for Ankle stimulation  o the take off velocity result was reduced for Fib head - Ankle segment  o the take off velocity result was reduced for Pop fossa - Fib head segment   In the L Tibial - AH study  o the peak amplitude result was reduced for Ankle stimulation  o the take off velocity  result was reduced for Pop fossa - Ankle segment   In the R Tibial - AH study  o the take off velocity result was reduced for Pop fossa - Ankle segment    The sensory conduction test had results outside of the specified normal range in all 4 of the tested nerves:   In the L Sural - Ankle (Calf) study  o the response was considered absent for Calf stimulation   In the R Sural - Ankle (Calf) study  o the peak latency result was increased for Calf stimulation   In the L Superficial peroneal - Ankle study  o the response was considered absent for Lat leg stimulation   In the R Superficial peroneal - Ankle study  o the peak latency result was increased for Lat leg stimulation    The F wave study had results outside of the specified normal range in all 2 of the tested nerves:   In the L Tibial - AH study  o cursor 1 latency result was increased   In the R Tibial - AH study  o cursor 1 latency result was increased    The needle EMG examination was performed in 18 muscles. It was normal in 6 muscle(s): R. Biceps femoris (short head), R. Vastus lateralis, L. Gastrocnemius (Medial head), R. Gastrocnemius (Medial head), R. Iliopsoas, R. Gluteus medius. The study was abnormal in 12 muscle(s), with the following distribution:   Abnormal spontaneous/insertional activity was found in L. Lumbar paraspinals (mid), R. Lumbar paraspinals (mid), L. Lumbar paraspinals (low), R. Lumbar paraspinals (low).   The MUP waveform abnormality was found in L. Vastus lateralis, L. Tibialis anterior, R. Tibialis anterior, L. Iliopsoas, L. Gluteus medius, L. Rectus femoris, R. Rectus femoris.   Abnormal interference pattern was found in L. Biceps femoris (short head), L. Vastus lateralis, L. Tibialis anterior, R. Tibialis anterior, L. Iliopsoas, L. Gluteus medius, L. Rectus femoris, R. Rectus femoris.          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

## 2022-06-07 ENCOUNTER — HOSPITAL ENCOUNTER (OUTPATIENT)
Dept: RADIOLOGY | Facility: HOSPITAL | Age: 72
Discharge: HOME | End: 2022-06-07
Attending: PHYSICAL MEDICINE & REHABILITATION
Payer: MEDICARE

## 2022-06-07 DIAGNOSIS — M54.16 LUMBAR RADICULOPATHY: ICD-10-CM

## 2022-06-07 DIAGNOSIS — M54.9 DORSALGIA, UNSPECIFIED: ICD-10-CM

## 2022-06-07 PROCEDURE — 72148 MRI LUMBAR SPINE WITHOUT CONTRAST: ICD-10-PCS | Mod: 26,,, | Performed by: RADIOLOGY

## 2022-06-07 PROCEDURE — 72148 MRI LUMBAR SPINE W/O DYE: CPT | Mod: 26,,, | Performed by: RADIOLOGY

## 2022-06-07 PROCEDURE — 72120 XR LUMBAR SPINE FLEXION AND EXTENSION ONLY: ICD-10-PCS | Mod: 26,,, | Performed by: RADIOLOGY

## 2022-06-07 PROCEDURE — 72120 X-RAY BEND ONLY L-S SPINE: CPT | Mod: TC,FY

## 2022-06-07 PROCEDURE — 72148 MRI LUMBAR SPINE W/O DYE: CPT | Mod: TC

## 2022-06-07 PROCEDURE — 72120 X-RAY BEND ONLY L-S SPINE: CPT | Mod: 26,,, | Performed by: RADIOLOGY

## 2022-06-11 DIAGNOSIS — E11.69 HYPERLIPIDEMIA ASSOCIATED WITH TYPE 2 DIABETES MELLITUS: ICD-10-CM

## 2022-06-11 DIAGNOSIS — E78.5 HYPERLIPIDEMIA ASSOCIATED WITH TYPE 2 DIABETES MELLITUS: ICD-10-CM

## 2022-06-11 NOTE — TELEPHONE ENCOUNTER
No new care gaps identified.  [facility] Embedded Care Gaps. Reference number: [id number]. 6/11/2022   11:48:11 AM LINDSEYT

## 2022-06-12 RX ORDER — ROSUVASTATIN CALCIUM 5 MG/1
TABLET, COATED ORAL
Qty: 45 TABLET | Refills: 2 | OUTPATIENT
Start: 2022-06-12

## 2022-06-12 NOTE — TELEPHONE ENCOUNTER
Quick DC. Inappropriate Request    Refill Authorization Note   Ludwin Wukeyana  is requesting a refill authorization.  Brief Assessment and Rationale for Refill:  Quick Discontinue  Medication Therapy Plan:  Patient is taking Crestor 10 mg 1 tab po qd    Medication Reconciliation Completed:  No      Comments:     Note composed:5:56 PM 06/12/2022

## 2022-06-13 ENCOUNTER — OFFICE VISIT (OUTPATIENT)
Dept: PHYSICAL MEDICINE AND REHAB | Facility: CLINIC | Age: 72
End: 2022-06-13
Payer: MEDICARE

## 2022-06-13 VITALS
BODY MASS INDEX: 28.85 KG/M2 | WEIGHT: 169 LBS | HEART RATE: 80 BPM | HEIGHT: 64 IN | SYSTOLIC BLOOD PRESSURE: 131 MMHG | DIASTOLIC BLOOD PRESSURE: 73 MMHG

## 2022-06-13 DIAGNOSIS — E11.69 HYPERLIPIDEMIA ASSOCIATED WITH TYPE 2 DIABETES MELLITUS: ICD-10-CM

## 2022-06-13 DIAGNOSIS — M54.16 LUMBAR RADICULOPATHY: Primary | ICD-10-CM

## 2022-06-13 DIAGNOSIS — E78.5 HYPERLIPIDEMIA ASSOCIATED WITH TYPE 2 DIABETES MELLITUS: ICD-10-CM

## 2022-06-13 PROCEDURE — 3072F LOW RISK FOR RETINOPATHY: CPT | Mod: CPTII,S$GLB,, | Performed by: PHYSICAL MEDICINE & REHABILITATION

## 2022-06-13 PROCEDURE — 99213 PR OFFICE/OUTPT VISIT, EST, LEVL III, 20-29 MIN: ICD-10-PCS | Mod: S$GLB,,, | Performed by: PHYSICAL MEDICINE & REHABILITATION

## 2022-06-13 PROCEDURE — 1101F PT FALLS ASSESS-DOCD LE1/YR: CPT | Mod: CPTII,S$GLB,, | Performed by: PHYSICAL MEDICINE & REHABILITATION

## 2022-06-13 PROCEDURE — 1126F AMNT PAIN NOTED NONE PRSNT: CPT | Mod: CPTII,S$GLB,, | Performed by: PHYSICAL MEDICINE & REHABILITATION

## 2022-06-13 PROCEDURE — 3051F HG A1C>EQUAL 7.0%<8.0%: CPT | Mod: CPTII,S$GLB,, | Performed by: PHYSICAL MEDICINE & REHABILITATION

## 2022-06-13 PROCEDURE — 3075F PR MOST RECENT SYSTOLIC BLOOD PRESS GE 130-139MM HG: ICD-10-PCS | Mod: CPTII,S$GLB,, | Performed by: PHYSICAL MEDICINE & REHABILITATION

## 2022-06-13 PROCEDURE — 1159F PR MEDICATION LIST DOCUMENTED IN MEDICAL RECORD: ICD-10-PCS | Mod: CPTII,S$GLB,, | Performed by: PHYSICAL MEDICINE & REHABILITATION

## 2022-06-13 PROCEDURE — 3078F PR MOST RECENT DIASTOLIC BLOOD PRESSURE < 80 MM HG: ICD-10-PCS | Mod: CPTII,S$GLB,, | Performed by: PHYSICAL MEDICINE & REHABILITATION

## 2022-06-13 PROCEDURE — 1126F PR PAIN SEVERITY QUANTIFIED, NO PAIN PRESENT: ICD-10-PCS | Mod: CPTII,S$GLB,, | Performed by: PHYSICAL MEDICINE & REHABILITATION

## 2022-06-13 PROCEDURE — 3288F PR FALLS RISK ASSESSMENT DOCUMENTED: ICD-10-PCS | Mod: CPTII,S$GLB,, | Performed by: PHYSICAL MEDICINE & REHABILITATION

## 2022-06-13 PROCEDURE — 3078F DIAST BP <80 MM HG: CPT | Mod: CPTII,S$GLB,, | Performed by: PHYSICAL MEDICINE & REHABILITATION

## 2022-06-13 PROCEDURE — 3008F BODY MASS INDEX DOCD: CPT | Mod: CPTII,S$GLB,, | Performed by: PHYSICAL MEDICINE & REHABILITATION

## 2022-06-13 PROCEDURE — 99999 PR PBB SHADOW E&M-EST. PATIENT-LVL III: CPT | Mod: PBBFAC,,, | Performed by: PHYSICAL MEDICINE & REHABILITATION

## 2022-06-13 PROCEDURE — 3288F FALL RISK ASSESSMENT DOCD: CPT | Mod: CPTII,S$GLB,, | Performed by: PHYSICAL MEDICINE & REHABILITATION

## 2022-06-13 PROCEDURE — 3075F SYST BP GE 130 - 139MM HG: CPT | Mod: CPTII,S$GLB,, | Performed by: PHYSICAL MEDICINE & REHABILITATION

## 2022-06-13 PROCEDURE — 3008F PR BODY MASS INDEX (BMI) DOCUMENTED: ICD-10-PCS | Mod: CPTII,S$GLB,, | Performed by: PHYSICAL MEDICINE & REHABILITATION

## 2022-06-13 PROCEDURE — 1101F PR PT FALLS ASSESS DOC 0-1 FALLS W/OUT INJ PAST YR: ICD-10-PCS | Mod: CPTII,S$GLB,, | Performed by: PHYSICAL MEDICINE & REHABILITATION

## 2022-06-13 PROCEDURE — 99999 PR PBB SHADOW E&M-EST. PATIENT-LVL III: ICD-10-PCS | Mod: PBBFAC,,, | Performed by: PHYSICAL MEDICINE & REHABILITATION

## 2022-06-13 PROCEDURE — 99213 OFFICE O/P EST LOW 20 MIN: CPT | Mod: S$GLB,,, | Performed by: PHYSICAL MEDICINE & REHABILITATION

## 2022-06-13 PROCEDURE — 3051F PR MOST RECENT HEMOGLOBIN A1C LEVEL 7.0 - < 8.0%: ICD-10-PCS | Mod: CPTII,S$GLB,, | Performed by: PHYSICAL MEDICINE & REHABILITATION

## 2022-06-13 PROCEDURE — 1159F MED LIST DOCD IN RCRD: CPT | Mod: CPTII,S$GLB,, | Performed by: PHYSICAL MEDICINE & REHABILITATION

## 2022-06-13 PROCEDURE — 3072F PR LOW RISK FOR RETINOPATHY: ICD-10-PCS | Mod: CPTII,S$GLB,, | Performed by: PHYSICAL MEDICINE & REHABILITATION

## 2022-06-13 PROCEDURE — 4010F ACE/ARB THERAPY RXD/TAKEN: CPT | Mod: CPTII,S$GLB,, | Performed by: PHYSICAL MEDICINE & REHABILITATION

## 2022-06-13 PROCEDURE — 4010F PR ACE/ARB THEARPY RXD/TAKEN: ICD-10-PCS | Mod: CPTII,S$GLB,, | Performed by: PHYSICAL MEDICINE & REHABILITATION

## 2022-06-13 NOTE — TELEPHONE ENCOUNTER
No new care gaps identified.  [facility] Embedded Care Gaps. Reference number: [id number]. 6/13/2022   8:43:18 AM LINDSEYT

## 2022-06-13 NOTE — PROGRESS NOTES
HPI:  Patient is a 72 y.o. year old male w. Lumbar radiculopathies. He has been steadily gaining back his strength. He is s/p COVID in feb. His foot drop has almost completely resolved. I ordered an MRI of L spine last eval., results are below:    Imaging    MRI LUMBAR SPINE WITHOUT CONTRAST     CLINICAL HISTORY:  Lumbar radiculopathy, no red flags, no prior management;Low back pain, progressive neurologic deficit; Dorsalgia, unspecified     TECHNIQUE:  Multiplanar, multisequence MR images were acquired from the thoracolumbar junction to the sacrum without the administration of contrast.     COMPARISON:  X-ray dated June 7, 2022     FINDINGS:  There is a 4 mm anterolisthesis of L4 upon L5.  There is no evidence of spondylolysis.  There is a 3 mm posterior subluxation of L2 relative to L3.  There is anterior osteophyte formation and diffuse desiccation throughout the lumbar region.  The conus terminates at T12 and has an unremarkable appearance.  There is a probable hemangioma in the L1 vertebral body.  There is no evidence of extra vertebral extension.  The lumbar vertebrae otherwise display normal signal, morphology and alignment.  The individual disc levels appears follows:     T11/T12: There is no evidence of disc protrusion, canal or foraminal stenosis.     T12/L1: Mild degenerative facet changes are noted but there is no evidence of disc protrusion, canal or foraminal stenosis.     L1/L2: Mild degenerative facet changes are noted and there is slight annular disc bulging without evidence of significant canal or foraminal stenosis.     L2/L3: Annular disc bulging is evident with a superimposed broad-based left posterolateral disc extrusion.  There is left greater than right inferior foraminal narrowing there is mild central canal narrowing.  Ligamentous hypertrophy is evident.     L3/L4: Annular disc bulging combines with facet and ligamentous hypertrophy to produce moderate canal stenosis.  There is left greater  than right foraminal narrowing.     L4/L5: There is is central disc extrusion which combines with prominent facet and ligamentous hypertrophy and the spondylolisthesis to produce moderate canal stenosis.  The central canal is narrowed to 8 mm.  There is bilateral foraminal narrowing with the exiting right L4-4 nerve root being contacted.     L5/S1: Severe degenerative facet changes are noted on the left and moderate changes on the right.  There is left greater than right foraminal narrowing.     Impression:     Moderately severe multilevel degenerative disc and facet disease with areas of canal and foraminal stenosis most significant at the L2/L3, L3/L4 and L4/L5 levels as are described above   XR LUMBAR SPINE FLEXION AND EXTENSION ONLY     CLINICAL HISTORY:  Radiculopathy, lumbar region     TECHNIQUE:  Flexion extension views lumbar spine were performed.     COMPARISON:  None     FINDINGS:  There is a grade 1 anterolisthesis of L4 upon L5 and of L3 relative to L2.  The each measure approximately 5 mm.  There is mild anterior wedging of L1. There is little change upon flexion and extension. Prominent mid and lower lumbar degenerative facet changes are noted.       Past Medical History:   Diagnosis Date    Diabetes mellitus     Elevated PSA     Hyperlipidemia     Hypertension     Personal history of colonic polyps      Past Surgical History:   Procedure Laterality Date    COLONOSCOPY  12/6/2013    Dr. Martinez, 5 year recheck    SHOULDER SURGERY  12/09/2011    right      Family History   Problem Relation Age of Onset    Diabetes Father     Heart disease Father     Cataracts Mother     Glaucoma Mother     Diabetes Maternal Grandmother     No Known Problems Brother     No Known Problems Daughter     No Known Problems Son     No Known Problems Maternal Grandfather     No Known Problems Paternal Grandmother     No Known Problems Paternal Grandfather     No Known Problems Maternal Aunt     No Known  "Problems Maternal Uncle     No Known Problems Paternal Aunt     No Known Problems Paternal Uncle     Urolithiasis Neg Hx     Prostate cancer Neg Hx     Kidney cancer Neg Hx     Retinal detachment Neg Hx     Macular degeneration Neg Hx      Social History     Socioeconomic History    Marital status:    Tobacco Use    Smoking status: Former Smoker     Types: Cigarettes     Quit date: 1985     Years since quittin.0    Smokeless tobacco: Never Used   Substance and Sexual Activity    Alcohol use: Yes     Comment: seldom    Drug use: No       Review of patient's allergies indicates:   Allergen Reactions    Pravastatin Other (See Comments)     Joint pain    Lisinopril Other (See Comments)     Cough        Current Outpatient Medications:     alfuzosin (UROXATRAL) 10 mg Tb24, Take 1 tablet (10 mg total) by mouth daily with breakfast., Disp: 90 tablet, Rfl: 3    BD INSULIN SYRINGE ULTRA-FINE 0.5 mL 31 gauge x 5/16" Syrg, , Disp: , Rfl:     blood sugar diagnostic (ONETOUCH ULTRA TEST) Strp, USE TO CHECK FASTING GLUCOSE IN THE MORNING, Disp: 100 strip, Rfl: 3    blood-glucose meter Misc, One Touch glucometer check fasting glucose in morning, Disp: 1 each, Rfl: 0    coenzyme Q10 10 mg capsule, Take 10 mg by mouth once daily., Disp: , Rfl:     famotidine (PEPCID) 20 MG tablet, Take 20 mg by mouth once daily., Disp: , Rfl:     ibuprofen (ADVIL,MOTRIN) 800 MG tablet, Take 800 mg by mouth every 6 (six) hours as needed., Disp: , Rfl:     insulin (LANTUS SOLOSTAR U-100 INSULIN) glargine 100 units/mL (3mL) SubQ pen, Inject 20 Units into the skin every evening., Disp: 6 mL, Rfl: 0    insulin lispro (HUMALOG U-100 INSULIN) 100 unit/mL injection, Inject 10 Units into the skin 3 (three) times daily before meals. **MODERATE CORRECTION DOSE** Blood Glucose mg/dL                  Pre-meal                Bedtime 151-200                2 units                    1 unit 201-250                4 units       " "             2 units 251-300                6 units                    3 units  301-350                8 units                    4 units >350                     10 units                  5 units **CALL MD for BG >350**, Disp: 9 mL, Rfl: 11    lancets Misc, One Touch lancets check fasting glucose in morning, Disp: 100 each, Rfl: 3    losartan (COZAAR) 25 MG tablet, TAKE ONE TABLET BY MOUTH ONCE A DAY, Disp: 90 tablet, Rfl: 3    meloxicam (MOBIC) 15 MG tablet, Take 15 mg by mouth once daily., Disp: , Rfl:     metFORMIN (GLUCOPHAGE-XR) 500 MG ER 24hr tablet, TAKE ONE TABLET BY MOUTH EVERY MORNING AND TAKE TWO TABLETS BY MOUTH EVERY EVENING, Disp: 270 tablet, Rfl: 1    ondansetron (ZOFRAN) 8 MG tablet, Take 1 tablet (8 mg total) by mouth every 8 (eight) hours as needed for Nausea., Disp: 12 tablet, Rfl: 1    pen needle, diabetic 30 gauge x 5/16" Ndle, Use with levemir once daily and novolog before each meal three times daily, Disp: 400 each, Rfl: 4    predniSONE (DELTASONE) 10 MG tablet, Take 1 tablet (10 mg total) by mouth once daily., Disp: 60 tablet, Rfl: 1    predniSONE (DELTASONE) 5 MG tablet, Take 1 tablet (5 mg total) by mouth once daily., Disp: 60 tablet, Rfl: 1    pulse oximeter (PULSE OXIMETER) device, by Apply Externally route 2 (two) times a day. Use twice daily at 8 AM and 3 PM and record the value in MyChart as directed., Disp: 1 each, Rfl: 0    pulse oximeter (PULSE OXIMETER) device, by Apply Externally route 2 (two) times a day. Use twice daily at 8 AM and 3 PM and record the value in MyChart as directed., Disp: 1 each, Rfl: 0    rosuvastatin (CRESTOR) 10 MG tablet, Take 1 tablet (10 mg total) by mouth once daily., Disp: 90 tablet, Rfl: 3      Review of Systems  No nausea, vomiting, fevers, Chills , contipation, diarrhea or sweats    Physical Exam:      Vitals:    06/13/22 0916   BP: 131/73   Pulse: 80       alert and oriented ×4 follows commands answers all questions appropriately,affect " wnl  Manual muscle test 5 out of 5 except left ADF 4/5, sensation to light touch decreased on the dorsum of foot and lateral aspect of his left lower extremity  Slight left foot drag  No C/C/E  No muscular atrophy     Assessment:  Left multilevel moderate chronic polyradiculopathies at L3/L4 and L4/L5 with NO active denervation  Right chronic mild L4/L5 radiculopathy with NO active denervation  Superimposed sensorimotor predominantly axonal neuropathy  DM2-avoid cortisone  S/p recent COVID hospitilization       Plan:  Foot drop resolving . AFO is no longer needed.  Reviewed imaging'      RTC prn    Greater than 50%of time spent reviewing diagnosis, prognosis and treatment

## 2022-06-14 RX ORDER — ROSUVASTATIN CALCIUM 5 MG/1
TABLET, COATED ORAL
Qty: 45 TABLET | Refills: 2 | OUTPATIENT
Start: 2022-06-14

## 2022-06-14 NOTE — TELEPHONE ENCOUNTER
Refill Routing Note   Medication(s) are not appropriate for processing by [facility] for the following reason(s):      - Medication not active on medication list    [facility] action(s):  Defer          Medication reconciliation completed: No     Appointments  past 12m or future 3m with PCP    Date Provider   Last Visit   5/6/2022 Herrera Fisher MD   Next Visit   9/6/2022 Herrera Fisher MD   ED visits in past 90 days: 0        Note composed:11:38 AM 06/14/2022

## 2022-06-17 DIAGNOSIS — E78.5 HYPERLIPIDEMIA ASSOCIATED WITH TYPE 2 DIABETES MELLITUS: ICD-10-CM

## 2022-06-17 DIAGNOSIS — E11.69 HYPERLIPIDEMIA ASSOCIATED WITH TYPE 2 DIABETES MELLITUS: ICD-10-CM

## 2022-06-17 RX ORDER — ROSUVASTATIN CALCIUM 5 MG/1
TABLET, COATED ORAL
Qty: 45 TABLET | Refills: 2 | OUTPATIENT
Start: 2022-06-17

## 2022-06-17 NOTE — TELEPHONE ENCOUNTER
Refill Decision Note   Ludwin Gee  is requesting a refill authorization.  Brief Assessment and Rationale for Refill:  Quick Discontinue     Medication Therapy Plan:       Medication Reconciliation Completed: No   Comments:     No Care Gaps recommended.     Note composed:11:14 AM 06/17/2022

## 2022-06-17 NOTE — TELEPHONE ENCOUNTER
No new care gaps identified.  [facility] Embedded Care Gaps. Reference number: [id number]. 6/17/2022   8:57:17 AM LINDSEYT

## 2022-06-28 ENCOUNTER — LAB VISIT (OUTPATIENT)
Dept: LAB | Facility: HOSPITAL | Age: 72
End: 2022-06-28
Attending: FAMILY MEDICINE
Payer: MEDICARE

## 2022-06-28 DIAGNOSIS — N40.0 BPH WITH ELEVATED PSA: ICD-10-CM

## 2022-06-28 DIAGNOSIS — R97.20 BPH WITH ELEVATED PSA: ICD-10-CM

## 2022-06-28 DIAGNOSIS — R80.9 CONTROLLED TYPE 2 DIABETES MELLITUS WITH MICROALBUMINURIA, WITHOUT LONG-TERM CURRENT USE OF INSULIN: ICD-10-CM

## 2022-06-28 DIAGNOSIS — E11.29 CONTROLLED TYPE 2 DIABETES MELLITUS WITH MICROALBUMINURIA, WITHOUT LONG-TERM CURRENT USE OF INSULIN: ICD-10-CM

## 2022-06-28 LAB
ESTIMATED AVG GLUCOSE: 186 MG/DL (ref 68–131)
HBA1C MFR BLD: 8.1 % (ref 4–5.6)

## 2022-06-28 PROCEDURE — 84153 ASSAY OF PSA TOTAL: CPT | Performed by: NURSE PRACTITIONER

## 2022-06-28 PROCEDURE — 83036 HEMOGLOBIN GLYCOSYLATED A1C: CPT | Performed by: FAMILY MEDICINE

## 2022-06-28 PROCEDURE — 84154 ASSAY OF PSA FREE: CPT | Performed by: NURSE PRACTITIONER

## 2022-06-28 PROCEDURE — 36415 COLL VENOUS BLD VENIPUNCTURE: CPT | Mod: PO | Performed by: NURSE PRACTITIONER

## 2022-06-29 ENCOUNTER — OFFICE VISIT (OUTPATIENT)
Dept: UROLOGY | Facility: CLINIC | Age: 72
End: 2022-06-29
Payer: MEDICARE

## 2022-06-29 VITALS
SYSTOLIC BLOOD PRESSURE: 111 MMHG | DIASTOLIC BLOOD PRESSURE: 67 MMHG | WEIGHT: 171.94 LBS | HEIGHT: 64 IN | BODY MASS INDEX: 29.35 KG/M2 | HEART RATE: 90 BPM

## 2022-06-29 DIAGNOSIS — R97.20 BPH WITH ELEVATED PSA: Primary | ICD-10-CM

## 2022-06-29 DIAGNOSIS — N40.0 BPH WITH ELEVATED PSA: Primary | ICD-10-CM

## 2022-06-29 LAB
PROSTATE SPECIFIC ANTIGEN, TOTAL: 5.7 NG/ML (ref 0–4)
PSA FREE MFR SERPL: 33.68 %
PSA FREE SERPL-MCNC: 1.92 NG/ML (ref 0–1.5)

## 2022-06-29 PROCEDURE — 3072F LOW RISK FOR RETINOPATHY: CPT | Mod: CPTII,S$GLB,, | Performed by: NURSE PRACTITIONER

## 2022-06-29 PROCEDURE — 3074F PR MOST RECENT SYSTOLIC BLOOD PRESSURE < 130 MM HG: ICD-10-PCS | Mod: CPTII,S$GLB,, | Performed by: NURSE PRACTITIONER

## 2022-06-29 PROCEDURE — 4010F PR ACE/ARB THEARPY RXD/TAKEN: ICD-10-PCS | Mod: CPTII,S$GLB,, | Performed by: NURSE PRACTITIONER

## 2022-06-29 PROCEDURE — 3008F BODY MASS INDEX DOCD: CPT | Mod: CPTII,S$GLB,, | Performed by: NURSE PRACTITIONER

## 2022-06-29 PROCEDURE — 1159F PR MEDICATION LIST DOCUMENTED IN MEDICAL RECORD: ICD-10-PCS | Mod: CPTII,S$GLB,, | Performed by: NURSE PRACTITIONER

## 2022-06-29 PROCEDURE — 3078F PR MOST RECENT DIASTOLIC BLOOD PRESSURE < 80 MM HG: ICD-10-PCS | Mod: CPTII,S$GLB,, | Performed by: NURSE PRACTITIONER

## 2022-06-29 PROCEDURE — 1159F MED LIST DOCD IN RCRD: CPT | Mod: CPTII,S$GLB,, | Performed by: NURSE PRACTITIONER

## 2022-06-29 PROCEDURE — 99999 PR PBB SHADOW E&M-EST. PATIENT-LVL IV: CPT | Mod: PBBFAC,,, | Performed by: NURSE PRACTITIONER

## 2022-06-29 PROCEDURE — 3074F SYST BP LT 130 MM HG: CPT | Mod: CPTII,S$GLB,, | Performed by: NURSE PRACTITIONER

## 2022-06-29 PROCEDURE — 1160F PR REVIEW ALL MEDS BY PRESCRIBER/CLIN PHARMACIST DOCUMENTED: ICD-10-PCS | Mod: CPTII,S$GLB,, | Performed by: NURSE PRACTITIONER

## 2022-06-29 PROCEDURE — 3072F PR LOW RISK FOR RETINOPATHY: ICD-10-PCS | Mod: CPTII,S$GLB,, | Performed by: NURSE PRACTITIONER

## 2022-06-29 PROCEDURE — 3052F PR MOST RECENT HEMOGLOBIN A1C LEVEL 8.0 - < 9.0%: ICD-10-PCS | Mod: CPTII,S$GLB,, | Performed by: NURSE PRACTITIONER

## 2022-06-29 PROCEDURE — 4010F ACE/ARB THERAPY RXD/TAKEN: CPT | Mod: CPTII,S$GLB,, | Performed by: NURSE PRACTITIONER

## 2022-06-29 PROCEDURE — 99213 PR OFFICE/OUTPT VISIT, EST, LEVL III, 20-29 MIN: ICD-10-PCS | Mod: S$GLB,,, | Performed by: NURSE PRACTITIONER

## 2022-06-29 PROCEDURE — 99213 OFFICE O/P EST LOW 20 MIN: CPT | Mod: S$GLB,,, | Performed by: NURSE PRACTITIONER

## 2022-06-29 PROCEDURE — 3078F DIAST BP <80 MM HG: CPT | Mod: CPTII,S$GLB,, | Performed by: NURSE PRACTITIONER

## 2022-06-29 PROCEDURE — 99999 PR PBB SHADOW E&M-EST. PATIENT-LVL IV: ICD-10-PCS | Mod: PBBFAC,,, | Performed by: NURSE PRACTITIONER

## 2022-06-29 PROCEDURE — 3052F HG A1C>EQUAL 8.0%<EQUAL 9.0%: CPT | Mod: CPTII,S$GLB,, | Performed by: NURSE PRACTITIONER

## 2022-06-29 PROCEDURE — 3008F PR BODY MASS INDEX (BMI) DOCUMENTED: ICD-10-PCS | Mod: CPTII,S$GLB,, | Performed by: NURSE PRACTITIONER

## 2022-06-29 PROCEDURE — 1160F RVW MEDS BY RX/DR IN RCRD: CPT | Mod: CPTII,S$GLB,, | Performed by: NURSE PRACTITIONER

## 2022-06-29 NOTE — PROGRESS NOTES
[facility] Note  Staff: JESSE Bhakta    PCP: ELIU Fisher    Chief Complaint: F/UP-Hx of BPH with Elevated PSA levels    Subjective:        HPI: Ludwin Gee is a 72 y.o. male presents today for routine recheck.  Pt with hx of BPH with elevated psa levels.    Pt was last seen by me on 22 where he was still recovering from post Covid pneumonia at that time.  *Was in critical care hospital for 3 weeks in 2022 with Covid.    Pt was last evaluated by Dr. Michel on 3/4/21.  Pt has chronic hx of elevated PSA, BPH     HISTORY OF PRESENT ILLNESS:   this 72-year-old male returns routine recheck.  He has history of elevated PSA for which he had undergone prostate ultrasound biopsy in  that proved to be negative for malignancy.  He subsequently had undergone MRI of the prostate and the 4K score both of which also revealed low probability of prostate cancer.  He continues to take Uroxatral for management of his BPH and is more effective than the Flomax he was on before.     Last PSA Screening:   PSA, Total and Free done 22:    Total PSA-5.7  Free PSA-1.92  Free PSA %-33.68    22-PSA Total was 6.5  21:  5.8  3/1/21:  6.4  12/3/20:  Lab Results   Component Value Date    PSA 6.33 (H) 06/10/2013    PSA 9.50 (H) 2013    PSA 7.21 (H) 2012    PSADIAG 6.5 (H) 2022    PSADIAG 5.8 (H) 2021    PSADIAG 6.4 (H) 2021     AUA SS Today:  10/3  Feeling of ICBE: 0  Frequency:2  Intermittency:1  Urgency:2  Weak urine stream:3  Strainin  Nocturia:2    REVIEW OF SYSTEMS:  A comprehensive 10 system review was performed and is negative except as noted above in HPI    PMHx:  Past Medical History:   Diagnosis Date    BPH with elevated PSA     Diabetes mellitus     Elevated PSA     Hyperlipidemia     Hypertension     Personal history of colonic polyps      PSHx:  Past Surgical History:   Procedure Laterality Date    COLONOSCOPY  2013     Dr. Martinez, 5 year recheck    SHOULDER SURGERY  12/09/2011    right      Allergies:  Pravastatin and Lisinopril    Medications: reviewed   Objective:     Vitals:    06/29/22 1426   BP: 111/67   Pulse: 90     General:WDWN in NAD  Eyes: PERRLA, normal conjunctiva  Respiratory: no increased work on breathing, clear to auscultation  Cardiovascular: regular rate and rhythm. No obvious extremity edema.  GI: palpation of masses. No tenderness. No hepatosplenomegaly to palpation.  Musculoskeletal: normal range of motion of bilateral upper extremities. Normal muscle strength and tone.  Skin: no obvious rashes or lesions. No tightening of skin noted.  Neurologic: CN grossly normal. Normal sensation.   Psychiatric: awake, alert and oriented x 3. Mood and affect normal. Cooperative.     Exam:  Inspection of anus and perineum normal  JOYCE: 30-35g enlarged, smooth prostate gland without nodules, masses, tenderness. SV not palpable. Normal sphincter tone.     Assessment:       1. BPH with elevated PSA          Plan:     As reviewed with MD in office today, we will repeat prostate MRI due to chronic fluctuations of elevated psa levels for recheck at this time.    Will schedule with pt and then contact him after with results.    [facility]: Active    Simin Ordoñez, JESSE

## 2022-06-30 ENCOUNTER — TELEPHONE (OUTPATIENT)
Dept: FAMILY MEDICINE | Facility: CLINIC | Age: 72
End: 2022-06-30
Payer: MEDICARE

## 2022-06-30 ENCOUNTER — TELEPHONE (OUTPATIENT)
Dept: UROLOGY | Facility: CLINIC | Age: 72
End: 2022-06-30
Payer: MEDICARE

## 2022-06-30 NOTE — TELEPHONE ENCOUNTER
I would rather increase the insulin slightly than put him back on glimepiride.  Glimepiride has an unpredictable potential to cause hypoglycemia.  If he could keep a blood sugar log with glucose readings before each meal and bedtime and include his insulin dose we can try to adjust his insulin to take care of the problem.  Keep the log for one week and send it in to me.

## 2022-06-30 NOTE — TELEPHONE ENCOUNTER
Please contact this pt and advise of the following:    Let him know I reviewed his prostate procedure history with one of our Urologists this morning and at this time we are recommending him repeat the MRI of the prostate in the future.    Orders are in Epic, please schedule for this pt.    Thank you.

## 2022-06-30 NOTE — TELEPHONE ENCOUNTER
Spoke with pt, gave recommendation ,per inés. Pt jose angel  Will call office to schedule appt, has to check his availability

## 2022-06-30 NOTE — TELEPHONE ENCOUNTER
----- Message from Jeanne Boeckelman, MA sent at 6/30/2022 12:57 PM CDT -----  I called the patient in regards to his lab results, Patient states that he is taking a lower dose of steroid now and he is wondering if he should start the glimepride again. Patient states that he was also on vacation last week so he definitely wasn't nailing down on his diet.  Pls advise

## 2022-07-11 ENCOUNTER — PATIENT MESSAGE (OUTPATIENT)
Dept: FAMILY MEDICINE | Facility: CLINIC | Age: 72
End: 2022-07-11
Payer: MEDICARE

## 2022-07-11 DIAGNOSIS — R80.9 CONTROLLED TYPE 2 DIABETES MELLITUS WITH MICROALBUMINURIA, WITHOUT LONG-TERM CURRENT USE OF INSULIN: ICD-10-CM

## 2022-07-11 DIAGNOSIS — E11.29 CONTROLLED TYPE 2 DIABETES MELLITUS WITH MICROALBUMINURIA, WITHOUT LONG-TERM CURRENT USE OF INSULIN: ICD-10-CM

## 2022-07-12 ENCOUNTER — TELEPHONE (OUTPATIENT)
Dept: FAMILY MEDICINE | Facility: CLINIC | Age: 72
End: 2022-07-12
Payer: MEDICARE

## 2022-07-12 DIAGNOSIS — N40.0 BPH WITH ELEVATED PSA: Primary | ICD-10-CM

## 2022-07-12 DIAGNOSIS — R97.20 BPH WITH ELEVATED PSA: Primary | ICD-10-CM

## 2022-07-12 NOTE — TELEPHONE ENCOUNTER
Call placed to patient regarding attached message. Patient indicates message was forwarded to incorrect provider. States message was intended for FNP Simin Falguni. Writer will forward message to Mrs. Montes De Oca's staff for further follow up.

## 2022-07-12 NOTE — TELEPHONE ENCOUNTER
----- Message from Eulalia Rick sent at 7/12/2022  4:06 PM CDT -----  Type:  Patient Call Back    Who Called: Pt wife     What is the reqeust in detail: Pt Wife is requesting a call back in regards to a order for an Ultrasound. Please Advise. Please Advise     Can the clinic reply by [facility]?    Best Call Back Number: [phone]

## 2022-07-13 NOTE — TELEPHONE ENCOUNTER
Please contact pt or wife and advise of the following:  It was not suppose to be an Ultrasound.  The order is in Epic for pt to have done a Prostate MRI with and without contrast.  Please schedule for pt at this time.  Thanks.

## 2022-07-18 NOTE — TELEPHONE ENCOUNTER
Glucose log: started 7/4-am before breakfast and before dinner  AM PM  135 102   120 107  138 101  122 120  135 114  132 130  122 126

## 2022-07-18 NOTE — TELEPHONE ENCOUNTER
According to his med list he is taking metformin extended release one in the morning and two in the evening and he is not taking any short-term or long-term insulin at this time.  Please confirm.      His A1c was 8.1 and his target is 6.5 to 7.5.  If he has no GI problems with the metformin we could try just going up to the maximum dose two in the morning and two in the evening.  If it tears up his stomach too much then we can resume a low dose extended release insulin.

## 2022-07-19 RX ORDER — METFORMIN HYDROCHLORIDE 500 MG/1
1000 TABLET, EXTENDED RELEASE ORAL 2 TIMES DAILY WITH MEALS
Qty: 360 TABLET | Refills: 1 | Status: SHIPPED | OUTPATIENT
Start: 2022-07-19 | End: 2022-07-20 | Stop reason: SDUPTHER

## 2022-07-20 ENCOUNTER — TELEPHONE (OUTPATIENT)
Dept: FAMILY MEDICINE | Facility: CLINIC | Age: 72
End: 2022-07-20
Payer: MEDICARE

## 2022-07-20 DIAGNOSIS — E11.29 CONTROLLED TYPE 2 DIABETES MELLITUS WITH MICROALBUMINURIA, WITHOUT LONG-TERM CURRENT USE OF INSULIN: ICD-10-CM

## 2022-07-20 DIAGNOSIS — R80.9 CONTROLLED TYPE 2 DIABETES MELLITUS WITH MICROALBUMINURIA, WITHOUT LONG-TERM CURRENT USE OF INSULIN: ICD-10-CM

## 2022-07-20 RX ORDER — METFORMIN HYDROCHLORIDE 500 MG/1
1000 TABLET, EXTENDED RELEASE ORAL 2 TIMES DAILY WITH MEALS
Qty: 360 TABLET | Refills: 1 | Status: SHIPPED | OUTPATIENT
Start: 2022-07-20 | End: 2023-02-22

## 2022-07-20 NOTE — TELEPHONE ENCOUNTER
People keep forgetting to take out the old instructions before putting in the new.  I sent a revised prescription to the pharmacy

## 2022-07-20 NOTE — TELEPHONE ENCOUNTER
2 sets of directions noted on Metformin prescription:    Sig - Route: Take 2 tablets (1,000 mg total) by mouth 2 (two) times daily with meals. TAKE ONE TABLET BY MOUTH EVERY MORNING AND TAKE TWO TABLETS BY MOUTH EVERY EVENING - Oral    Pharmacy requesting clarification of orders. Please advise. Thank you.

## 2022-07-20 NOTE — TELEPHONE ENCOUNTER
----- Message from Kristine George sent at 7/20/2022  1:18 PM CDT -----  .Type:  Pharmacy Calling to Clarify an RX    [facility]    [facility]arm[facility] Name:[facility]    Prescription Name:metFORMIN (GLUCOPHAGE-XR) 500 MG ER 24hr tablet    What do they need to clarify?:on directions     Best Call Back Number:[phone]    Additional Information:

## 2022-08-03 ENCOUNTER — HOSPITAL ENCOUNTER (OUTPATIENT)
Dept: RADIOLOGY | Facility: HOSPITAL | Age: 72
Discharge: HOME | End: 2022-08-03
Attending: NURSE PRACTITIONER
Payer: MEDICARE

## 2022-08-03 ENCOUNTER — TELEPHONE (OUTPATIENT)
Dept: UROLOGY | Facility: CLINIC | Age: 72
End: 2022-08-03
Payer: MEDICARE

## 2022-08-03 ENCOUNTER — HOSPITAL ENCOUNTER (OUTPATIENT)
Dept: RADIOLOGY | Facility: HOSPITAL | Age: 72
Discharge: HOME | End: 2022-08-03
Attending: INTERNAL MEDICINE
Payer: MEDICARE

## 2022-08-03 DIAGNOSIS — U07.1 ACUTE HYPOXEMIC RESPIRATORY FAILURE DUE TO COVID-19: ICD-10-CM

## 2022-08-03 DIAGNOSIS — N40.0 BPH WITH ELEVATED PSA: ICD-10-CM

## 2022-08-03 DIAGNOSIS — R97.20 BPH WITH ELEVATED PSA: ICD-10-CM

## 2022-08-03 DIAGNOSIS — J96.01 ACUTE HYPOXEMIC RESPIRATORY FAILURE DUE TO COVID-19: ICD-10-CM

## 2022-08-03 LAB
CREAT SERPL-MCNC: 1.1 MG/DL (ref 0.5–1.4)
SAMPLE: NORMAL

## 2022-08-03 PROCEDURE — 71046 XR CHEST PA AND LATERAL: ICD-10-PCS | Mod: 26,,, | Performed by: RADIOLOGY

## 2022-08-03 PROCEDURE — 25500020 PHARM REV CODE 255: Performed by: NURSE PRACTITIONER

## 2022-08-03 PROCEDURE — 72197 MRI PROSTATE W W/O CONTRAST: ICD-10-PCS | Mod: 26,,, | Performed by: RADIOLOGY

## 2022-08-03 PROCEDURE — 72197 MRI PELVIS W/O & W/DYE: CPT | Mod: TC

## 2022-08-03 PROCEDURE — A9585 GADOBUTROL INJECTION: HCPCS | Performed by: NURSE PRACTITIONER

## 2022-08-03 PROCEDURE — 71046 X-RAY EXAM CHEST 2 VIEWS: CPT | Mod: 26,,, | Performed by: RADIOLOGY

## 2022-08-03 PROCEDURE — 71046 X-RAY EXAM CHEST 2 VIEWS: CPT | Mod: TC,FY

## 2022-08-03 PROCEDURE — 72197 MRI PELVIS W/O & W/DYE: CPT | Mod: 26,,, | Performed by: RADIOLOGY

## 2022-08-03 RX ORDER — GADOBUTROL 604.72 MG/ML
7 INJECTION INTRAVENOUS
Status: COMPLETED | OUTPATIENT
Start: 2022-08-03 | End: 2022-08-03

## 2022-08-03 RX ADMIN — GADOBUTROL 7 ML: 604.72 INJECTION INTRAVENOUS at 09:08

## 2022-08-03 NOTE — TELEPHONE ENCOUNTER
Pt called stating he had MRI done today and received results in portal which he could not understand, wanted clarification. Explained to pt Simin has not received  MRI results to review. Once reviewed, results will be released and a call will be placed to pt. Pt jose angel

## 2022-08-03 NOTE — TELEPHONE ENCOUNTER
----- Message from Alyson Bennett sent at 8/3/2022  4:16 PM CDT -----  Regarding: pt called  Name of Who is Calling: BULMARO ASHLEY [[id number]] luis ( wife)      What is the request in detail: pt is requesting to call back from the office and dd not leave any detail. Please advise       Can the clinic reply by [facility]: No      What Number to Call Back if not in [facility]: [phone] (home)

## 2022-08-04 ENCOUNTER — PATIENT MESSAGE (OUTPATIENT)
Dept: PULMONOLOGY | Facility: CLINIC | Age: 72
End: 2022-08-04
Payer: MEDICARE

## 2022-08-04 ENCOUNTER — TELEPHONE (OUTPATIENT)
Dept: UROLOGY | Facility: CLINIC | Age: 72
End: 2022-08-04
Payer: MEDICARE

## 2022-08-04 NOTE — TELEPHONE ENCOUNTER
----- Message from Li Cai sent at 8/4/2022  4:20 PM CDT -----  Contact: [phone]  Type:  Patient Returning Call    Who Called:  Pt  Who Left Message for Patient:  Renea  Does the patient know what this is regarding?:  test results   Best Call Back Number:  [phone]  Additional Information:  Pls call back advise

## 2022-08-04 NOTE — TELEPHONE ENCOUNTER
Pt requesting mri results. Advised results have not been reviewed by inés yet and upon review a call will be made with the results. Brian walter

## 2022-08-04 NOTE — TELEPHONE ENCOUNTER
----- Message from Erika Haque Patient Care Assistant sent at 8/4/2022  4:26 PM CDT -----  Contact: Pt Wife  Type: Return Call    Who Called: Pt Wife  Who Left Message for Pt: Renea  Does the patient know what this is regarding: Yes  Best Call Back Number: [phone] or [phone]  Thank you~

## 2022-08-04 NOTE — TELEPHONE ENCOUNTER
----- Message from Erika Haque, Patient Care Assistant sent at 8/4/2022  2:54 PM CDT -----  Contact: Pt Wife  Type: Test Results    Who Called: Pt Wife  Name of Test:  (labs, xray etc..) MRI  Date of Test: 08/04/2022  Ordering Provider: Smita  Where the test performed: [facility]  Best Call Back Number: [phone]  Additional Info-Please Advise-Thank you~

## 2022-08-08 ENCOUNTER — TELEPHONE (OUTPATIENT)
Dept: UROLOGY | Facility: CLINIC | Age: 72
End: 2022-08-08
Payer: MEDICARE

## 2022-08-08 NOTE — TELEPHONE ENCOUNTER
pts wife calling needing clarification regarding mri results. offered to schedule f/u with inés to discuss results in detail. pts wife declined. States will wait for dr riojas staff to schedule mri prostate

## 2022-08-08 NOTE — TELEPHONE ENCOUNTER
----- Message from Allen Harper sent at 8/8/2022  2:48 PM CDT -----  Contact: Wife Emily  Patient's wife is calling to ask some questions about her 's MRI. Please call her back since they would like some clarification on the test results. Please call at [phone].

## 2022-08-08 NOTE — TELEPHONE ENCOUNTER
----- Message from Eleanor Puentes sent at 8/8/2022  3:45 PM CDT -----  Contact: pt's wife at [phone]  Type: Needs Medical Advice  Who Called:  pt's wife Emily Maria Call Back Number: [phone]    Additional Information: pt's wife is calling the office requesting a call back in regards to MRI results. She states Renea spoke with  and he don't fully understand. Please call back and advise.

## 2022-08-10 ENCOUNTER — TELEPHONE (OUTPATIENT)
Dept: UROLOGY | Facility: CLINIC | Age: 72
End: 2022-08-10
Payer: MEDICARE

## 2022-08-10 DIAGNOSIS — R97.20 ELEVATED PSA: Primary | ICD-10-CM

## 2022-08-10 RX ORDER — CIPROFLOXACIN 500 MG/1
500 TABLET ORAL 2 TIMES DAILY
Qty: 6 TABLET | Refills: 0 | Status: SHIPPED | OUTPATIENT
Start: 2022-08-10 | End: 2022-08-13

## 2022-08-10 RX ORDER — GENTAMICIN SULFATE 40 MG/ML
80 INJECTION, SOLUTION INTRAMUSCULAR; INTRAVENOUS
Status: CANCELLED | OUTPATIENT
Start: 2022-08-10

## 2022-08-17 DIAGNOSIS — Z01.818 PRE-OP TESTING: ICD-10-CM

## 2022-08-21 ENCOUNTER — LAB VISIT (OUTPATIENT)
Dept: PRIMARY CARE CLINIC | Facility: CLINIC | Age: 72
End: 2022-08-21
Payer: MEDICARE

## 2022-08-21 DIAGNOSIS — Z01.818 PRE-OP TESTING: ICD-10-CM

## 2022-08-21 LAB — SARS-COV-2 RNA RESP QL NAA+PROBE: NOT DETECTED

## 2022-08-21 PROCEDURE — U0003 INFECTIOUS AGENT DETECTION BY NUCLEIC ACID (DNA OR RNA); SEVERE ACUTE RESPIRATORY SYNDROME CORONAVIRUS 2 (SARS-COV-2) (CORONAVIRUS DISEASE [COVID-19]), AMPLIFIED PROBE TECHNIQUE, MAKING USE OF HIGH THROUGHPUT TECHNOLOGIES AS DESCRIBED BY CMS-2020-01-R: HCPCS | Performed by: UROLOGY

## 2022-08-21 PROCEDURE — U0005 INFEC AGEN DETEC AMPLI PROBE: HCPCS | Performed by: UROLOGY

## 2022-08-22 ENCOUNTER — TELEPHONE (OUTPATIENT)
Dept: UROLOGY | Facility: CLINIC | Age: 72
End: 2022-08-22
Payer: MEDICARE

## 2022-08-22 NOTE — TELEPHONE ENCOUNTER
----- Message from Dennys Ferrer sent at 8/22/2022  2:15 PM CDT -----  Type: Needs Medical Advice  Who Called:  Emilyrene Gee (Spouse)    Best Call Back Number: [phone]  Additional Information: Caller states that she would like a callback regarding the patient's procedure

## 2022-08-22 NOTE — DISCHARGE INSTRUCTIONS
After your prostate biopsy    Avoid sexual activity,lifting, strenuous physical activity or exertion for 3 days     No riding mowers, tractors, bicycles, motorcycles for 2-3 weeks    You may experience blood in your urine or stool for up to 2 weeks and in your semen for up to 6 weeks.  This is a normal side effect of the procedure and will resolve.    Drink plenty of water    Take antibiotics as prescribed    If you experience any of the following conditions, please return immediately to the clinic (during office hrs) or the Emergency Room if after hours:       Fever       Inabiltiy to urinate       Severe bleeding    You may resume aspirin, anti inflammatory and blood thinners in 3 days    Results take at minimum 10 business days.  A 2 week follow up will be scheduled to review pathology reports in the clinic    During office hours, please call  and ask to speak with the nurse if you have any questions.  If after hours, call the [facility] On [facility] # to be connectied to the doctor on call    After Surgery:  Always be aware that any surgery can cause these symptoms:    Pain- Medication can be prescribed for pain to decrease your pain but may not completely take your pain away.  Over the Counter pain medicine my be enough and you can always use Ice and rest to help ease pain.    Bleeding- a little bleeding after a surgery is usually within normal.  If there is a lot of blood you need to notify your MD.  Emergency treatments of bleeding are cold application, elevation of the bleeding site and compression.    Infection- Infection after surgery is NOT a normal occurrence.  Signs of infection are fever, swelling, hot to touch the incision.  If this occurs notify your MD immediately.    Nausea- this can be common after a surgery especially if you have had anesthesia medicine or are taking pain medicine.  Staying on clear liquids, bland foods, gingerale, or over the counter anti nausea medicines can help.  If you  vomit more than once, notify your MD.  Anti Nausea medicines can be prescribed.

## 2022-08-23 ENCOUNTER — TELEPHONE (OUTPATIENT)
Dept: UROLOGY | Facility: CLINIC | Age: 72
End: 2022-08-23
Payer: MEDICARE

## 2022-08-23 DIAGNOSIS — R97.20 ELEVATED PSA: Primary | ICD-10-CM

## 2022-08-23 RX ORDER — DIAZEPAM 10 MG/1
10 TABLET ORAL ONCE
Qty: 1 TABLET | Refills: 0 | Status: SHIPPED | OUTPATIENT
Start: 2022-08-23 | End: 2022-09-06

## 2022-08-23 NOTE — TELEPHONE ENCOUNTER
Called and spoke to patient. Informed that Rx was sent to pharmacy for Valium. Informed to bring to procedure tomorrow and to have transportation to and from procedure. Patient voiced okay.

## 2022-08-23 NOTE — TELEPHONE ENCOUNTER
Called and spoke to patient. Arrival time given for procedure tomorrow. Patient asked if procedure is done with local anesthesia. Informed patient that it is. Patient states that he has this procedure done before and that the local wasn't really local. Patient asking for something can be sent in to help calm him.     Please Advise.

## 2022-08-24 ENCOUNTER — HOSPITAL ENCOUNTER (OUTPATIENT)
Facility: AMBULARY SURGERY CENTER | Age: 72
Discharge: HOME | End: 2022-08-24
Attending: UROLOGY | Admitting: UROLOGY
Payer: MEDICARE

## 2022-08-24 DIAGNOSIS — R97.20 ELEVATED PSA: ICD-10-CM

## 2022-08-24 PROCEDURE — 76872 US TRANSRECTAL: CPT | Performed by: UROLOGY

## 2022-08-24 PROCEDURE — 76872 PR US TRANSRECTAL: ICD-10-PCS | Mod: 26,,, | Performed by: UROLOGY

## 2022-08-24 PROCEDURE — 55700 PR BIOPSY OF PROSTATE,NEEDLE/PUNCH: ICD-10-PCS | Mod: ,,, | Performed by: UROLOGY

## 2022-08-24 PROCEDURE — 88305 TISSUE EXAM BY PATHOLOGIST: ICD-10-PCS | Mod: 26,,, | Performed by: PATHOLOGY

## 2022-08-24 PROCEDURE — 88305 TISSUE EXAM BY PATHOLOGIST: CPT | Mod: 26,,, | Performed by: PATHOLOGY

## 2022-08-24 PROCEDURE — 88341 PR IHC OR ICC EACH ADD'L SINGLE ANTIBODY  STAINPR: ICD-10-PCS | Mod: 26,,, | Performed by: PATHOLOGY

## 2022-08-24 PROCEDURE — 88341 IMHCHEM/IMCYTCHM EA ADD ANTB: CPT | Mod: 26,,, | Performed by: PATHOLOGY

## 2022-08-24 PROCEDURE — 88342 CHG IMMUNOCYTOCHEMISTRY: ICD-10-PCS | Mod: 26,,, | Performed by: PATHOLOGY

## 2022-08-24 PROCEDURE — 55700 PR BIOPSY OF PROSTATE,NEEDLE/PUNCH: CPT | Mod: ,,, | Performed by: UROLOGY

## 2022-08-24 PROCEDURE — 88342 IMHCHEM/IMCYTCHM 1ST ANTB: CPT | Mod: 26,,, | Performed by: PATHOLOGY

## 2022-08-24 PROCEDURE — 55700 HC PROSTATE NEEDLE BIOPSY: CPT | Performed by: UROLOGY

## 2022-08-24 PROCEDURE — 76872 US TRANSRECTAL: CPT | Mod: 26,,, | Performed by: UROLOGY

## 2022-08-24 RX ORDER — GENTAMICIN SULFATE 40 MG/ML
80 INJECTION, SOLUTION INTRAMUSCULAR; INTRAVENOUS
Status: DISCONTINUED | OUTPATIENT
Start: 2022-08-24 | End: 2022-08-24 | Stop reason: HOSPADM

## 2022-08-24 RX ORDER — CIPROFLOXACIN 500 MG/1
500 TABLET ORAL 2 TIMES DAILY
COMMUNITY
End: 2022-09-06

## 2022-08-24 RX ORDER — LIDOCAINE HYDROCHLORIDE 10 MG/ML
INJECTION, SOLUTION EPIDURAL; INFILTRATION; INTRACAUDAL; PERINEURAL
Status: DISCONTINUED | OUTPATIENT
Start: 2022-08-24 | End: 2022-08-24 | Stop reason: HOSPADM

## 2022-08-24 RX ADMIN — GENTAMICIN SULFATE 80 MG: 40 INJECTION, SOLUTION INTRAMUSCULAR; INTRAVENOUS at 01:08

## 2022-08-24 NOTE — OP NOTE
"Prostate Biopsy Procedure/Discharge Note:    PRE-PROCEDURE DIAGNOSIS: Elevated PSA    POST-PROCEDURE DIAGNOSIS: Same    DATE: 08/24/2022    INDICATION FOR PROCEDURE: Elevated PSA    ANESTHESIA: Local periprostatic block; 10 cc 1% lidocaine    PSA: 5.7    TRUS VOLUME: 66 cc    STAFF: Mora Millan MD     ASSISTANT: None    EBL: Minimal    SPECIMEN: 12 Prostate Core Bx    ULTRASOUND FINDINGS: Unremarkable    JOYCE: 60 grams, smooth, anodular    CONFIRMED PATIENT TOOK ANTIBIOTICS: Yes    WHICH ANTIBIOTIC: Gentamicin, Cipro    CONFIRMED PATIENT NOT TAKING ASPIRIN OR ANTICOAGULANTS: Yes    CONFIRMED PATIENT USED ENEMA: Yes    PROCEDURE IN DETAIL:    Risk, Benefits, and alternatives explained to patient. All questions answered  to patients satisfaction and consented appropriately. Patient has completed the  prescribed bowel prep and antibiotic. TRUS probe inserted into rectum. 10cc 1%  lidocaine injected in a natali-prostatic block fashion. 12 systematic biopsies taken in standard fashion.  Patient tolerated well without complication.    CONDITION: Stable    DISPOSITION: Home    Discharge home today status post uncomplicated procedure as above  Diet - resume home diet  Follow up: RTC in 3 weeks to review results.   Instructions: Complete Cipro. Continue home medications.   Meds:     Medication List      CONTINUE taking these medications    alfuzosin 10 mg Tb24  Commonly known as: UROXATRAL  Take 1 tablet (10 mg total) by mouth daily with breakfast.     BD INSULIN SYRINGE ULTRA-FINE 0.5 mL 31 gauge x 5/16" Syrg  Generic drug: insulin syringe-needle U-100     blood sugar diagnostic Strp  Commonly known as: ONETOUCH ULTRA TEST  USE TO CHECK FASTING GLUCOSE IN THE MORNING     blood-glucose meter Misc  One Touch glucometer check fasting glucose in morning     ciprofloxacin HCl 500 MG tablet  Commonly known as: CIPRO     clotrimazole-betamethasone 1-0.05% cream  Commonly known as: LOTRISONE  aaa bid prn scaling/itching ears   " "  coenzyme Q10 10 mg capsule     diazePAM 10 MG Tab  Commonly known as: VALIUM  Take 1 tablet (10 mg total) by mouth once. for 1 dose     famotidine 20 MG tablet  Commonly known as: PEPCID     ibuprofen 800 MG tablet  Commonly known as: ADVIL,MOTRIN     insulin lispro 100 unit/mL injection  Commonly known as: HumaLOG U-100 Insulin  Inject 10 Units into the skin 3 (three) times daily before meals. **MODERATE CORRECTION DOSE** Blood Glucose mg/dL                  Pre-meal                Bedtime 151-200                2 units                    1 unit 201-250                4 units                    2 units 251-300                6 units                    3 units  301-350                8 units                    4 units >350                     10 units                  5 units **CALL MD for BG >350**     lancets Misc  One Touch lancets check fasting glucose in morning     LANTUS SOLOSTAR U-100 INSULIN glargine 100 units/mL SubQ pen  Generic drug: insulin  Inject 20 Units into the skin every evening.     losartan 25 MG tablet  Commonly known as: COZAAR  TAKE ONE TABLET BY MOUTH ONCE A DAY     meloxicam 15 MG tablet  Commonly known as: MOBIC     metFORMIN 500 MG ER 24hr tablet  Commonly known as: GLUCOPHAGE-XR  Take 2 tablets (1,000 mg total) by mouth 2 (two) times daily with meals.     ondansetron 8 MG tablet  Commonly known as: ZOFRAN  Take 1 tablet (8 mg total) by mouth every 8 (eight) hours as needed for Nausea.     * pen needle, diabetic 30 gauge x 5/16" Ndle  Use with levemir once daily and novolog before each meal three times daily     * BD ULTRA-FINE CONSTANCE PEN NEEDLE 32 gauge x 5/32" Ndle  Generic drug: pen needle, diabetic     * predniSONE 10 MG tablet  Commonly known as: DELTASONE  Take 1 tablet (10 mg total) by mouth once daily.     * predniSONE 5 MG tablet  Commonly known as: DELTASONE  Take 1 tablet (5 mg total) by mouth once daily.     * pulse oximeter device  Commonly known as: pulse oximeter  by Apply " Externally route 2 (two) times a day. Use twice daily at 8 AM and 3 PM and record the value in MyChart as directed.     * pulse oximeter device  Commonly known as: pulse oximeter  by Apply Externally route 2 (two) times a day. Use twice daily at 8 AM and 3 PM and record the value in MyChart as directed.     rosuvastatin 10 MG tablet  Commonly known as: CRESTOR  Take 1 tablet (10 mg total) by mouth once daily.         * This list has 6 medication(s) that are the same as other medications prescribed for you. Read the directions carefully, and ask your doctor or other care provider to review them with you.                Mora Millan Jr, MD

## 2022-08-24 NOTE — H&P
No changes in exam.     Per NP Simin Ordoñez:        [facility] Note  Staff: Simin Ordoñez, JUNEP-C     PCP: ELIU Fisher     Chief Complaint: F/UP-Hx of BPH with Elevated PSA levels     Subjective:        HPI: Ludwin Gee is a 72 y.o. male presents today for routine recheck.  Pt with hx of BPH with elevated psa levels.     Pt was last seen by me on 22 where he was still recovering from post Covid pneumonia at that time.  *Was in critical care hospital for 3 weeks in 2022 with Covid.     Pt was last evaluated by Dr. Michel on 3/4/21.  Pt has chronic hx of elevated PSA, BPH     HISTORY OF PRESENT ILLNESS:   this 72-year-old male returns routine recheck.  He has history of elevated PSA for which he had undergone prostate ultrasound biopsy in  that proved to be negative for malignancy.  He subsequently had undergone MRI of the prostate and the 4K score both of which also revealed low probability of prostate cancer.  He continues to take Uroxatral for management of his BPH and is more effective than the Flomax he was on before.     Last PSA Screening:   PSA, Total and Free done 22:    Total PSA-5.7  Free PSA-1.92  Free PSA %-33.68     22-PSA Total was 6.5  21:  5.8  3/1/21:  6.4  12/3/20:        Lab Results   Component Value Date     PSA 6.33 (H) 06/10/2013     PSA 9.50 (H) 2013     PSA 7.21 (H) 2012     PSADIAG 6.5 (H) 2022     PSADIAG 5.8 (H) 2021     PSADIAG 6.4 (H) 2021      AUA SS Today:  10/3  Feeling of ICBE: 0  Frequency:2  Intermittency:1  Urgency:2  Weak urine stream:3  Strainin  Nocturia:2     REVIEW OF SYSTEMS:  A comprehensive 10 system review was performed and is negative except as noted above in HPI     PMHx:       Past Medical History:   Diagnosis Date    BPH with elevated PSA      Diabetes mellitus      Elevated PSA      Hyperlipidemia      Hypertension      Personal history of colonic polyps         PSHx:        Past Surgical History:   Procedure Laterality Date    COLONOSCOPY   12/6/2013     Dr. Martinez, 5 year recheck    SHOULDER SURGERY   12/09/2011     right       Allergies:  Pravastatin and Lisinopril     Medications: reviewed   Objective:          Vitals:     06/29/22 1426   BP: 111/67   Pulse: 90      General:WDWN in NAD  Eyes: PERRLA, normal conjunctiva  Respiratory: no increased work on breathing, clear to auscultation  Cardiovascular: regular rate and rhythm. No obvious extremity edema.  GI: palpation of masses. No tenderness. No hepatosplenomegaly to palpation.  Musculoskeletal: normal range of motion of bilateral upper extremities. Normal muscle strength and tone.  Skin: no obvious rashes or lesions. No tightening of skin noted.  Neurologic: CN grossly normal. Normal sensation.   Psychiatric: awake, alert and oriented x 3. Mood and affect normal. Cooperative.      Exam:  Inspection of anus and perineum normal  JOYCE: 30-35g enlarged, smooth prostate gland without nodules, masses, tenderness. SV not palpable. Normal sphincter tone.      Assessment:       1. BPH with elevated PSA           Plan:      As reviewed with MD in office today, we will repeat prostate MRI due to chronic fluctuations of elevated psa levels for recheck at this time.     Will schedule with pt and then contact him after with results.

## 2022-08-26 VITALS
DIASTOLIC BLOOD PRESSURE: 75 MMHG | BODY MASS INDEX: 29.52 KG/M2 | HEART RATE: 90 BPM | WEIGHT: 171.94 LBS | OXYGEN SATURATION: 97 % | SYSTOLIC BLOOD PRESSURE: 127 MMHG | RESPIRATION RATE: 18 BRPM | TEMPERATURE: 98 F

## 2022-08-31 LAB
COMMENT: NORMAL
FINAL PATHOLOGIC DIAGNOSIS: NORMAL
GROSS: NORMAL
Lab: NORMAL

## 2022-09-06 ENCOUNTER — OFFICE VISIT (OUTPATIENT)
Dept: FAMILY MEDICINE | Facility: CLINIC | Age: 72
End: 2022-09-06
Attending: FAMILY MEDICINE
Payer: MEDICARE

## 2022-09-06 VITALS
OXYGEN SATURATION: 97 % | BODY MASS INDEX: 28.6 KG/M2 | HEIGHT: 64 IN | TEMPERATURE: 98 F | HEART RATE: 69 BPM | DIASTOLIC BLOOD PRESSURE: 70 MMHG | RESPIRATION RATE: 17 BRPM | WEIGHT: 167.56 LBS | SYSTOLIC BLOOD PRESSURE: 119 MMHG

## 2022-09-06 DIAGNOSIS — E11.29 CONTROLLED TYPE 2 DIABETES MELLITUS WITH MICROALBUMINURIA, WITHOUT LONG-TERM CURRENT USE OF INSULIN: ICD-10-CM

## 2022-09-06 DIAGNOSIS — E11.59 HYPERTENSION ASSOCIATED WITH DIABETES: Primary | ICD-10-CM

## 2022-09-06 DIAGNOSIS — E78.5 HYPERLIPIDEMIA ASSOCIATED WITH TYPE 2 DIABETES MELLITUS: ICD-10-CM

## 2022-09-06 DIAGNOSIS — I15.2 HYPERTENSION ASSOCIATED WITH DIABETES: Primary | ICD-10-CM

## 2022-09-06 DIAGNOSIS — R80.9 CONTROLLED TYPE 2 DIABETES MELLITUS WITH MICROALBUMINURIA, WITHOUT LONG-TERM CURRENT USE OF INSULIN: ICD-10-CM

## 2022-09-06 DIAGNOSIS — C61 PROSTATE CANCER: ICD-10-CM

## 2022-09-06 DIAGNOSIS — E11.69 HYPERLIPIDEMIA ASSOCIATED WITH TYPE 2 DIABETES MELLITUS: ICD-10-CM

## 2022-09-06 PROCEDURE — 99999 PR PBB SHADOW E&M-EST. PATIENT-LVL IV: CPT | Mod: PBBFAC,,, | Performed by: FAMILY MEDICINE

## 2022-09-06 PROCEDURE — 1159F PR MEDICATION LIST DOCUMENTED IN MEDICAL RECORD: ICD-10-PCS | Mod: CPTII,S$GLB,, | Performed by: FAMILY MEDICINE

## 2022-09-06 PROCEDURE — 3052F PR MOST RECENT HEMOGLOBIN A1C LEVEL 8.0 - < 9.0%: ICD-10-PCS | Mod: CPTII,S$GLB,, | Performed by: FAMILY MEDICINE

## 2022-09-06 PROCEDURE — 1126F PR PAIN SEVERITY QUANTIFIED, NO PAIN PRESENT: ICD-10-PCS | Mod: CPTII,S$GLB,, | Performed by: FAMILY MEDICINE

## 2022-09-06 PROCEDURE — 3078F DIAST BP <80 MM HG: CPT | Mod: CPTII,S$GLB,, | Performed by: FAMILY MEDICINE

## 2022-09-06 PROCEDURE — 3074F SYST BP LT 130 MM HG: CPT | Mod: CPTII,S$GLB,, | Performed by: FAMILY MEDICINE

## 2022-09-06 PROCEDURE — 1126F AMNT PAIN NOTED NONE PRSNT: CPT | Mod: CPTII,S$GLB,, | Performed by: FAMILY MEDICINE

## 2022-09-06 PROCEDURE — 3288F FALL RISK ASSESSMENT DOCD: CPT | Mod: CPTII,S$GLB,, | Performed by: FAMILY MEDICINE

## 2022-09-06 PROCEDURE — 1101F PT FALLS ASSESS-DOCD LE1/YR: CPT | Mod: CPTII,S$GLB,, | Performed by: FAMILY MEDICINE

## 2022-09-06 PROCEDURE — 3288F PR FALLS RISK ASSESSMENT DOCUMENTED: ICD-10-PCS | Mod: CPTII,S$GLB,, | Performed by: FAMILY MEDICINE

## 2022-09-06 PROCEDURE — 4010F ACE/ARB THERAPY RXD/TAKEN: CPT | Mod: CPTII,S$GLB,, | Performed by: FAMILY MEDICINE

## 2022-09-06 PROCEDURE — 99214 PR OFFICE/OUTPT VISIT, EST, LEVL IV, 30-39 MIN: ICD-10-PCS | Mod: S$GLB,,, | Performed by: FAMILY MEDICINE

## 2022-09-06 PROCEDURE — 3008F BODY MASS INDEX DOCD: CPT | Mod: CPTII,S$GLB,, | Performed by: FAMILY MEDICINE

## 2022-09-06 PROCEDURE — 1101F PR PT FALLS ASSESS DOC 0-1 FALLS W/OUT INJ PAST YR: ICD-10-PCS | Mod: CPTII,S$GLB,, | Performed by: FAMILY MEDICINE

## 2022-09-06 PROCEDURE — 1159F MED LIST DOCD IN RCRD: CPT | Mod: CPTII,S$GLB,, | Performed by: FAMILY MEDICINE

## 2022-09-06 PROCEDURE — 3074F PR MOST RECENT SYSTOLIC BLOOD PRESSURE < 130 MM HG: ICD-10-PCS | Mod: CPTII,S$GLB,, | Performed by: FAMILY MEDICINE

## 2022-09-06 PROCEDURE — 1160F PR REVIEW ALL MEDS BY PRESCRIBER/CLIN PHARMACIST DOCUMENTED: ICD-10-PCS | Mod: CPTII,S$GLB,, | Performed by: FAMILY MEDICINE

## 2022-09-06 PROCEDURE — 3008F PR BODY MASS INDEX (BMI) DOCUMENTED: ICD-10-PCS | Mod: CPTII,S$GLB,, | Performed by: FAMILY MEDICINE

## 2022-09-06 PROCEDURE — 99999 PR PBB SHADOW E&M-EST. PATIENT-LVL IV: ICD-10-PCS | Mod: PBBFAC,,, | Performed by: FAMILY MEDICINE

## 2022-09-06 PROCEDURE — 4010F PR ACE/ARB THEARPY RXD/TAKEN: ICD-10-PCS | Mod: CPTII,S$GLB,, | Performed by: FAMILY MEDICINE

## 2022-09-06 PROCEDURE — 99214 OFFICE O/P EST MOD 30 MIN: CPT | Mod: S$GLB,,, | Performed by: FAMILY MEDICINE

## 2022-09-06 PROCEDURE — 3052F HG A1C>EQUAL 8.0%<EQUAL 9.0%: CPT | Mod: CPTII,S$GLB,, | Performed by: FAMILY MEDICINE

## 2022-09-06 PROCEDURE — 3078F PR MOST RECENT DIASTOLIC BLOOD PRESSURE < 80 MM HG: ICD-10-PCS | Mod: CPTII,S$GLB,, | Performed by: FAMILY MEDICINE

## 2022-09-06 PROCEDURE — 3072F PR LOW RISK FOR RETINOPATHY: ICD-10-PCS | Mod: CPTII,S$GLB,, | Performed by: FAMILY MEDICINE

## 2022-09-06 PROCEDURE — 1160F RVW MEDS BY RX/DR IN RCRD: CPT | Mod: CPTII,S$GLB,, | Performed by: FAMILY MEDICINE

## 2022-09-06 PROCEDURE — 3072F LOW RISK FOR RETINOPATHY: CPT | Mod: CPTII,S$GLB,, | Performed by: FAMILY MEDICINE

## 2022-09-06 NOTE — PROGRESS NOTES
Subjective:       Patient ID: Ludwin Gee is a 72 y.o. male.    Chief Complaint: Diabetes (Pt states that he is here for his 4 mo fu )    72-year-old male coming in for follow-up on high blood pressure and diabetes.  He had a case of long COVID and was on a long tapering dose of prednisone that was affecting blood sugar control with his most recent A1c about two months ago at 8.1.  He was taken off of the prednisone and taken off of Lantus and Humalog about two months ago and his blood sugars have been doing well.  He is a little early for rechecking his A1c however.  In the interval he was found to have a PSA of 5.7 and had prostate biopsy done with prostate cancer found in 2/12 specimens.  Both were [place] 3 + 3 /six and he is being monitored by watchful waiting by Dr. Millan.  My understanding is the plan is to check a diagnostic PSA about twice a year.  We should be able to coordinate with his blood sugar checks.  He feels well, his stamina has improved and he has no complaints.  He will be due for a BMP, A1c, and microalbumin level at the end of this month.    Past Medical History:  No date: BPH with elevated PSA  No date: Diabetes mellitus  No date: Elevated PSA  No date: Hyperlipidemia  No date: Hypertension  No date: Personal history of colonic polyps  9/6/2022: Prostate cancer      Comment:  [place] 3+3=6 Watchful waiting    Past Surgical History:  12/6/2013: COLONOSCOPY      Comment:  Dr. Martinez, 5 year recheck  12/09/2011: SHOULDER SURGERY      Comment:  right   8/24/2022: TRANSRECTAL BIOPSY OF PROSTATE WITH ULTRASOUND GUIDANCE; N/  A      Comment:  Procedure: BIOPSY, PROSTATE, RECTAL APPROACH, WITH US                GUIDANCE;  Surgeon: Mora Millan Jr., MD;  Location:                [facility] OR;  Service: Urology;  Laterality: N/A;    Current Outpatient Medications on File Prior to Visit:  alfuzosin (UROXATRAL) 10 mg Tb24, Take 1 tablet (10 mg total) by mouth daily with breakfast., Disp: 90 tablet,  "Rfl: 3  blood sugar diagnostic (ONETOUCH ULTRA TEST) Strp, USE TO CHECK FASTING GLUCOSE IN THE MORNING, Disp: 100 strip, Rfl: 3  blood-glucose meter Misc, One Touch glucometer check fasting glucose in morning, Disp: 1 each, Rfl: 0  lancets Misc, One Touch lancets check fasting glucose in morning, Disp: 100 each, Rfl: 3  losartan (COZAAR) 25 MG tablet, TAKE ONE TABLET BY MOUTH ONCE A DAY, Disp: 90 tablet, Rfl: 3  metFORMIN (GLUCOPHAGE-XR) 500 MG ER 24hr tablet, Take 2 tablets (1,000 mg total) by mouth 2 (two) times daily with meals., Disp: 360 tablet, Rfl: 1  pulse oximeter (PULSE OXIMETER) device, by Apply Externally route 2 (two) times a day. Use twice daily at 8 AM and 3 PM and record the value in MyChart as directed., Disp: 1 each, Rfl: 0  rosuvastatin (CRESTOR) 10 MG tablet, Take 1 tablet (10 mg total) by mouth once daily., Disp: 90 tablet, Rfl: 3  coenzyme Q10 10 mg capsule, Take 10 mg by mouth once daily., Disp: , Rfl:   famotidine (PEPCID) 20 MG tablet, Take 20 mg by mouth once daily., Disp: , Rfl:   pen needle, diabetic 30 gauge x 5/16" Ndle, Use with levemir once daily and novolog before each meal three times daily (Patient not taking: No sig reported), Disp: 400 each, Rfl: 4    Review of Systems   Constitutional:  Negative for chills, diaphoresis, fatigue and fever.   Respiratory:  Negative for chest tightness and shortness of breath.    Cardiovascular:  Negative for chest pain and palpitations.   Endocrine: Negative for polydipsia and polyuria.     Objective:      Physical Exam  Vitals and nursing note reviewed.   Constitutional:       General: He is not in acute distress.     Appearance: Normal appearance. He is normal weight. He is not ill-appearing, toxic-appearing or diaphoretic.      Comments: Good blood pressure control   Normal pulse with regular rhythm  Normal weight for age with a BMI of 28.8 he is up 0.4 lb from his May 6, 2022 visit   Cardiovascular:      Rate and Rhythm: Normal rate and " regular rhythm.      Heart sounds: Normal heart sounds. No murmur heard.    No friction rub. No gallop.   Pulmonary:      Effort: Pulmonary effort is normal. No respiratory distress.      Breath sounds: No stridor. Rales (Minimal bibasal rales does not clear with a cough) present. No wheezing or rhonchi.   Neurological:      Mental Status: He is alert.       Assessment:       1. Hypertension associated with diabetes    2. Hyperlipidemia associated with type 2 diabetes mellitus    3. Controlled type 2 diabetes mellitus with microalbuminuria, without long-term current use of insulin    4. Prostate cancer    5. BMI 28.0-28.9,adult          Plan:       1. Hypertension associated with diabetes  Good control, no changes needed on losartan 25 mg daily  - Basic Metabolic Panel; Future    2. Hyperlipidemia associated with type 2 diabetes mellitus  Lab Results   Component Value Date    CHOL 137 03/26/2022    CHOL 152 03/01/2021    CHOL 128 06/24/2020     Lab Results   Component Value Date    HDL 61 03/26/2022    HDL 48 03/01/2021    HDL 44 06/24/2020     Lab Results   Component Value Date    LDLCALC 56.2 (L) 03/26/2022    LDLCALC 84.8 03/01/2021    LDLCALC 66.4 06/24/2020     Lab Results   Component Value Date    TRIG 99 03/26/2022    TRIG 96 03/01/2021    TRIG 88 06/24/2020     Lab Results   Component Value Date    CHOLHDL 44.5 03/26/2022    CHOLHDL 31.6 03/01/2021    CHOLHDL 34.4 06/24/2020     Good control, no changes needed in Crestor 10 mg daily, recheck approximately March    3. Controlled type 2 diabetes mellitus with microalbuminuria, without long-term current use of insulin  Lab Results   Component Value Date    HGBA1C 8.1 (H) 06/28/2022     Poor control but was on prednisone at the time.  Now off of insulin and off of prednisone will recheck at the end of this month  - Basic Metabolic Panel; Future  - Hemoglobin A1C; Future  - Microalbumin/Creatinine Ratio, Urine; Future    4. Prostate cancer  [place] six, watchful  waiting under the supervision of Dr. Millan.  Will try to coordinate the PSA levels with his A1cs    5. BMI 28.0-28.9,adult

## 2022-09-09 ENCOUNTER — OFFICE VISIT (OUTPATIENT)
Dept: UROLOGY | Facility: CLINIC | Age: 72
End: 2022-09-09
Payer: MEDICARE

## 2022-09-09 VITALS
DIASTOLIC BLOOD PRESSURE: 60 MMHG | SYSTOLIC BLOOD PRESSURE: 117 MMHG | WEIGHT: 167 LBS | HEART RATE: 92 BPM | BODY MASS INDEX: 28.51 KG/M2 | HEIGHT: 64 IN

## 2022-09-09 DIAGNOSIS — C61 PROSTATE CANCER: Primary | ICD-10-CM

## 2022-09-09 DIAGNOSIS — R39.9 LOWER URINARY TRACT SYMPTOMS: ICD-10-CM

## 2022-09-09 DIAGNOSIS — N39.41 URGENCY INCONTINENCE: ICD-10-CM

## 2022-09-09 DIAGNOSIS — N52.9 ERECTILE DYSFUNCTION, UNSPECIFIED ERECTILE DYSFUNCTION TYPE: ICD-10-CM

## 2022-09-09 PROCEDURE — 99215 PR OFFICE/OUTPT VISIT, EST, LEVL V, 40-54 MIN: ICD-10-PCS | Mod: S$GLB,,, | Performed by: UROLOGY

## 2022-09-09 PROCEDURE — 3078F DIAST BP <80 MM HG: CPT | Mod: CPTII,S$GLB,, | Performed by: UROLOGY

## 2022-09-09 PROCEDURE — 99999 PR PBB SHADOW E&M-EST. PATIENT-LVL IV: ICD-10-PCS | Mod: PBBFAC,,, | Performed by: UROLOGY

## 2022-09-09 PROCEDURE — 3288F PR FALLS RISK ASSESSMENT DOCUMENTED: ICD-10-PCS | Mod: CPTII,S$GLB,, | Performed by: UROLOGY

## 2022-09-09 PROCEDURE — 1126F PR PAIN SEVERITY QUANTIFIED, NO PAIN PRESENT: ICD-10-PCS | Mod: CPTII,S$GLB,, | Performed by: UROLOGY

## 2022-09-09 PROCEDURE — 1159F MED LIST DOCD IN RCRD: CPT | Mod: CPTII,S$GLB,, | Performed by: UROLOGY

## 2022-09-09 PROCEDURE — 1126F AMNT PAIN NOTED NONE PRSNT: CPT | Mod: CPTII,S$GLB,, | Performed by: UROLOGY

## 2022-09-09 PROCEDURE — 3052F PR MOST RECENT HEMOGLOBIN A1C LEVEL 8.0 - < 9.0%: ICD-10-PCS | Mod: CPTII,S$GLB,, | Performed by: UROLOGY

## 2022-09-09 PROCEDURE — 1101F PT FALLS ASSESS-DOCD LE1/YR: CPT | Mod: CPTII,S$GLB,, | Performed by: UROLOGY

## 2022-09-09 PROCEDURE — 4010F ACE/ARB THERAPY RXD/TAKEN: CPT | Mod: CPTII,S$GLB,, | Performed by: UROLOGY

## 2022-09-09 PROCEDURE — 1101F PR PT FALLS ASSESS DOC 0-1 FALLS W/OUT INJ PAST YR: ICD-10-PCS | Mod: CPTII,S$GLB,, | Performed by: UROLOGY

## 2022-09-09 PROCEDURE — 3074F SYST BP LT 130 MM HG: CPT | Mod: CPTII,S$GLB,, | Performed by: UROLOGY

## 2022-09-09 PROCEDURE — 3072F LOW RISK FOR RETINOPATHY: CPT | Mod: CPTII,S$GLB,, | Performed by: UROLOGY

## 2022-09-09 PROCEDURE — 1160F RVW MEDS BY RX/DR IN RCRD: CPT | Mod: CPTII,S$GLB,, | Performed by: UROLOGY

## 2022-09-09 PROCEDURE — 99999 PR PBB SHADOW E&M-EST. PATIENT-LVL IV: CPT | Mod: PBBFAC,,, | Performed by: UROLOGY

## 2022-09-09 PROCEDURE — 4010F PR ACE/ARB THEARPY RXD/TAKEN: ICD-10-PCS | Mod: CPTII,S$GLB,, | Performed by: UROLOGY

## 2022-09-09 PROCEDURE — 3008F PR BODY MASS INDEX (BMI) DOCUMENTED: ICD-10-PCS | Mod: CPTII,S$GLB,, | Performed by: UROLOGY

## 2022-09-09 PROCEDURE — 3074F PR MOST RECENT SYSTOLIC BLOOD PRESSURE < 130 MM HG: ICD-10-PCS | Mod: CPTII,S$GLB,, | Performed by: UROLOGY

## 2022-09-09 PROCEDURE — 3288F FALL RISK ASSESSMENT DOCD: CPT | Mod: CPTII,S$GLB,, | Performed by: UROLOGY

## 2022-09-09 PROCEDURE — 3008F BODY MASS INDEX DOCD: CPT | Mod: CPTII,S$GLB,, | Performed by: UROLOGY

## 2022-09-09 PROCEDURE — 3078F PR MOST RECENT DIASTOLIC BLOOD PRESSURE < 80 MM HG: ICD-10-PCS | Mod: CPTII,S$GLB,, | Performed by: UROLOGY

## 2022-09-09 PROCEDURE — 3052F HG A1C>EQUAL 8.0%<EQUAL 9.0%: CPT | Mod: CPTII,S$GLB,, | Performed by: UROLOGY

## 2022-09-09 PROCEDURE — 1160F PR REVIEW ALL MEDS BY PRESCRIBER/CLIN PHARMACIST DOCUMENTED: ICD-10-PCS | Mod: CPTII,S$GLB,, | Performed by: UROLOGY

## 2022-09-09 PROCEDURE — 99215 OFFICE O/P EST HI 40 MIN: CPT | Mod: S$GLB,,, | Performed by: UROLOGY

## 2022-09-09 PROCEDURE — 3072F PR LOW RISK FOR RETINOPATHY: ICD-10-PCS | Mod: CPTII,S$GLB,, | Performed by: UROLOGY

## 2022-09-09 PROCEDURE — 1159F PR MEDICATION LIST DOCUMENTED IN MEDICAL RECORD: ICD-10-PCS | Mod: CPTII,S$GLB,, | Performed by: UROLOGY

## 2022-09-09 RX ORDER — OXYBUTYNIN CHLORIDE 5 MG/1
5 TABLET, EXTENDED RELEASE ORAL DAILY
Qty: 30 TABLET | Refills: 11 | Status: SHIPPED | OUTPATIENT
Start: 2022-09-09 | End: 2023-03-09 | Stop reason: SDUPTHER

## 2022-09-09 NOTE — PROGRESS NOTES
[facility] Established Patient/H&P:    Ludwin Gee is a 72 y.o. male who presents for new low-risk prostate cancer.     Patient with a several year history of elevated PSA and lower urinary tract symptoms who was previously managed by Dr. Michel.     On review of records, he has undergone negative negative prostate biopsy in 2014. 4K score in the past showed a low probability of clinically significant cancer.     MRI prostate on 8/3/22 with a PI-RADs 4 lesion in the right posterior base. He is now s/p prostate biopsy on 8/24/22 which revealed [place] 3+3= 6 prostate cancer in 2/12 cores.     He remains with moderate lower urinary tract symptoms - urgency, frequency and weak stream. He has been on Uroxatral and previously Flomax. Mostly bothered by his urgency.     He has severe ED with no medications. Not significantly bothered.     Works as an  on the [employer].     Denies any fever, chills, gross hematuria, bone pain, intentional weight loss or  trauma.       PSA  5.7  6/28/22  6.5  2/28/22  5.8  7/23/21  7.9  10/7/20  4.7  6/1/09    IPSS QoL  10 3 6/29/22      Past Medical History:   Diagnosis Date    BPH with elevated PSA     Diabetes mellitus     Elevated PSA     Hyperlipidemia     Hypertension     Personal history of colonic polyps     Prostate cancer 9/6/2022    [place] 3+3=6 Watchful waiting       Past Surgical History:   Procedure Laterality Date    COLONOSCOPY  12/6/2013    Dr. Martinez, 5 year recheck    SHOULDER SURGERY  12/09/2011    right     TRANSRECTAL BIOPSY OF PROSTATE WITH ULTRASOUND GUIDANCE N/A 8/24/2022    Procedure: BIOPSY, PROSTATE, RECTAL APPROACH, WITH US GUIDANCE;  Surgeon: Mora Millan Jr., MD;  Location: [facility] OR;  Service: Urology;  Laterality: N/A;       Review of patient's allergies indicates:   Allergen Reactions    Pravastatin Other (See Comments)     Joint pain    Lisinopril Other (See Comments)     Cough        Medications Reviewed: see  "MAR    FOCUSED PHYSICAL EXAM:    Vitals:    09/09/22 1437   BP: 117/60   Pulse: 92     Body mass index is 28.67 kg/m². Weight: 75.8 kg (167 lb) Height: 5' 4" (162.6 cm)       General: Alert, cooperative, no distress, appears stated age  Abdomen: Soft, non-tender, no CVA tenderness, non-distended      LABS:    No results found for this or any previous visit (from the past 336 hour(s)).      Assessment/Diagnosis:    1. Prostate cancer  PROSTATE SPECIFIC ANTIGEN, DIAGNOSTIC      2. Lower urinary tract symptoms  oxybutynin (DITROPAN-XL) 5 MG TR24      3. Erectile dysfunction, unspecified erectile dysfunction type        4. Urgency incontinence            Plans:    - I spent 30 minutes of the day of this encounter preparing for, treating and managing this patient. Today's visit was spent almost entirely on counseling. We reviewed his diagnosis, stage, grade, risk group, and prognosis. We discussed the concept of low risk, moderate risk, and high risk disease. We discussed the different treatment options including active surveillance (as well as the surveillance protocol), prostate brachytherapy, IMRT, IGRT, cryotherapy, and both open and robotic prostatectomy.We also discussed the advantages, disadvantages, risks and benefits of the different options. Regarding radiation therapy we discussed treatment planning, the different techniques, short and long term complications. These included radiation cystitis, radiation proctitis, and impotence. We discussed success, failure, and salvage therapeutic options. We discussed surgical therapy in depth including preoperative preparation, surgical technique(including bladder neck and nerve-sparing techniques), postoperative recuperation and recovery, and short and long term complications including UTI, bleeding, blood clots,catheter dislodgement, etc. We discussed the risks of reoperation, incontinence, impotence, and recurrence. We discussed preop and postop Kegels, post op penile " rehab, and treatment options for incontinence and impotence. We discussed rates of cancer free survival and recurrence, as well as salvage therapeutic options. We discussed the possible  indications for adjuvant radiation therapy. I answered questions and addressed concerns.  - After extensive discussion, patient wishes to proceed with active surveillance.   - Continue Uroxatral 10 mg PO daily.   - RX for Ditropan 5 mg PO daily.   - RTC in 6 months with PSA prior. Confirmatory biopsy within 1 - 2 years.

## 2022-09-27 ENCOUNTER — LAB VISIT (OUTPATIENT)
Dept: LAB | Facility: HOSPITAL | Age: 72
End: 2022-09-27
Attending: PHYSICAL MEDICINE & REHABILITATION
Payer: MEDICARE

## 2022-09-27 DIAGNOSIS — E11.59 HYPERTENSION ASSOCIATED WITH DIABETES: ICD-10-CM

## 2022-09-27 DIAGNOSIS — E11.29 CONTROLLED TYPE 2 DIABETES MELLITUS WITH MICROALBUMINURIA, WITHOUT LONG-TERM CURRENT USE OF INSULIN: ICD-10-CM

## 2022-09-27 DIAGNOSIS — I15.2 HYPERTENSION ASSOCIATED WITH DIABETES: ICD-10-CM

## 2022-09-27 DIAGNOSIS — R80.9 CONTROLLED TYPE 2 DIABETES MELLITUS WITH MICROALBUMINURIA, WITHOUT LONG-TERM CURRENT USE OF INSULIN: ICD-10-CM

## 2022-09-27 LAB
ANION GAP SERPL CALC-SCNC: 10 MMOL/L (ref 8–16)
BUN SERPL-MCNC: 15 MG/DL (ref 8–23)
CALCIUM SERPL-MCNC: 9.8 MG/DL (ref 8.7–10.5)
CHLORIDE SERPL-SCNC: 105 MMOL/L (ref 95–110)
CO2 SERPL-SCNC: 23 MMOL/L (ref 23–29)
CREAT SERPL-MCNC: 1.1 MG/DL (ref 0.5–1.4)
EST. GFR  (NO RACE VARIABLE): >60 ML/MIN/1.73 M^2
ESTIMATED AVG GLUCOSE: 163 MG/DL (ref 68–131)
GLUCOSE SERPL-MCNC: 137 MG/DL (ref 70–110)
HBA1C MFR BLD: 7.3 % (ref 4–5.6)
POTASSIUM SERPL-SCNC: 4.4 MMOL/L (ref 3.5–5.1)
SODIUM SERPL-SCNC: 138 MMOL/L (ref 136–145)

## 2022-09-27 PROCEDURE — 80048 BASIC METABOLIC PNL TOTAL CA: CPT | Performed by: FAMILY MEDICINE

## 2022-09-27 PROCEDURE — 83036 HEMOGLOBIN GLYCOSYLATED A1C: CPT | Performed by: FAMILY MEDICINE

## 2022-09-27 PROCEDURE — 36415 COLL VENOUS BLD VENIPUNCTURE: CPT | Mod: PO | Performed by: FAMILY MEDICINE

## 2022-10-18 DIAGNOSIS — E11.9 DIABETES MELLITUS WITHOUT COMPLICATION: Primary | ICD-10-CM

## 2022-11-07 NOTE — SUBJECTIVE & OBJECTIVE
Consider compression hose, elevation of legs when possible, increasing ambulatory activity.  Please see your doctor for recheck within 1 week, return for any concerns.   Interval History: remains on NC, 3L; on steriods, Lovenox and remdesivir. Reports nasuea with ambulation.   Review of Systems   Constitutional: Negative for chills and fever.   Respiratory: Negative for cough and shortness of breath.    Gastrointestinal: Positive for nausea. Negative for abdominal pain.   Allergic/Immunologic: Positive for immunocompromised state.   All other systems reviewed and are negative.    Objective:     Vital Signs (Most Recent):  Temp: 98.4 °F (36.9 °C) (02/04/22 0748)  Pulse: 61 (02/04/22 0748)  Resp: 18 (02/04/22 0748)  BP: 135/65 (02/04/22 0748)  SpO2: (!) 94 % (02/04/22 0748) Vital Signs (24h Range):  Temp:  [96.1 °F (35.6 °C)-98.4 °F (36.9 °C)] 98.4 °F (36.9 °C)  Pulse:  [61-80] 61  Resp:  [16-19] 18  SpO2:  [92 %-95 %] 94 %  BP: (118-137)/(63-68) 135/65     Weight: 76.2 kg (168 lb)  Body mass index is 24.81 kg/m².    Intake/Output Summary (Last 24 hours) at 2/4/2022 1223  Last data filed at 2/4/2022 1152  Gross per 24 hour   Intake 1134.52 ml   Output --   Net 1134.52 ml      Physical Exam  Vitals and nursing note reviewed.   Constitutional:       Appearance: He is ill-appearing.   HENT:      Head: Normocephalic and atraumatic.      Right Ear: External ear normal.      Left Ear: External ear normal.      Nose: Nose normal.      Mouth/Throat:      Mouth: Mucous membranes are moist.      Pharynx: Oropharynx is clear.   Eyes:      Extraocular Movements: Extraocular movements intact.      Conjunctiva/sclera: Conjunctivae normal.   Cardiovascular:      Rate and Rhythm: Normal rate and regular rhythm.      Pulses: Normal pulses.      Heart sounds: Normal heart sounds.   Pulmonary:      Effort: Pulmonary effort is normal.      Breath sounds: Rhonchi present.   Abdominal:      General: Bowel sounds are normal.      Palpations: Abdomen is soft.   Musculoskeletal:         General: Normal range of motion.      Cervical back: Normal range of motion and neck supple.      Right lower leg: No edema.       Left lower leg: No edema.   Skin:     General: Skin is warm and dry.   Neurological:      General: No focal deficit present.      Mental Status: He is alert and oriented to person, place, and time. Mental status is at baseline.   Psychiatric:         Mood and Affect: Mood normal.         Behavior: Behavior normal.         Significant Labs: All pertinent labs within the past 24 hours have been reviewed.    Significant Imaging: I have reviewed all pertinent imaging results/findings within the past 24 hours.

## 2022-12-28 ENCOUNTER — LAB VISIT (OUTPATIENT)
Dept: LAB | Facility: HOSPITAL | Age: 72
End: 2022-12-28
Attending: FAMILY MEDICINE
Payer: MEDICARE

## 2022-12-28 DIAGNOSIS — E11.9 DIABETES MELLITUS WITHOUT COMPLICATION: ICD-10-CM

## 2022-12-28 LAB
ESTIMATED AVG GLUCOSE: 148 MG/DL (ref 68–131)
HBA1C MFR BLD: 6.8 % (ref 4–5.6)

## 2022-12-28 PROCEDURE — 36415 COLL VENOUS BLD VENIPUNCTURE: CPT | Mod: PO | Performed by: FAMILY MEDICINE

## 2022-12-28 PROCEDURE — 83036 HEMOGLOBIN GLYCOSYLATED A1C: CPT | Performed by: FAMILY MEDICINE

## 2023-01-04 DIAGNOSIS — E78.5 HYPERLIPIDEMIA ASSOCIATED WITH TYPE 2 DIABETES MELLITUS: ICD-10-CM

## 2023-01-04 DIAGNOSIS — E11.69 HYPERLIPIDEMIA ASSOCIATED WITH TYPE 2 DIABETES MELLITUS: ICD-10-CM

## 2023-01-04 DIAGNOSIS — E11.59 HYPERTENSION ASSOCIATED WITH DIABETES: Primary | ICD-10-CM

## 2023-01-04 DIAGNOSIS — R80.9 CONTROLLED TYPE 2 DIABETES MELLITUS WITH MICROALBUMINURIA, WITHOUT LONG-TERM CURRENT USE OF INSULIN: ICD-10-CM

## 2023-01-04 DIAGNOSIS — I15.2 HYPERTENSION ASSOCIATED WITH DIABETES: Primary | ICD-10-CM

## 2023-01-04 DIAGNOSIS — D64.9 ANEMIA, UNSPECIFIED TYPE: ICD-10-CM

## 2023-01-04 DIAGNOSIS — E11.29 CONTROLLED TYPE 2 DIABETES MELLITUS WITH MICROALBUMINURIA, WITHOUT LONG-TERM CURRENT USE OF INSULIN: ICD-10-CM

## 2023-02-22 DIAGNOSIS — E11.29 CONTROLLED TYPE 2 DIABETES MELLITUS WITH MICROALBUMINURIA, WITHOUT LONG-TERM CURRENT USE OF INSULIN: ICD-10-CM

## 2023-02-22 DIAGNOSIS — R80.9 CONTROLLED TYPE 2 DIABETES MELLITUS WITH MICROALBUMINURIA, WITHOUT LONG-TERM CURRENT USE OF INSULIN: ICD-10-CM

## 2023-02-22 RX ORDER — METFORMIN HYDROCHLORIDE 500 MG/1
TABLET, EXTENDED RELEASE ORAL
Qty: 360 TABLET | Refills: 0 | Status: SHIPPED | OUTPATIENT
Start: 2023-02-22 | End: 2023-09-18

## 2023-02-22 NOTE — TELEPHONE ENCOUNTER
Care Due:                  Date            Visit Type   Department     Provider  --------------------------------------------------------------------------------                                EP -                              PRIMARY      [facility]  Last Visit: 09-      CARE ([facility])   PRACTICE       Herrera Fisher  Next Visit: None Scheduled  None         None Found                                                            Last  Test          Frequency    Reason                     Performed    Due Date  --------------------------------------------------------------------------------    CMP.........  12 months..  rosuvastatin.............  03- 03-    Lipid Panel.  12 months..  rosuvastatin.............  03- 03-    [employer] Embedded Care Gaps. Reference number: [id number]. 2/22/2023   8:59:51 AM CST

## 2023-02-22 NOTE — TELEPHONE ENCOUNTER
Refill Decision Note   Ludwin Gee  is requesting a refill authorization.  Brief Assessment and Rationale for Refill:  Approve     Medication Therapy Plan:       Medication Reconciliation Completed: No   Comments:     Provider Staff:     Action is required for this patient.   Please see care gap opportunities below in Care Due Message.     Thanks!  [facility]     Appointments      Date Provider   Last Visit   9/6/2022 Herrera Fisher MD   Next Visit   Visit date not found Herrera Fisher MD     Note composed:3:17 PM 02/22/2023           Note composed:3:17 PM 02/22/2023

## 2023-02-28 ENCOUNTER — TELEPHONE (OUTPATIENT)
Dept: GASTROENTEROLOGY | Facility: CLINIC | Age: 73
End: 2023-02-28
Payer: MEDICARE

## 2023-02-28 DIAGNOSIS — Z86.010 HISTORY OF COLONIC POLYPS: Primary | ICD-10-CM

## 2023-02-28 NOTE — TELEPHONE ENCOUNTER
----- Message from Eryn Kang sent at 2/28/2023  4:06 PM CST -----  Regarding: PROCEDURE SCHED REQUEST  Contact: Wife  Type: Patient Call Back         Who called: Wife         What is the request in detail: calling to sched patient for colonoscopy; requesting a call back to discuss availability; please advise            Best call back number: [phone] - wife; [phone]         Additional Information: prefers Friday mornings            Thank You

## 2023-03-07 ENCOUNTER — LAB VISIT (OUTPATIENT)
Dept: LAB | Facility: HOSPITAL | Age: 73
End: 2023-03-07
Attending: UROLOGY
Payer: MEDICARE

## 2023-03-07 DIAGNOSIS — C61 PROSTATE CANCER: ICD-10-CM

## 2023-03-07 LAB — COMPLEXED PSA SERPL-MCNC: 4.9 NG/ML (ref 0–4)

## 2023-03-07 PROCEDURE — 84153 ASSAY OF PSA TOTAL: CPT | Performed by: UROLOGY

## 2023-03-07 PROCEDURE — 36415 COLL VENOUS BLD VENIPUNCTURE: CPT | Performed by: UROLOGY

## 2023-03-09 ENCOUNTER — OFFICE VISIT (OUTPATIENT)
Dept: UROLOGY | Facility: CLINIC | Age: 73
End: 2023-03-09
Payer: MEDICARE

## 2023-03-09 VITALS
DIASTOLIC BLOOD PRESSURE: 73 MMHG | SYSTOLIC BLOOD PRESSURE: 123 MMHG | HEIGHT: 64 IN | HEART RATE: 80 BPM | BODY MASS INDEX: 28.67 KG/M2

## 2023-03-09 DIAGNOSIS — R39.9 LOWER URINARY TRACT SYMPTOMS: ICD-10-CM

## 2023-03-09 DIAGNOSIS — N52.9 ERECTILE DYSFUNCTION, UNSPECIFIED ERECTILE DYSFUNCTION TYPE: ICD-10-CM

## 2023-03-09 DIAGNOSIS — N39.41 URGENCY INCONTINENCE: ICD-10-CM

## 2023-03-09 DIAGNOSIS — C61 PROSTATE CANCER: Primary | ICD-10-CM

## 2023-03-09 PROCEDURE — 1101F PT FALLS ASSESS-DOCD LE1/YR: CPT | Mod: CPTII,S$GLB,, | Performed by: UROLOGY

## 2023-03-09 PROCEDURE — 1101F PR PT FALLS ASSESS DOC 0-1 FALLS W/OUT INJ PAST YR: ICD-10-PCS | Mod: CPTII,S$GLB,, | Performed by: UROLOGY

## 2023-03-09 PROCEDURE — 1126F PR PAIN SEVERITY QUANTIFIED, NO PAIN PRESENT: ICD-10-PCS | Mod: CPTII,S$GLB,, | Performed by: UROLOGY

## 2023-03-09 PROCEDURE — 99999 PR PBB SHADOW E&M-EST. PATIENT-LVL II: ICD-10-PCS | Mod: PBBFAC,,, | Performed by: UROLOGY

## 2023-03-09 PROCEDURE — 1160F RVW MEDS BY RX/DR IN RCRD: CPT | Mod: CPTII,S$GLB,, | Performed by: UROLOGY

## 2023-03-09 PROCEDURE — 99999 PR PBB SHADOW E&M-EST. PATIENT-LVL II: CPT | Mod: PBBFAC,,, | Performed by: UROLOGY

## 2023-03-09 PROCEDURE — 3078F DIAST BP <80 MM HG: CPT | Mod: CPTII,S$GLB,, | Performed by: UROLOGY

## 2023-03-09 PROCEDURE — 4010F PR ACE/ARB THEARPY RXD/TAKEN: ICD-10-PCS | Mod: CPTII,S$GLB,, | Performed by: UROLOGY

## 2023-03-09 PROCEDURE — 3008F PR BODY MASS INDEX (BMI) DOCUMENTED: ICD-10-PCS | Mod: CPTII,S$GLB,, | Performed by: UROLOGY

## 2023-03-09 PROCEDURE — 1160F PR REVIEW ALL MEDS BY PRESCRIBER/CLIN PHARMACIST DOCUMENTED: ICD-10-PCS | Mod: CPTII,S$GLB,, | Performed by: UROLOGY

## 2023-03-09 PROCEDURE — 3074F PR MOST RECENT SYSTOLIC BLOOD PRESSURE < 130 MM HG: ICD-10-PCS | Mod: CPTII,S$GLB,, | Performed by: UROLOGY

## 2023-03-09 PROCEDURE — 3078F PR MOST RECENT DIASTOLIC BLOOD PRESSURE < 80 MM HG: ICD-10-PCS | Mod: CPTII,S$GLB,, | Performed by: UROLOGY

## 2023-03-09 PROCEDURE — 99214 PR OFFICE/OUTPT VISIT, EST, LEVL IV, 30-39 MIN: ICD-10-PCS | Mod: S$GLB,,, | Performed by: UROLOGY

## 2023-03-09 PROCEDURE — 1159F MED LIST DOCD IN RCRD: CPT | Mod: CPTII,S$GLB,, | Performed by: UROLOGY

## 2023-03-09 PROCEDURE — 99214 OFFICE O/P EST MOD 30 MIN: CPT | Mod: S$GLB,,, | Performed by: UROLOGY

## 2023-03-09 PROCEDURE — 1159F PR MEDICATION LIST DOCUMENTED IN MEDICAL RECORD: ICD-10-PCS | Mod: CPTII,S$GLB,, | Performed by: UROLOGY

## 2023-03-09 PROCEDURE — 4010F ACE/ARB THERAPY RXD/TAKEN: CPT | Mod: CPTII,S$GLB,, | Performed by: UROLOGY

## 2023-03-09 PROCEDURE — 1126F AMNT PAIN NOTED NONE PRSNT: CPT | Mod: CPTII,S$GLB,, | Performed by: UROLOGY

## 2023-03-09 PROCEDURE — 3288F FALL RISK ASSESSMENT DOCD: CPT | Mod: CPTII,S$GLB,, | Performed by: UROLOGY

## 2023-03-09 PROCEDURE — 3074F SYST BP LT 130 MM HG: CPT | Mod: CPTII,S$GLB,, | Performed by: UROLOGY

## 2023-03-09 PROCEDURE — 3008F BODY MASS INDEX DOCD: CPT | Mod: CPTII,S$GLB,, | Performed by: UROLOGY

## 2023-03-09 PROCEDURE — 3288F PR FALLS RISK ASSESSMENT DOCUMENTED: ICD-10-PCS | Mod: CPTII,S$GLB,, | Performed by: UROLOGY

## 2023-03-09 RX ORDER — OXYBUTYNIN CHLORIDE 5 MG/1
5 TABLET, EXTENDED RELEASE ORAL DAILY
Qty: 90 TABLET | Refills: 3 | Status: SHIPPED | OUTPATIENT
Start: 2023-03-09 | End: 2024-03-27 | Stop reason: SDUPTHER

## 2023-03-09 RX ORDER — ALFUZOSIN HYDROCHLORIDE 10 MG/1
10 TABLET, EXTENDED RELEASE ORAL
Qty: 90 TABLET | Refills: 3 | Status: SHIPPED | OUTPATIENT
Start: 2023-03-09 | End: 2024-03-27 | Stop reason: SDUPTHER

## 2023-03-09 NOTE — PROGRESS NOTES
[facility] Established Patient/H&P:    Ludwin Gee is a 73 y.o. male who presents for follow-up for low-risk prostate cancer.      Patient with a several year history of elevated PSA and lower urinary tract symptoms who was previously managed by Dr. Michel.      On review of records, he has undergone negative negative prostate biopsy in 2014. 4K score in the past showed a low probability of clinically significant cancer.      MRI prostate on 8/3/22 with a PI-RADs 4 lesion in the right posterior base. He is now s/p prostate biopsy on 8/24/22 which revealed [place] 3+3= 6 prostate cancer in 2/12 cores.      He remains with moderate lower urinary tract symptoms - urgency, frequency and weak stream. He has been on Uroxatral and previously Flomax. Mostly bothered by his urgency.      He has severe ED with no medications. Not significantly bothered.      Works as an  on the [email or web address].      Interval History    3/9/23: Here for follow-up with no complaints. Decided on active surveillance for his low-risk prostate cancer. Most recent PSA 4.9. He is voiding well with stable lower urinary tract symptoms on Ditropan 5 mg and Uroxatral 10 mg PO daily.      Denies any fever, chills, gross hematuria, bone pain, intentional weight loss or  trauma.         PSA  4.9  3/7/23  5.7                   6/28/22  6.5                   2/28/22  5.8                   7/23/21  7.9                   10/7/20  11.7  1/25/18  9.61  5/8/13  4.7                   6/1/09     IPSS    QoL  10        3          6/29/22    Past Medical History:   Diagnosis Date    BPH with elevated PSA     Diabetes mellitus     Elevated PSA     Hyperlipidemia     Hypertension     Personal history of colonic polyps     Prostate cancer 9/6/2022    [place] 3+3=6 Watchful waiting       Past Surgical History:   Procedure Laterality Date    COLONOSCOPY  12/6/2013    Dr. Martinez, 5 year recheck    SHOULDER SURGERY  12/09/2011    right   "   TRANSRECTAL BIOPSY OF PROSTATE WITH ULTRASOUND GUIDANCE N/A 8/24/2022    Procedure: BIOPSY, PROSTATE, RECTAL APPROACH, WITH US GUIDANCE;  Surgeon: Mora Millan Jr., MD;  Location: [facility] OR;  Service: Urology;  Laterality: N/A;       Review of patient's allergies indicates:   Allergen Reactions    Pravastatin Other (See Comments)     Joint pain    Lisinopril Other (See Comments)     Cough        Medications Reviewed: see MAR    FOCUSED PHYSICAL EXAM:    Vitals:    03/09/23 1122   BP: 123/73   Pulse: 80     Body mass index is 28.67 kg/m².   Height: 5' 4" (162.6 cm)       General: Alert, cooperative, no distress, appears stated age  Abdomen: Soft, non-tender, no CVA tenderness, non-distended  JOYCE: Declined    LABS:    Recent Results (from the past 336 hour(s))   PROSTATE SPECIFIC ANTIGEN, DIAGNOSTIC    Collection Time: 03/07/23  8:08 AM   Result Value Ref Range    PSA Diagnostic 4.9 (H) 0.00 - 4.00 ng/mL         Assessment/Diagnosis:    1. Prostate cancer        2. Lower urinary tract symptoms        3. Urgency incontinence        4. Erectile dysfunction, unspecified erectile dysfunction type            Plans:    - I spent 30 minutes of the day of this encounter preparing for, treating and managing this patient. Today's visit was spent almost entirely on counseling. We reviewed his diagnosis, stage, grade, risk group, and prognosis. We discussed the concept of low risk, moderate risk, and high risk disease. We discussed the different treatment options including active surveillance (as well as the surveillance protocol), prostate brachytherapy, IMRT, IGRT, cryotherapy, and both open and robotic prostatectomy. Wishes to continue with active surveillance.   - Continue Uroxatral 10 mg PO daily - refills ordered.   - RX for Ditropan 5 mg PO daily - refills ordered.   - PSA in 6 months.   - RTC in 1 year if stable.     "

## 2023-03-29 ENCOUNTER — TELEPHONE (OUTPATIENT)
Dept: PULMONOLOGY | Facility: CLINIC | Age: 73
End: 2023-03-29
Payer: MEDICARE

## 2023-03-29 NOTE — TELEPHONE ENCOUNTER
Spoke with patient. Advised chest x-ray not needed. Patient is to follow up as needed. Patient will call if appointment needed.     ----- Message from Jennifer Figueroa sent at 3/29/2023  2:31 PM CDT -----  Contact: Pt wife Emily  Type:  Needs Medical Advice    Who Called: pt wife Emily  Symptoms (please be specific): Pt wife says Dr. BRUCE ordered Xray to be done for f/u      Would the patient rather a call back or a response via [facility]? call  Best Call Back Number: [phone]    Additional Information: Please call to advise...    Thank you...

## 2023-04-12 ENCOUNTER — PATIENT MESSAGE (OUTPATIENT)
Dept: ADMINISTRATIVE | Facility: HOSPITAL | Age: 73
End: 2023-04-12
Payer: MEDICARE

## 2023-04-26 ENCOUNTER — PES CALL (OUTPATIENT)
Dept: ADMINISTRATIVE | Facility: CLINIC | Age: 73
End: 2023-04-26
Payer: MEDICARE

## 2023-04-27 NOTE — PLAN OF CARE
Pt is alert and oriented. Pt is able to verbalize needs. Pt ambulates independently. Pt is continent of bowel and bladder. Pt. Denies any pain this shift. Pt received sliding scale for blood sugar of 362. Pt tolerated sliding scale well. Pt remains on 6L of 02 via nasal cannula. Will continue to monitor,   Enbrel Counseling:  I discussed with the patient the risks of etanercept including but not limited to myelosuppression, immunosuppression, autoimmune hepatitis, demyelinating diseases, lymphoma, and infections.  The patient understands that monitoring is required including a PPD at baseline and must alert us or the primary physician if symptoms of infection or other concerning signs are noted.

## 2023-05-02 ENCOUNTER — TELEPHONE (OUTPATIENT)
Dept: FAMILY MEDICINE | Facility: CLINIC | Age: 73
End: 2023-05-02
Payer: MEDICARE

## 2023-05-02 ENCOUNTER — TELEPHONE (OUTPATIENT)
Dept: GASTROENTEROLOGY | Facility: CLINIC | Age: 73
End: 2023-05-02
Payer: MEDICARE

## 2023-05-02 NOTE — TELEPHONE ENCOUNTER
----- Message from Mitali Castanon sent at 5/2/2023  4:31 PM CDT -----  Regarding: advice  Contact: wife  Type: Needs Medical Advice  Who Called:  Patient wife// Jennifer   Symptoms (please be specific):    How long has patient had these symptoms:    Pharmacy name and phone #:    Best Call Back Number: [phone] (home) [phone]    Additional Information: Patient wife that she would like to speak with the nurse. Please contact to advise. Thanks!

## 2023-05-02 NOTE — TELEPHONE ENCOUNTER
----- Message from Virgie Camacho sent at 5/2/2023  4:06 PM CDT -----  Type: Needs Medical Advice  Who Called:  pt     Best Call Back Number: [phone]    Additional Information: pt wife is requesting a call back in regards to having a possible hernia , wants to know if dr wants to see pt before colonoscopy , please call back and advise .

## 2023-05-02 NOTE — TELEPHONE ENCOUNTER
Returned call to patient to clarify that if he has a hernia it will not interfere with colonoscopy.

## 2023-05-12 ENCOUNTER — OFFICE VISIT (OUTPATIENT)
Dept: OPTOMETRY | Facility: CLINIC | Age: 73
End: 2023-05-12
Payer: MEDICARE

## 2023-05-12 DIAGNOSIS — H26.9 CORTICAL CATARACT: ICD-10-CM

## 2023-05-12 DIAGNOSIS — H25.13 NUCLEAR SCLEROSIS, BILATERAL: ICD-10-CM

## 2023-05-12 DIAGNOSIS — H52.7 REFRACTIVE ERROR: ICD-10-CM

## 2023-05-12 DIAGNOSIS — E11.9 DIABETES MELLITUS TYPE 2 WITHOUT RETINOPATHY: Primary | ICD-10-CM

## 2023-05-12 DIAGNOSIS — H04.123 DRY EYE SYNDROME, BILATERAL: ICD-10-CM

## 2023-05-12 PROCEDURE — 99999 PR PBB SHADOW E&M-EST. PATIENT-LVL III: ICD-10-PCS | Mod: PBBFAC,,, | Performed by: OPTOMETRIST

## 2023-05-12 PROCEDURE — 99999 PR PBB SHADOW E&M-EST. PATIENT-LVL III: CPT | Mod: PBBFAC,,, | Performed by: OPTOMETRIST

## 2023-05-12 PROCEDURE — 92014 COMPRE OPH EXAM EST PT 1/>: CPT | Mod: S$GLB,,, | Performed by: OPTOMETRIST

## 2023-05-12 PROCEDURE — 92014 PR EYE EXAM, EST PATIENT,COMPREHESV: ICD-10-PCS | Mod: S$GLB,,, | Performed by: OPTOMETRIST

## 2023-05-12 NOTE — PROGRESS NOTES
HPI    Pt here today for annual DM exam.   States slight decreased vision at   distance only, near vision good.  Declines refraction today.      Denies any headaches or eye pain.    Hemoglobin A1C       Date                     Value               Ref Range             Status                12/28/2022               6.8 (H)             4.0 - 5.6 %           Final                 09/27/2022               7.3 (H)             4.0 - 5.6 %           Final                 06/28/2022               8.1 (H)             4.0 - 5.6 %           Final              BSL controlled.    (+) dry eyes -- +Refresh gtts throughout day due to works in [place]   environment  (+) allergies -- [place] OU prn  (+) floaters OU -- no light flashes  Last edited by Lui Horta, OD on 5/12/2023  8:07 AM.            Assessment /Plan     For exam results, see Encounter Report.    Diabetes mellitus type 2 without retinopathy    Nuclear sclerosis, bilateral    Cortical cataract    Refractive error    Dry eye syndrome, bilateral      1. Diabetes mellitus type 2 without retinopathy  Discussed possible ocular affects of uncontrolled blood sugar with patient. Recommended continued strong blood sugar control and continued care with PCP. Monitor yearly.     2. Nuclear sclerosis, bilateral  3. Cortical cataract  Mild to moderate, not yet significant. Discussed possible ocular affects of cataracts. Acceptable BCVA OU. Discussed treatment options. Surgery not recommended at this time. Monitor yearly.     4. Refractive error  Declines refraction, happy with current specs    5. Dry eye syndrome, bilateral  Discussed ocular affects of dry eyes. Recommend OTC artificial tears 2-4 times a day in both eyes. Discussed chronicity of SYDNI. RTC if symptoms not alleviated by continued use of artificial tears.

## 2023-05-15 ENCOUNTER — PATIENT MESSAGE (OUTPATIENT)
Dept: GASTROENTEROLOGY | Facility: CLINIC | Age: 73
End: 2023-05-15
Payer: MEDICARE

## 2023-05-15 ENCOUNTER — TELEPHONE (OUTPATIENT)
Dept: GASTROENTEROLOGY | Facility: CLINIC | Age: 73
End: 2023-05-15
Payer: MEDICARE

## 2023-05-15 NOTE — TELEPHONE ENCOUNTER
----- Message from Rosmery Alo sent at 5/15/2023  3:38 PM CDT -----  Contact: patient wife  Type:  Needs Medical Advice    Who Called:  Patient's wifeEmily     Would the patient rather a call back or a response via [facility]? Call     Best Call Back Number: [phone] (home)      Additional Information: Patient's wifeEmily would like to speak with the nurse in regards to getting the instructions for the colonoscopy sent to patient.     Please call to advise

## 2023-05-18 ENCOUNTER — HOSPITAL ENCOUNTER (OUTPATIENT)
Facility: HOSPITAL | Age: 73
Discharge: HOME | End: 2023-05-19
Attending: INTERNAL MEDICINE | Admitting: INTERNAL MEDICINE
Payer: MEDICARE

## 2023-05-18 DIAGNOSIS — K64.8 INTERNAL HEMORRHOIDS: ICD-10-CM

## 2023-05-18 DIAGNOSIS — Z86.010 HISTORY OF COLONIC POLYPS: ICD-10-CM

## 2023-05-18 DIAGNOSIS — K63.5 POLYP OF COLON, UNSPECIFIED PART OF COLON, UNSPECIFIED TYPE: Primary | ICD-10-CM

## 2023-05-18 DIAGNOSIS — K57.90 DIVERTICULOSIS: ICD-10-CM

## 2023-05-19 ENCOUNTER — OFFICE VISIT (OUTPATIENT)
Dept: FAMILY MEDICINE | Facility: CLINIC | Age: 73
End: 2023-05-19
Attending: FAMILY MEDICINE
Payer: MEDICARE

## 2023-05-19 ENCOUNTER — ANESTHESIA (OUTPATIENT)
Dept: ENDOSCOPY | Facility: HOSPITAL | Age: 73
End: 2023-05-19
Payer: MEDICARE

## 2023-05-19 ENCOUNTER — TELEPHONE (OUTPATIENT)
Dept: GASTROENTEROLOGY | Facility: CLINIC | Age: 73
End: 2023-05-19
Payer: MEDICARE

## 2023-05-19 ENCOUNTER — PATIENT MESSAGE (OUTPATIENT)
Dept: SURGERY | Facility: CLINIC | Age: 73
End: 2023-05-19
Payer: MEDICARE

## 2023-05-19 ENCOUNTER — TELEPHONE (OUTPATIENT)
Dept: SURGERY | Facility: CLINIC | Age: 73
End: 2023-05-19
Payer: MEDICARE

## 2023-05-19 ENCOUNTER — ANESTHESIA EVENT (OUTPATIENT)
Dept: ENDOSCOPY | Facility: HOSPITAL | Age: 73
End: 2023-05-19
Payer: MEDICARE

## 2023-05-19 VITALS
HEIGHT: 66 IN | BODY MASS INDEX: 25.75 KG/M2 | SYSTOLIC BLOOD PRESSURE: 110 MMHG | HEART RATE: 88 BPM | DIASTOLIC BLOOD PRESSURE: 60 MMHG | OXYGEN SATURATION: 95 % | TEMPERATURE: 98 F | WEIGHT: 160.25 LBS

## 2023-05-19 VITALS
OXYGEN SATURATION: 95 % | HEIGHT: 66 IN | DIASTOLIC BLOOD PRESSURE: 67 MMHG | SYSTOLIC BLOOD PRESSURE: 148 MMHG | TEMPERATURE: 98 F | RESPIRATION RATE: 18 BRPM | WEIGHT: 167 LBS | HEART RATE: 62 BPM | BODY MASS INDEX: 26.84 KG/M2

## 2023-05-19 DIAGNOSIS — E11.59 HYPERTENSION ASSOCIATED WITH DIABETES: ICD-10-CM

## 2023-05-19 DIAGNOSIS — E11.29 CONTROLLED TYPE 2 DIABETES MELLITUS WITH MICROALBUMINURIA, WITHOUT LONG-TERM CURRENT USE OF INSULIN: Primary | ICD-10-CM

## 2023-05-19 DIAGNOSIS — E11.69 HYPERLIPIDEMIA ASSOCIATED WITH TYPE 2 DIABETES MELLITUS: ICD-10-CM

## 2023-05-19 DIAGNOSIS — I15.2 HYPERTENSION ASSOCIATED WITH DIABETES: ICD-10-CM

## 2023-05-19 DIAGNOSIS — K43.9 VENTRAL HERNIA WITHOUT OBSTRUCTION OR GANGRENE: ICD-10-CM

## 2023-05-19 DIAGNOSIS — R80.9 CONTROLLED TYPE 2 DIABETES MELLITUS WITH MICROALBUMINURIA, WITHOUT LONG-TERM CURRENT USE OF INSULIN: Primary | ICD-10-CM

## 2023-05-19 DIAGNOSIS — K63.5 POLYP OF ASCENDING COLON, UNSPECIFIED TYPE: Primary | ICD-10-CM

## 2023-05-19 DIAGNOSIS — E78.5 HYPERLIPIDEMIA ASSOCIATED WITH TYPE 2 DIABETES MELLITUS: ICD-10-CM

## 2023-05-19 PROCEDURE — 37000008 HC ANESTHESIA 1ST 15 MINUTES: Performed by: INTERNAL MEDICINE

## 2023-05-19 PROCEDURE — 45381 COLONOSCOPY SUBMUCOUS NJX: CPT | Mod: PT,,, | Performed by: INTERNAL MEDICINE

## 2023-05-19 PROCEDURE — 45385 PR COLONOSCOPY,REMV LESN,SNARE: ICD-10-PCS | Mod: 22,PT,, | Performed by: INTERNAL MEDICINE

## 2023-05-19 PROCEDURE — D9220A PRA ANESTHESIA: Mod: PT,ANES,, | Performed by: ANESTHESIOLOGY

## 2023-05-19 PROCEDURE — 63600175 PHARM REV CODE 636 W HCPCS: Performed by: NURSE ANESTHETIST, CERTIFIED REGISTERED

## 2023-05-19 PROCEDURE — 1126F PR PAIN SEVERITY QUANTIFIED, NO PAIN PRESENT: ICD-10-PCS | Mod: CPTII,,, | Performed by: FAMILY MEDICINE

## 2023-05-19 PROCEDURE — 3074F SYST BP LT 130 MM HG: CPT | Mod: CPTII,,, | Performed by: FAMILY MEDICINE

## 2023-05-19 PROCEDURE — 45381 PR COLONOSCPY,FLEX,W/DIR SUBMUC INJECT: ICD-10-PCS | Mod: PT,,, | Performed by: INTERNAL MEDICINE

## 2023-05-19 PROCEDURE — 99214 OFFICE O/P EST MOD 30 MIN: CPT | Mod: ,,, | Performed by: FAMILY MEDICINE

## 2023-05-19 PROCEDURE — 3008F PR BODY MASS INDEX (BMI) DOCUMENTED: ICD-10-PCS | Mod: CPTII,,, | Performed by: FAMILY MEDICINE

## 2023-05-19 PROCEDURE — 27201089 HC SNARE, DISP (ANY): Performed by: INTERNAL MEDICINE

## 2023-05-19 PROCEDURE — 1160F PR REVIEW ALL MEDS BY PRESCRIBER/CLIN PHARMACIST DOCUMENTED: ICD-10-PCS | Mod: CPTII,,, | Performed by: FAMILY MEDICINE

## 2023-05-19 PROCEDURE — D9220A PRA ANESTHESIA: ICD-10-PCS | Mod: PT,CRNA,, | Performed by: NURSE ANESTHETIST, CERTIFIED REGISTERED

## 2023-05-19 PROCEDURE — 3078F PR MOST RECENT DIASTOLIC BLOOD PRESSURE < 80 MM HG: ICD-10-PCS | Mod: CPTII,,, | Performed by: FAMILY MEDICINE

## 2023-05-19 PROCEDURE — 45385 COLONOSCOPY W/LESION REMOVAL: CPT | Mod: PT | Performed by: INTERNAL MEDICINE

## 2023-05-19 PROCEDURE — 1101F PR PT FALLS ASSESS DOC 0-1 FALLS W/OUT INJ PAST YR: ICD-10-PCS | Mod: CPTII,,, | Performed by: FAMILY MEDICINE

## 2023-05-19 PROCEDURE — 25000003 PHARM REV CODE 250: Performed by: INTERNAL MEDICINE

## 2023-05-19 PROCEDURE — 3288F FALL RISK ASSESSMENT DOCD: CPT | Mod: CPTII,,, | Performed by: FAMILY MEDICINE

## 2023-05-19 PROCEDURE — 25000003 PHARM REV CODE 250: Performed by: NURSE ANESTHETIST, CERTIFIED REGISTERED

## 2023-05-19 PROCEDURE — 4010F ACE/ARB THERAPY RXD/TAKEN: CPT | Mod: CPTII,,, | Performed by: FAMILY MEDICINE

## 2023-05-19 PROCEDURE — 45381 COLONOSCOPY SUBMUCOUS NJX: CPT | Mod: PT | Performed by: INTERNAL MEDICINE

## 2023-05-19 PROCEDURE — 99214 PR OFFICE/OUTPT VISIT, EST, LEVL IV, 30-39 MIN: ICD-10-PCS | Mod: ,,, | Performed by: FAMILY MEDICINE

## 2023-05-19 PROCEDURE — 1159F MED LIST DOCD IN RCRD: CPT | Mod: CPTII,,, | Performed by: FAMILY MEDICINE

## 2023-05-19 PROCEDURE — 1101F PT FALLS ASSESS-DOCD LE1/YR: CPT | Mod: CPTII,,, | Performed by: FAMILY MEDICINE

## 2023-05-19 PROCEDURE — 88305 TISSUE EXAM BY PATHOLOGIST: CPT | Mod: 26,,, | Performed by: STUDENT IN AN ORGANIZED HEALTH CARE EDUCATION/TRAINING PROGRAM

## 2023-05-19 PROCEDURE — 1160F RVW MEDS BY RX/DR IN RCRD: CPT | Mod: CPTII,,, | Performed by: FAMILY MEDICINE

## 2023-05-19 PROCEDURE — 88305 TISSUE EXAM BY PATHOLOGIST: CPT | Mod: 59 | Performed by: STUDENT IN AN ORGANIZED HEALTH CARE EDUCATION/TRAINING PROGRAM

## 2023-05-19 PROCEDURE — 3288F PR FALLS RISK ASSESSMENT DOCUMENTED: ICD-10-PCS | Mod: CPTII,,, | Performed by: FAMILY MEDICINE

## 2023-05-19 PROCEDURE — D9220A PRA ANESTHESIA: Mod: PT,CRNA,, | Performed by: NURSE ANESTHETIST, CERTIFIED REGISTERED

## 2023-05-19 PROCEDURE — 4010F PR ACE/ARB THEARPY RXD/TAKEN: ICD-10-PCS | Mod: CPTII,,, | Performed by: FAMILY MEDICINE

## 2023-05-19 PROCEDURE — 3008F BODY MASS INDEX DOCD: CPT | Mod: CPTII,,, | Performed by: FAMILY MEDICINE

## 2023-05-19 PROCEDURE — D9220A PRA ANESTHESIA: ICD-10-PCS | Mod: PT,ANES,, | Performed by: ANESTHESIOLOGY

## 2023-05-19 PROCEDURE — 37000009 HC ANESTHESIA EA ADD 15 MINS: Performed by: INTERNAL MEDICINE

## 2023-05-19 PROCEDURE — 99999 PR PBB SHADOW E&M-EST. PATIENT-LVL III: CPT | Mod: PBBFAC,,, | Performed by: FAMILY MEDICINE

## 2023-05-19 PROCEDURE — 27201028 HC NEEDLE, SCLERO: Performed by: INTERNAL MEDICINE

## 2023-05-19 PROCEDURE — 99213 OFFICE O/P EST LOW 20 MIN: CPT | Mod: PN | Performed by: FAMILY MEDICINE

## 2023-05-19 PROCEDURE — 3078F DIAST BP <80 MM HG: CPT | Mod: CPTII,,, | Performed by: FAMILY MEDICINE

## 2023-05-19 PROCEDURE — 88305 TISSUE EXAM BY PATHOLOGIST: ICD-10-PCS | Mod: 26,,, | Performed by: STUDENT IN AN ORGANIZED HEALTH CARE EDUCATION/TRAINING PROGRAM

## 2023-05-19 PROCEDURE — 1126F AMNT PAIN NOTED NONE PRSNT: CPT | Mod: CPTII,,, | Performed by: FAMILY MEDICINE

## 2023-05-19 PROCEDURE — 99999 PR PBB SHADOW E&M-EST. PATIENT-LVL III: ICD-10-PCS | Mod: PBBFAC,,, | Performed by: FAMILY MEDICINE

## 2023-05-19 PROCEDURE — 45385 COLONOSCOPY W/LESION REMOVAL: CPT | Mod: 22,PT,, | Performed by: INTERNAL MEDICINE

## 2023-05-19 PROCEDURE — 3074F PR MOST RECENT SYSTOLIC BLOOD PRESSURE < 130 MM HG: ICD-10-PCS | Mod: CPTII,,, | Performed by: FAMILY MEDICINE

## 2023-05-19 PROCEDURE — 1159F PR MEDICATION LIST DOCUMENTED IN MEDICAL RECORD: ICD-10-PCS | Mod: CPTII,,, | Performed by: FAMILY MEDICINE

## 2023-05-19 RX ORDER — LIDOCAINE HYDROCHLORIDE 20 MG/ML
INJECTION INTRAVENOUS
Status: DISCONTINUED | OUTPATIENT
Start: 2023-05-19 | End: 2023-05-19

## 2023-05-19 RX ORDER — SODIUM CHLORIDE 9 MG/ML
INJECTION, SOLUTION INTRAVENOUS CONTINUOUS
Status: DISCONTINUED | OUTPATIENT
Start: 2023-05-19 | End: 2023-05-19 | Stop reason: HOSPADM

## 2023-05-19 RX ORDER — PROPOFOL 10 MG/ML
INJECTION, EMULSION INTRAVENOUS
Status: DISCONTINUED | OUTPATIENT
Start: 2023-05-19 | End: 2023-05-19

## 2023-05-19 RX ORDER — DEXTROMETHORPHAN/PSEUDOEPHED 2.5-7.5/.8
DROPS ORAL
Status: COMPLETED | OUTPATIENT
Start: 2023-05-19 | End: 2023-05-19

## 2023-05-19 RX ADMIN — LIDOCAINE HYDROCHLORIDE 80 MG: 20 INJECTION, SOLUTION INTRAVENOUS at 08:05

## 2023-05-19 RX ADMIN — PROPOFOL 40 MG: 10 INJECTION, EMULSION INTRAVENOUS at 08:05

## 2023-05-19 RX ADMIN — PROPOFOL 80 MG: 10 INJECTION, EMULSION INTRAVENOUS at 08:05

## 2023-05-19 RX ADMIN — SODIUM CHLORIDE: 9 INJECTION, SOLUTION INTRAVENOUS at 07:05

## 2023-05-19 NOTE — PROGRESS NOTES
Subjective:       Patient ID: Ludwin Gee is a 73 y.o. male.    Chief Complaint: Diabetes and Hypertension    73-year-old male coming in for a follow-up on diabetes, hypertension, hyperlipidemia.  He had a severe episode of COVID-19 with pneumonia and is still recovering meeting the criteria for long COVID.  His blood sugar has been doing well with no episodes of hypoglycemia to report and his blood pressure has been controlled.  He had a colonoscopy earlier today with a 30 mm sessile polyp in the proximal ascending colon that could not be completely removed and for which he is being sent to see Dr. Castro on May 30th.  The pathology report is still pending.  The patient reports what he believes is a ventral hernia in the right lower abdomen at about the level of the pelvic brim that arises just lateral to the rectus abdominis lateral margin expands laterally producing a large bulb that frequently will bubble and feel as if air is moving through it.  When he lies down the area retracts and is not palpable.    He has a history of diabetes with microalbuminuria and no insulin use, hypertension, hyperlipidemia, BPH, prostate cancer [place] six which is being treated with watchful waiting and followed by Dr. Millan.  His most recent PSA was 4.9 and was down from previous levels following a recent trend downwards.    Past Medical History:  No date: BPH with elevated PSA  No date: Diabetes mellitus  No date: Elevated PSA  No date: Hyperlipidemia  No date: Hypertension  No date: Personal history of colonic polyps  9/6/2022: Prostate cancer      Comment:  Toña 3+3=6 Watchful waiting    Past Surgical History:  12/6/2013: COLONOSCOPY      Comment:  Dr. Martinez, 5 year recheck  12/09/2011: SHOULDER SURGERY      Comment:  right   8/24/2022: TRANSRECTAL BIOPSY OF PROSTATE WITH ULTRASOUND GUIDANCE; N/  A      Comment:  Procedure: BIOPSY, PROSTATE, RECTAL APPROACH, WITH US                GUIDANCE;  Surgeon: Mora Millan  "MD Ryan;  Location:                [facility] OR;  Service: Urology;  Laterality: N/A;    Current Outpatient Medications on File Prior to Visit:  alfuzosin (UROXATRAL) 10 mg Tb24, Take 1 tablet (10 mg total) by mouth daily with breakfast., Disp: 90 tablet, Rfl: 3  BD INSULIN SYRINGE ULTRA-FINE 0.5 mL 31 gauge x 5/16" Syrg, , Disp: , Rfl:   BD ULTRA-FINE CONSTANCE PEN NEEDLE 32 gauge x 5/32" Ndle, , Disp: , Rfl:   blood sugar diagnostic (ONETOUCH ULTRA TEST) Strp, USE TO CHECK FASTING GLUCOSE IN THE MORNING, Disp: 100 strip, Rfl: 3  blood-glucose meter Misc, One Touch glucometer check fasting glucose in morning, Disp: 1 each, Rfl: 0  coenzyme Q10 10 mg capsule, Take 10 mg by mouth once daily., Disp: , Rfl:   famotidine (PEPCID) 20 MG tablet, Take 20 mg by mouth once daily., Disp: , Rfl:   lancets Misc, One Touch lancets check fasting glucose in morning, Disp: 100 each, Rfl: 3  losartan (COZAAR) 25 MG tablet, TAKE ONE TABLET BY MOUTH EVERY DAY, Disp: 90 tablet, Rfl: 1  metFORMIN (GLUCOPHAGE-XR) 500 MG ER 24hr tablet, TAKE TWO TABLETS BY MOUTH TWO TIMES A DAY WITH MEALS, Disp: 360 tablet, Rfl: 0  oxybutynin (DITROPAN-XL) 5 MG TR24, Take 1 tablet (5 mg total) by mouth once daily., Disp: 90 tablet, Rfl: 3  pen needle, diabetic 30 gauge x 5/16" Ndle, Use with levemir once daily and novolog before each meal three times daily, Disp: 400 each, Rfl: 4  rosuvastatin (CRESTOR) 10 MG tablet, Take 1 tablet (10 mg total) by mouth once daily., Disp: 90 tablet, Rfl: 3  [DISCONTINUED] pulse oximeter (PULSE OXIMETER) device, by Apply Externally route 2 (two) times a day. Use twice daily at 8 AM and 3 PM and record the value in MyChart as directed., Disp: 1 each, Rfl: 0    Current Facility-Administered Medications on File Prior to Visit:  [COMPLETED] simethicone 40 mg/0.6 mL drops, , , PRN, Saúl Mayen MD, 20 mg at 05/19/23 0817  [DISCONTINUED] 0.9%  NaCl infusion, , Intravenous, Continuous, Saúl Mayen MD, Last Rate: 10 mL/hr at " 05/19/23 1444, Restarted at 05/19/23 0847          Review of Systems   Constitutional:  Positive for fatigue. Negative for chills, diaphoresis and fever.   Respiratory:  Negative for cough, chest tightness and shortness of breath.    Cardiovascular:  Negative for chest pain and palpitations.   Gastrointestinal:  Negative for blood in stool, constipation and diarrhea.        Right lower quadrant hernia   Endocrine: Negative for polydipsia and polyuria.   Psychiatric/Behavioral:  Positive for decreased concentration. Negative for confusion and dysphoric mood. The patient is not nervous/anxious.      Objective:      Physical Exam  Vitals and nursing note reviewed.   Constitutional:       General: He is not in acute distress.     Appearance: Normal appearance. He is normal weight. He is not ill-appearing, toxic-appearing or diaphoretic.      Comments: Good blood pressure control  Normal pulse with regular rhythm   Normal weight for age with a BMI of 25.9 he is down 7.3 lb from his last visit with me September 6, 2022   HENT:      Head: Normocephalic and atraumatic.      Right Ear: Tympanic membrane, ear canal and external ear normal. There is no impacted cerumen.      Left Ear: Tympanic membrane, ear canal and external ear normal. There is no impacted cerumen.      Nose: Nose normal. No congestion or rhinorrhea.      Mouth/Throat:      Mouth: Mucous membranes are moist.      Pharynx: Oropharynx is clear. No oropharyngeal exudate or posterior oropharyngeal erythema.   Eyes:      General: No scleral icterus.     Extraocular Movements: Extraocular movements intact.      Pupils: Pupils are equal, round, and reactive to light.   Neck:      Vascular: No carotid bruit.   Cardiovascular:      Rate and Rhythm: Normal rate and regular rhythm.      Pulses:           Dorsalis pedis pulses are 2+ on the right side and 2+ on the left side.        Posterior tibial pulses are 2+ on the right side and 2+ on the left side.      Heart  sounds: Normal heart sounds. No murmur heard.    No friction rub. No gallop.   Pulmonary:      Effort: Pulmonary effort is normal. No respiratory distress.      Breath sounds: No stridor. Examination of the right-lower field reveals rales. Examination of the left-lower field reveals rales. Rales present. No wheezing or rhonchi.   Abdominal:      Hernia: A hernia is present. Hernia is present in the ventral area.       Musculoskeletal:      Cervical back: Normal range of motion and neck supple. No rigidity or tenderness.      Right foot: Normal range of motion. No deformity, bunion, Charcot foot, foot drop or prominent metatarsal heads.      Left foot: Normal range of motion. No deformity, bunion, Charcot foot, foot drop or prominent metatarsal heads.   Feet:      Right foot:      Protective Sensation: 9 sites tested.  9 sites sensed.      Skin integrity: Skin integrity normal. No ulcer, blister, skin breakdown, erythema, warmth, callus, dry skin or fissure.      Toenail Condition: Right toenails are normal.      Left foot:      Protective Sensation: 9 sites tested.  9 sites sensed.      Skin integrity: Skin integrity normal. No ulcer, blister, skin breakdown, erythema, warmth, callus, dry skin or fissure.      Toenail Condition: Left toenails are normal.   Lymphadenopathy:      Cervical: No cervical adenopathy.   Skin:     General: Skin is warm and dry.      Capillary Refill: Capillary refill takes less than 2 seconds.      Coloration: Skin is not jaundiced or pale.      Findings: No erythema or rash.   Neurological:      General: No focal deficit present.      Mental Status: He is alert and oriented to person, place, and time. Mental status is at baseline.      Comments: Proprioception intact all 10 toes   Psychiatric:         Mood and Affect: Mood normal.         Behavior: Behavior normal.         Thought Content: Thought content normal.         Judgment: Judgment normal.       Assessment:       1. Controlled type  2 diabetes mellitus with microalbuminuria, without long-term current use of insulin    2. Hypertension associated with diabetes    3. Hyperlipidemia associated with type 2 diabetes mellitus    4. Ventral hernia without obstruction or gangrene        Plan:       1. Controlled type 2 diabetes mellitus with microalbuminuria, without long-term current use of insulin  Await results of chemistry panel, A1c, etc.  - Comprehensive Metabolic Panel; Future  - Hemoglobin A1C; Future  - Microalbumin/Creatinine Ratio, Urine; Future  - Lipid Panel; Future    2. Hypertension associated with diabetes  Good control, no changes needed in losartan 25 mg daily  - Comprehensive Metabolic Panel; Future  - CBC Auto Differential; Future  - Lipid Panel; Future    3. Hyperlipidemia associated with type 2 diabetes mellitus  Await results of lipid panel for possible change in Crestor 10 mg daily  - Comprehensive Metabolic Panel; Future  - Lipid Panel; Future    4. Ventral hernia without obstruction or gangrene  Will have Dr. Castro check the hernia when he sees the patient to evaluate him for removal of the residual ascending colon polyp.  Hernia is largely asymptomatic but might produce a risk of obstruction or vascular impairment  - Ambulatory referral/consult to General Surgery; Future

## 2023-05-19 NOTE — ANESTHESIA POSTPROCEDURE EVALUATION
Anesthesia Post Evaluation    Patient: Ludwin Gee    Procedure(s) Performed: Procedure(s) (LRB):  COLONOSCOPY (N/A)    Final Anesthesia Type: general      Patient location during evaluation: PACU  Patient participation: Yes- Able to Participate  Level of consciousness: awake and alert  Post-procedure vital signs: reviewed and stable  Pain management: adequate  Airway patency: patent    PONV status at discharge: No PONV  Anesthetic complications: no      Cardiovascular status: hemodynamically stable  Respiratory status: unassisted and room air  Hydration status: euvolemic  Follow-up not needed.          Vitals Value Taken Time   /67 05/19/23 0915   Temp 36.6 °C (97.9 °F) 05/19/23 0855   Pulse 58 05/19/23 0916   Resp 18 05/19/23 0915   SpO2 96 % 05/19/23 0916   Vitals shown include unvalidated device data.      No case tracking events are documented in the log.      Pain/Lee Score: Lee Score: 10 (5/19/2023  9:15 AM)

## 2023-05-19 NOTE — H&P
CC: History of colon polyps - last scope 2013    73 year old male with above. States that symptoms are absent, no alleviating/exacerbating factors. No family history of colorectal CA. Positive personal history of polyps. No bleeding or weight loss.     ROS:  No headache, no fever/chills, no chest pain/SOB, no nausea/vomiting/diarrhea/constipation/GI bleeding/abdominal pain, no dysuria/hematuria.    VSSAF   Exam:   Alert and oriented x 3; no apparent distress   PERRLA, sclera anicteric  CV: Regular rate/rhythm, normal PMI   Lungs: Clear bilaterally with no wheeze/rales   Abdomen: Soft, NT/ND, normal bowel sounds   Ext: No cyanosis, clubbing     Impression:   As above    Plan:   Proceed with endoscopy. Further recs to follow.

## 2023-05-19 NOTE — TELEPHONE ENCOUNTER
----- Message -----   From: Saúl Mayen MD   Sent: 5/19/2023   8:54 AM CDT   To: Jesus Montoya LPN, [facility] Staff     Referral to [facility] for proximal ascending colon polyp not amenable to endoscopic resection.

## 2023-05-19 NOTE — PROVATION PATIENT INSTRUCTIONS
Discharge Summary/Instructions after an Endoscopic Procedure  Patient Name: Ludwin Gee  Patient MRN: [id number]  Patient YOB: 1950  Friday, May 19, 2023  Saúl Santiago MD  Dear patient,  As a result of recent federal legislation (The Federal Cures Act), you may   receive lab or pathology results from your procedure in your [facility]   account before your physician is able to contact you. Your physician or   their representative will relay the results to you with their   recommendations at their soonest availability.  Thank you,  RESTRICTIONS:  During your procedure today, you received medications for sedation.  These   medications may affect your judgment, balance and coordination.  Therefore,   for 24 hours, you have the following restrictions:   - DO NOT drive a car, operate machinery, make legal/financial decisions,   sign important papers or drink alcohol.    ACTIVITY:  Today: no heavy lifting, straining or running due to procedural   sedation/anesthesia.  The following day: return to full activity including work.  DIET:  Eat and drink normally unless instructed otherwise.     TREATMENT FOR COMMON SIDE EFFECTS:  - Mild abdominal pain, nausea, belching, bloating or excessive gas:  rest,   eat lightly and use a heating pad.  - Sore Throat: treat with throat lozenges and/or gargle with warm salt   water.  - Because air was used during the procedure, expelling large amounts of air   from your rectum or belching is normal.  - If a bowel prep was taken, you may not have a bowel movement for 1-3 days.    This is normal.  SYMPTOMS TO WATCH FOR AND REPORT TO YOUR PHYSICIAN:  1. Abdominal pain or bloating, other than gas cramps.  2. Chest pain.  3. Back pain.  4. Signs of infection such as: chills or fever occurring within 24 hours   after the procedure.  5. Rectal bleeding, which would show as bright red, maroon, or black stools.   (A tablespoon of blood from the rectum is not serious, especially if    Detail Level: Detailed hemorrhoids are present.)  6. Vomiting.  7. Weakness or dizziness.  GO DIRECTLY TO THE NEAREST EMERGENCY ROOM IF YOU HAVE ANY OF THE FOLLOWING:      Difficulty breathing              Chills and/or fever over 101 F   Persistent vomiting and/or vomiting blood   Severe abdominal pain   Severe chest pain   Black, tarry stools   Bleeding- more than one tablespoon   Any other symptom or condition that you feel may need urgent attention  Your doctor recommends these additional instructions:  If any biopsies were taken, your doctors clinic will contact you in 1 to 2   weeks with any results.  - Patient has a contact number available for emergencies.  The signs and   symptoms of potential delayed complications were discussed with the   patient.  Return to normal activities tomorrow.  Written discharge   instructions were provided to the patient.   - High fiber diet.   - Continue present medications.   - No aspirin, ibuprofen, naproxen, or other non-steroidal anti-inflammatory   drugs for 2 weeks after polyp removal.   - Await pathology results.   - Repeat colonoscopy (date not yet determined) for surveillance.   - Discharge patient to home (ambulatory).   - Return to GI office after studies are complete.   - Refer to a surgeon at appointment to be scheduled.  For questions, problems or results please call your physician - Saúl Santiago MD at Work:  [phone].  MINASTANISHA JEAN, EMERGENCY ROOM PHONE NUMBER: [phone]  IF A COMPLICATION OR EMERGENCY SITUATION ARISES AND YOU ARE UNABLE TO REACH   YOUR PHYSICIAN - GO DIRECTLY TO THE EMERGENCY ROOM.  Saúl Santiago MD  5/19/2023 8:50:57 AM  This report has been verified and signed electronically.  Dear patient,  As a result of recent federal legislation (The Federal Cures Act), you may   receive lab or pathology results from your procedure in your [facility]   account before your physician is able to contact you. Your physician or   their representative will relay  the results to you with their   recommendations at their soonest availability.  Thank you,  [employer]ATION

## 2023-05-19 NOTE — TELEPHONE ENCOUNTER
----- Message from Franca Griffith LPN sent at 5/19/2023 11:50 AM CDT -----  Regarding: SOONER APPOINTMENT  I have scheduled patient in first-available appointment slot, [facility] 6/13/2023 ,  and have added to wait-list.     Patient requests sooner appointment. He is leaving out of town 6/4/2023-6/11/2023.    If you could review chart and contact patient, for sooner appointment, your help is appreciated    Thanks,  [facility] Referral JANESSA

## 2023-05-19 NOTE — TELEPHONE ENCOUNTER
----- Message from Saúl Mayen MD sent at 5/19/2023  8:53 AM CDT -----  Referral to Castro for proximal ascending colon polyp not amenable to endoscopic resection.

## 2023-05-19 NOTE — ANESTHESIA PREPROCEDURE EVALUATION
05/19/2023  Ludwin Gee is a 73 y.o., male.      Pre-op Assessment    I have reviewed the Patient Summary Reports.     I have reviewed the Nursing Notes. I have reviewed the NPO Status.   I have reviewed the Medications.     Review of Systems  Anesthesia Hx:  No problems with previous Anesthesia    Social:  Former Smoker    Hematology/Oncology:         -- Anemia: Current/Recent Cancer. Other (see Oncology comments) Oncology Comments: Low risk prostate CA. Monitoring      Cardiovascular:   Hypertension hyperlipidemia    Endocrine:   Diabetes        Physical Exam  General: Well nourished, Cooperative, Oriented and Alert    Airway:  Mallampati: II   Mouth Opening: Normal  TM Distance: Normal  Neck ROM: Normal ROM        Anesthesia Plan  Type of Anesthesia, risks & benefits discussed:    Anesthesia Type: Gen Natural Airway  Intra-op Monitoring Plan: Standard ASA Monitors  Induction:  IV  Informed Consent: Informed consent signed with the Patient and all parties understand the risks and agree with anesthesia plan.  All questions answered.   ASA Score: 3    Ready For Surgery From Anesthesia Perspective.     .

## 2023-05-19 NOTE — TELEPHONE ENCOUNTER
I called patient @ 10:25am and his mailbox is full to schedule him to see Dr. Castro for proximal ascending colon polyp per Dr. Mayen.  Zack

## 2023-05-19 NOTE — TELEPHONE ENCOUNTER
I called patient @ 12:12pm and his mailbox is full.  I'll send him a [employer] message.  Zack

## 2023-05-19 NOTE — TRANSFER OF CARE
"Anesthesia Transfer of Care Note    Patient: Ludwin Gee    Procedure(s) Performed: Procedure(s) (LRB):  COLONOSCOPY (N/A)    Patient location: GI    Anesthesia Type: general    Transport from OR: Transported from OR on room air with adequate spontaneous ventilation    Post pain: adequate analgesia    Post assessment: no apparent anesthetic complications and tolerated procedure well    Post vital signs: stable    Level of consciousness: sedated and responds to stimulation    Nausea/Vomiting: no nausea/vomiting    Complications: none    Transfer of care protocol was followed      Last vitals:   Visit Vitals  BP (!) 160/77 (BP Location: Left arm, Patient Position: Lying)   Pulse 73   Temp 36.8 °C (98.2 °F) (Skin)   Resp 20   Ht 5' 6" (1.676 m)   Wt 75.8 kg (167 lb)   SpO2 100%   BMI 26.95 kg/m²     "

## 2023-05-19 NOTE — TELEPHONE ENCOUNTER
[facility] referral scheduled with patient: 6/13/2023  [facility]. Patient asks if sooner appointment available. He is out of town 6/4/2023-6/12/2023 and if possible he'd like to consult before leaving.  staff message sent.

## 2023-05-19 NOTE — PLAN OF CARE
Vss, alf po fluids, denies pain, ambulates easily. IV removed, catheter intact. Discharge instructions provided and states understanding. States ready to go home.  Discharged from facility with family per wheelchair.

## 2023-05-23 ENCOUNTER — LAB VISIT (OUTPATIENT)
Dept: LAB | Facility: HOSPITAL | Age: 73
End: 2023-05-23
Attending: FAMILY MEDICINE
Payer: MEDICARE

## 2023-05-23 DIAGNOSIS — E11.69 HYPERLIPIDEMIA ASSOCIATED WITH TYPE 2 DIABETES MELLITUS: ICD-10-CM

## 2023-05-23 DIAGNOSIS — E11.29 CONTROLLED TYPE 2 DIABETES MELLITUS WITH MICROALBUMINURIA, WITHOUT LONG-TERM CURRENT USE OF INSULIN: ICD-10-CM

## 2023-05-23 DIAGNOSIS — E11.59 HYPERTENSION ASSOCIATED WITH DIABETES: ICD-10-CM

## 2023-05-23 DIAGNOSIS — R80.9 CONTROLLED TYPE 2 DIABETES MELLITUS WITH MICROALBUMINURIA, WITHOUT LONG-TERM CURRENT USE OF INSULIN: ICD-10-CM

## 2023-05-23 DIAGNOSIS — I15.2 HYPERTENSION ASSOCIATED WITH DIABETES: ICD-10-CM

## 2023-05-23 DIAGNOSIS — E78.5 HYPERLIPIDEMIA ASSOCIATED WITH TYPE 2 DIABETES MELLITUS: ICD-10-CM

## 2023-05-23 LAB
ALBUMIN SERPL BCP-MCNC: 3.8 G/DL (ref 3.5–5.2)
ALP SERPL-CCNC: 38 U/L (ref 55–135)
ALT SERPL W/O P-5'-P-CCNC: 10 U/L (ref 10–44)
ANION GAP SERPL CALC-SCNC: 8 MMOL/L (ref 8–16)
AST SERPL-CCNC: 16 U/L (ref 10–40)
BASOPHILS # BLD AUTO: 0.03 K/UL (ref 0–0.2)
BASOPHILS NFR BLD: 0.6 % (ref 0–1.9)
BILIRUB SERPL-MCNC: 0.5 MG/DL (ref 0.1–1)
BUN SERPL-MCNC: 21 MG/DL (ref 8–23)
CALCIUM SERPL-MCNC: 9.8 MG/DL (ref 8.7–10.5)
CHLORIDE SERPL-SCNC: 105 MMOL/L (ref 95–110)
CHOLEST SERPL-MCNC: 108 MG/DL (ref 120–199)
CHOLEST/HDLC SERPL: 2.7 {RATIO} (ref 2–5)
CO2 SERPL-SCNC: 26 MMOL/L (ref 23–29)
CREAT SERPL-MCNC: 1.3 MG/DL (ref 0.5–1.4)
DIFFERENTIAL METHOD: ABNORMAL
EOSINOPHIL # BLD AUTO: 0.1 K/UL (ref 0–0.5)
EOSINOPHIL NFR BLD: 1.4 % (ref 0–8)
ERYTHROCYTE [DISTWIDTH] IN BLOOD BY AUTOMATED COUNT: 12.7 % (ref 11.5–14.5)
EST. GFR  (NO RACE VARIABLE): 58 ML/MIN/1.73 M^2
ESTIMATED AVG GLUCOSE: 148 MG/DL (ref 68–131)
GLUCOSE SERPL-MCNC: 124 MG/DL (ref 70–110)
HBA1C MFR BLD: 6.8 % (ref 4–5.6)
HCT VFR BLD AUTO: 38.1 % (ref 40–54)
HDLC SERPL-MCNC: 40 MG/DL (ref 40–75)
HDLC SERPL: 37 % (ref 20–50)
HGB BLD-MCNC: 12.7 G/DL (ref 14–18)
IMM GRANULOCYTES # BLD AUTO: 0.01 K/UL (ref 0–0.04)
IMM GRANULOCYTES NFR BLD AUTO: 0.2 % (ref 0–0.5)
LDLC SERPL CALC-MCNC: 53.6 MG/DL (ref 63–159)
LYMPHOCYTES # BLD AUTO: 1.2 K/UL (ref 1–4.8)
LYMPHOCYTES NFR BLD: 23.8 % (ref 18–48)
MCH RBC QN AUTO: 30.7 PG (ref 27–31)
MCHC RBC AUTO-ENTMCNC: 33.3 G/DL (ref 32–36)
MCV RBC AUTO: 92 FL (ref 82–98)
MONOCYTES # BLD AUTO: 0.5 K/UL (ref 0.3–1)
MONOCYTES NFR BLD: 10.4 % (ref 4–15)
NEUTROPHILS # BLD AUTO: 3.1 K/UL (ref 1.8–7.7)
NEUTROPHILS NFR BLD: 63.6 % (ref 38–73)
NONHDLC SERPL-MCNC: 68 MG/DL
NRBC BLD-RTO: 0 /100 WBC
PLATELET # BLD AUTO: 175 K/UL (ref 150–450)
PMV BLD AUTO: 11.9 FL (ref 9.2–12.9)
POTASSIUM SERPL-SCNC: 4.3 MMOL/L (ref 3.5–5.1)
PROT SERPL-MCNC: 6.9 G/DL (ref 6–8.4)
RBC # BLD AUTO: 4.14 M/UL (ref 4.6–6.2)
SODIUM SERPL-SCNC: 139 MMOL/L (ref 136–145)
TRIGL SERPL-MCNC: 72 MG/DL (ref 30–150)
WBC # BLD AUTO: 4.91 K/UL (ref 3.9–12.7)

## 2023-05-23 PROCEDURE — 36415 COLL VENOUS BLD VENIPUNCTURE: CPT | Mod: PO | Performed by: FAMILY MEDICINE

## 2023-05-23 PROCEDURE — 80053 COMPREHEN METABOLIC PANEL: CPT | Performed by: FAMILY MEDICINE

## 2023-05-23 PROCEDURE — 85025 COMPLETE CBC W/AUTO DIFF WBC: CPT | Performed by: FAMILY MEDICINE

## 2023-05-23 PROCEDURE — 83036 HEMOGLOBIN GLYCOSYLATED A1C: CPT | Performed by: FAMILY MEDICINE

## 2023-05-23 PROCEDURE — 80061 LIPID PANEL: CPT | Performed by: FAMILY MEDICINE

## 2023-05-23 NOTE — PROGRESS NOTES
Subjective     Patient ID: Ludwin Gee is a 73 y.o. male.    Chief Complaint: Consult (Polyp of ascending colon)    HPI  this is a pleasant 73-year-old gentleman who was referred to me for evaluation of a lesion in his ascending colon.  Patient had recent colonoscopy performed and was noted to have a flat adenomatous lesion in the ascending colon.  Attempts at removal were undertaken.  A lift procedure was performed and a portion of the lesion was removed but it was incompletely excised.  Pathology confirmed adenomatous tissue.  He was referred to me for surgical resection.  He denies any abdominal pain.  No bleeding.  No significant changes in bowel habits.  He does have a past medical history significant for hypertension and diabetes.  He denies any significant abdominal surgical history.  Review of Systems   Constitutional:  Negative for activity change and appetite change.   Cardiovascular:  Negative for chest pain and claudication.   Gastrointestinal:  Negative for abdominal distention, abdominal pain, nausea, rectal pain and vomiting.   Hematological:  Negative for adenopathy. Does not bruise/bleed easily.   Psychiatric/Behavioral:  Negative for agitation and decreased concentration.         Objective     Physical Exam  Vitals reviewed.   Cardiovascular:      Rate and Rhythm: Normal rate.      Pulses: Normal pulses.   Pulmonary:      Effort: Pulmonary effort is normal.   Abdominal:      General: Abdomen is flat. Bowel sounds are normal. There is no distension.      Tenderness: There is no abdominal tenderness.      Hernia: A hernia is present.      Comments: Reducible spigelian type hernia     Musculoskeletal:      Cervical back: Normal range of motion.   Neurological:      Mental Status: He is alert.     Cscope:  Images reviewed.  Incomplete resection of lesion in the ascending colon.  This was a carpet like flat lesion.  Tattoo was placed.      Pathology:   1. Colon, cecal and ascending polypectomies,  biopsies:   - Fragments of tubular / tubulovillous adenoma(s)   - Negative for high-grade dysplasia or carcinoma   - Multiple H&E levels evaluated      Assessment and Plan     1. Polyp of ascending colon, unspecified type  -     Ambulatory referral/consult to General Surgery        Discussion with patient and his wife.  He does have a adenomatous lesion in the ascending colon.  Explained to them that the most important reality is that they lesion needs to be removed so that it does not become malignant.  Given pictures evaluated from the colonoscopy it is unlikely that this lesion is malignant.  Discussed options of surgical resection versus consideration of repeat advanced endoscopic techniques.  Explained pros and cons of each to the patient.  Certainly surgical resection is the most definitive route but it was also the more aggressive route.  Higher risk complications are present.  Advanced endoscopic neck leaks is less invasive but may require multiple procedures and may ultimately lead to surgery depending on the final pathology.  Per discussion with the patient and his wife he prefers to proceed straight to surgery.  Risks of surgery were discussed in detail.  Informed consent has been obtained.  Will plan on surgical resection on June 26th.  Informed consent obtained         No follow-ups on file.

## 2023-05-24 ENCOUNTER — TELEPHONE (OUTPATIENT)
Dept: FAMILY MEDICINE | Facility: CLINIC | Age: 73
End: 2023-05-24
Payer: MEDICARE

## 2023-05-24 NOTE — TELEPHONE ENCOUNTER
----- Message from Jeanne Boeckelman, MA sent at 5/24/2023  1:34 PM CDT -----  I called the patient in regards to his lab results, patient states that he would like to try and take his vitamins including the vitamin b to see if it helps because he has not noticed any bleeding or any symptoms to make him feel like he needs to see another doctor currently.

## 2023-05-25 LAB
FINAL PATHOLOGIC DIAGNOSIS: NORMAL
GROSS: NORMAL
Lab: NORMAL

## 2023-05-25 NOTE — PROGRESS NOTES
Please advise patient that polyp pathology was reviewed and is benign and is the adenomatous and villous type which is definitely precancerous/risk factor for cancer.  Referral to surgery recommended given incomplete resection if not already placed.

## 2023-05-30 ENCOUNTER — OFFICE VISIT (OUTPATIENT)
Dept: SURGERY | Facility: CLINIC | Age: 73
End: 2023-05-30
Payer: MEDICARE

## 2023-05-30 VITALS
WEIGHT: 160.69 LBS | TEMPERATURE: 98 F | SYSTOLIC BLOOD PRESSURE: 126 MMHG | HEIGHT: 66 IN | BODY MASS INDEX: 25.83 KG/M2 | HEART RATE: 64 BPM | DIASTOLIC BLOOD PRESSURE: 64 MMHG

## 2023-05-30 DIAGNOSIS — K63.5 POLYP OF ASCENDING COLON, UNSPECIFIED TYPE: ICD-10-CM

## 2023-05-30 PROCEDURE — 1126F AMNT PAIN NOTED NONE PRSNT: CPT | Mod: CPTII,S$GLB,, | Performed by: SURGERY

## 2023-05-30 PROCEDURE — 3288F PR FALLS RISK ASSESSMENT DOCUMENTED: ICD-10-PCS | Mod: CPTII,S$GLB,, | Performed by: SURGERY

## 2023-05-30 PROCEDURE — 4010F PR ACE/ARB THEARPY RXD/TAKEN: ICD-10-PCS | Mod: CPTII,S$GLB,, | Performed by: SURGERY

## 2023-05-30 PROCEDURE — 99205 PR OFFICE/OUTPT VISIT, NEW, LEVL V, 60-74 MIN: ICD-10-PCS | Mod: S$GLB,,, | Performed by: SURGERY

## 2023-05-30 PROCEDURE — 3078F DIAST BP <80 MM HG: CPT | Mod: CPTII,S$GLB,, | Performed by: SURGERY

## 2023-05-30 PROCEDURE — 3288F FALL RISK ASSESSMENT DOCD: CPT | Mod: CPTII,S$GLB,, | Performed by: SURGERY

## 2023-05-30 PROCEDURE — 3074F PR MOST RECENT SYSTOLIC BLOOD PRESSURE < 130 MM HG: ICD-10-PCS | Mod: CPTII,S$GLB,, | Performed by: SURGERY

## 2023-05-30 PROCEDURE — 3066F NEPHROPATHY DOC TX: CPT | Mod: CPTII,S$GLB,, | Performed by: SURGERY

## 2023-05-30 PROCEDURE — 4010F ACE/ARB THERAPY RXD/TAKEN: CPT | Mod: CPTII,S$GLB,, | Performed by: SURGERY

## 2023-05-30 PROCEDURE — 3044F HG A1C LEVEL LT 7.0%: CPT | Mod: CPTII,S$GLB,, | Performed by: SURGERY

## 2023-05-30 PROCEDURE — 1126F PR PAIN SEVERITY QUANTIFIED, NO PAIN PRESENT: ICD-10-PCS | Mod: CPTII,S$GLB,, | Performed by: SURGERY

## 2023-05-30 PROCEDURE — 1159F MED LIST DOCD IN RCRD: CPT | Mod: CPTII,S$GLB,, | Performed by: SURGERY

## 2023-05-30 PROCEDURE — 1101F PT FALLS ASSESS-DOCD LE1/YR: CPT | Mod: CPTII,S$GLB,, | Performed by: SURGERY

## 2023-05-30 PROCEDURE — 99999 PR PBB SHADOW E&M-EST. PATIENT-LVL V: CPT | Mod: PBBFAC,,, | Performed by: SURGERY

## 2023-05-30 PROCEDURE — 1159F PR MEDICATION LIST DOCUMENTED IN MEDICAL RECORD: ICD-10-PCS | Mod: CPTII,S$GLB,, | Performed by: SURGERY

## 2023-05-30 PROCEDURE — 3060F POS MICROALBUMINURIA REV: CPT | Mod: CPTII,S$GLB,, | Performed by: SURGERY

## 2023-05-30 PROCEDURE — 3060F PR POS MICROALBUMINURIA RESULT DOCUMENTED/REVIEW: ICD-10-PCS | Mod: CPTII,S$GLB,, | Performed by: SURGERY

## 2023-05-30 PROCEDURE — 3008F BODY MASS INDEX DOCD: CPT | Mod: CPTII,S$GLB,, | Performed by: SURGERY

## 2023-05-30 PROCEDURE — 3078F PR MOST RECENT DIASTOLIC BLOOD PRESSURE < 80 MM HG: ICD-10-PCS | Mod: CPTII,S$GLB,, | Performed by: SURGERY

## 2023-05-30 PROCEDURE — 3074F SYST BP LT 130 MM HG: CPT | Mod: CPTII,S$GLB,, | Performed by: SURGERY

## 2023-05-30 PROCEDURE — 3066F PR DOCUMENTATION OF TREATMENT FOR NEPHROPATHY: ICD-10-PCS | Mod: CPTII,S$GLB,, | Performed by: SURGERY

## 2023-05-30 PROCEDURE — 99205 OFFICE O/P NEW HI 60 MIN: CPT | Mod: S$GLB,,, | Performed by: SURGERY

## 2023-05-30 PROCEDURE — 3008F PR BODY MASS INDEX (BMI) DOCUMENTED: ICD-10-PCS | Mod: CPTII,S$GLB,, | Performed by: SURGERY

## 2023-05-30 PROCEDURE — 3044F PR MOST RECENT HEMOGLOBIN A1C LEVEL <7.0%: ICD-10-PCS | Mod: CPTII,S$GLB,, | Performed by: SURGERY

## 2023-05-30 PROCEDURE — 1101F PR PT FALLS ASSESS DOC 0-1 FALLS W/OUT INJ PAST YR: ICD-10-PCS | Mod: CPTII,S$GLB,, | Performed by: SURGERY

## 2023-05-30 PROCEDURE — 99999 PR PBB SHADOW E&M-EST. PATIENT-LVL V: ICD-10-PCS | Mod: PBBFAC,,, | Performed by: SURGERY

## 2023-05-30 NOTE — PATIENT INSTRUCTIONS
Surgery is scheduled for 6/26/23 arrival time will be given by the the preop nurse.  You may receive two calls from the Preop Nurses one a few days before surgery the second the day before surgery with the arrival time.  The preop nurse will call you from [phone]  Nothing to eat or drink after midnight.  Someone to drive you home if you are same day surgery.    THE PREOP NURSE WILL CALL, SOMETIMES AS LATE AS 4 or 5 PM IN THE AFTERNOON THE DAY BEFORE SURGERY.    Bathe the night before and the morning of your procedure with a Chlorhexidine wash such as Hibiclens, can be purchased at most Pharmacy's no prescription needed.    Special Instruction:     Your procedure/surgery is scheduled at the [facility] in [address] Dr. Ayala.     Bowel Prep for Colonoscopy/Surgery  Purchase:  Dulcolax Pills (Laxative) 4 tablets  14 dose bottle of Miralax Powder  64 ounces of Gatorade or Powerade    The day before your procedure no solid foods, clear liquids only to include water, Gatorade,Powerade.  Coffee no creamer  Soft Drinks  Popsicles   Jello   Sven Aid  Chicken or beef broth  Apple juice, white grape juice, Tea,   Hard Candy    NO RED OR PURPLE COLORED LIQUIDS, JELLO, POPSCILES.  Try to Avoid these foods 7 days before your Procedure:  beans, peas, corn, nuts, popcorn and tomatoes.  Try not to use Advil or Ibuprofen, Non-Steroidal Anti-inflammatories such as Aleve for 7 days before your colonoscopy, no fish oil for 5 days before the procedure.    Mix the entire 14 dose bottle of Miralax into  64 ounces of Gatorade into a large pitcher, stir and chill until ready to use.  The day before procedure starting at noon take all 4 Dulcolax tablets followed by clear liquids until 2pm.  Keep in mind to drink plenty of fluids this is how the laxatives work, plenty fluids.  After 2pm no later than 4pm start drinkg the Miralax/Gatorade mix 8 ounces at a time over a 2-4 hour period until completing the entire 64  ounces, one glass every 15 to 30 minutes.  Keep sipping clear liquids between each dose  Contact your Diabetes doctor or Endocrinologist for detailed instruction regarding your insulin or oral diabetes medication.     Contact Dr. Castro if you have any questions, [phone]    Be Sure to Notify Your Doctor if You are on a Prescription Blood Thinner.         Contact Jesus Montoya LPN for questions are concerns. [phone]

## 2023-05-31 RX ORDER — SODIUM CHLORIDE 9 MG/ML
INJECTION, SOLUTION INTRAVENOUS CONTINUOUS
Status: CANCELLED | OUTPATIENT
Start: 2023-05-31

## 2023-05-31 RX ORDER — METRONIDAZOLE 500 MG/100ML
500 INJECTION, SOLUTION INTRAVENOUS
Status: CANCELLED | OUTPATIENT
Start: 2023-05-31

## 2023-06-12 DIAGNOSIS — E11.69 HYPERLIPIDEMIA ASSOCIATED WITH TYPE 2 DIABETES MELLITUS: ICD-10-CM

## 2023-06-12 DIAGNOSIS — E78.5 HYPERLIPIDEMIA ASSOCIATED WITH TYPE 2 DIABETES MELLITUS: ICD-10-CM

## 2023-06-12 RX ORDER — ROSUVASTATIN CALCIUM 10 MG/1
TABLET, COATED ORAL
Qty: 90 TABLET | Refills: 3 | Status: SHIPPED | OUTPATIENT
Start: 2023-06-12

## 2023-06-12 NOTE — TELEPHONE ENCOUNTER
" Refill Decision Note   Ludwin Gee  is requesting a refill authorization.  Brief Assessment and Rationale for Refill:  Approve     Medication Therapy Plan:  "Acceptable use per ORC protocol"      Alert overridden per protocol: Yes   Comments:     Note composed:3:12 PM 06/12/2023              "

## 2023-06-12 NOTE — TELEPHONE ENCOUNTER
No care due was identified.  Health [facility] Embedded Care Due Messages. Reference number: [id number].   6/12/2023 12:52:21 PM CDT

## 2023-06-16 ENCOUNTER — TELEPHONE (OUTPATIENT)
Dept: ADMINISTRATIVE | Facility: CLINIC | Age: 73
End: 2023-06-16
Payer: MEDICARE

## 2023-06-19 ENCOUNTER — OFFICE VISIT (OUTPATIENT)
Dept: FAMILY MEDICINE | Facility: CLINIC | Age: 73
End: 2023-06-19
Payer: MEDICARE

## 2023-06-19 VITALS
BODY MASS INDEX: 26.43 KG/M2 | OXYGEN SATURATION: 97 % | TEMPERATURE: 98 F | HEIGHT: 66 IN | DIASTOLIC BLOOD PRESSURE: 64 MMHG | HEART RATE: 68 BPM | WEIGHT: 164.44 LBS | SYSTOLIC BLOOD PRESSURE: 122 MMHG

## 2023-06-19 DIAGNOSIS — Z00.00 ENCOUNTER FOR PREVENTIVE HEALTH EXAMINATION: Primary | ICD-10-CM

## 2023-06-19 DIAGNOSIS — E11.69 HYPERLIPIDEMIA ASSOCIATED WITH TYPE 2 DIABETES MELLITUS: ICD-10-CM

## 2023-06-19 DIAGNOSIS — E11.29 CONTROLLED TYPE 2 DIABETES MELLITUS WITH MICROALBUMINURIA, WITHOUT LONG-TERM CURRENT USE OF INSULIN: ICD-10-CM

## 2023-06-19 DIAGNOSIS — I15.2 HYPERTENSION ASSOCIATED WITH DIABETES: ICD-10-CM

## 2023-06-19 DIAGNOSIS — E11.59 HYPERTENSION ASSOCIATED WITH DIABETES: ICD-10-CM

## 2023-06-19 DIAGNOSIS — E78.5 HYPERLIPIDEMIA ASSOCIATED WITH TYPE 2 DIABETES MELLITUS: ICD-10-CM

## 2023-06-19 DIAGNOSIS — C61 PROSTATE CANCER: ICD-10-CM

## 2023-06-19 DIAGNOSIS — R80.9 CONTROLLED TYPE 2 DIABETES MELLITUS WITH MICROALBUMINURIA, WITHOUT LONG-TERM CURRENT USE OF INSULIN: ICD-10-CM

## 2023-06-19 DIAGNOSIS — I70.0 ATHEROSCLEROSIS OF AORTA: ICD-10-CM

## 2023-06-19 PROCEDURE — 1170F PR FUNCTIONAL STATUS ASSESSED: ICD-10-PCS | Mod: CPTII,S$GLB,, | Performed by: NURSE PRACTITIONER

## 2023-06-19 PROCEDURE — 1101F PT FALLS ASSESS-DOCD LE1/YR: CPT | Mod: CPTII,S$GLB,, | Performed by: NURSE PRACTITIONER

## 2023-06-19 PROCEDURE — 3060F POS MICROALBUMINURIA REV: CPT | Mod: CPTII,S$GLB,, | Performed by: NURSE PRACTITIONER

## 2023-06-19 PROCEDURE — 4010F PR ACE/ARB THEARPY RXD/TAKEN: ICD-10-PCS | Mod: CPTII,S$GLB,, | Performed by: NURSE PRACTITIONER

## 2023-06-19 PROCEDURE — 3074F PR MOST RECENT SYSTOLIC BLOOD PRESSURE < 130 MM HG: ICD-10-PCS | Mod: CPTII,S$GLB,, | Performed by: NURSE PRACTITIONER

## 2023-06-19 PROCEDURE — 1126F AMNT PAIN NOTED NONE PRSNT: CPT | Mod: CPTII,S$GLB,, | Performed by: NURSE PRACTITIONER

## 2023-06-19 PROCEDURE — 3044F PR MOST RECENT HEMOGLOBIN A1C LEVEL <7.0%: ICD-10-PCS | Mod: CPTII,S$GLB,, | Performed by: NURSE PRACTITIONER

## 2023-06-19 PROCEDURE — 3288F PR FALLS RISK ASSESSMENT DOCUMENTED: ICD-10-PCS | Mod: CPTII,S$GLB,, | Performed by: NURSE PRACTITIONER

## 2023-06-19 PROCEDURE — 3078F DIAST BP <80 MM HG: CPT | Mod: CPTII,S$GLB,, | Performed by: NURSE PRACTITIONER

## 2023-06-19 PROCEDURE — 4010F ACE/ARB THERAPY RXD/TAKEN: CPT | Mod: CPTII,S$GLB,, | Performed by: NURSE PRACTITIONER

## 2023-06-19 PROCEDURE — 3074F SYST BP LT 130 MM HG: CPT | Mod: CPTII,S$GLB,, | Performed by: NURSE PRACTITIONER

## 2023-06-19 PROCEDURE — 1159F MED LIST DOCD IN RCRD: CPT | Mod: CPTII,S$GLB,, | Performed by: NURSE PRACTITIONER

## 2023-06-19 PROCEDURE — 1170F FXNL STATUS ASSESSED: CPT | Mod: CPTII,S$GLB,, | Performed by: NURSE PRACTITIONER

## 2023-06-19 PROCEDURE — 3288F FALL RISK ASSESSMENT DOCD: CPT | Mod: CPTII,S$GLB,, | Performed by: NURSE PRACTITIONER

## 2023-06-19 PROCEDURE — 99999 PR PBB SHADOW E&M-EST. PATIENT-LVL IV: ICD-10-PCS | Mod: PBBFAC,,, | Performed by: NURSE PRACTITIONER

## 2023-06-19 PROCEDURE — G0439 PR MEDICARE ANNUAL WELLNESS SUBSEQUENT VISIT: ICD-10-PCS | Mod: S$GLB,,, | Performed by: NURSE PRACTITIONER

## 2023-06-19 PROCEDURE — 3078F PR MOST RECENT DIASTOLIC BLOOD PRESSURE < 80 MM HG: ICD-10-PCS | Mod: CPTII,S$GLB,, | Performed by: NURSE PRACTITIONER

## 2023-06-19 PROCEDURE — 3066F NEPHROPATHY DOC TX: CPT | Mod: CPTII,S$GLB,, | Performed by: NURSE PRACTITIONER

## 2023-06-19 PROCEDURE — 1101F PR PT FALLS ASSESS DOC 0-1 FALLS W/OUT INJ PAST YR: ICD-10-PCS | Mod: CPTII,S$GLB,, | Performed by: NURSE PRACTITIONER

## 2023-06-19 PROCEDURE — 3066F PR DOCUMENTATION OF TREATMENT FOR NEPHROPATHY: ICD-10-PCS | Mod: CPTII,S$GLB,, | Performed by: NURSE PRACTITIONER

## 2023-06-19 PROCEDURE — 3008F PR BODY MASS INDEX (BMI) DOCUMENTED: ICD-10-PCS | Mod: CPTII,S$GLB,, | Performed by: NURSE PRACTITIONER

## 2023-06-19 PROCEDURE — 3044F HG A1C LEVEL LT 7.0%: CPT | Mod: CPTII,S$GLB,, | Performed by: NURSE PRACTITIONER

## 2023-06-19 PROCEDURE — 99999 PR PBB SHADOW E&M-EST. PATIENT-LVL IV: CPT | Mod: PBBFAC,,, | Performed by: NURSE PRACTITIONER

## 2023-06-19 PROCEDURE — G0439 PPPS, SUBSEQ VISIT: HCPCS | Mod: S$GLB,,, | Performed by: NURSE PRACTITIONER

## 2023-06-19 PROCEDURE — 3008F BODY MASS INDEX DOCD: CPT | Mod: CPTII,S$GLB,, | Performed by: NURSE PRACTITIONER

## 2023-06-19 PROCEDURE — 1160F RVW MEDS BY RX/DR IN RCRD: CPT | Mod: CPTII,S$GLB,, | Performed by: NURSE PRACTITIONER

## 2023-06-19 PROCEDURE — 3060F PR POS MICROALBUMINURIA RESULT DOCUMENTED/REVIEW: ICD-10-PCS | Mod: CPTII,S$GLB,, | Performed by: NURSE PRACTITIONER

## 2023-06-19 PROCEDURE — 1159F PR MEDICATION LIST DOCUMENTED IN MEDICAL RECORD: ICD-10-PCS | Mod: CPTII,S$GLB,, | Performed by: NURSE PRACTITIONER

## 2023-06-19 PROCEDURE — 1160F PR REVIEW ALL MEDS BY PRESCRIBER/CLIN PHARMACIST DOCUMENTED: ICD-10-PCS | Mod: CPTII,S$GLB,, | Performed by: NURSE PRACTITIONER

## 2023-06-19 PROCEDURE — 1126F PR PAIN SEVERITY QUANTIFIED, NO PAIN PRESENT: ICD-10-PCS | Mod: CPTII,S$GLB,, | Performed by: NURSE PRACTITIONER

## 2023-06-19 NOTE — PROGRESS NOTES
"Ludwin Gee presented for a  Medicare AWV and comprehensive Health Risk Assessment today. The following components were reviewed and updated:    Medical history  Family History  Social history  Allergies and Current Medications  Health Risk Assessment  Health Maintenance  Care Team         ** See Completed Assessments for Annual Wellness Visit within the encounter summary.**         The following assessments were completed:  Living Situation  CAGE  Depression Screening  Timed Get Up and Go  Whisper Test  Cognitive Function Screening  Nutrition Screening  ADL Screening  PAQ Screening    Clock in media     Vitals:    06/19/23 0921   BP: 122/64   Pulse: 68   Temp: 97.9 °F (36.6 °C)   TempSrc: Oral   SpO2: 97%   Weight: 74.6 kg (164 lb 7.4 oz)   Height: 5' 6" (1.676 m)     Body mass index is 26.55 kg/m².  Physical Exam  Constitutional:       Appearance: He is well-developed.   HENT:      Head: Normocephalic and atraumatic.      Right Ear: Hearing normal.      Left Ear: Hearing normal.      Nose: Nose normal.   Eyes:      General: Lids are normal.      Conjunctiva/sclera: Conjunctivae normal.      Pupils: Pupils are equal, round, and reactive to light.   Cardiovascular:      Rate and Rhythm: Normal rate.   Pulmonary:      Effort: Pulmonary effort is normal.   Abdominal:      Palpations: Abdomen is soft.   Musculoskeletal:         General: Normal range of motion.      Cervical back: Normal range of motion and neck supple.   Skin:     General: Skin is warm and dry.   Neurological:      Mental Status: He is alert and oriented to person, place, and time.             Diagnoses and health risks identified today and associated recommendations/orders:    1. Encounter for preventive health examination  Discussed health maintenance guidelines appropriate for age.    Review for Opioid Screening: Patient does not have rx for Opioids.    Review for Substance Use Disorders: Patient does not use substance.      2. Hypertension " associated with diabetes  Controlled, continue current medication regimen  Low salt diet  Increase physical activity  Followed by pcp      3. Hyperlipidemia associated with type 2 diabetes mellitus  Controlled, continue current medication regimen  Patient taking statin  Followed by pcp      4. Prostate cancer  Stable, continue active surveillance with urology     5. Controlled type 2 diabetes mellitus with microalbuminuria, without long-term current use of insulin  Controlled, continue current medication regimen  Last HgA1c - 6.8  ADA diet  Increase physical activity   Followed by pcp      6. Atherosclerosis of aorta  Stable, continue to monitor  On statin, bp controlled   Followed by pcp      Provided Ludwin with a 5-10 year written screening schedule and personal prevention plan. Recommendations were developed using the USPSTF age appropriate recommendations. Education, counseling, and referrals were provided as needed. After Visit Summary printed and given to patient which includes a list of additional screenings\tests needed.    Follow up for One year for Annual Wellness Visit.    Annia Alanis, NP      I offered to discuss advanced care planning, including how to pick a person who would make decisions for you if you were unable to make them for yourself, called a health care power of , and what kind of decisions you might make such as use of life sustaining treatments such as ventilators and tube feeding when faced with a life limiting illness recorded on a living will that they will need to know. (How you want to be cared for as you near the end of your natural life)     X  Patient is unwilling to engage in a discussion regarding advance directives at this time.

## 2023-06-22 ENCOUNTER — HOSPITAL ENCOUNTER (OUTPATIENT)
Dept: PREADMISSION TESTING | Facility: HOSPITAL | Age: 73
Discharge: HOME | End: 2023-06-22
Attending: SURGERY
Payer: MEDICARE

## 2023-06-22 DIAGNOSIS — K63.5 POLYP OF ASCENDING COLON, UNSPECIFIED TYPE: ICD-10-CM

## 2023-06-22 DIAGNOSIS — Z01.818 PREOP EXAMINATION: ICD-10-CM

## 2023-06-22 LAB
ABO + RH BLD: NORMAL
BLD GP AB SCN CELLS X3 SERPL QL: NORMAL
SPECIMEN OUTDATE: NORMAL

## 2023-06-22 PROCEDURE — 93010 EKG 12-LEAD: ICD-10-PCS | Mod: ,,, | Performed by: SPECIALIST

## 2023-06-22 PROCEDURE — 99900103 DSU ONLY-NO CHARGE-INITIAL HR (STAT)

## 2023-06-22 PROCEDURE — 86900 BLOOD TYPING SEROLOGIC ABO: CPT | Performed by: SURGERY

## 2023-06-22 PROCEDURE — 93010 ELECTROCARDIOGRAM REPORT: CPT | Mod: ,,, | Performed by: SPECIALIST

## 2023-06-22 PROCEDURE — 99900104 DSU ONLY-NO CHARGE-EA ADD'L HR (STAT)

## 2023-06-22 PROCEDURE — 36415 COLL VENOUS BLD VENIPUNCTURE: CPT | Performed by: SURGERY

## 2023-06-22 PROCEDURE — 93005 ELECTROCARDIOGRAM TRACING: CPT

## 2023-06-22 NOTE — DISCHARGE INSTRUCTIONS
To confirm, Your doctor has instructed you that surgery is scheduled for: 6/26/23    Please report to [facility], Registration the morning of surgery. You must check-in and receive a wristband before going to your procedure.    Pre-Op will call the afternoon prior to surgery between 1:00 and 6:00 PM with the final arrival time.  Phone number: [phone]    PLEASE NOTE:  The surgery schedule has many variables which may affect the time of your surgery case.  Family members should be available if your surgery time changes.  Plan to be here the day of your procedure between 4-6 hours.    MEDICATIONS:  TAKE ONLY THESE MEDICATIONS WITH A SMALL SIP OF WATER THE MORNING OF YOUR PROCEDURE:    SEE MED LIST        DO NOT TAKE THESE MEDICATIONS 5-7 DAYS PRIOR to your procedure or per your surgeon's request:   ASPIRIN, ALEVE, ADVIL, IBUPROFEN, FISH OIL VITAMIN E, HERBALS  (May take Tylenol)    ONLY if you are prescribed any types of blood thinners such as:  Aspirin, Coumadin, Plavix, Pradaxa, Xarelto, Aggrenox, Effient, Eliquis, Savasya, Brilinta, or any other, ask your surgeon whether you should stop taking them and how long before surgery you should stop.  You may also need to verify with the prescribing physician if it is ok to stop your medication.      INSTRUCTIONS IMPORTANT!!  Do not eat or drink anything between midnight and the time of your procedure- this includes gum, mints, and candy.  Do not smoke or drink alcoholic beverages 24 hours prior to your procedure.  Shower the night before AND the morning of your procedure with a Chlorhexidine wash such as Hibiclens or Dial antibacterial soap from the neck down.  Do not get it on your face or in your eyes.  You may use your own shampoo and face wash. This helps your skin to be as bacteria free as possible.    If you wear contact lenses, dentures, hearing aids or glasses, bring a container to put them in during surgery and give to a family member  for safe keeping.  Please leave all jewelry, piercing's and valuables at home. You must remove your false eyelashes prior to surgery.    DO NOT remove hair from the surgery site.  Do not shave the incision site unless you are given specific instructions to do so.    ONLY if you have been diagnosed with sleep apnea please bring your C-PAP machine.  ONLY if you wear home oxygen please bring your portable oxygen tank the day of your procedure.  ONLY if you have a history of OPEN HEART SURGERY you will need a clearance from your Cardiologist per Anesthesia.      ONLY for patients requiring bowel prep, written instructions will be given by your doctor's office.  ONLY if you have a neuro stimulator, please bring the controller with you the morning of surgery  ONLY if a type and screen test is needed before surgery, please return:  If your doctor has scheduled you for an overnight stay, bring a small overnight bag with any personal items you need.  Make arrangements in advance for transportation home by a responsible adult.  It is not safe to drive a vehicle during the 24 hours after anesthesia.      [facility] Visitor Policy    Effective September 26, 2022    [facility] will resume routine visitation for COVID-19 negative patients, including inpatients, outpatients, and procedural areas, in accordance with local campus procedures.    All [facility] facilities and properties are tobacco free.  Smoking is NOT allowed.   If you have any questions about these instructions, call Pre-Op Admit  Nursing at [phone] or the Pre-Op Day Surgery Unit at [phone].

## 2023-06-23 ENCOUNTER — TELEPHONE (OUTPATIENT)
Dept: SURGERY | Facility: CLINIC | Age: 73
End: 2023-06-23
Payer: MEDICARE

## 2023-06-23 NOTE — TELEPHONE ENCOUNTER
I called and spoke to patients wife to inform her of the 1 week follow up appointment on Wednesday, 7/5/23 @ 10am in [place].  Zack

## 2023-06-24 ENCOUNTER — ANESTHESIA EVENT (OUTPATIENT)
Dept: SURGERY | Facility: HOSPITAL | Age: 73
DRG: 331 | End: 2023-06-24
Payer: MEDICARE

## 2023-06-26 ENCOUNTER — ANESTHESIA (OUTPATIENT)
Dept: SURGERY | Facility: HOSPITAL | Age: 73
DRG: 331 | End: 2023-06-26
Payer: MEDICARE

## 2023-06-26 ENCOUNTER — HOSPITAL ENCOUNTER (INPATIENT)
Facility: HOSPITAL | Age: 73
LOS: 1 days | Discharge: HOME | DRG: 331 | End: 2023-06-27
Attending: SURGERY | Admitting: SURGERY
Payer: MEDICARE

## 2023-06-26 DIAGNOSIS — K63.5 POLYP OF ASCENDING COLON: ICD-10-CM

## 2023-06-26 DIAGNOSIS — I15.2 HYPERTENSION ASSOCIATED WITH DIABETES: Primary | ICD-10-CM

## 2023-06-26 DIAGNOSIS — K63.5 POLYP OF ASCENDING COLON, UNSPECIFIED TYPE: ICD-10-CM

## 2023-06-26 DIAGNOSIS — E11.59 HYPERTENSION ASSOCIATED WITH DIABETES: Primary | ICD-10-CM

## 2023-06-26 PROCEDURE — 63600175 PHARM REV CODE 636 W HCPCS: Performed by: NURSE ANESTHETIST, CERTIFIED REGISTERED

## 2023-06-26 PROCEDURE — 63600175 PHARM REV CODE 636 W HCPCS: Performed by: SURGERY

## 2023-06-26 PROCEDURE — D9220A PRA ANESTHESIA: Mod: CRNA,,, | Performed by: NURSE ANESTHETIST, CERTIFIED REGISTERED

## 2023-06-26 PROCEDURE — 71000039 HC RECOVERY, EACH ADD'L HOUR: Performed by: SURGERY

## 2023-06-26 PROCEDURE — D9220A PRA ANESTHESIA: ICD-10-PCS | Mod: CRNA,,, | Performed by: NURSE ANESTHETIST, CERTIFIED REGISTERED

## 2023-06-26 PROCEDURE — 88309 TISSUE EXAM BY PATHOLOGIST: CPT | Performed by: STUDENT IN AN ORGANIZED HEALTH CARE EDUCATION/TRAINING PROGRAM

## 2023-06-26 PROCEDURE — S0030 INJECTION, METRONIDAZOLE: HCPCS | Performed by: SURGERY

## 2023-06-26 PROCEDURE — D9220A PRA ANESTHESIA: Mod: ANES,,, | Performed by: ANESTHESIOLOGY

## 2023-06-26 PROCEDURE — C9290 INJ, BUPIVACAINE LIPOSOME: HCPCS | Performed by: SURGERY

## 2023-06-26 PROCEDURE — 25000003 PHARM REV CODE 250: Performed by: ANESTHESIOLOGY

## 2023-06-26 PROCEDURE — 25000003 PHARM REV CODE 250: Performed by: SURGERY

## 2023-06-26 PROCEDURE — 36000713 HC OR TIME LEV V EA ADD 15 MIN: Performed by: SURGERY

## 2023-06-26 PROCEDURE — 37000009 HC ANESTHESIA EA ADD 15 MINS: Performed by: SURGERY

## 2023-06-26 PROCEDURE — 99900104 DSU ONLY-NO CHARGE-EA ADD'L HR (STAT): Performed by: SURGERY

## 2023-06-26 PROCEDURE — 12000002 HC ACUTE/MED SURGE SEMI-PRIVATE ROOM

## 2023-06-26 PROCEDURE — 25000003 PHARM REV CODE 250: Performed by: NURSE ANESTHETIST, CERTIFIED REGISTERED

## 2023-06-26 PROCEDURE — 71000033 HC RECOVERY, INTIAL HOUR: Performed by: SURGERY

## 2023-06-26 PROCEDURE — 99900103 DSU ONLY-NO CHARGE-INITIAL HR (STAT): Performed by: SURGERY

## 2023-06-26 PROCEDURE — 37000008 HC ANESTHESIA 1ST 15 MINUTES: Performed by: SURGERY

## 2023-06-26 PROCEDURE — 94799 UNLISTED PULMONARY SVC/PX: CPT

## 2023-06-26 PROCEDURE — 94761 N-INVAS EAR/PLS OXIMETRY MLT: CPT

## 2023-06-26 PROCEDURE — 27201423 OPTIME MED/SURG SUP & DEVICES STERILE SUPPLY: Performed by: SURGERY

## 2023-06-26 PROCEDURE — D9220A PRA ANESTHESIA: ICD-10-PCS | Mod: ANES,,, | Performed by: ANESTHESIOLOGY

## 2023-06-26 PROCEDURE — 36000712 HC OR TIME LEV V 1ST 15 MIN: Performed by: SURGERY

## 2023-06-26 PROCEDURE — 44205 PR LAP,SURG,COLECTOMY,W/REMVL TERM ILEUM: ICD-10-PCS | Mod: ,,, | Performed by: SURGERY

## 2023-06-26 PROCEDURE — 88309 TISSUE EXAM BY PATHOLOGIST: CPT | Mod: 26,,, | Performed by: STUDENT IN AN ORGANIZED HEALTH CARE EDUCATION/TRAINING PROGRAM

## 2023-06-26 PROCEDURE — 99900035 HC TECH TIME PER 15 MIN (STAT)

## 2023-06-26 PROCEDURE — 44205 LAP COLECTOMY PART W/ILEUM: CPT | Mod: ,,, | Performed by: SURGERY

## 2023-06-26 PROCEDURE — 88309 PR  SURG PATH,LEVEL VI: ICD-10-PCS | Mod: 26,,, | Performed by: STUDENT IN AN ORGANIZED HEALTH CARE EDUCATION/TRAINING PROGRAM

## 2023-06-26 RX ORDER — KETOROLAC TROMETHAMINE 30 MG/ML
INJECTION, SOLUTION INTRAMUSCULAR; INTRAVENOUS
Status: DISCONTINUED | OUTPATIENT
Start: 2023-06-26 | End: 2023-06-26

## 2023-06-26 RX ORDER — BUPIVACAINE HYDROCHLORIDE AND EPINEPHRINE 2.5; 5 MG/ML; UG/ML
INJECTION, SOLUTION EPIDURAL; INFILTRATION; INTRACAUDAL; PERINEURAL
Status: DISCONTINUED | OUTPATIENT
Start: 2023-06-26 | End: 2023-06-26 | Stop reason: HOSPADM

## 2023-06-26 RX ORDER — ONDANSETRON 2 MG/ML
4 INJECTION INTRAMUSCULAR; INTRAVENOUS EVERY 12 HOURS PRN
Status: DISCONTINUED | OUTPATIENT
Start: 2023-06-26 | End: 2023-06-26

## 2023-06-26 RX ORDER — CEFAZOLIN SODIUM 2 G/50ML
2 SOLUTION INTRAVENOUS
Status: COMPLETED | OUTPATIENT
Start: 2023-06-26 | End: 2023-06-27

## 2023-06-26 RX ORDER — ACETAMINOPHEN 10 MG/ML
1000 INJECTION, SOLUTION INTRAVENOUS EVERY 6 HOURS
Status: COMPLETED | OUTPATIENT
Start: 2023-06-26 | End: 2023-06-27

## 2023-06-26 RX ORDER — ONDANSETRON 2 MG/ML
4 INJECTION INTRAMUSCULAR; INTRAVENOUS EVERY 6 HOURS PRN
Status: DISCONTINUED | OUTPATIENT
Start: 2023-06-26 | End: 2023-06-27 | Stop reason: HOSPADM

## 2023-06-26 RX ORDER — DEXTROSE, SODIUM CHLORIDE, SODIUM LACTATE, POTASSIUM CHLORIDE, AND CALCIUM CHLORIDE 5; .6; .31; .03; .02 G/100ML; G/100ML; G/100ML; G/100ML; G/100ML
INJECTION, SOLUTION INTRAVENOUS CONTINUOUS
Status: DISCONTINUED | OUTPATIENT
Start: 2023-06-26 | End: 2023-06-27

## 2023-06-26 RX ORDER — LIDOCAINE HYDROCHLORIDE 10 MG/ML
1 INJECTION, SOLUTION EPIDURAL; INFILTRATION; INTRACAUDAL; PERINEURAL ONCE
Status: DISCONTINUED | OUTPATIENT
Start: 2023-06-26 | End: 2023-06-26 | Stop reason: HOSPADM

## 2023-06-26 RX ORDER — SODIUM CHLORIDE 9 MG/ML
INJECTION, SOLUTION INTRAVENOUS CONTINUOUS
Status: DISCONTINUED | OUTPATIENT
Start: 2023-06-26 | End: 2023-06-26

## 2023-06-26 RX ORDER — ONDANSETRON 2 MG/ML
4 INJECTION INTRAMUSCULAR; INTRAVENOUS ONCE
Status: DISCONTINUED | OUTPATIENT
Start: 2023-06-26 | End: 2023-06-26 | Stop reason: HOSPADM

## 2023-06-26 RX ORDER — ACETAMINOPHEN 10 MG/ML
INJECTION, SOLUTION INTRAVENOUS
Status: DISCONTINUED | OUTPATIENT
Start: 2023-06-26 | End: 2023-06-26

## 2023-06-26 RX ORDER — INDOCYANINE GREEN AND WATER 25 MG
KIT INJECTION
Status: DISCONTINUED | OUTPATIENT
Start: 2023-06-26 | End: 2023-06-26

## 2023-06-26 RX ORDER — FENTANYL CITRATE 50 UG/ML
25 INJECTION, SOLUTION INTRAMUSCULAR; INTRAVENOUS EVERY 5 MIN PRN
Status: DISCONTINUED | OUTPATIENT
Start: 2023-06-26 | End: 2023-06-26 | Stop reason: HOSPADM

## 2023-06-26 RX ORDER — PHENYLEPHRINE HYDROCHLORIDE 10 MG/ML
INJECTION INTRAVENOUS
Status: DISCONTINUED | OUTPATIENT
Start: 2023-06-26 | End: 2023-06-26

## 2023-06-26 RX ORDER — PROPOFOL 10 MG/ML
VIAL (ML) INTRAVENOUS
Status: DISCONTINUED | OUTPATIENT
Start: 2023-06-26 | End: 2023-06-26

## 2023-06-26 RX ORDER — DEXAMETHASONE SODIUM PHOSPHATE 4 MG/ML
INJECTION, SOLUTION INTRA-ARTICULAR; INTRALESIONAL; INTRAMUSCULAR; INTRAVENOUS; SOFT TISSUE
Status: DISCONTINUED | OUTPATIENT
Start: 2023-06-26 | End: 2023-06-26

## 2023-06-26 RX ORDER — LIDOCAINE HYDROCHLORIDE 20 MG/ML
INJECTION INTRAVENOUS
Status: DISCONTINUED | OUTPATIENT
Start: 2023-06-26 | End: 2023-06-26

## 2023-06-26 RX ORDER — IBUPROFEN 600 MG/1
600 TABLET ORAL 4 TIMES DAILY
Status: DISCONTINUED | OUTPATIENT
Start: 2023-06-26 | End: 2023-06-27

## 2023-06-26 RX ORDER — BISACODYL 5 MG
5 TABLET, DELAYED RELEASE (ENTERIC COATED) ORAL NIGHTLY
Status: DISCONTINUED | OUTPATIENT
Start: 2023-06-26 | End: 2023-06-27 | Stop reason: HOSPADM

## 2023-06-26 RX ORDER — METRONIDAZOLE 500 MG/100ML
500 INJECTION, SOLUTION INTRAVENOUS
Status: COMPLETED | OUTPATIENT
Start: 2023-06-26 | End: 2023-06-27

## 2023-06-26 RX ORDER — OXYCODONE HYDROCHLORIDE 5 MG/1
5 TABLET ORAL
Status: DISCONTINUED | OUTPATIENT
Start: 2023-06-26 | End: 2023-06-26 | Stop reason: HOSPADM

## 2023-06-26 RX ORDER — ENOXAPARIN SODIUM 100 MG/ML
40 INJECTION SUBCUTANEOUS EVERY 12 HOURS
Status: DISCONTINUED | OUTPATIENT
Start: 2023-06-26 | End: 2023-06-27

## 2023-06-26 RX ORDER — HYDROMORPHONE HYDROCHLORIDE 2 MG/ML
1 INJECTION, SOLUTION INTRAMUSCULAR; INTRAVENOUS; SUBCUTANEOUS EVERY 4 HOURS PRN
Status: DISCONTINUED | OUTPATIENT
Start: 2023-06-26 | End: 2023-06-27 | Stop reason: HOSPADM

## 2023-06-26 RX ORDER — METRONIDAZOLE 500 MG/100ML
500 INJECTION, SOLUTION INTRAVENOUS
Status: COMPLETED | OUTPATIENT
Start: 2023-06-26 | End: 2023-06-26

## 2023-06-26 RX ORDER — MEPERIDINE HYDROCHLORIDE 50 MG/ML
12.5 INJECTION INTRAMUSCULAR; INTRAVENOUS; SUBCUTANEOUS ONCE
Status: DISCONTINUED | OUTPATIENT
Start: 2023-06-26 | End: 2023-06-26 | Stop reason: HOSPADM

## 2023-06-26 RX ORDER — DIPHENHYDRAMINE HYDROCHLORIDE 50 MG/ML
12.5 INJECTION INTRAMUSCULAR; INTRAVENOUS EVERY 6 HOURS PRN
Status: DISCONTINUED | OUTPATIENT
Start: 2023-06-26 | End: 2023-06-26 | Stop reason: HOSPADM

## 2023-06-26 RX ORDER — FENTANYL CITRATE 50 UG/ML
INJECTION, SOLUTION INTRAMUSCULAR; INTRAVENOUS
Status: DISCONTINUED | OUTPATIENT
Start: 2023-06-26 | End: 2023-06-26

## 2023-06-26 RX ORDER — DIPHENHYDRAMINE HYDROCHLORIDE 50 MG/ML
12.5 INJECTION INTRAMUSCULAR; INTRAVENOUS EVERY 6 HOURS PRN
Status: DISCONTINUED | OUTPATIENT
Start: 2023-06-26 | End: 2023-06-26

## 2023-06-26 RX ORDER — EPHEDRINE SULFATE 50 MG/ML
INJECTION, SOLUTION INTRAVENOUS
Status: DISCONTINUED | OUTPATIENT
Start: 2023-06-26 | End: 2023-06-26

## 2023-06-26 RX ORDER — MIDAZOLAM HYDROCHLORIDE 1 MG/ML
INJECTION INTRAMUSCULAR; INTRAVENOUS
Status: DISCONTINUED | OUTPATIENT
Start: 2023-06-26 | End: 2023-06-26

## 2023-06-26 RX ORDER — GABAPENTIN 100 MG/1
200 CAPSULE ORAL 2 TIMES DAILY
Status: DISCONTINUED | OUTPATIENT
Start: 2023-06-26 | End: 2023-06-27 | Stop reason: HOSPADM

## 2023-06-26 RX ORDER — ROCURONIUM BROMIDE 10 MG/ML
INJECTION, SOLUTION INTRAVENOUS
Status: DISCONTINUED | OUTPATIENT
Start: 2023-06-26 | End: 2023-06-26

## 2023-06-26 RX ORDER — LORAZEPAM 2 MG/ML
0.25 INJECTION INTRAMUSCULAR EVERY 4 HOURS PRN
Status: DISCONTINUED | OUTPATIENT
Start: 2023-06-26 | End: 2023-06-27 | Stop reason: HOSPADM

## 2023-06-26 RX ORDER — SUCCINYLCHOLINE CHLORIDE 20 MG/ML
INJECTION INTRAMUSCULAR; INTRAVENOUS
Status: DISCONTINUED | OUTPATIENT
Start: 2023-06-26 | End: 2023-06-26

## 2023-06-26 RX ORDER — NEOSTIGMINE METHYLSULFATE 1 MG/ML
INJECTION, SOLUTION INTRAVENOUS
Status: DISCONTINUED | OUTPATIENT
Start: 2023-06-26 | End: 2023-06-26

## 2023-06-26 RX ORDER — OXYCODONE HYDROCHLORIDE 5 MG/1
5 TABLET ORAL EVERY 4 HOURS PRN
Status: DISCONTINUED | OUTPATIENT
Start: 2023-06-26 | End: 2023-06-27 | Stop reason: HOSPADM

## 2023-06-26 RX ORDER — MUPIROCIN 20 MG/G
OINTMENT TOPICAL 2 TIMES DAILY
Status: DISCONTINUED | OUTPATIENT
Start: 2023-06-26 | End: 2023-06-27 | Stop reason: HOSPADM

## 2023-06-26 RX ORDER — ONDANSETRON 2 MG/ML
INJECTION INTRAMUSCULAR; INTRAVENOUS
Status: DISCONTINUED | OUTPATIENT
Start: 2023-06-26 | End: 2023-06-26

## 2023-06-26 RX ORDER — SODIUM CHLORIDE, SODIUM LACTATE, POTASSIUM CHLORIDE, CALCIUM CHLORIDE 600; 310; 30; 20 MG/100ML; MG/100ML; MG/100ML; MG/100ML
INJECTION, SOLUTION INTRAVENOUS CONTINUOUS
Status: DISCONTINUED | OUTPATIENT
Start: 2023-06-26 | End: 2023-06-26

## 2023-06-26 RX ORDER — DIPHENHYDRAMINE HYDROCHLORIDE 50 MG/ML
12.5 INJECTION INTRAMUSCULAR; INTRAVENOUS EVERY 4 HOURS PRN
Status: DISCONTINUED | OUTPATIENT
Start: 2023-06-26 | End: 2023-06-27 | Stop reason: HOSPADM

## 2023-06-26 RX ORDER — HYDROMORPHONE HYDROCHLORIDE 2 MG/ML
0.2 INJECTION, SOLUTION INTRAMUSCULAR; INTRAVENOUS; SUBCUTANEOUS EVERY 5 MIN PRN
Status: DISCONTINUED | OUTPATIENT
Start: 2023-06-26 | End: 2023-06-26 | Stop reason: HOSPADM

## 2023-06-26 RX ADMIN — FENTANYL CITRATE 25 MCG: 50 INJECTION, SOLUTION INTRAMUSCULAR; INTRAVENOUS at 10:06

## 2023-06-26 RX ADMIN — SUCCINYLCHOLINE CHLORIDE 120 MG: 20 INJECTION, SOLUTION INTRAMUSCULAR; INTRAVENOUS at 07:06

## 2023-06-26 RX ADMIN — SODIUM CHLORIDE, SODIUM LACTATE, POTASSIUM CHLORIDE, CALCIUM CHLORIDE AND DEXTROSE MONOHYDRATE: 5; 600; 310; 30; 20 INJECTION, SOLUTION INTRAVENOUS at 09:06

## 2023-06-26 RX ADMIN — EPHEDRINE SULFATE 10 MG: 50 INJECTION, SOLUTION INTRAMUSCULAR; INTRAVENOUS; SUBCUTANEOUS at 08:06

## 2023-06-26 RX ADMIN — ACETAMINOPHEN 1000 MG: 10 INJECTION, SOLUTION INTRAVENOUS at 07:06

## 2023-06-26 RX ADMIN — FENTANYL CITRATE 25 MCG: 50 INJECTION, SOLUTION INTRAMUSCULAR; INTRAVENOUS at 08:06

## 2023-06-26 RX ADMIN — MUPIROCIN: 20 OINTMENT TOPICAL at 12:06

## 2023-06-26 RX ADMIN — EPHEDRINE SULFATE 10 MG: 50 INJECTION, SOLUTION INTRAMUSCULAR; INTRAVENOUS; SUBCUTANEOUS at 09:06

## 2023-06-26 RX ADMIN — MIDAZOLAM HYDROCHLORIDE 2 MG: 1 INJECTION, SOLUTION INTRAMUSCULAR; INTRAVENOUS at 07:06

## 2023-06-26 RX ADMIN — DEXAMETHASONE SODIUM PHOSPHATE 4 MG: 4 INJECTION, SOLUTION INTRA-ARTICULAR; INTRALESIONAL; INTRAMUSCULAR; INTRAVENOUS; SOFT TISSUE at 07:06

## 2023-06-26 RX ADMIN — NEOSTIGMINE METHYLSULFATE 3 MG: 1 INJECTION INTRAVENOUS at 09:06

## 2023-06-26 RX ADMIN — GABAPENTIN 200 MG: 100 CAPSULE ORAL at 08:06

## 2023-06-26 RX ADMIN — EPHEDRINE SULFATE 10 MG: 50 INJECTION, SOLUTION INTRAMUSCULAR; INTRAVENOUS; SUBCUTANEOUS at 07:06

## 2023-06-26 RX ADMIN — CEFTRIAXONE SODIUM 2 G: 2 INJECTION, POWDER, FOR SOLUTION INTRAMUSCULAR; INTRAVENOUS at 07:06

## 2023-06-26 RX ADMIN — GABAPENTIN 200 MG: 100 CAPSULE ORAL at 12:06

## 2023-06-26 RX ADMIN — METRONIDAZOLE 500 MG: 500 INJECTION, SOLUTION INTRAVENOUS at 05:06

## 2023-06-26 RX ADMIN — LIDOCAINE HYDROCHLORIDE 100 MG: 20 INJECTION, SOLUTION INTRAVENOUS at 07:06

## 2023-06-26 RX ADMIN — ACETAMINOPHEN 1000 MG: 10 INJECTION INTRAVENOUS at 03:06

## 2023-06-26 RX ADMIN — SODIUM CHLORIDE, SODIUM GLUCONATE, SODIUM ACETATE, POTASSIUM CHLORIDE AND MAGNESIUM CHLORIDE: 526; 502; 368; 37; 30 INJECTION, SOLUTION INTRAVENOUS at 08:06

## 2023-06-26 RX ADMIN — MUPIROCIN: 20 OINTMENT TOPICAL at 08:06

## 2023-06-26 RX ADMIN — OXYCODONE HYDROCHLORIDE 5 MG: 5 TABLET ORAL at 08:06

## 2023-06-26 RX ADMIN — PHENYLEPHRINE HYDROCHLORIDE 100 MCG: 10 INJECTION INTRAVENOUS at 08:06

## 2023-06-26 RX ADMIN — BISACODYL 5 MG: 5 TABLET, COATED ORAL at 08:06

## 2023-06-26 RX ADMIN — ENOXAPARIN SODIUM 40 MG: 40 INJECTION SUBCUTANEOUS at 08:06

## 2023-06-26 RX ADMIN — SODIUM CHLORIDE, SODIUM GLUCONATE, SODIUM ACETATE, POTASSIUM CHLORIDE AND MAGNESIUM CHLORIDE: 526; 502; 368; 37; 30 INJECTION, SOLUTION INTRAVENOUS at 06:06

## 2023-06-26 RX ADMIN — PHENYLEPHRINE HYDROCHLORIDE 200 MCG: 10 INJECTION INTRAVENOUS at 07:06

## 2023-06-26 RX ADMIN — CEFAZOLIN SODIUM 2 G: 2 SOLUTION INTRAVENOUS at 04:06

## 2023-06-26 RX ADMIN — ACETAMINOPHEN 1000 MG: 10 INJECTION INTRAVENOUS at 08:06

## 2023-06-26 RX ADMIN — FENTANYL CITRATE 100 MCG: 50 INJECTION, SOLUTION INTRAMUSCULAR; INTRAVENOUS at 07:06

## 2023-06-26 RX ADMIN — SODIUM CHLORIDE, SODIUM LACTATE, POTASSIUM CHLORIDE, CALCIUM CHLORIDE AND DEXTROSE MONOHYDRATE: 5; 600; 310; 30; 20 INJECTION, SOLUTION INTRAVENOUS at 11:06

## 2023-06-26 RX ADMIN — PROPOFOL 150 MG: 10 INJECTION, EMULSION INTRAVENOUS at 07:06

## 2023-06-26 RX ADMIN — ONDANSETRON 4 MG: 2 INJECTION INTRAMUSCULAR; INTRAVENOUS at 07:06

## 2023-06-26 RX ADMIN — PHENYLEPHRINE HYDROCHLORIDE 100 MCG: 10 INJECTION INTRAVENOUS at 07:06

## 2023-06-26 RX ADMIN — ROCURONIUM BROMIDE 5 MG: 10 INJECTION, SOLUTION INTRAVENOUS at 07:06

## 2023-06-26 RX ADMIN — GLYCOPYRROLATE 0.4 MG: 0.2 INJECTION, SOLUTION INTRAMUSCULAR; INTRAVENOUS at 09:06

## 2023-06-26 RX ADMIN — IBUPROFEN 600 MG: 600 TABLET ORAL at 05:06

## 2023-06-26 RX ADMIN — IBUPROFEN 600 MG: 600 TABLET ORAL at 12:06

## 2023-06-26 RX ADMIN — KETOROLAC TROMETHAMINE 30 MG: 30 INJECTION, SOLUTION INTRAMUSCULAR; INTRAVENOUS at 10:06

## 2023-06-26 RX ADMIN — INDOCYANINE GREEN 7.5 MG: KIT INTRAVENOUS at 08:06

## 2023-06-26 RX ADMIN — FENTANYL CITRATE 50 MCG: 50 INJECTION, SOLUTION INTRAMUSCULAR; INTRAVENOUS at 10:06

## 2023-06-26 RX ADMIN — METRONIDAZOLE 500 MG: 500 INJECTION, SOLUTION INTRAVENOUS at 07:06

## 2023-06-26 RX ADMIN — IBUPROFEN 600 MG: 600 TABLET ORAL at 08:06

## 2023-06-26 RX ADMIN — ROCURONIUM BROMIDE 25 MG: 10 INJECTION, SOLUTION INTRAVENOUS at 07:06

## 2023-06-26 NOTE — HPI
Ludwin Gee is a 73 yr old male with Pmhx of type 2 diabetes, hypertension, hyperlipidemia, BPH, prostate cancer presenting today s/p robotic right hemicolectomy with ileocolic anastomosis with Dr. Castro. Per chart reviewe patient had recent colonoscopy performed and was noted to have a flat adenomatous lesion in the ascending colon.  Attempts at removal were undertaken.  A lift procedure was performed and a portion of the lesion was removed but it was incompletely excised.  Pathology confirmed adenomatous tissue.  He was referred to Dr. Castro for surgical resection. Pt seen post op and doing well.  He is tolerating clear liquid diet.  He states pain is currently controlled and denies acute pain at this time.  Patient will be monitored overnight.

## 2023-06-26 NOTE — PLAN OF CARE
Patient transferred to room from PACU. Vitals stable. Oriented to room. Plan of care reviewed with patient and family, verbalized understanding. Dressing to abdomen clean dry and intact. Clear liquid diet provided. Vicente intact. IV fluids infusing. Bed in lowest position and call light within reach.

## 2023-06-26 NOTE — ANESTHESIA POSTPROCEDURE EVALUATION
Anesthesia Post Evaluation    Patient: Ludwin Gee    Procedure(s) Performed: Procedure(s) (LRB):  XI ROBOTIC COLECTOMY, PARTIAL (N/A)    Final Anesthesia Type: general      Patient location during evaluation: PACU  Patient participation: Yes- Able to Participate  Level of consciousness: awake and alert and oriented  Post-procedure vital signs: reviewed and stable  Pain management: adequate  Airway patency: patent    PONV status at discharge: No PONV  Anesthetic complications: no      Cardiovascular status: blood pressure returned to baseline  Respiratory status: unassisted, spontaneous ventilation and room air  Hydration status: euvolemic  Follow-up not needed.          Vitals Value Taken Time   /60 06/26/23 1117   Temp 36.6 °C (97.8 °F) 06/26/23 1030   Pulse 84 06/26/23 1121   Resp 36 06/26/23 1121   SpO2 100 % 06/26/23 1121   Vitals shown include unvalidated device data.      No case tracking events are documented in the log.      Pain/Lee Score: Lee Score: 10 (6/26/2023 11:00 AM)

## 2023-06-26 NOTE — TRANSFER OF CARE
"Anesthesia Transfer of Care Note    Patient: Ludwin Gee    Procedure(s) Performed: Procedure(s) (LRB):  XI ROBOTIC COLECTOMY, PARTIAL (N/A)    Patient location: PACU    Anesthesia Type: general    Transport from OR: Transported from OR on 6-10 L/min O2 by face mask with adequate spontaneous ventilation    Post pain: adequate analgesia    Post assessment: no apparent anesthetic complications    Post vital signs: stable    Level of consciousness: sedated    Nausea/Vomiting: no nausea/vomiting    Complications: none    Transfer of care protocol was followed      Last vitals:   Visit Vitals  /68 (BP Location: Right arm, Patient Position: Lying)   Temp 36.6 °C (97.8 °F) (Oral)   Resp 16   Ht 5' 6" (1.676 m)   Wt 74.4 kg (164 lb)   SpO2 96%   BMI 26.47 kg/m²     "

## 2023-06-26 NOTE — ANESTHESIA PREPROCEDURE EVALUATION
2023  Ludwin Gee is a 73 y.o., male.      Pre-op Assessment    I have reviewed the Patient Summary Reports.     I have reviewed the Nursing Notes. I have reviewed the NPO Status.   I have reviewed the Medications.     Review of Systems  Anesthesia Hx:  No problems with previous Anesthesia Long COVID   Social:  Non-Smoker, Former Smoker Smoking Status: Former  Quit Smokin85  Smokeless Tobacco Status: Never  Alcohol use: Yes, unspecified volume  Drug use: No       Cardiovascular:   Hypertension Denies MI.  Denies CAD.    Denies CABG/stent.   Denies Angina. hyperlipidemia    Pulmonary:   Denies COPD.  Denies Asthma.  Denies Recent URI.    Renal/:   Denies Chronic Renal Disease.     Hepatic/GI:   Denies GERD. Denies Liver Disease.    Neurological:   Denies TIA. Denies CVA. Denies Seizures.    Endocrine:   Diabetes, type 2 Denies Hypothyroidism.    Psych:   Denies Psychiatric History.          Physical Exam  General: Well nourished, Cooperative, Alert and Oriented    Airway:  Mallampati: II / II  Mouth Opening: Normal  TM Distance: 4 - 6 cm  Tongue: Normal    Dental:  Intact    Chest/Lungs:  Clear to auscultation, Normal Respiratory Rate    Heart:  Rate: Normal  Rhythm: Regular Rhythm  Sounds: Normal        Anesthesia Plan  Type of Anesthesia, risks & benefits discussed:    Anesthesia Type: Gen ETT  Intra-op Monitoring Plan: Standard ASA Monitors  Induction:  IV  Informed Consent: Informed consent signed with the Patient and all parties understand the risks and agree with anesthesia plan.  All questions answered.   ASA Score: 3    Ready For Surgery From Anesthesia Perspective.     .

## 2023-06-26 NOTE — ASSESSMENT & PLAN NOTE
Patient's FSGs are controlled on current medication regimen.  Last A1c reviewed-   Lab Results   Component Value Date    HGBA1C 6.8 (H) 05/23/2023     Most recent fingerstick glucose reviewed- No results for input(s): POCTGLUCOSE in the last 24 hours.  Current correctional scale  Low  Maintain anti-hyperglycemic dose as follows-   Antihyperglycemics (From admission, onward)    None        Hold Oral hypoglycemics while patient is in the hospital.

## 2023-06-26 NOTE — SUBJECTIVE & OBJECTIVE
"Past Medical History:   Diagnosis Date    BPH with elevated PSA     Digestive disorder     COLON POLYPS    Elevated PSA     Hyperlipidemia     Hypertension     Personal history of colonic polyps     Prostate cancer 09/06/2022    [place] 3+3=6 Watchful waiting       Past Surgical History:   Procedure Laterality Date    COLONOSCOPY  12/6/2013    Dr. Martinez, 5 year recheck    COLONOSCOPY N/A 5/19/2023    Procedure: COLONOSCOPY;  Surgeon: Saúl Mayen MD;  Location: [facility];  Service: Endoscopy;  Laterality: N/A;    SHOULDER SURGERY  12/09/2011    right     TRANSRECTAL BIOPSY OF PROSTATE WITH ULTRASOUND GUIDANCE N/A 8/24/2022    Procedure: BIOPSY, PROSTATE, RECTAL APPROACH, WITH US GUIDANCE;  Surgeon: Mora Millan Jr., MD;  Location: [facility];  Service: Urology;  Laterality: N/A;       Review of patient's allergies indicates:   Allergen Reactions    Pravastatin Other (See Comments)     Joint pain    Lisinopril Other (See Comments)     Cough        No current facility-administered medications on file prior to encounter.     Current Outpatient Medications on File Prior to Encounter   Medication Sig    alfuzosin (UROXATRAL) 10 mg Tb24 Take 1 tablet (10 mg total) by mouth daily with breakfast.    coenzyme Q10 10 mg capsule Take 10 mg by mouth once daily.    famotidine (PEPCID) 20 MG tablet Take 20 mg by mouth once daily.    losartan (COZAAR) 25 MG tablet TAKE ONE TABLET BY MOUTH EVERY DAY    metFORMIN (GLUCOPHAGE-XR) 500 MG ER 24hr tablet TAKE TWO TABLETS BY MOUTH TWO TIMES A DAY WITH MEALS    oxybutynin (DITROPAN-XL) 5 MG TR24 Take 1 tablet (5 mg total) by mouth once daily.    BD INSULIN SYRINGE ULTRA-FINE 0.5 mL 31 gauge x 5/16" Syrg     BD ULTRA-FINE CONSTANCE PEN NEEDLE 32 gauge x 5/32" Ndle     blood sugar diagnostic (ONETOUCH ULTRA TEST) Strp USE TO CHECK FASTING GLUCOSE IN THE MORNING    blood-glucose meter Misc One Touch glucometer check fasting glucose in morning    pen needle, diabetic 30 gauge x 5/16" Ndle " Use with levemir once daily and novolog before each meal three times daily (Patient not taking: Reported on 2023)     Family History       Problem Relation (Age of Onset)    Cataracts Mother    Diabetes Father, Maternal Grandmother    Glaucoma Mother    Heart disease Father    No Known Problems Brother, Daughter, Son, Maternal Grandfather, Paternal Grandmother, Paternal Grandfather, Maternal Aunt, Maternal Uncle, Paternal Aunt, Paternal Uncle          Tobacco Use    Smoking status: Former     Types: Cigarettes     Quit date: 1985     Years since quittin.1    Smokeless tobacco: Never   Substance and Sexual Activity    Alcohol use: Yes     Comment: seldom    Drug use: No    Sexual activity: Yes     Partners: Female     Review of Systems   Constitutional:  Negative for chills, fatigue and fever.   HENT:  Negative for congestion, sore throat and trouble swallowing.    Respiratory:  Negative for cough and shortness of breath.    Cardiovascular:  Negative for chest pain.   Gastrointestinal:  Negative for abdominal pain, diarrhea, nausea and vomiting.   Genitourinary:  Negative for difficulty urinating, dysuria and urgency.   Musculoskeletal:  Negative for arthralgias, back pain and gait problem.   Skin:  Negative for rash.   Neurological:  Negative for dizziness and light-headedness.   Hematological:  Negative for adenopathy.   Psychiatric/Behavioral:  Negative for agitation, behavioral problems and confusion.    All other systems reviewed and are negative.  Objective:     Vital Signs (Most Recent):  Temp: 97.7 °F (36.5 °C) (23 1120)  Pulse: 83 (23 1131)  Resp: 12 (23 1131)  BP: 119/60 (23 1131)  SpO2: 97 % (23 1131) Vital Signs (24h Range):  Temp:  [97.7 °F (36.5 °C)-97.8 °F (36.6 °C)] 97.7 °F (36.5 °C)  Pulse:  [83-98] 83  Resp:  [12-24] 12  SpO2:  [88 %-100 %] 97 %  BP: ()/(46-68) 119/60     Weight: 74.4 kg (164 lb)  Body mass index is 26.47 kg/m².     Physical  Exam  Vitals and nursing note reviewed.   Constitutional:       General: He is not in acute distress.     Appearance: Normal appearance. He is well-developed. He is not ill-appearing or diaphoretic.   HENT:      Head: Normocephalic and atraumatic.      Right Ear: External ear normal.      Left Ear: External ear normal.      Nose: Nose normal. No congestion or rhinorrhea.      Mouth/Throat:      Mouth: Mucous membranes are moist.      Pharynx: Oropharynx is clear. No oropharyngeal exudate or posterior oropharyngeal erythema.   Eyes:      General: No scleral icterus.     Conjunctiva/sclera: Conjunctivae normal.      Pupils: Pupils are equal, round, and reactive to light.   Neck:      Vascular: No JVD.   Cardiovascular:      Rate and Rhythm: Normal rate and regular rhythm.      Pulses: Normal pulses.      Heart sounds: Normal heart sounds. No murmur heard.  Pulmonary:      Effort: Pulmonary effort is normal. No respiratory distress.      Breath sounds: Normal breath sounds. No stridor. No wheezing, rhonchi or rales.   Abdominal:      General: Bowel sounds are normal. There is no distension.      Palpations: Abdomen is soft.      Tenderness: There is no abdominal tenderness.      Comments: Lap sites covered with drsg CDI   Genitourinary:     Comments: Vicente draining clear yellow urine  Musculoskeletal:         General: No swelling or tenderness. Normal range of motion.      Cervical back: Normal range of motion and neck supple.   Skin:     General: Skin is warm and dry.      Capillary Refill: Capillary refill takes 2 to 3 seconds.      Coloration: Skin is not jaundiced or pale.      Findings: No erythema.   Neurological:      General: No focal deficit present.      Mental Status: He is alert and oriented to person, place, and time.      Cranial Nerves: No cranial nerve deficit.      Sensory: No sensory deficit.   Psychiatric:         Mood and Affect: Mood normal.         Behavior: Behavior normal.         Thought  Content: Thought content normal.            CRANIAL NERVES     CN III, IV, VI   Pupils are equal, round, and reactive to light.     Significant Labs: All pertinent labs within the past 24 hours have been reviewed.  Preop labs reviewed and wnl    Significant Imaging: I have reviewed all pertinent imaging results/findings within the past 24 hours.

## 2023-06-26 NOTE — ASSESSMENT & PLAN NOTE
Chronic, controlled.  Latest blood pressure and vitals reviewed-     Temp:  [97.8 °F (36.6 °C)]   Pulse:  [88-98]   Resp:  [14-24]   BP: ()/(46-68)   SpO2:  [88 %-100 %] .   Home meds for hypertension were reviewed and noted below.   Hypertension Medications             losartan (COZAAR) 25 MG tablet TAKE ONE TABLET BY MOUTH EVERY DAY          While in the hospital, will manage blood pressure as follows; Continue home antihypertensive regimen    Will utilize p.r.n. blood pressure medication only if patient's blood pressure greater than 180/110 and he develops symptoms such as worsening chest pain or shortness of breath.

## 2023-06-26 NOTE — BRIEF OP NOTE
[facility]  Brief Operative Note    SUMMARY     Surgery Date: 6/26/2023     Surgeon(s) and Role:     * Leobardo Castro MD - Primary    Assisting Surgeon: Amanda GARCIA    Pre-op Diagnosis:  Polyp of ascending colon, unspecified type [K63.5]    Post-op Diagnosis:  Post-Op Diagnosis Codes:     * Polyp of ascending colon, unspecified type [K63.5]    Procedure(s) (LRB):  XI ROBOTIC COLECTOMY, PARTIAL (N/A)    Anesthesia: General    Operative Findings:  Tattoo noted in the ascending colon.  Robotic right hemicolectomy with ileocolic anastomosis performed without event.    Estimated Blood Loss: 20 mL    Estimated Blood Loss has been documented.         Specimens:   Specimen (24h ago, onward)       Start     Ordered    06/26/23 1005  Specimen to Pathology, Surgery General Surgery  Once        Comments: Pre-op Diagnosis: Polyp of ascending colon, unspecified type [K63.5]Procedure(s):XI ROBOTIC COLECTOMY, PARTIAL Number of specimens: 1Name of specimens: RIGHT COLON     References:    Click here for ordering Quick Tip   Question Answer Comment   Procedure Type: General Surgery    Which provider would you like to cc? LEOBARDO CASTRO    Release to patient Immediate        06/26/23 1004                    [id number]    [facility]  Brief Operative Note    SUMMARY     Surgery Date: 6/26/2023     Surgeon(s) and Role:     * Leobardo Castro MD - Primary    Assisting Surgeon: None    Pre-op Diagnosis:  Polyp of ascending colon, unspecified type [K63.5]    Post-op Diagnosis:  Post-Op Diagnosis Codes:     * Polyp of ascending colon, unspecified type [K63.5]    Procedure(s) (LRB):  XI ROBOTIC COLECTOMY, PARTIAL (N/A)    Anesthesia: General    Operative Findings:  Tattoo present in the right colon.  Robotic right hemicolectomy without event    Estimated Blood Loss: 20 mL    Estimated Blood Loss has been documented.         Specimens:   Specimen (24h ago, onward)       Start     Ordered     06/26/23 1005  Specimen to Pathology, Surgery General Surgery  Once        Comments: Pre-op Diagnosis: Polyp of ascending colon, unspecified type [K63.5]Procedure(s):XI ROBOTIC COLECTOMY, PARTIAL Number of specimens: 1Name of specimens: RIGHT COLON     References:    Click here for ordering Quick Tip   Question Answer Comment   Procedure Type: General Surgery    Which provider would you like to cc? LEOBARDO HELLER    Release to patient Immediate        06/26/23 1004                    [id number]

## 2023-06-26 NOTE — PLAN OF CARE
[facility]  Initial Discharge Assessment       Primary Care Provider: Herrera Fisher MD    Admission Diagnosis: Polyp of ascending colon, unspecified type [K63.5]  Polyp of ascending colon [K63.5]    Admission Date: 6/26/2023  Expected Discharge Date:     Assessment completed with pt and Spouse Emily Barnhart [phone] at bedside. Pt denied HH and DME Confirmed PCP, insurance and pharmacy as [facility]. Pts spouse will provide transport home. CM following for additional needs.     Transition of Care Barriers: None    Payor: [facility] MEDICARE / Plan: [employer] [place] / Product Type: Medicare Advantage /     Extended Emergency Contact Information  Primary Emergency Contact: BrycekeyanaEmily  Address: [id number] [place] [address] [id number] [place] [address]  Mobile Phone: [phone]  Relation: Spouse  Preferred language: English   needed? No    Discharge Plan A: Home with family  Discharge Plan B: Home with family, Home Health      [facility] - [place], [place] - [address] [facility] [address]  [address] [facility] [address]  [place] [address]  Phone: [phone] Fax: [phone]      Initial Assessment (most recent)       Adult Discharge Assessment - 06/26/23 1337          Discharge Assessment    Assessment Type Discharge Planning Assessment     Confirmed/corrected address, phone number and insurance Yes     Confirmed Demographics Correct on Facesheet     Source of Information patient;family     Communicated ANTELMO with patient/caregiver Yes     Reason For Admission polyp     People in Home spouse     Facility Arrived From: Home     Do you expect to return to your current living situation? Yes     Do you have help at home or someone to help you manage your care at home? Yes     Who are your caregiver(s) and their phone number(s)? Spouse Emily Barnhart [phone]     Prior to hospitilization cognitive status: Alert/Oriented     Current cognitive status:  Alert/Oriented     Home Layout Able to live on 1st floor     Equipment Currently Used at Home none     Readmission within 30 days? No     Patient currently being followed by outpatient case management? No     Do you currently have service(s) that help you manage your care at home? No     Do you take prescription medications? Yes     Do you have prescription coverage? Yes     Do you have any problems affording any of your prescribed medications? No     Is the patient taking medications as prescribed? yes     Who is going to help you get home at discharge? Spouse Emily Barnhart [phone]     How do you get to doctors appointments? car, drives self;family or friend will provide     Are you on dialysis? No     Do you take coumadin? No     Discharge Plan A Home with family     Discharge Plan B Home with family;Home Health     DME Needed Upon Discharge  none     Discharge Plan discussed with: Patient;Spouse/sig other     Name(s) and Number(s) Spouse Emily Barnhart [phone]     Transition of Care Barriers None        Physical Activity    On average, how many days per week do you engage in moderate to strenuous exercise (like a brisk walk)? Patient refused     On average, how many minutes do you engage in exercise at this level? Patient refused        Financial Resource Strain    How hard is it for you to pay for the very basics like food, housing, medical care, and heating? Not hard at all        Housing Stability    In the last 12 months, was there a time when you were not able to pay the mortgage or rent on time? No     In the last 12 months, was there a time when you did not have a steady place to sleep or slept in a shelter (including now)? No        Transportation Needs    In the past 12 months, has lack of transportation kept you from medical appointments or from getting medications? No     In the past 12 months, has lack of transportation kept you from meetings, work, or from getting things needed for  daily living? No        Food Insecurity    Within the past 12 months, you worried that your food would run out before you got the money to buy more. Never true     Within the past 12 months, the food you bought just didn't last and you didn't have money to get more. Never true        Stress    Do you feel stress - tense, restless, nervous, or anxious, or unable to sleep at night because your mind is troubled all the time - these days? Patient refused        Social Connections    In a typical week, how many times do you talk on the phone with family, friends, or neighbors? Patient refused     How often do you get together with friends or relatives? Patient refused     How often do you attend Buddhism or Church services? Patient refused     Do you belong to any clubs or organizations such as Buddhism groups, unions, fraternal or athletic groups, or school groups? Patient refused     How often do you attend meetings of the clubs or organizations you belong to? Patient refused     Are you , , , , never , or living with a partner?         Alcohol Use    Q1: How often do you have a drink containing alcohol? Patient refused     Q2: How many drinks containing alcohol do you have on a typical day when you are drinking? Patient refused     Q3: How often do you have six or more drinks on one occasion? Patient refused        OTHER    Name(s) of People in Home Spouse Emily Villagranranjith [phone]

## 2023-06-26 NOTE — ASSESSMENT & PLAN NOTE
Patient's anemia is currently controlled. Has not received any PRBCs to date.. Etiology likely d/t chronic disease  Current CBC reviewed-   Lab Results   Component Value Date    HGB 12.7 (L) 05/23/2023    HCT 38.1 (L) 05/23/2023     Monitor serial CBC and transfuse if patient becomes hemodynamically unstable, symptomatic or H/H drops below 7/21.

## 2023-06-26 NOTE — ANESTHESIA PROCEDURE NOTES
Intubation    Date/Time: 6/26/2023 7:41 AM  Performed by: Dustin Way CRNA  Authorized by: Brendon Boyd MD     Intubation:     Induction:  Intravenous    Intubated:  Postinduction    Mask Ventilation:  Easy mask    Attempts:  1    Attempted By:  Student    Method of Intubation:  Video laryngoscopy    Blade:  Mitchell 3    Laryngeal View Grade: Grade I - full view of cords      Difficult Airway Encountered?: No      Complications:  None    Airway Device:  Oral endotracheal tube    Airway Device Size:  7.5    Style/Cuff Inflation:  Cuffed (inflated to minimal occlusive pressure)    Tube secured:  23    Secured at:  The lips    Placement Verified By:  Capnometry    Complicating Factors:  None    Findings Post-Intubation:  BS equal bilateral and atraumatic/condition of teeth unchanged

## 2023-06-26 NOTE — H&P
[facility] Hospital Medicine  History & Physical    Patient Name: Ludwin Gee  MRN: [id number]  Patient Class: IP- Inpatient  Admission Date: 6/26/2023  Attending Physician: Ismael Castro MD   Primary Care Provider: Herrera Fisher MD         Patient information was obtained from patient, past medical records and ER records.     Subjective:     Principal Problem:Polyp of ascending colon    Chief Complaint: colon polyp       HPI: Ludwin Gee is a 73 yr old male with Pmhx of type 2 diabetes, hypertension, hyperlipidemia, BPH, prostate cancer presenting today s/p robotic right hemicolectomy with ileocolic anastomosis with Dr. Castro. Per chart reviewe patient had recent colonoscopy performed and was noted to have a flat adenomatous lesion in the ascending colon.  Attempts at removal were undertaken.  A lift procedure was performed and a portion of the lesion was removed but it was incompletely excised.  Pathology confirmed adenomatous tissue.  He was referred to Dr. Castro for surgical resection. Pt seen post op and doing well.  He is tolerating clear liquid diet.  He states pain is currently controlled and denies acute pain at this time.  Patient will be monitored overnight.       Past Medical History:   Diagnosis Date    BPH with elevated PSA     Digestive disorder     COLON POLYPS    Elevated PSA     Hyperlipidemia     Hypertension     Personal history of colonic polyps     Prostate cancer 09/06/2022    Toña 3+3=6 Watchful waiting       Past Surgical History:   Procedure Laterality Date    COLONOSCOPY  12/6/2013    Dr. Martinez, 5 year recheck    COLONOSCOPY N/A 5/19/2023    Procedure: COLONOSCOPY;  Surgeon: Saúl Mayen MD;  Location: [facility];  Service: Endoscopy;  Laterality: N/A;    SHOULDER SURGERY  12/09/2011    right     TRANSRECTAL BIOPSY OF PROSTATE WITH ULTRASOUND GUIDANCE N/A 8/24/2022    Procedure: BIOPSY, PROSTATE, RECTAL APPROACH, WITH US GUIDANCE;   "Surgeon: Mora Millan Jr., MD;  Location: [facility] OR;  Service: Urology;  Laterality: N/A;       Review of patient's allergies indicates:   Allergen Reactions    Pravastatin Other (See Comments)     Joint pain    Lisinopril Other (See Comments)     Cough        No current facility-administered medications on file prior to encounter.     Current Outpatient Medications on File Prior to Encounter   Medication Sig    alfuzosin (UROXATRAL) 10 mg Tb24 Take 1 tablet (10 mg total) by mouth daily with breakfast.    coenzyme Q10 10 mg capsule Take 10 mg by mouth once daily.    famotidine (PEPCID) 20 MG tablet Take 20 mg by mouth once daily.    losartan (COZAAR) 25 MG tablet TAKE ONE TABLET BY MOUTH EVERY DAY    metFORMIN (GLUCOPHAGE-XR) 500 MG ER 24hr tablet TAKE TWO TABLETS BY MOUTH TWO TIMES A DAY WITH MEALS    oxybutynin (DITROPAN-XL) 5 MG TR24 Take 1 tablet (5 mg total) by mouth once daily.    BD INSULIN SYRINGE ULTRA-FINE 0.5 mL 31 gauge x 5/16" Syrg     BD ULTRA-FINE CONSTANCE PEN NEEDLE 32 gauge x 5/32" Ndle     blood sugar diagnostic (ONETOUCH ULTRA TEST) Strp USE TO CHECK FASTING GLUCOSE IN THE MORNING    blood-glucose meter Misc One Touch glucometer check fasting glucose in morning    pen needle, diabetic 30 gauge x 5/16" Ndle Use with levemir once daily and novolog before each meal three times daily (Patient not taking: Reported on 2023)     Family History       Problem Relation (Age of Onset)    Cataracts Mother    Diabetes Father, Maternal Grandmother    Glaucoma Mother    Heart disease Father    No Known Problems Brother, Daughter, Son, Maternal Grandfather, Paternal Grandmother, Paternal Grandfather, Maternal Aunt, Maternal Uncle, Paternal Aunt, Paternal Uncle          Tobacco Use    Smoking status: Former     Types: Cigarettes     Quit date: 1985     Years since quittin.1    Smokeless tobacco: Never   Substance and Sexual Activity    Alcohol use: Yes     Comment: seldom    Drug use: " No    Sexual activity: Yes     Partners: Female     Review of Systems   Constitutional:  Negative for chills, fatigue and fever.   HENT:  Negative for congestion, sore throat and trouble swallowing.    Respiratory:  Negative for cough and shortness of breath.    Cardiovascular:  Negative for chest pain.   Gastrointestinal:  Negative for abdominal pain, diarrhea, nausea and vomiting.   Genitourinary:  Negative for difficulty urinating, dysuria and urgency.   Musculoskeletal:  Negative for arthralgias, back pain and gait problem.   Skin:  Negative for rash.   Neurological:  Negative for dizziness and light-headedness.   Hematological:  Negative for adenopathy.   Psychiatric/Behavioral:  Negative for agitation, behavioral problems and confusion.    All other systems reviewed and are negative.  Objective:     Vital Signs (Most Recent):  Temp: 97.7 °F (36.5 °C) (06/26/23 1120)  Pulse: 83 (06/26/23 1131)  Resp: 12 (06/26/23 1131)  BP: 119/60 (06/26/23 1131)  SpO2: 97 % (06/26/23 1131) Vital Signs (24h Range):  Temp:  [97.7 °F (36.5 °C)-97.8 °F (36.6 °C)] 97.7 °F (36.5 °C)  Pulse:  [83-98] 83  Resp:  [12-24] 12  SpO2:  [88 %-100 %] 97 %  BP: ()/(46-68) 119/60     Weight: 74.4 kg (164 lb)  Body mass index is 26.47 kg/m².     Physical Exam  Vitals and nursing note reviewed.   Constitutional:       General: He is not in acute distress.     Appearance: Normal appearance. He is well-developed. He is not ill-appearing or diaphoretic.   HENT:      Head: Normocephalic and atraumatic.      Right Ear: External ear normal.      Left Ear: External ear normal.      Nose: Nose normal. No congestion or rhinorrhea.      Mouth/Throat:      Mouth: Mucous membranes are moist.      Pharynx: Oropharynx is clear. No oropharyngeal exudate or posterior oropharyngeal erythema.   Eyes:      General: No scleral icterus.     Conjunctiva/sclera: Conjunctivae normal.      Pupils: Pupils are equal, round, and reactive to light.   Neck:       Vascular: No JVD.   Cardiovascular:      Rate and Rhythm: Normal rate and regular rhythm.      Pulses: Normal pulses.      Heart sounds: Normal heart sounds. No murmur heard.  Pulmonary:      Effort: Pulmonary effort is normal. No respiratory distress.      Breath sounds: Normal breath sounds. No stridor. No wheezing, rhonchi or rales.   Abdominal:      General: Bowel sounds are normal. There is no distension.      Palpations: Abdomen is soft.      Tenderness: There is no abdominal tenderness.      Comments: Lap sites covered with drsg CDI   Genitourinary:     Comments: Vicente draining clear yellow urine  Musculoskeletal:         General: No swelling or tenderness. Normal range of motion.      Cervical back: Normal range of motion and neck supple.   Skin:     General: Skin is warm and dry.      Capillary Refill: Capillary refill takes 2 to 3 seconds.      Coloration: Skin is not jaundiced or pale.      Findings: No erythema.   Neurological:      General: No focal deficit present.      Mental Status: He is alert and oriented to person, place, and time.      Cranial Nerves: No cranial nerve deficit.      Sensory: No sensory deficit.   Psychiatric:         Mood and Affect: Mood normal.         Behavior: Behavior normal.         Thought Content: Thought content normal.            CRANIAL NERVES     CN III, IV, VI   Pupils are equal, round, and reactive to light.     Significant Labs: All pertinent labs within the past 24 hours have been reviewed.  Preop labs reviewed and wnl    Significant Imaging: I have reviewed all pertinent imaging results/findings within the past 24 hours.    Assessment/Plan:     * Polyp of ascending colon  POD 0 s/p robotic hemicolectomy with ileocolic anastomosis  with Dr. Castro  Continue to follow surgery recommendations.  Needs aggressive incentive spirometry.  Follow hemoglobin and hematocrit closely.  Pain control with IV narcotics and antiemetics as needed.  lovenox for DVT  prophylaxis    Prostate cancer  Follows outpt with Dr. Millan  No active chemo - currently on monitoring only      Anemia  Patient's anemia is currently controlled. Has not received any PRBCs to date.. Etiology likely d/t chronic disease  Current CBC reviewed-   Lab Results   Component Value Date    HGB 12.7 (L) 05/23/2023    HCT 38.1 (L) 05/23/2023     Monitor serial CBC and transfuse if patient becomes hemodynamically unstable, symptomatic or H/H drops below 7/21.         Hyperlipidemia associated with type 2 diabetes mellitus  Chronic, stable  Cont home meds    Hypertension associated with diabetes  Chronic, controlled.  Latest blood pressure and vitals reviewed-     Temp:  [97.8 °F (36.6 °C)]   Pulse:  [88-98]   Resp:  [14-24]   BP: ()/(46-68)   SpO2:  [88 %-100 %] .   Home meds for hypertension were reviewed and noted below.   Hypertension Medications             losartan (COZAAR) 25 MG tablet TAKE ONE TABLET BY MOUTH EVERY DAY          While in the hospital, will manage blood pressure as follows; Continue home antihypertensive regimen    Will utilize p.r.n. blood pressure medication only if patient's blood pressure greater than 180/110 and he develops symptoms such as worsening chest pain or shortness of breath.      BPH (benign prostatic hyperplasia)  Chronic, stable  Cont home meds  Monitor for urinary retention post op      Controlled type 2 diabetes mellitus with microalbuminuria, without long-term current use of insulin  Patient's FSGs are controlled on current medication regimen.  Last A1c reviewed-   Lab Results   Component Value Date    HGBA1C 6.8 (H) 05/23/2023     Most recent fingerstick glucose reviewed- No results for input(s): POCTGLUCOSE in the last 24 hours.  Current correctional scale  Low  Maintain anti-hyperglycemic dose as follows-   Antihyperglycemics (From admission, onward)    None        Hold Oral hypoglycemics while patient is in the hospital.      VTE Risk Mitigation (From  admission, onward)         Ordered     enoxaparin injection 40 mg  Every 12 hours         06/26/23 1127     IP VTE HIGH RISK PATIENT  Once         06/26/23 1127     Place sequential compression device  Until discontinued         06/26/23 1127                           Andressa Reaves NP  Department of Hospital Medicine  [facility]

## 2023-06-26 NOTE — OP NOTE
Date of procedure:  June 26, 2023     Staff surgeon:  Dr. Ismael Castro     Preoperative diagnosis:  Colon polyp     Postoperative diagnosis: The same     Procedure:  Robotic right hemicolectomy with ileocolic anastomosis     Anesthesia:  General endotracheal anesthesia     Indication for procedure:  73-year-old gentleman with a flat villous polyp in the ascending colon which could not be removed endoscopically.  He is referred to me for surgical resection    Description of procedure:   Following signing informed consent patient taken the operating room placed supine position.  General endotracheal anesthesia was administered.  Vicente catheter was inserted.  The abdomen is prepped and draped standard fashion.  An appropriate time-out procedure was performed.  Having performed time-out procedure make a small transverse incision at the level of the umbilicus  to the left of midline and dissect down to the deep dermal tissue.  Dissection was performed down to the fascia.  I enter the abdominal cavity with a Veress needle in establish pneumoperitoneum to 15 mmHg.  An 8 mm robotic trocar was then placed under direct visualization.  The patient is tilted to the left.  I evaluate for injury from the Veress needle no such injuries appreciated.  I placed another 8 mm trocar further superior and lateral to this under direct visualization.  A 12 mm robotic trocar was placed further so.  For the lateral under direct visualization.  An 8 mm trocar was then placed in the suprapubic region uneventfully.  A small 5 mm incision was placed in the right lower quadrant and services assistant trocar.  The robot is docked the procedure was commenced.  I identified the tattoo present in the proximal ascending colon.  The ileocolic pedicle is identified held upright.  Scored the mesentery just distal to the takeoff dissect into the retroperitoneal space.  I sweep the duodenum down posterior to the retroperitoneum was with the mesentery away  from the retroperitoneal structures.  Perform a medial to lateral dissection of the ascending colon.  I proceeded cephalad up towards the hepatic flexure.  The hepatic colic ligaments are identified of opened and released uneventfully.  At this point the vessel sealer was used to take down the ileocolic pedicle and mesentery of the ascending colon.  I continue towards the proximal transverse colon to the point where resection would be performed.  I turned my attention back to the ileocolic pedicle and the terminal ileum.  The mesentery to the terminal ileum was ligated with the vessel sealer to a 0.5 cm proximal to the ileocecal valve.  At this point ICG was given by Anesthesia.  Fluorescence was used and good perfusion was noted at both areas of resection.  A robotic stapler was used to staple across the terminal ileum uneventfully.  Robotic stapler was then used to staple across the the transverse colon uneventfully.  Next the vessel sealer was used to separate the white line of Toldt and the lateral attachments to the colon.  The specimen was placed in the right upper quadrant.  The mesentery to the terminal ileum was released such that the TI would reach up to the transverse colon to allow a isoperistaltic side-to-side anastomosis.  I make a colotomy approximately 7 cm distal to the staple line.  A enterotomy was made 2 cm proximal to the staple line on the small bowel.  I fashion create a side-to-side isoperistaltic anastomosis uneventfully.  Common channel was then sent sewn with a barbed suture in 2 layers without event.  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  I make a longitudinal incision over the area of the hernia.  Dissected the hernia sac into the abdominal cavity.  Wound retractor was placed.  This was  all done after the robot was undocked.  Having placed a wound retractor I externalized remove the specimen uneventfully.  I then closed the hernia defect primarily with a heavy PDS suture uneventfully.  Next all trocars removed under direct visualization.  The 12 mm trocar site in the left upper quadrant is closed using a crossbow technique.  The other trocar sites were then closed with 4-0 Monocryl.  .  Patient was extubated taken recovery room stable condition.  There were no immediate complications.  Blood loss was 25 cc 25 cc

## 2023-06-26 NOTE — ASSESSMENT & PLAN NOTE
POD 0 s/p robotic hemicolectomy with ileocolic anastomosis  with Dr. Castro  Continue to follow surgery recommendations.  Needs aggressive incentive spirometry.  Follow hemoglobin and hematocrit closely.  Pain control with IV narcotics and antiemetics as needed.  lovenox for DVT prophylaxis

## 2023-06-26 NOTE — PLAN OF CARE
Patient ready for surgery. Surgery and anesthesia consents signed. Educated on incentive spirometer use. Belongings with wife. Spouse set up with text message notifications.

## 2023-06-27 ENCOUNTER — TELEPHONE (OUTPATIENT)
Dept: SURGERY | Facility: CLINIC | Age: 73
End: 2023-06-27
Payer: MEDICARE

## 2023-06-27 VITALS
TEMPERATURE: 97 F | HEART RATE: 66 BPM | WEIGHT: 164 LBS | SYSTOLIC BLOOD PRESSURE: 110 MMHG | BODY MASS INDEX: 26.36 KG/M2 | HEIGHT: 66 IN | OXYGEN SATURATION: 98 % | DIASTOLIC BLOOD PRESSURE: 63 MMHG | RESPIRATION RATE: 18 BRPM

## 2023-06-27 LAB
ANION GAP SERPL CALC-SCNC: 11 MMOL/L (ref 8–16)
BASOPHILS # BLD AUTO: 0.02 K/UL (ref 0–0.2)
BASOPHILS NFR BLD: 0.2 % (ref 0–1.9)
BUN SERPL-MCNC: 11 MG/DL (ref 8–23)
CALCIUM SERPL-MCNC: 8.4 MG/DL (ref 8.7–10.5)
CHLORIDE SERPL-SCNC: 105 MMOL/L (ref 95–110)
CO2 SERPL-SCNC: 23 MMOL/L (ref 23–29)
CREAT SERPL-MCNC: 1.6 MG/DL (ref 0.5–1.4)
DIFFERENTIAL METHOD: ABNORMAL
EOSINOPHIL # BLD AUTO: 0 K/UL (ref 0–0.5)
EOSINOPHIL NFR BLD: 0.1 % (ref 0–8)
ERYTHROCYTE [DISTWIDTH] IN BLOOD BY AUTOMATED COUNT: 12.5 % (ref 11.5–14.5)
EST. GFR  (NO RACE VARIABLE): 45 ML/MIN/1.73 M^2
GLUCOSE SERPL-MCNC: 196 MG/DL (ref 70–110)
HCT VFR BLD AUTO: 33.4 % (ref 40–54)
HGB BLD-MCNC: 11.2 G/DL (ref 14–18)
IMM GRANULOCYTES # BLD AUTO: 0.06 K/UL (ref 0–0.04)
IMM GRANULOCYTES NFR BLD AUTO: 0.5 % (ref 0–0.5)
LYMPHOCYTES # BLD AUTO: 1.6 K/UL (ref 1–4.8)
LYMPHOCYTES NFR BLD: 14.1 % (ref 18–48)
MCH RBC QN AUTO: 30.2 PG (ref 27–31)
MCHC RBC AUTO-ENTMCNC: 33.5 G/DL (ref 32–36)
MCV RBC AUTO: 90 FL (ref 82–98)
MONOCYTES # BLD AUTO: 0.5 K/UL (ref 0.3–1)
MONOCYTES NFR BLD: 4.6 % (ref 4–15)
NEUTROPHILS # BLD AUTO: 9 K/UL (ref 1.8–7.7)
NEUTROPHILS NFR BLD: 80.5 % (ref 38–73)
NRBC BLD-RTO: 0 /100 WBC
PLATELET # BLD AUTO: 122 K/UL (ref 150–450)
PMV BLD AUTO: 12.1 FL (ref 9.2–12.9)
POTASSIUM SERPL-SCNC: 3.4 MMOL/L (ref 3.5–5.1)
RBC # BLD AUTO: 3.71 M/UL (ref 4.6–6.2)
SODIUM SERPL-SCNC: 139 MMOL/L (ref 136–145)
WBC # BLD AUTO: 11.17 K/UL (ref 3.9–12.7)

## 2023-06-27 PROCEDURE — 25000003 PHARM REV CODE 250: Performed by: NURSE PRACTITIONER

## 2023-06-27 PROCEDURE — 85025 COMPLETE CBC W/AUTO DIFF WBC: CPT | Performed by: SURGERY

## 2023-06-27 PROCEDURE — 94761 N-INVAS EAR/PLS OXIMETRY MLT: CPT

## 2023-06-27 PROCEDURE — S0030 INJECTION, METRONIDAZOLE: HCPCS | Performed by: SURGERY

## 2023-06-27 PROCEDURE — 94799 UNLISTED PULMONARY SVC/PX: CPT

## 2023-06-27 PROCEDURE — 63600175 PHARM REV CODE 636 W HCPCS: Performed by: SURGERY

## 2023-06-27 PROCEDURE — 94760 N-INVAS EAR/PLS OXIMETRY 1: CPT

## 2023-06-27 PROCEDURE — 25000003 PHARM REV CODE 250: Performed by: SURGERY

## 2023-06-27 PROCEDURE — 36415 COLL VENOUS BLD VENIPUNCTURE: CPT | Performed by: SURGERY

## 2023-06-27 PROCEDURE — 80048 BASIC METABOLIC PNL TOTAL CA: CPT | Performed by: SURGERY

## 2023-06-27 RX ORDER — POTASSIUM CHLORIDE 20 MEQ/1
40 TABLET, EXTENDED RELEASE ORAL ONCE
Status: COMPLETED | OUTPATIENT
Start: 2023-06-27 | End: 2023-06-27

## 2023-06-27 RX ORDER — SODIUM CHLORIDE 9 MG/ML
INJECTION, SOLUTION INTRAVENOUS CONTINUOUS
Status: DISCONTINUED | OUTPATIENT
Start: 2023-06-27 | End: 2023-06-27 | Stop reason: HOSPADM

## 2023-06-27 RX ORDER — HEPARIN SODIUM 5000 [USP'U]/ML
5000 INJECTION, SOLUTION INTRAVENOUS; SUBCUTANEOUS EVERY 8 HOURS
Status: DISCONTINUED | OUTPATIENT
Start: 2023-06-27 | End: 2023-06-27 | Stop reason: HOSPADM

## 2023-06-27 RX ADMIN — ACETAMINOPHEN 1000 MG: 10 INJECTION INTRAVENOUS at 04:06

## 2023-06-27 RX ADMIN — CEFAZOLIN SODIUM 2 G: 2 SOLUTION INTRAVENOUS at 12:06

## 2023-06-27 RX ADMIN — GABAPENTIN 200 MG: 100 CAPSULE ORAL at 08:06

## 2023-06-27 RX ADMIN — METRONIDAZOLE 500 MG: 500 INJECTION, SOLUTION INTRAVENOUS at 01:06

## 2023-06-27 RX ADMIN — POTASSIUM CHLORIDE 40 MEQ: 1500 TABLET, EXTENDED RELEASE ORAL at 08:06

## 2023-06-27 RX ADMIN — SODIUM CHLORIDE: 9 INJECTION, SOLUTION INTRAVENOUS at 08:06

## 2023-06-27 RX ADMIN — ACETAMINOPHEN 1000 MG: 10 INJECTION INTRAVENOUS at 09:06

## 2023-06-27 NOTE — SUBJECTIVE & OBJECTIVE
Interval History: Patient seen and examined this morning. No acute events overnight. Vitals, labs reviewed. Discontinued ibuprofen, lovenox due to elevated Cr. Having bowel function. Tolerating clear liquids. No other complaints.     Medications:  Continuous Infusions:   dextrose 5% lactated ringers 125 mL/hr at 06/26/23 2145     Scheduled Meds:   acetaminophen  1,000 mg Intravenous Q6H    bisacodyL  5 mg Oral QHS    gabapentin  200 mg Oral BID    heparin (porcine)  5,000 Units Subcutaneous Q8H    mupirocin   Nasal BID     PRN Meds:diphenhydrAMINE, HYDROmorphone, lorazepam, ondansetron, oxyCODONE     Review of patient's allergies indicates:   Allergen Reactions    Pravastatin Other (See Comments)     Joint pain    Lisinopril Other (See Comments)     Cough      Objective:     Vital Signs (Most Recent):  Temp: 97 °F (36.1 °C) (06/26/23 2306)  Pulse: 78 (06/26/23 2306)  Resp: 18 (06/26/23 2306)  BP: (!) 94/48 (06/26/23 2306)  SpO2: (!) 94 % (06/26/23 2306) Vital Signs (24h Range):  Temp:  [96.6 °F (35.9 °C)-97.8 °F (36.6 °C)] 97 °F (36.1 °C)  Pulse:  [71-98] 78  Resp:  [12-24] 18  SpO2:  [88 %-100 %] 94 %  BP: ()/(46-60) 94/48     Weight: 74.4 kg (164 lb)  Body mass index is 26.47 kg/m².    Intake/Output - Last 3 Shifts         06/25 0700 06/26 0659 06/26 0700 06/27 0659 06/27 0700  06/28 0659    P.O.  930     I.V. (mL/kg)  1500 (20.2)     IV Piggyback  556.7     Total Intake(mL/kg)  2986.7 (40.1)     Urine (mL/kg/hr)  1260 (0.7)     Blood  20     Total Output  1280     Net  +1706.7                     Physical Exam  Vitals and nursing note reviewed.   Constitutional:       General: He is not in acute distress.     Appearance: Normal appearance.   HENT:      Head: Normocephalic and atraumatic.      Mouth/Throat:      Mouth: Mucous membranes are moist.   Cardiovascular:      Rate and Rhythm: Normal rate and regular rhythm.   Pulmonary:      Effort: Pulmonary effort is normal. No respiratory distress.    Abdominal:      Comments: Abdomen soft, non-distended  Appropriately tender to palpation throughout abdomen  Incisions with steri-strips and bandaids in place   Musculoskeletal:         General: Normal range of motion.   Skin:     General: Skin is warm and dry.   Neurological:      Mental Status: He is alert.        Significant Labs:  I have reviewed all pertinent lab results within the past 24 hours.  CBC:   Recent Labs   Lab 06/27/23  0405   WBC 11.17   RBC 3.71*   HGB 11.2*   HCT 33.4*   *   MCV 90   MCH 30.2   MCHC 33.5     CMP:   Recent Labs   Lab 06/27/23  0405   *   CALCIUM 8.4*      K 3.4*   CO2 23      BUN 11   CREATININE 1.6*       Significant Diagnostics:  I have reviewed all pertinent imaging results/findings within the past 24 hours.

## 2023-06-27 NOTE — TELEPHONE ENCOUNTER
----- Message from Andressa Parra sent at 6/27/2023  2:25 PM CDT -----  Contact: pt wife/luis  Type: Needs Medical Advice  Who Called:  pt wife/ luis  Best Call Back Number: [phone] /[phone]  Additional Information: pt's wife luis has some questions regarding his diet and post op instructions.please call

## 2023-06-27 NOTE — CARE UPDATE
06/26/23 1943   Patient Assessment/Suction   Level of Consciousness (AVPU) alert   Respiratory Effort Unlabored   PRE-TX-O2   Device (Oxygen Therapy) room air   SpO2 (!) 94 %   Pulse Oximetry Type Intermittent   $ Pulse Oximetry - Multiple Charge Pulse Oximetry - Multiple   Pulse 71   Resp 18   Incentive Spirometer   $ Incentive Spirometer Charges done with encouragement;proper technique demonstrated   Administration (IS) proper technique demonstrated   Number of Repetitions (IS) 8   Level Incentive Spirometer (mL) 2000   Patient Tolerance (IS) good;no adverse signs/symptoms present

## 2023-06-27 NOTE — ASSESSMENT & PLAN NOTE
Ludwin Gee is a 73yoM who underwent a robotic right hemicolectomy on 6/26/23.     - POD1  - patient seen and examined this morning  - vitals, labs reviewed   - elevated Cr this morning, discontinued ibuprofen; switched lovenox to subcutaneous heparin  - pain adequately controlled  - tolerating clears; advanced to low residue diet  - mIVF rate cut in half  - encouraged out of bed, ambulate halls  - discharge this afternoon versus tomorrow morning

## 2023-06-27 NOTE — DISCHARGE SUMMARY
[facility] Medicine  Discharge Summary      Patient Name: Ludwin Gee  MRN: [id number]  ESTHELA: [id number]  Patient Class: IP- Inpatient  Admission Date: 6/26/2023  Hospital Length of Stay: 1 days  Discharge Date and Time: 6/27/2023  2:03 PM  Attending Physician: Ismael Castro MD   Discharging Provider: Aubrie Amador NP  Primary Care Provider: Herrera Fisher MD    Primary Care Team: Networked reference to record PCT     HPI:   Ludwin Gee is a 73 yr old male with Pmhx of type 2 diabetes, hypertension, hyperlipidemia, BPH, prostate cancer presenting today s/p robotic right hemicolectomy with ileocolic anastomosis with Dr. Castro. Per chart reviewe patient had recent colonoscopy performed and was noted to have a flat adenomatous lesion in the ascending colon.  Attempts at removal were undertaken.  A lift procedure was performed and a portion of the lesion was removed but it was incompletely excised.  Pathology confirmed adenomatous tissue.  He was referred to Dr. Castro for surgical resection. Pt seen post op and doing well.  He is tolerating clear liquid diet.  He states pain is currently controlled and denies acute pain at this time.  Patient will be monitored overnight.       Procedure(s) (LRB):  XI ROBOTIC COLECTOMY, PARTIAL (N/A)      Hospital Course:   Patient was admitted S/P hemicolectomy with anastomosis with Dr. Castro.  Patient was monitored closely during his stay.  His last vital signs were trended closely.  His pain was well controlled with p.r.n. narcotics.  He was able to tolerate his ordered diet.  His potassium was repleted and he was noted to have a small increase in his serum creatinine.  He was given IV fluid hydration.  He was cleared from general surgery perspective for discharge.  Patient seen and evaluated on day of discharge and deemed appropriate.  Discharge instructions well as return precautions were discussed with patient and family at bedside good  understanding.       Goals of Care Treatment Preferences:  Code Status: Full Code      Consults:     No new Assessment & Plan notes have been filed under this hospital service since the last note was generated.  Service: Hospital Medicine    Final Active Diagnoses:    Diagnosis Date Noted POA    PRINCIPAL PROBLEM:  Polyp of ascending colon [K63.5] 06/26/2023 Yes    Prostate cancer [C61] 09/06/2022 Yes    Anemia [D64.9] 02/01/2022 Yes    Hyperlipidemia associated with type 2 diabetes mellitus [E11.69, E78.5] 04/07/2017 Yes    Hypertension associated with diabetes [E11.59, I15.2] 04/07/2017 Yes    BPH (benign prostatic hyperplasia) [N40.0] 05/23/2012 Yes    Controlled type 2 diabetes mellitus with microalbuminuria, without long-term current use of insulin [E11.29, R80.9] 05/23/2012 Yes      Problems Resolved During this Admission:       Discharged Condition: good    Disposition: Home or Self Care    Follow Up:   Follow-up Information       Herrera Fisher MD Follow up in 1 week(s).    Specialty: Family Medicine  Contact information:  [address]  [address]  [place] [address]  [phone]               Ismael Castro MD Follow up on 7/5/2023.    Specialties: General Surgery, Colon and Rectal Surgery, Surgery  Why: 10:00 am Hospital follow up  Contact information:  [address]  [address]  [place] [address]  [phone]                           Patient Instructions:      Diet Adult Regular     Lifting restrictions     No driving until:     Notify your health care provider if you experience any of the following:  temperature >100.4     Notify your health care provider if you experience any of the following:  persistent nausea and vomiting or diarrhea     Notify your health care provider if you experience any of the following:  severe uncontrolled pain     Notify your health care provider if you experience any of the following:  redness, tenderness, or signs of infection (pain, swelling, redness, odor or  "green/yellow discharge around incision site)     Activity as tolerated     Shower on day dressing removed (No bath)       Significant Diagnostic Studies: Labs:   CMP   Recent Labs   Lab 06/27/23  0405      K 3.4*      CO2 23   *   BUN 11   CREATININE 1.6*   CALCIUM 8.4*   ANIONGAP 11    and CBC   Recent Labs   Lab 06/27/23  0405   WBC 11.17   HGB 11.2*   HCT 33.4*   *       Pending Diagnostic Studies:       Procedure Component Value Units Date/Time    Specimen to Pathology, Surgery General Surgery [[id number]] Collected: 06/26/23 1004    Order Status: Sent Lab Status: In process Updated: 06/26/23 1044    Specimen: Tissue            Medications:  Reconciled Home Medications:      Medication List        CONTINUE taking these medications      alfuzosin 10 mg Tb24  Commonly known as: UROXATRAL  Take 1 tablet (10 mg total) by mouth daily with breakfast.     BD INSULIN SYRINGE ULTRA-FINE 0.5 mL 31 gauge x 5/16" Syrg  Generic drug: insulin syringe-needle U-100     * BD ULTRA-FINE CONSTANCE PEN NEEDLE 32 gauge x 5/32" Ndle  Generic drug: pen needle, diabetic     blood sugar diagnostic Strp  Commonly known as: ONETOUCH ULTRA TEST  USE TO CHECK FASTING GLUCOSE IN THE MORNING     blood-glucose meter Misc  One Touch glucometer check fasting glucose in morning     coenzyme Q10 10 mg capsule  Take 10 mg by mouth once daily.     famotidine 20 MG tablet  Commonly known as: PEPCID  Take 20 mg by mouth once daily.     losartan 25 MG tablet  Commonly known as: COZAAR  TAKE ONE TABLET BY MOUTH EVERY DAY     metFORMIN 500 MG ER 24hr tablet  Commonly known as: GLUCOPHAGE-XR  TAKE TWO TABLETS BY MOUTH TWO TIMES A DAY WITH MEALS     oxybutynin 5 MG Tr24  Commonly known as: DITROPAN-XL  Take 1 tablet (5 mg total) by mouth once daily.     rosuvastatin 10 MG tablet  Commonly known as: CRESTOR  TAKE ONE TABLET BY MOUTH EVERY DAY           * This list has 1 medication(s) that are the same as other medications prescribed for " "you. Read the directions carefully, and ask your doctor or other care provider to review them with you.                ASK your doctor about these medications      * pen needle, diabetic 30 gauge x 5/16" Ndle  Use with levemir once daily and novolog before each meal three times daily           * This list has 1 medication(s) that are the same as other medications prescribed for you. Read the directions carefully, and ask your doctor or other care provider to review them with you.                  Indwelling Lines/Drains at time of discharge:   Lines/Drains/Airways       None                   Time spent on the discharge of patient: 32 minutes         Aubrie Amador NP  Department of Hospital Medicine  [facility]  "

## 2023-06-27 NOTE — PLAN OF CARE
The pt is cleared for discharge home and has follow up appointments added to his avs.    06/27/23 1022   Final Note   Assessment Type Final Discharge Note   Anticipated Discharge Disposition Home   What phone number can be called within the next 1-3 days to see how you are doing after discharge? [phone]   Hospital Resources/Appts/Education Provided Appointments scheduled and added to AVS   Post-Acute Status   Discharge Delays None known at this time

## 2023-06-27 NOTE — TELEPHONE ENCOUNTER
I called and spoke to patient's wife, she wanted to know if he can go back to eating a regular diet since he had a colectomy yesterday.  I asked Dr. Castro and said yes, that he can go back to a regular diet.  I informed patient's wife that Dr. Castro said yes.  Zack

## 2023-06-27 NOTE — PLAN OF CARE
Hospital follow up with Dr. Castro added to pts AVS for 7/5/23 at 10:00 am    06/27/23 1022   Discharge Assessment   Assessment Type Discharge Planning Reassessment

## 2023-06-27 NOTE — PROGRESS NOTES
POD 1 s/p Robotic R denisa  Pt resting in chair.  Notes he feels well.  NO pain or discomfort.  No n/v.  Having flatus and BM    .la  Wt Readings from Last 3 Encounters:   06/26/23 74.4 kg (164 lb)   06/19/23 74.6 kg (164 lb 7.4 oz)   05/30/23 72.9 kg (160 lb 11.5 oz)     Temp Readings from Last 3 Encounters:   06/27/23 97.3 °F (36.3 °C)   06/19/23 97.9 °F (36.6 °C) (Oral)   05/30/23 97.9 °F (36.6 °C) (Oral)     BP Readings from Last 3 Encounters:   06/27/23 110/63   06/19/23 122/64   05/30/23 126/64     Pulse Readings from Last 3 Encounters:   06/27/23 66   06/19/23 68   05/30/23 64     AAox3  Soft/nd/appt ttp  BS present    S/p robotic R denisa  Ok to d/c   F/u with me in 1-2 weeks

## 2023-06-27 NOTE — PROGRESS NOTES
[facility] Surgery  Progress Note    Subjective:     History of Present Illness:  No notes on file    Post-Op Info:  Procedure(s) (LRB):  XI ROBOTIC COLECTOMY, PARTIAL (N/A)   1 Day Post-Op     Interval History: Patient seen and examined this morning. No acute events overnight. Vitals, labs reviewed. Discontinued ibuprofen, lovenox due to elevated Cr. Having bowel function. Tolerating clear liquids. No other complaints.     Medications:  Continuous Infusions:   dextrose 5% lactated ringers 125 mL/hr at 06/26/23 2145     Scheduled Meds:   acetaminophen  1,000 mg Intravenous Q6H    bisacodyL  5 mg Oral QHS    gabapentin  200 mg Oral BID    heparin (porcine)  5,000 Units Subcutaneous Q8H    mupirocin   Nasal BID     PRN Meds:diphenhydrAMINE, HYDROmorphone, lorazepam, ondansetron, oxyCODONE     Review of patient's allergies indicates:   Allergen Reactions    Pravastatin Other (See Comments)     Joint pain    Lisinopril Other (See Comments)     Cough      Objective:     Vital Signs (Most Recent):  Temp: 97 °F (36.1 °C) (06/26/23 2306)  Pulse: 78 (06/26/23 2306)  Resp: 18 (06/26/23 2306)  BP: (!) 94/48 (06/26/23 2306)  SpO2: (!) 94 % (06/26/23 2306) Vital Signs (24h Range):  Temp:  [96.6 °F (35.9 °C)-97.8 °F (36.6 °C)] 97 °F (36.1 °C)  Pulse:  [71-98] 78  Resp:  [12-24] 18  SpO2:  [88 %-100 %] 94 %  BP: ()/(46-60) 94/48     Weight: 74.4 kg (164 lb)  Body mass index is 26.47 kg/m².    Intake/Output - Last 3 Shifts         06/25 0700 06/26 0659 06/26 0700 06/27 0659 06/27 0700 06/28 0659    P.O.  930     I.V. (mL/kg)  1500 (20.2)     IV Piggyback  556.7     Total Intake(mL/kg)  2986.7 (40.1)     Urine (mL/kg/hr)  1260 (0.7)     Blood  20     Total Output  1280     Net  +1706.7                     Physical Exam  Vitals and nursing note reviewed.   Constitutional:       General: He is not in acute distress.     Appearance: Normal appearance.   HENT:      Head: Normocephalic and  atraumatic.      Mouth/Throat:      Mouth: Mucous membranes are moist.   Cardiovascular:      Rate and Rhythm: Normal rate and regular rhythm.   Pulmonary:      Effort: Pulmonary effort is normal. No respiratory distress.   Abdominal:      Comments: Abdomen soft, non-distended  Appropriately tender to palpation throughout abdomen  Incisions with steri-strips and bandaids in place   Musculoskeletal:         General: Normal range of motion.   Skin:     General: Skin is warm and dry.   Neurological:      Mental Status: He is alert.        Significant Labs:  I have reviewed all pertinent lab results within the past 24 hours.  CBC:   Recent Labs   Lab 06/27/23  0405   WBC 11.17   RBC 3.71*   HGB 11.2*   HCT 33.4*   *   MCV 90   MCH 30.2   MCHC 33.5     CMP:   Recent Labs   Lab 06/27/23  0405   *   CALCIUM 8.4*      K 3.4*   CO2 23      BUN 11   CREATININE 1.6*       Significant Diagnostics:  I have reviewed all pertinent imaging results/findings within the past 24 hours.    Assessment/Plan:     * Polyp of ascending colon  Ludwin Gee is a 73yoM who underwent a robotic right hemicolectomy on 6/26/23.     - POD1  - patient seen and examined this morning  - vitals, labs reviewed   - elevated Cr this morning, discontinued ibuprofen; switched lovenox to subcutaneous heparin  - pain adequately controlled  - tolerating clears; advanced to low residue diet  - mIVF rate cut in half  - encouraged out of bed, ambulate halls  - discharge this afternoon versus tomorrow morning        Amari Rodarte MD  General Surgery  [facility]

## 2023-06-27 NOTE — DISCHARGE INSTRUCTIONS
Discharge Instructions, [facility] Medicine    Thank you for choosing [facility] for your medical care. The primary provider who is taking care of you at the time of your discharge is Aubrie Amador NP    You were admitted to the hospital with Polyp of ascending colon.     Please note your discharge instructions, including diet/activity restrictions, follow-up appointments, and medication changes.  If you have any questions about your medical issues, prescriptions, or any other questions, please feel free to contact the [facility] Medicine Dept at [phone] and we will help.    If you are previously with Home health, outpatient PT/OT or under a therapy program, you are cleared to return to those programs.    Please direct all long term medication refills and follow up to your primary care provider, Herrera Fisher MD  Thank you again for letting us take care of your health care needs.    Please note the following discharge instructions per your discharging provider -  Aubrie Amador NP

## 2023-06-27 NOTE — PLAN OF CARE
The pt is cleared for discharge home from case management and has follow up appointments added to his AVS.    06/27/23 1333   Final Note   Assessment Type Final Discharge Note   Anticipated Discharge Disposition Home   What phone number can be called within the next 1-3 days to see how you are doing after discharge? [phone]   Hospital Resources/Appts/Education Provided Appointments scheduled and added to AVS   Post-Acute Status   Discharge Delays None known at this time

## 2023-06-27 NOTE — CARE UPDATE
06/27/23 0734   Patient Assessment/Suction   Level of Consciousness (AVPU) alert   Respiratory Effort Normal;Unlabored   Expansion/Accessory Muscles/Retractions no use of accessory muscles   PRE-TX-O2   Device (Oxygen Therapy) room air   SpO2 98 %   Pulse Oximetry Type Intermittent   $ Pulse Oximetry - Multiple Charge Pulse Oximetry - Multiple   Pulse 66   Resp 18   Incentive Spirometer   $ Incentive Spirometer Charges done with encouragement   Administration (IS) self-administered   Number of Repetitions (IS) 10   Level Incentive Spirometer (mL) 2000   Patient Tolerance (IS) no adverse signs/symptoms present

## 2023-06-27 NOTE — HOSPITAL COURSE
Patient was admitted S/P hemicolectomy with anastomosis with Dr. Castro.  Patient was monitored closely during his stay.  His last vital signs were trended closely.  His pain was well controlled with p.r.n. narcotics.  He was able to tolerate his ordered diet.  His potassium was repleted and he was noted to have a small increase in his serum creatinine.  He was given IV fluid hydration and his labs were repeated with good resolve.  He was cleared from general surgery perspective for discharge.  Patient seen and evaluated on day of discharge and deemed appropriate.  Discharge instructions well as return precautions were discussed with patient and family at bedside good understanding.

## 2023-06-27 NOTE — PLAN OF CARE
06/27/23 1335   Medicare Message   Important Message from Medicare regarding Discharge Appeal Rights Given to patient/caregiver;Explained to patient/caregiver;Signed/date by patient/caregiver   Date IMM was signed 06/27/23   Time IMM was signed [id number]

## 2023-06-28 ENCOUNTER — PATIENT OUTREACH (OUTPATIENT)
Dept: ADMINISTRATIVE | Facility: CLINIC | Age: 73
End: 2023-06-28
Payer: MEDICARE

## 2023-06-28 NOTE — PROGRESS NOTES
C3 nurse spoke with Ludwin Gee for a TCC post hospital discharge follow up call. The patient has a scheduled HOSFU appointment with Herrera Fisher MD. Pt unable to tell date or time of appt but states [facility] is [facility]. C3 nurse is unable to verify in Epic. Will route message to PCP staff to verify appt and if no hosp f/u appt was made to contact patient.

## 2023-06-28 NOTE — TELEPHONE ENCOUNTER
Call placed to patient who states he scheduled an appointment on 8-11-23 and would like to keep existing appointment.

## 2023-06-30 ENCOUNTER — LAB VISIT (OUTPATIENT)
Dept: LAB | Facility: HOSPITAL | Age: 73
End: 2023-06-30
Attending: NURSE PRACTITIONER
Payer: MEDICARE

## 2023-06-30 DIAGNOSIS — I15.2 HYPERTENSION ASSOCIATED WITH DIABETES: ICD-10-CM

## 2023-06-30 DIAGNOSIS — E11.59 HYPERTENSION ASSOCIATED WITH DIABETES: ICD-10-CM

## 2023-06-30 LAB
ANION GAP SERPL CALC-SCNC: 9 MMOL/L (ref 8–16)
BUN SERPL-MCNC: 13 MG/DL (ref 8–23)
CALCIUM SERPL-MCNC: 9.9 MG/DL (ref 8.7–10.5)
CHLORIDE SERPL-SCNC: 103 MMOL/L (ref 95–110)
CO2 SERPL-SCNC: 27 MMOL/L (ref 23–29)
CREAT SERPL-MCNC: 1.2 MG/DL (ref 0.5–1.4)
EST. GFR  (NO RACE VARIABLE): >60 ML/MIN/1.73 M^2
GLUCOSE SERPL-MCNC: 130 MG/DL (ref 70–110)
POTASSIUM SERPL-SCNC: 4.8 MMOL/L (ref 3.5–5.1)
SODIUM SERPL-SCNC: 139 MMOL/L (ref 136–145)

## 2023-06-30 PROCEDURE — 36415 COLL VENOUS BLD VENIPUNCTURE: CPT | Performed by: NURSE PRACTITIONER

## 2023-06-30 PROCEDURE — 80048 BASIC METABOLIC PNL TOTAL CA: CPT | Performed by: NURSE PRACTITIONER

## 2023-07-05 ENCOUNTER — OFFICE VISIT (OUTPATIENT)
Dept: SURGERY | Facility: CLINIC | Age: 73
End: 2023-07-05
Payer: MEDICARE

## 2023-07-05 VITALS
HEIGHT: 66 IN | DIASTOLIC BLOOD PRESSURE: 62 MMHG | TEMPERATURE: 97 F | WEIGHT: 158.75 LBS | HEART RATE: 70 BPM | SYSTOLIC BLOOD PRESSURE: 120 MMHG | BODY MASS INDEX: 25.51 KG/M2

## 2023-07-05 DIAGNOSIS — Z09 POSTOP CHECK: Primary | ICD-10-CM

## 2023-07-05 LAB
COMMENT: NORMAL
FINAL PATHOLOGIC DIAGNOSIS: NORMAL
GROSS: NORMAL
Lab: NORMAL
MICROSCOPIC EXAM: NORMAL

## 2023-07-05 PROCEDURE — 3008F BODY MASS INDEX DOCD: CPT | Mod: CPTII,S$GLB,, | Performed by: SURGERY

## 2023-07-05 PROCEDURE — 3060F POS MICROALBUMINURIA REV: CPT | Mod: CPTII,S$GLB,, | Performed by: SURGERY

## 2023-07-05 PROCEDURE — 1159F PR MEDICATION LIST DOCUMENTED IN MEDICAL RECORD: ICD-10-PCS | Mod: CPTII,S$GLB,, | Performed by: SURGERY

## 2023-07-05 PROCEDURE — 99999 PR PBB SHADOW E&M-EST. PATIENT-LVL III: ICD-10-PCS | Mod: PBBFAC,,, | Performed by: SURGERY

## 2023-07-05 PROCEDURE — 3044F PR MOST RECENT HEMOGLOBIN A1C LEVEL <7.0%: ICD-10-PCS | Mod: CPTII,S$GLB,, | Performed by: SURGERY

## 2023-07-05 PROCEDURE — 4010F ACE/ARB THERAPY RXD/TAKEN: CPT | Mod: CPTII,S$GLB,, | Performed by: SURGERY

## 2023-07-05 PROCEDURE — 3044F HG A1C LEVEL LT 7.0%: CPT | Mod: CPTII,S$GLB,, | Performed by: SURGERY

## 2023-07-05 PROCEDURE — 99024 PR POST-OP FOLLOW-UP VISIT: ICD-10-PCS | Mod: S$GLB,,, | Performed by: SURGERY

## 2023-07-05 PROCEDURE — 99999 PR PBB SHADOW E&M-EST. PATIENT-LVL III: CPT | Mod: PBBFAC,,, | Performed by: SURGERY

## 2023-07-05 PROCEDURE — 3078F PR MOST RECENT DIASTOLIC BLOOD PRESSURE < 80 MM HG: ICD-10-PCS | Mod: CPTII,S$GLB,, | Performed by: SURGERY

## 2023-07-05 PROCEDURE — 3074F PR MOST RECENT SYSTOLIC BLOOD PRESSURE < 130 MM HG: ICD-10-PCS | Mod: CPTII,S$GLB,, | Performed by: SURGERY

## 2023-07-05 PROCEDURE — 1126F PR PAIN SEVERITY QUANTIFIED, NO PAIN PRESENT: ICD-10-PCS | Mod: CPTII,S$GLB,, | Performed by: SURGERY

## 2023-07-05 PROCEDURE — 1101F PT FALLS ASSESS-DOCD LE1/YR: CPT | Mod: CPTII,S$GLB,, | Performed by: SURGERY

## 2023-07-05 PROCEDURE — 3078F DIAST BP <80 MM HG: CPT | Mod: CPTII,S$GLB,, | Performed by: SURGERY

## 2023-07-05 PROCEDURE — 1126F AMNT PAIN NOTED NONE PRSNT: CPT | Mod: CPTII,S$GLB,, | Performed by: SURGERY

## 2023-07-05 PROCEDURE — 3060F PR POS MICROALBUMINURIA RESULT DOCUMENTED/REVIEW: ICD-10-PCS | Mod: CPTII,S$GLB,, | Performed by: SURGERY

## 2023-07-05 PROCEDURE — 3008F PR BODY MASS INDEX (BMI) DOCUMENTED: ICD-10-PCS | Mod: CPTII,S$GLB,, | Performed by: SURGERY

## 2023-07-05 PROCEDURE — 99024 POSTOP FOLLOW-UP VISIT: CPT | Mod: S$GLB,,, | Performed by: SURGERY

## 2023-07-05 PROCEDURE — 3066F PR DOCUMENTATION OF TREATMENT FOR NEPHROPATHY: ICD-10-PCS | Mod: CPTII,S$GLB,, | Performed by: SURGERY

## 2023-07-05 PROCEDURE — 3288F FALL RISK ASSESSMENT DOCD: CPT | Mod: CPTII,S$GLB,, | Performed by: SURGERY

## 2023-07-05 PROCEDURE — 3066F NEPHROPATHY DOC TX: CPT | Mod: CPTII,S$GLB,, | Performed by: SURGERY

## 2023-07-05 PROCEDURE — 4010F PR ACE/ARB THEARPY RXD/TAKEN: ICD-10-PCS | Mod: CPTII,S$GLB,, | Performed by: SURGERY

## 2023-07-05 PROCEDURE — 3288F PR FALLS RISK ASSESSMENT DOCUMENTED: ICD-10-PCS | Mod: CPTII,S$GLB,, | Performed by: SURGERY

## 2023-07-05 PROCEDURE — 1101F PR PT FALLS ASSESS DOC 0-1 FALLS W/OUT INJ PAST YR: ICD-10-PCS | Mod: CPTII,S$GLB,, | Performed by: SURGERY

## 2023-07-05 PROCEDURE — 1159F MED LIST DOCD IN RCRD: CPT | Mod: CPTII,S$GLB,, | Performed by: SURGERY

## 2023-07-05 PROCEDURE — 3074F SYST BP LT 130 MM HG: CPT | Mod: CPTII,S$GLB,, | Performed by: SURGERY

## 2023-07-05 NOTE — PROGRESS NOTES
Cc: post op    HPI: 73 y.o.  male  1 weeks s/p robotic R denisa.   Pt doing well    PE: AFVSS    AAOx3  CTA  Soft/NT/nd  Inc: c/d/i        Path: pending    A/P:   Pt doing well post surgery.   F/U 2 weeks

## 2023-07-05 NOTE — PROGRESS NOTES
[facility]   Clinic Note    Subjective     Patient ID: Ludwin Gee is a 73 y.o. male.     CC:   Chief Complaint   Patient presents with    Post-op Evaluation     Partial colectomy 6/26/23        HPI: Patient is a pleasant 73 y.o. male with a PMHx of HTN, HLD, colon polyps, and prostate cancer who is s/p robotic R hemicolectomy with ileocolic anastomosis on 6/26/23. Patient is doing well following surgery. Denies experiencing any fever, chills, redness/swelling/discharge from incision sites, N/V/C/D, or abdominal pain. Appetite has increased since surgery and he is back to a regular diet.       ROS  Review of Systems   Constitutional:  Negative for chills, fever and weight loss.   Eyes:  Negative for blurred vision and double vision.   Respiratory:  Negative for cough and shortness of breath.    Cardiovascular:  Negative for chest pain.   Gastrointestinal:  Negative for abdominal pain, blood in stool, constipation, diarrhea, melena, nausea and vomiting.   Genitourinary:  Negative for dysuria and hematuria.   Musculoskeletal:  Negative for myalgias.   Skin:  Negative for itching and rash.   Neurological:  Negative for dizziness, weakness and headaches.     PMH  Past Medical History:   Diagnosis Date    BPH with elevated PSA     Digestive disorder     COLON POLYPS    Elevated PSA     Hyperlipidemia     Hypertension     Personal history of colonic polyps     Prostate cancer 09/06/2022    [place] 3+3=6 Watchful waiting       Past Surgical History:   Procedure Laterality Date    COLONOSCOPY  12/6/2013    Dr. Martinez, 5 year recheck    COLONOSCOPY N/A 5/19/2023    Procedure: COLONOSCOPY;  Surgeon: Saúl Mayen MD;  Location: [facility];  Service: Endoscopy;  Laterality: N/A;    SHOULDER SURGERY  12/09/2011    right     TRANSRECTAL BIOPSY OF PROSTATE WITH ULTRASOUND GUIDANCE N/A 8/24/2022    Procedure: BIOPSY, PROSTATE, RECTAL APPROACH, WITH US GUIDANCE;  Surgeon: Mora Millan Jr., MD;   Location: [facility] OR;  Service: Urology;  Laterality: N/A;    XI ROBOTIC COLECTOMY, PARTIAL N/A 6/26/2023    Procedure: XI ROBOTIC COLECTOMY, PARTIAL;  Surgeon: Ismael Castro MD;  Location: [facility] OR;  Service: General;  Laterality: N/A;       Review of patient's allergies indicates:   Allergen Reactions    Pravastatin Other (See Comments)     Joint pain    Lisinopril Other (See Comments)     Cough          Objective     PE  Vitals:    07/05/23 0959   BP: 120/62   Pulse: 70   Temp: 97.3 °F (36.3 °C)        Physical Exam  Constitutional:       Appearance: Normal appearance.   HENT:      Head: Normocephalic and atraumatic.      Mouth/Throat:      Mouth: Mucous membranes are moist.   Cardiovascular:      Rate and Rhythm: Normal rate and regular rhythm.      Pulses: Normal pulses.      Heart sounds: Normal heart sounds.   Pulmonary:      Effort: Pulmonary effort is normal.      Breath sounds: Normal breath sounds.   Abdominal:      General: Abdomen is flat. Bowel sounds are normal. There is no distension.      Palpations: Abdomen is soft.      Tenderness: There is no abdominal tenderness.      Comments: Incisions C/D/I   Musculoskeletal:      Cervical back: Normal range of motion.   Skin:     General: Skin is warm and dry.   Neurological:      General: No focal deficit present.      Mental Status: He is alert.   Psychiatric:         Mood and Affect: Mood normal.         Behavior: Behavior normal.     Pathology  Pending    Assessment & Plan     Patient is a pleasant 73 y.o. male   - Patient who is s/p robotic R hemicolectomy with ileocolic anastomosis on 6/26/23  - Path report pending.   - Patient doing well following surgery. Appetite and BM are regular.  - Return to clinic in 2 weeks to review path report.    Discussed and examined with Dr. Ismael Castro, General Surgery.    JUAN MANUEL KochS

## 2023-07-19 ENCOUNTER — OFFICE VISIT (OUTPATIENT)
Dept: SURGERY | Facility: CLINIC | Age: 73
End: 2023-07-19
Payer: MEDICARE

## 2023-07-19 VITALS
SYSTOLIC BLOOD PRESSURE: 93 MMHG | HEART RATE: 70 BPM | TEMPERATURE: 97 F | DIASTOLIC BLOOD PRESSURE: 58 MMHG | HEIGHT: 66 IN | WEIGHT: 157.63 LBS | BODY MASS INDEX: 25.33 KG/M2

## 2023-07-19 DIAGNOSIS — Z09 POSTOP CHECK: Primary | ICD-10-CM

## 2023-07-19 PROCEDURE — 4010F ACE/ARB THERAPY RXD/TAKEN: CPT | Mod: CPTII,S$GLB,, | Performed by: SURGERY

## 2023-07-19 PROCEDURE — 3060F PR POS MICROALBUMINURIA RESULT DOCUMENTED/REVIEW: ICD-10-PCS | Mod: CPTII,S$GLB,, | Performed by: SURGERY

## 2023-07-19 PROCEDURE — 3008F BODY MASS INDEX DOCD: CPT | Mod: CPTII,S$GLB,, | Performed by: SURGERY

## 2023-07-19 PROCEDURE — 3044F HG A1C LEVEL LT 7.0%: CPT | Mod: CPTII,S$GLB,, | Performed by: SURGERY

## 2023-07-19 PROCEDURE — 3060F POS MICROALBUMINURIA REV: CPT | Mod: CPTII,S$GLB,, | Performed by: SURGERY

## 2023-07-19 PROCEDURE — 3074F SYST BP LT 130 MM HG: CPT | Mod: CPTII,S$GLB,, | Performed by: SURGERY

## 2023-07-19 PROCEDURE — 1159F PR MEDICATION LIST DOCUMENTED IN MEDICAL RECORD: ICD-10-PCS | Mod: CPTII,S$GLB,, | Performed by: SURGERY

## 2023-07-19 PROCEDURE — 3008F PR BODY MASS INDEX (BMI) DOCUMENTED: ICD-10-PCS | Mod: CPTII,S$GLB,, | Performed by: SURGERY

## 2023-07-19 PROCEDURE — 3074F PR MOST RECENT SYSTOLIC BLOOD PRESSURE < 130 MM HG: ICD-10-PCS | Mod: CPTII,S$GLB,, | Performed by: SURGERY

## 2023-07-19 PROCEDURE — 1126F AMNT PAIN NOTED NONE PRSNT: CPT | Mod: CPTII,S$GLB,, | Performed by: SURGERY

## 2023-07-19 PROCEDURE — 1126F PR PAIN SEVERITY QUANTIFIED, NO PAIN PRESENT: ICD-10-PCS | Mod: CPTII,S$GLB,, | Performed by: SURGERY

## 2023-07-19 PROCEDURE — 3066F NEPHROPATHY DOC TX: CPT | Mod: CPTII,S$GLB,, | Performed by: SURGERY

## 2023-07-19 PROCEDURE — 99999 PR PBB SHADOW E&M-EST. PATIENT-LVL III: CPT | Mod: PBBFAC,,, | Performed by: SURGERY

## 2023-07-19 PROCEDURE — 99024 PR POST-OP FOLLOW-UP VISIT: ICD-10-PCS | Mod: S$GLB,,, | Performed by: SURGERY

## 2023-07-19 PROCEDURE — 3078F DIAST BP <80 MM HG: CPT | Mod: CPTII,S$GLB,, | Performed by: SURGERY

## 2023-07-19 PROCEDURE — 1101F PT FALLS ASSESS-DOCD LE1/YR: CPT | Mod: CPTII,S$GLB,, | Performed by: SURGERY

## 2023-07-19 PROCEDURE — 3044F PR MOST RECENT HEMOGLOBIN A1C LEVEL <7.0%: ICD-10-PCS | Mod: CPTII,S$GLB,, | Performed by: SURGERY

## 2023-07-19 PROCEDURE — 1101F PR PT FALLS ASSESS DOC 0-1 FALLS W/OUT INJ PAST YR: ICD-10-PCS | Mod: CPTII,S$GLB,, | Performed by: SURGERY

## 2023-07-19 PROCEDURE — 4010F PR ACE/ARB THEARPY RXD/TAKEN: ICD-10-PCS | Mod: CPTII,S$GLB,, | Performed by: SURGERY

## 2023-07-19 PROCEDURE — 3288F PR FALLS RISK ASSESSMENT DOCUMENTED: ICD-10-PCS | Mod: CPTII,S$GLB,, | Performed by: SURGERY

## 2023-07-19 PROCEDURE — 3288F FALL RISK ASSESSMENT DOCD: CPT | Mod: CPTII,S$GLB,, | Performed by: SURGERY

## 2023-07-19 PROCEDURE — 3066F PR DOCUMENTATION OF TREATMENT FOR NEPHROPATHY: ICD-10-PCS | Mod: CPTII,S$GLB,, | Performed by: SURGERY

## 2023-07-19 PROCEDURE — 1159F MED LIST DOCD IN RCRD: CPT | Mod: CPTII,S$GLB,, | Performed by: SURGERY

## 2023-07-19 PROCEDURE — 3078F PR MOST RECENT DIASTOLIC BLOOD PRESSURE < 80 MM HG: ICD-10-PCS | Mod: CPTII,S$GLB,, | Performed by: SURGERY

## 2023-07-19 PROCEDURE — 99999 PR PBB SHADOW E&M-EST. PATIENT-LVL III: ICD-10-PCS | Mod: PBBFAC,,, | Performed by: SURGERY

## 2023-07-19 PROCEDURE — 99024 POSTOP FOLLOW-UP VISIT: CPT | Mod: S$GLB,,, | Performed by: SURGERY

## 2023-07-19 NOTE — PROGRESS NOTES
Pt seen and examined with PA   Doing well.  No complaints    Path:  Colon, right, right hemicolectomy:   - Tubular adenoma with focal high-grade dysplasia   - No invasive malignancy identified   - Resection margins are viable and uninvolved by dysplasia     - Closest Margin: 6.5 cm to the mesenteric margin   - Benign appendix   - Benign small bowel   - Twelve benign lymph nodes (0/12)       F/u with me prn.    Repeat cscope in one year

## 2023-07-19 NOTE — PROGRESS NOTES
[facility]   Clinic Note    Subjective     Patient ID: Ludwin Gee is a 73 y.o. male.     CC: No chief complaint on file.       HPI: Patient is a pleasant 73 y.o. male with a PMHx of HTN, HLD, prostate CA, and colon polyps who is 2 weeks s/p robotic R hemicolectomy with ileocolic anastomosis on 6/26/23. Pathology reports tubular adenoma with focal high-grade dysplasia of the right colon with no invasive malignancy identified.  Patient doing well following surgery. Denies experiencing any fever, chills, redness/swelling/discharge from incision sites, N/V/C/D, or abdominal pain. Appetite and BMs are returning to patient's baseline.        ROS  Review of Systems   Constitutional:  Negative for chills, fever and weight loss.   Eyes:  Negative for blurred vision and double vision.   Respiratory:  Negative for cough and shortness of breath.    Cardiovascular:  Negative for chest pain.   Gastrointestinal:  Negative for abdominal pain, blood in stool, constipation, diarrhea, melena, nausea and vomiting.   Genitourinary:  Negative for dysuria and hematuria.   Musculoskeletal:  Negative for myalgias.   Skin:  Negative for itching and rash.   Neurological:  Negative for dizziness, weakness and headaches.     PMH  Past Medical History:   Diagnosis Date    BPH with elevated PSA     Digestive disorder     COLON POLYPS    Elevated PSA     Hyperlipidemia     Hypertension     Personal history of colonic polyps     Prostate cancer 09/06/2022    [place] 3+3=6 Watchful waiting       Past Surgical History:   Procedure Laterality Date    COLONOSCOPY  12/6/2013    Dr. Martinez, 5 year recheck    COLONOSCOPY N/A 5/19/2023    Procedure: COLONOSCOPY;  Surgeon: Saúl Mayen MD;  Location: [facility];  Service: Endoscopy;  Laterality: N/A;    SHOULDER SURGERY  12/09/2011    right     TRANSRECTAL BIOPSY OF PROSTATE WITH ULTRASOUND GUIDANCE N/A 8/24/2022    Procedure: BIOPSY, PROSTATE, RECTAL APPROACH, WITH US  GUIDANCE;  Surgeon: Mora Millan Jr., MD;  Location: [facility] OR;  Service: Urology;  Laterality: N/A;    XI ROBOTIC COLECTOMY, PARTIAL N/A 6/26/2023    Procedure: XI ROBOTIC COLECTOMY, PARTIAL;  Surgeon: Ismael Castro MD;  Location: [facility] OR;  Service: General;  Laterality: N/A;       Review of patient's allergies indicates:   Allergen Reactions    Pravastatin Other (See Comments)     Joint pain    Lisinopril Other (See Comments)     Cough          Objective     PE  There were no vitals filed for this visit.     Physical Exam  Constitutional:       Appearance: Normal appearance.   HENT:      Head: Normocephalic and atraumatic.      Mouth/Throat:      Mouth: Mucous membranes are moist.   Cardiovascular:      Rate and Rhythm: Normal rate and regular rhythm.      Pulses: Normal pulses.      Heart sounds: Normal heart sounds.   Pulmonary:      Effort: Pulmonary effort is normal.      Breath sounds: Normal breath sounds.   Abdominal:      General: Abdomen is flat. Bowel sounds are normal. There is no distension.      Palpations: Abdomen is soft.      Tenderness: There is no abdominal tenderness.      Comments: (+) Well healed incision sites C/D/I   Musculoskeletal:      Cervical back: Normal range of motion.   Skin:     General: Skin is warm and dry.   Neurological:      General: No focal deficit present.      Mental Status: He is alert.   Psychiatric:         Mood and Affect: Mood normal.         Behavior: Behavior normal.     Pathology  Path 6/26/23:  Colon, right, right hemicolectomy:   - Tubular adenoma with focal high-grade dysplasia   - No invasive malignancy identified   - Resection margins are viable and uninvolved by dysplasia     - Closest Margin: 6.5 cm to the mesenteric margin   - Benign appendix   - Benign small bowel   - Twelve benign lymph nodes (0/12)     Assessment & Plan     Patient is a pleasant 73 y.o. male   - Patient doing well  - Well healed incisions C/D/I  - Pathology reports tubular adenoma  with focal high-grade dysplasia of the right colon with no invasive malignancy identified. Discussed results with patient.  - Repeat colonoscopy in 1 year for routine screening  - Return to clinic in 3 months or sooner for pain, N/V, or change in bowel habits.     Discussed and examined with Dr. Ismael Castro, General Surgery.    LANDEN Koch

## 2023-08-11 ENCOUNTER — OFFICE VISIT (OUTPATIENT)
Dept: FAMILY MEDICINE | Facility: CLINIC | Age: 73
End: 2023-08-11
Attending: FAMILY MEDICINE
Payer: MEDICARE

## 2023-08-11 VITALS
OXYGEN SATURATION: 96 % | DIASTOLIC BLOOD PRESSURE: 62 MMHG | RESPIRATION RATE: 17 BRPM | BODY MASS INDEX: 25.49 KG/M2 | HEIGHT: 66 IN | SYSTOLIC BLOOD PRESSURE: 116 MMHG | TEMPERATURE: 98 F | HEART RATE: 79 BPM | WEIGHT: 158.63 LBS

## 2023-08-11 DIAGNOSIS — S80.211A ABRASION, RIGHT KNEE, INITIAL ENCOUNTER: ICD-10-CM

## 2023-08-11 DIAGNOSIS — E11.29 CONTROLLED TYPE 2 DIABETES MELLITUS WITH MICROALBUMINURIA, WITHOUT LONG-TERM CURRENT USE OF INSULIN: Primary | ICD-10-CM

## 2023-08-11 DIAGNOSIS — D12.2 ADENOMATOUS POLYP OF ASCENDING COLON: ICD-10-CM

## 2023-08-11 DIAGNOSIS — E78.5 HYPERLIPIDEMIA ASSOCIATED WITH TYPE 2 DIABETES MELLITUS: ICD-10-CM

## 2023-08-11 DIAGNOSIS — I70.0 ATHEROSCLEROSIS OF AORTA: ICD-10-CM

## 2023-08-11 DIAGNOSIS — E11.59 HYPERTENSION ASSOCIATED WITH DIABETES: ICD-10-CM

## 2023-08-11 DIAGNOSIS — I15.2 HYPERTENSION ASSOCIATED WITH DIABETES: ICD-10-CM

## 2023-08-11 DIAGNOSIS — E11.69 HYPERLIPIDEMIA ASSOCIATED WITH TYPE 2 DIABETES MELLITUS: ICD-10-CM

## 2023-08-11 DIAGNOSIS — R80.9 CONTROLLED TYPE 2 DIABETES MELLITUS WITH MICROALBUMINURIA, WITHOUT LONG-TERM CURRENT USE OF INSULIN: Primary | ICD-10-CM

## 2023-08-11 PROCEDURE — 1126F PR PAIN SEVERITY QUANTIFIED, NO PAIN PRESENT: ICD-10-PCS | Mod: CPTII,S$GLB,, | Performed by: FAMILY MEDICINE

## 2023-08-11 PROCEDURE — 4010F PR ACE/ARB THEARPY RXD/TAKEN: ICD-10-PCS | Mod: CPTII,S$GLB,, | Performed by: FAMILY MEDICINE

## 2023-08-11 PROCEDURE — 99999 PR PBB SHADOW E&M-EST. PATIENT-LVL III: CPT | Mod: PBBFAC,,, | Performed by: FAMILY MEDICINE

## 2023-08-11 PROCEDURE — 3066F NEPHROPATHY DOC TX: CPT | Mod: CPTII,S$GLB,, | Performed by: FAMILY MEDICINE

## 2023-08-11 PROCEDURE — 3288F PR FALLS RISK ASSESSMENT DOCUMENTED: ICD-10-PCS | Mod: CPTII,S$GLB,, | Performed by: FAMILY MEDICINE

## 2023-08-11 PROCEDURE — 1160F RVW MEDS BY RX/DR IN RCRD: CPT | Mod: CPTII,S$GLB,, | Performed by: FAMILY MEDICINE

## 2023-08-11 PROCEDURE — 1159F PR MEDICATION LIST DOCUMENTED IN MEDICAL RECORD: ICD-10-PCS | Mod: CPTII,S$GLB,, | Performed by: FAMILY MEDICINE

## 2023-08-11 PROCEDURE — 3060F PR POS MICROALBUMINURIA RESULT DOCUMENTED/REVIEW: ICD-10-PCS | Mod: CPTII,S$GLB,, | Performed by: FAMILY MEDICINE

## 2023-08-11 PROCEDURE — 1160F PR REVIEW ALL MEDS BY PRESCRIBER/CLIN PHARMACIST DOCUMENTED: ICD-10-PCS | Mod: CPTII,S$GLB,, | Performed by: FAMILY MEDICINE

## 2023-08-11 PROCEDURE — 3288F FALL RISK ASSESSMENT DOCD: CPT | Mod: CPTII,S$GLB,, | Performed by: FAMILY MEDICINE

## 2023-08-11 PROCEDURE — 99214 PR OFFICE/OUTPT VISIT, EST, LEVL IV, 30-39 MIN: ICD-10-PCS | Mod: S$GLB,,, | Performed by: FAMILY MEDICINE

## 2023-08-11 PROCEDURE — 3078F DIAST BP <80 MM HG: CPT | Mod: CPTII,S$GLB,, | Performed by: FAMILY MEDICINE

## 2023-08-11 PROCEDURE — 3008F BODY MASS INDEX DOCD: CPT | Mod: CPTII,S$GLB,, | Performed by: FAMILY MEDICINE

## 2023-08-11 PROCEDURE — 1126F AMNT PAIN NOTED NONE PRSNT: CPT | Mod: CPTII,S$GLB,, | Performed by: FAMILY MEDICINE

## 2023-08-11 PROCEDURE — 1101F PT FALLS ASSESS-DOCD LE1/YR: CPT | Mod: CPTII,S$GLB,, | Performed by: FAMILY MEDICINE

## 2023-08-11 PROCEDURE — 3078F PR MOST RECENT DIASTOLIC BLOOD PRESSURE < 80 MM HG: ICD-10-PCS | Mod: CPTII,S$GLB,, | Performed by: FAMILY MEDICINE

## 2023-08-11 PROCEDURE — 3074F SYST BP LT 130 MM HG: CPT | Mod: CPTII,S$GLB,, | Performed by: FAMILY MEDICINE

## 2023-08-11 PROCEDURE — 3060F POS MICROALBUMINURIA REV: CPT | Mod: CPTII,S$GLB,, | Performed by: FAMILY MEDICINE

## 2023-08-11 PROCEDURE — 3074F PR MOST RECENT SYSTOLIC BLOOD PRESSURE < 130 MM HG: ICD-10-PCS | Mod: CPTII,S$GLB,, | Performed by: FAMILY MEDICINE

## 2023-08-11 PROCEDURE — 99999 PR PBB SHADOW E&M-EST. PATIENT-LVL III: ICD-10-PCS | Mod: PBBFAC,,, | Performed by: FAMILY MEDICINE

## 2023-08-11 PROCEDURE — 4010F ACE/ARB THERAPY RXD/TAKEN: CPT | Mod: CPTII,S$GLB,, | Performed by: FAMILY MEDICINE

## 2023-08-11 PROCEDURE — 3044F HG A1C LEVEL LT 7.0%: CPT | Mod: CPTII,S$GLB,, | Performed by: FAMILY MEDICINE

## 2023-08-11 PROCEDURE — 1101F PR PT FALLS ASSESS DOC 0-1 FALLS W/OUT INJ PAST YR: ICD-10-PCS | Mod: CPTII,S$GLB,, | Performed by: FAMILY MEDICINE

## 2023-08-11 PROCEDURE — 3044F PR MOST RECENT HEMOGLOBIN A1C LEVEL <7.0%: ICD-10-PCS | Mod: CPTII,S$GLB,, | Performed by: FAMILY MEDICINE

## 2023-08-11 PROCEDURE — 3008F PR BODY MASS INDEX (BMI) DOCUMENTED: ICD-10-PCS | Mod: CPTII,S$GLB,, | Performed by: FAMILY MEDICINE

## 2023-08-11 PROCEDURE — 1159F MED LIST DOCD IN RCRD: CPT | Mod: CPTII,S$GLB,, | Performed by: FAMILY MEDICINE

## 2023-08-11 PROCEDURE — 99214 OFFICE O/P EST MOD 30 MIN: CPT | Mod: S$GLB,,, | Performed by: FAMILY MEDICINE

## 2023-08-11 PROCEDURE — 3066F PR DOCUMENTATION OF TREATMENT FOR NEPHROPATHY: ICD-10-PCS | Mod: CPTII,S$GLB,, | Performed by: FAMILY MEDICINE

## 2023-08-11 RX ORDER — MUPIROCIN 20 MG/G
OINTMENT TOPICAL 3 TIMES DAILY
Qty: 22 G | Refills: 1 | Status: SHIPPED | OUTPATIENT
Start: 2023-08-11

## 2023-08-11 NOTE — PROGRESS NOTES
Subjective:       Patient ID: Ludwin Gee is a 73 y.o. male.    Chief Complaint: Hospital Follow Up (Pt states that he is here for a fu )    73-year-old male coming in for follow-up of high blood pressure and diabetes as well as cholesterol.  Since I last saw him he had a colonoscopy that found a estimated 30 mm polyp in the ascending colon followed by a robot assisted partial colectomy by Dr. Castro in June of 2023.  The specimen measured it 27 mm and proved to be a tubular adenoma with high-grade dysplasia but no malignancy.  Twelve lymph nodes were taken with the specimen and were all negative for tumor and the appendix was taken out incidentally.  The patient has recovered from his surgery and suffered no long-term he has a history of diabetes with micro albuminuria and without insulin use, hypertension taking losartan 25 mg daily, hyperlipidemia on Crestor 10 mg daily, atherosclerosis of the aorta, prostate cancer [place], and asbestos exposure.    Past Medical History:  No date: BPH with elevated PSA  No date: Digestive disorder      Comment:  COLON POLYPS  No date: Elevated PSA  No date: Hyperlipidemia  No date: Hypertension  No date: Personal history of colonic polyps  09/06/2022: Prostate cancer      Comment:  [place] 3+3=6 Watchful waiting    Past Surgical History:  12/6/2013: COLONOSCOPY      Comment:  Dr. Martinez, 5 year recheck  5/19/2023: COLONOSCOPY; N/A      Comment:  Procedure: COLONOSCOPY;  Surgeon: Saúl Mayen MD;                Location: [facility];  Service: Endoscopy;  Laterality:                N/A;  12/09/2011: SHOULDER SURGERY      Comment:  right   8/24/2022: TRANSRECTAL BIOPSY OF PROSTATE WITH ULTRASOUND GUIDANCE; N/  A      Comment:  Procedure: BIOPSY, PROSTATE, RECTAL APPROACH, WITH US                GUIDANCE;  Surgeon: Mora Millan Jr., MD;  Location:                [facility] OR;  Service: Urology;  Laterality: N/A;  6/26/2023: XI ROBOTIC COLECTOMY, PARTIAL; N/A      Comment:   Procedure: XI ROBOTIC COLECTOMY, PARTIAL;  Surgeon:                Ismael Castro MD;  Location: [facility];  Service:                General;  Laterality: N/A;    Current Outpatient Medications on File Prior to Visit:  alfuzosin (UROXATRAL) 10 mg Tb24, Take 1 tablet (10 mg total) by mouth daily with breakfast., Disp: 90 tablet, Rfl: 3  blood sugar diagnostic (ONETOUCH ULTRA TEST) Strp, USE TO CHECK FASTING GLUCOSE IN THE MORNING, Disp: 100 strip, Rfl: 3  blood-glucose meter Misc, One Touch glucometer check fasting glucose in morning, Disp: 1 each, Rfl: 0  coenzyme Q10 10 mg capsule, Take 10 mg by mouth once daily., Disp: , Rfl:   famotidine (PEPCID) 20 MG tablet, Take 20 mg by mouth once daily., Disp: , Rfl:   losartan (COZAAR) 25 MG tablet, TAKE ONE TABLET BY MOUTH EVERY DAY, Disp: 90 tablet, Rfl: 1  metFORMIN (GLUCOPHAGE-XR) 500 MG ER 24hr tablet, TAKE TWO TABLETS BY MOUTH TWO TIMES A DAY WITH MEALS, Disp: 360 tablet, Rfl: 0  oxybutynin (DITROPAN-XL) 5 MG TR24, Take 1 tablet (5 mg total) by mouth once daily., Disp: 90 tablet, Rfl: 3  rosuvastatin (CRESTOR) 10 MG tablet, TAKE ONE TABLET BY MOUTH EVERY DAY, Disp: 90 tablet, Rfl: 3    No current facility-administered medications on file prior to visit.          Review of Systems   Constitutional:  Negative for activity change, chills, fatigue and fever.   HENT:  Negative for congestion, ear pain, rhinorrhea and sore throat.    Eyes:  Negative for visual disturbance.   Respiratory:  Negative for cough, chest tightness and shortness of breath.    Cardiovascular:  Negative for chest pain and palpitations.   Gastrointestinal:  Negative for abdominal pain, blood in stool, constipation, diarrhea, nausea and vomiting.   Genitourinary:  Negative for difficulty urinating, dysuria and hematuria.   Musculoskeletal:  Negative for arthralgias and neck pain.   Skin:  Negative for rash.   Neurological:  Negative for dizziness and headaches.   Psychiatric/Behavioral:  Negative for  sleep disturbance.        Objective:      Physical Exam  Vitals and nursing note reviewed.   Constitutional:       General: He is not in acute distress.     Appearance: Normal appearance. He is well-developed and normal weight. He is not ill-appearing, toxic-appearing or diaphoretic.      Comments: Good blood pressure control   Normal weight with a BMI of 25.6 he is down 1.6 lb from his May 19, 2023 last visit with me   HENT:      Head: Normocephalic and atraumatic.      Right Ear: Tympanic membrane, ear canal and external ear normal. There is no impacted cerumen.      Left Ear: Tympanic membrane, ear canal and external ear normal. There is no impacted cerumen.      Nose: Nose normal. No congestion or rhinorrhea.      Mouth/Throat:      Mouth: Mucous membranes are moist.      Pharynx: Oropharynx is clear. No oropharyngeal exudate or posterior oropharyngeal erythema.   Eyes:      General: No scleral icterus.     Extraocular Movements: Extraocular movements intact.      Conjunctiva/sclera: Conjunctivae normal.      Pupils: Pupils are equal, round, and reactive to light.   Neck:      Thyroid: No thyromegaly.      Vascular: No carotid bruit or JVD.      Trachea: No tracheal deviation.   Cardiovascular:      Rate and Rhythm: Normal rate and regular rhythm.      Heart sounds: Normal heart sounds. No murmur heard.     No friction rub. No gallop.   Pulmonary:      Effort: Pulmonary effort is normal. No respiratory distress.      Breath sounds: Normal breath sounds. No stridor. No wheezing, rhonchi or rales.   Chest:      Chest wall: No tenderness.   Abdominal:      General: Bowel sounds are normal. There is no distension.      Palpations: Abdomen is soft. There is no mass.      Tenderness: There is no abdominal tenderness. There is no guarding or rebound.      Hernia: No hernia is present.   Musculoskeletal:         General: Normal range of motion.      Cervical back: Normal range of motion and neck supple. No rigidity or  tenderness.      Right lower leg: No edema.      Left lower leg: No edema.   Lymphadenopathy:      Cervical: No cervical adenopathy.   Skin:     General: Skin is warm and dry.      Findings: No rash.          Neurological:      General: No focal deficit present.      Mental Status: He is alert and oriented to person, place, and time. Mental status is at baseline.      Cranial Nerves: No cranial nerve deficit.      Deep Tendon Reflexes: Reflexes are normal and symmetric. Reflexes normal.   Psychiatric:         Mood and Affect: Mood normal.         Behavior: Behavior normal.         Thought Content: Thought content normal.         Judgment: Judgment normal.         Assessment:       1. Controlled type 2 diabetes mellitus with microalbuminuria, without long-term current use of insulin    2. Hypertension associated with diabetes    3. Hyperlipidemia associated with type 2 diabetes mellitus    4. Atherosclerosis of aorta    5. Adenomatous polyp of ascending colon    6. Abrasion, right knee, initial encounter    7. BMI 25.0-25.9,adult        Plan:       1. Controlled type 2 diabetes mellitus with microalbuminuria, without long-term current use of insulin  Lab Results   Component Value Date    HGBA1C 6.8 (H) 05/23/2023     Good control, no changes needed recheck around the end of November with BMP and A1c  - Basic Metabolic Panel; Future  - Hemoglobin A1C; Future    2. Hypertension associated with diabetes  Well controlled, no changes needed in losartan 25 mg daily  - Basic Metabolic Panel; Future    3. Hyperlipidemia associated with type 2 diabetes mellitus  Lab Results   Component Value Date    CHOL 108 (L) 05/23/2023    CHOL 137 03/26/2022    CHOL 152 03/01/2021     Lab Results   Component Value Date    HDL 40 05/23/2023    HDL 61 03/26/2022    HDL 48 03/01/2021     Lab Results   Component Value Date    LDLCALC 53.6 (L) 05/23/2023    LDLCALC 56.2 (L) 03/26/2022    LDLCALC 84.8 03/01/2021     No results found for:  ""DLDL"  Lab Results   Component Value Date    TRIG 72 05/23/2023    TRIG 99 03/26/2022    TRIG 96 03/01/2021       f1   Lab Results   Component Value Date    CHOLHDL 37.0 05/23/2023    CHOLHDL 44.5 03/26/2022    CHOLHDL 31.6 03/01/2021     Well controlled, no changes needed in Crestor 10 mg    4. Atherosclerosis of aorta  Asymptomatic, continue to maintain good control of blood pressure, cholesterol, and blood sugar    5. Adenomatous polyp of ascending colon  Follow-up colonoscopy in one year    6. Abrasion, right knee, initial encounter  TD up-to-date having been given in May of 2018.  - mupirocin (BACTROBAN) 2 % ointment; Apply topically 3 (three) times daily.  Dispense: 22 g; Refill: 1    7. BMI 25.0-25.9,adult  Good weight, no changes needed        "

## 2023-09-08 ENCOUNTER — LAB VISIT (OUTPATIENT)
Dept: LAB | Facility: HOSPITAL | Age: 73
End: 2023-09-08
Attending: UROLOGY
Payer: MEDICARE

## 2023-09-08 DIAGNOSIS — C61 PROSTATE CANCER: ICD-10-CM

## 2023-09-08 LAB — COMPLEXED PSA SERPL-MCNC: 5.2 NG/ML (ref 0–4)

## 2023-09-08 PROCEDURE — 84153 ASSAY OF PSA TOTAL: CPT | Performed by: UROLOGY

## 2023-09-08 PROCEDURE — 36415 COLL VENOUS BLD VENIPUNCTURE: CPT | Performed by: UROLOGY

## 2023-09-18 DIAGNOSIS — E11.29 CONTROLLED TYPE 2 DIABETES MELLITUS WITH MICROALBUMINURIA, WITHOUT LONG-TERM CURRENT USE OF INSULIN: ICD-10-CM

## 2023-09-18 DIAGNOSIS — R80.9 CONTROLLED TYPE 2 DIABETES MELLITUS WITH MICROALBUMINURIA, WITHOUT LONG-TERM CURRENT USE OF INSULIN: ICD-10-CM

## 2023-09-18 RX ORDER — METFORMIN HYDROCHLORIDE 500 MG/1
TABLET, EXTENDED RELEASE ORAL
Qty: 360 TABLET | Refills: 0 | Status: SHIPPED | OUTPATIENT
Start: 2023-09-18 | End: 2024-01-31

## 2023-09-18 NOTE — TELEPHONE ENCOUNTER
Care Due:                  Date            Visit Type   Department     Provider  --------------------------------------------------------------------------------                                HOSPITAL     [facility]  Last Visit: 08-      FOLLOW UP    PRACTICE       Herrera DAVE -                              PRIMARY      [facility]  Next Visit: 11-      CARE ([facility])   PRACTICE       Herrera Fisher                                                            Last  Test          Frequency    Reason                     Performed    Due Date  --------------------------------------------------------------------------------    HBA1C.......  6 months...  metFORMIN................  05- 11-    [facility] Embedded Care Due Messages. Reference number: [id number].   9/18/2023 8:56:58 AM CDT

## 2023-09-18 NOTE — TELEPHONE ENCOUNTER
Refill Decision Note   Ludwin Gee  is requesting a refill authorization.  Brief Assessment and Rationale for Refill:  Approve     Medication Therapy Plan:  FLOS      Pharmacist review requested: Yes   Extended chart review required: Yes   Comments:     Note composed:6:56 PM 09/18/2023

## 2023-11-21 ENCOUNTER — OFFICE VISIT (OUTPATIENT)
Dept: FAMILY MEDICINE | Facility: CLINIC | Age: 73
End: 2023-11-21
Attending: FAMILY MEDICINE
Payer: MEDICARE

## 2023-11-21 VITALS
RESPIRATION RATE: 18 BRPM | TEMPERATURE: 98 F | HEART RATE: 69 BPM | HEIGHT: 66 IN | BODY MASS INDEX: 25.54 KG/M2 | SYSTOLIC BLOOD PRESSURE: 106 MMHG | OXYGEN SATURATION: 97 % | WEIGHT: 158.94 LBS | DIASTOLIC BLOOD PRESSURE: 66 MMHG

## 2023-11-21 DIAGNOSIS — E78.5 HYPERLIPIDEMIA ASSOCIATED WITH TYPE 2 DIABETES MELLITUS: ICD-10-CM

## 2023-11-21 DIAGNOSIS — E11.59 HYPERTENSION ASSOCIATED WITH DIABETES: ICD-10-CM

## 2023-11-21 DIAGNOSIS — E11.29 CONTROLLED TYPE 2 DIABETES MELLITUS WITH MICROALBUMINURIA, WITHOUT LONG-TERM CURRENT USE OF INSULIN: Primary | ICD-10-CM

## 2023-11-21 DIAGNOSIS — I15.2 HYPERTENSION ASSOCIATED WITH DIABETES: ICD-10-CM

## 2023-11-21 DIAGNOSIS — R80.9 CONTROLLED TYPE 2 DIABETES MELLITUS WITH MICROALBUMINURIA, WITHOUT LONG-TERM CURRENT USE OF INSULIN: Primary | ICD-10-CM

## 2023-11-21 DIAGNOSIS — E11.69 HYPERLIPIDEMIA ASSOCIATED WITH TYPE 2 DIABETES MELLITUS: ICD-10-CM

## 2023-11-21 PROCEDURE — 3074F SYST BP LT 130 MM HG: CPT | Mod: CPTII,S$GLB,, | Performed by: FAMILY MEDICINE

## 2023-11-21 PROCEDURE — 3288F PR FALLS RISK ASSESSMENT DOCUMENTED: ICD-10-PCS | Mod: CPTII,S$GLB,, | Performed by: FAMILY MEDICINE

## 2023-11-21 PROCEDURE — 3008F BODY MASS INDEX DOCD: CPT | Mod: CPTII,S$GLB,, | Performed by: FAMILY MEDICINE

## 2023-11-21 PROCEDURE — 99999 PR PBB SHADOW E&M-EST. PATIENT-LVL IV: CPT | Mod: PBBFAC,,, | Performed by: FAMILY MEDICINE

## 2023-11-21 PROCEDURE — 1160F RVW MEDS BY RX/DR IN RCRD: CPT | Mod: CPTII,S$GLB,, | Performed by: FAMILY MEDICINE

## 2023-11-21 PROCEDURE — 3008F PR BODY MASS INDEX (BMI) DOCUMENTED: ICD-10-PCS | Mod: CPTII,S$GLB,, | Performed by: FAMILY MEDICINE

## 2023-11-21 PROCEDURE — 3078F PR MOST RECENT DIASTOLIC BLOOD PRESSURE < 80 MM HG: ICD-10-PCS | Mod: CPTII,S$GLB,, | Performed by: FAMILY MEDICINE

## 2023-11-21 PROCEDURE — 3288F FALL RISK ASSESSMENT DOCD: CPT | Mod: CPTII,S$GLB,, | Performed by: FAMILY MEDICINE

## 2023-11-21 PROCEDURE — 3044F PR MOST RECENT HEMOGLOBIN A1C LEVEL <7.0%: ICD-10-PCS | Mod: CPTII,S$GLB,, | Performed by: FAMILY MEDICINE

## 2023-11-21 PROCEDURE — 1101F PT FALLS ASSESS-DOCD LE1/YR: CPT | Mod: CPTII,S$GLB,, | Performed by: FAMILY MEDICINE

## 2023-11-21 PROCEDURE — 99214 OFFICE O/P EST MOD 30 MIN: CPT | Mod: S$GLB,,, | Performed by: FAMILY MEDICINE

## 2023-11-21 PROCEDURE — 3066F NEPHROPATHY DOC TX: CPT | Mod: CPTII,S$GLB,, | Performed by: FAMILY MEDICINE

## 2023-11-21 PROCEDURE — 1159F MED LIST DOCD IN RCRD: CPT | Mod: CPTII,S$GLB,, | Performed by: FAMILY MEDICINE

## 2023-11-21 PROCEDURE — 3044F HG A1C LEVEL LT 7.0%: CPT | Mod: CPTII,S$GLB,, | Performed by: FAMILY MEDICINE

## 2023-11-21 PROCEDURE — 1159F PR MEDICATION LIST DOCUMENTED IN MEDICAL RECORD: ICD-10-PCS | Mod: CPTII,S$GLB,, | Performed by: FAMILY MEDICINE

## 2023-11-21 PROCEDURE — 3074F PR MOST RECENT SYSTOLIC BLOOD PRESSURE < 130 MM HG: ICD-10-PCS | Mod: CPTII,S$GLB,, | Performed by: FAMILY MEDICINE

## 2023-11-21 PROCEDURE — 3060F POS MICROALBUMINURIA REV: CPT | Mod: CPTII,S$GLB,, | Performed by: FAMILY MEDICINE

## 2023-11-21 PROCEDURE — 3066F PR DOCUMENTATION OF TREATMENT FOR NEPHROPATHY: ICD-10-PCS | Mod: CPTII,S$GLB,, | Performed by: FAMILY MEDICINE

## 2023-11-21 PROCEDURE — 3060F PR POS MICROALBUMINURIA RESULT DOCUMENTED/REVIEW: ICD-10-PCS | Mod: CPTII,S$GLB,, | Performed by: FAMILY MEDICINE

## 2023-11-21 PROCEDURE — 99999 PR PBB SHADOW E&M-EST. PATIENT-LVL IV: ICD-10-PCS | Mod: PBBFAC,,, | Performed by: FAMILY MEDICINE

## 2023-11-21 PROCEDURE — 4010F PR ACE/ARB THEARPY RXD/TAKEN: ICD-10-PCS | Mod: CPTII,S$GLB,, | Performed by: FAMILY MEDICINE

## 2023-11-21 PROCEDURE — 1126F PR PAIN SEVERITY QUANTIFIED, NO PAIN PRESENT: ICD-10-PCS | Mod: CPTII,S$GLB,, | Performed by: FAMILY MEDICINE

## 2023-11-21 PROCEDURE — 1101F PR PT FALLS ASSESS DOC 0-1 FALLS W/OUT INJ PAST YR: ICD-10-PCS | Mod: CPTII,S$GLB,, | Performed by: FAMILY MEDICINE

## 2023-11-21 PROCEDURE — 99214 PR OFFICE/OUTPT VISIT, EST, LEVL IV, 30-39 MIN: ICD-10-PCS | Mod: S$GLB,,, | Performed by: FAMILY MEDICINE

## 2023-11-21 PROCEDURE — 3078F DIAST BP <80 MM HG: CPT | Mod: CPTII,S$GLB,, | Performed by: FAMILY MEDICINE

## 2023-11-21 PROCEDURE — 4010F ACE/ARB THERAPY RXD/TAKEN: CPT | Mod: CPTII,S$GLB,, | Performed by: FAMILY MEDICINE

## 2023-11-21 PROCEDURE — 1126F AMNT PAIN NOTED NONE PRSNT: CPT | Mod: CPTII,S$GLB,, | Performed by: FAMILY MEDICINE

## 2023-11-21 PROCEDURE — 1160F PR REVIEW ALL MEDS BY PRESCRIBER/CLIN PHARMACIST DOCUMENTED: ICD-10-PCS | Mod: CPTII,S$GLB,, | Performed by: FAMILY MEDICINE

## 2023-11-21 NOTE — PROGRESS NOTES
Subjective:       Patient ID: Ludwin Gee is a 73 y.o. male.    Chief Complaint: Diabetes (Pt states that he is here for his 6 mo fu )    73-year-old male presents for follow-up of diabetes hypertension hyperlipidemia.  He is scheduled for lab December 12 include a BMP and an A1c.  He has no complaints at this time and he does have a [employer] physical form to complete.  History includes diabetes, hypertension, hyperlipidemia, atherosclerosis ptosis of the aorta, BPH, long COVID, anemia, prostate cancer, colon polyps and asbestosis.  He has a history of a partial colectomy for a large but benign polyp in June of 2023 and prostate biopsy in August of 2022.  He takes Uroxatral and Ditropan to control urination, Crestor 10 mg for cholesterol, losartan 25 mg daily for blood pressure, and metformin  two tablets b.i.d. for diabetes.  He has but does not use regularly Pepcid 20 mg for reflux and heartburn symptoms.    Past Medical History:  No date: BPH with elevated PSA  No date: Digestive disorder      Comment:  COLON POLYPS  No date: Elevated PSA  No date: Hyperlipidemia  No date: Hypertension  No date: Personal history of colonic polyps  09/06/2022: Prostate cancer      Comment:  [place] 3+3=6 Watchful waiting    Past Surgical History:  12/6/2013: COLONOSCOPY      Comment:  Dr. Martinez, 5 year recheck  5/19/2023: COLONOSCOPY; N/A      Comment:  Procedure: COLONOSCOPY;  Surgeon: Saúl Mayen MD;                Location: [facility];  Service: Endoscopy;  Laterality:                N/A;  12/09/2011: SHOULDER SURGERY      Comment:  right   8/24/2022: TRANSRECTAL BIOPSY OF PROSTATE WITH ULTRASOUND GUIDANCE; N/  A      Comment:  Procedure: BIOPSY, PROSTATE, RECTAL APPROACH, WITH US                GUIDANCE;  Surgeon: Mora Millan Jr., MD;  Location:                [facility];  Service: Urology;  Laterality: N/A;  6/26/2023: XI ROBOTIC COLECTOMY, PARTIAL; N/A      Comment:  Procedure: XI ROBOTIC COLECTOMY,  PARTIAL;  Surgeon:                Ismael Castro MD;  Location: [facility];  Service:                General;  Laterality: N/A;    Current Outpatient Medications on File Prior to Visit:  alfuzosin (UROXATRAL) 10 mg Tb24, Take 1 tablet (10 mg total) by mouth daily with breakfast., Disp: 90 tablet, Rfl: 3  blood sugar diagnostic (ONETOUCH ULTRA TEST) Strp, USE TO CHECK FASTING GLUCOSE IN THE MORNING, Disp: 100 strip, Rfl: 3  blood-glucose meter Misc, One Touch glucometer check fasting glucose in morning, Disp: 1 each, Rfl: 0  coenzyme Q10 10 mg capsule, Take 10 mg by mouth once daily., Disp: , Rfl:   famotidine (PEPCID) 20 MG tablet, Take 20 mg by mouth once daily., Disp: , Rfl:   losartan (COZAAR) 25 MG tablet, TAKE ONE TABLET BY MOUTH EVERY DAY, Disp: 90 tablet, Rfl: 2  metFORMIN (GLUCOPHAGE-XR) 500 MG ER 24hr tablet, TAKE TWO TABLETS BY MOUTH TWO TIMES A DAY WITH MEALS, Disp: 360 tablet, Rfl: 0  mupirocin (BACTROBAN) 2 % ointment, Apply topically 3 (three) times daily., Disp: 22 g, Rfl: 1  oxybutynin (DITROPAN-XL) 5 MG TR24, Take 1 tablet (5 mg total) by mouth once daily., Disp: 90 tablet, Rfl: 3  rosuvastatin (CRESTOR) 10 MG tablet, TAKE ONE TABLET BY MOUTH EVERY DAY, Disp: 90 tablet, Rfl: 3    No current facility-administered medications on file prior to visit.          Review of Systems   Constitutional:  Negative for activity change, chills, fatigue and fever.   HENT:  Negative for congestion, ear pain, rhinorrhea and sore throat.    Eyes:  Negative for visual disturbance.   Respiratory:  Negative for cough, chest tightness and shortness of breath.    Cardiovascular:  Negative for chest pain and palpitations.   Gastrointestinal:  Negative for abdominal pain, blood in stool, constipation, diarrhea, nausea and vomiting.   Genitourinary:  Negative for difficulty urinating, dysuria and hematuria.   Musculoskeletal:  Negative for arthralgias and neck pain.   Skin:  Negative for rash.   Neurological:  Negative for  dizziness and headaches.   Psychiatric/Behavioral:  Negative for sleep disturbance.        Objective:      Physical Exam  Vitals and nursing note reviewed.   Constitutional:       General: He is not in acute distress.     Appearance: Normal appearance. He is normal weight. He is not ill-appearing, toxic-appearing or diaphoretic.      Comments: Good blood pressure control  Normal pulse with regular rhythm   Normal weight with a BMI of 25.7 he is up 0.3 lb from his last visit August 11, 2023   HENT:      Head: Normocephalic and atraumatic.   Eyes:      General: No scleral icterus.        Right eye: No discharge.         Left eye: No discharge.      Extraocular Movements: Extraocular movements intact.      Conjunctiva/sclera: Conjunctivae normal.      Pupils: Pupils are equal, round, and reactive to light.   Neck:      Vascular: No carotid bruit.   Cardiovascular:      Rate and Rhythm: Normal rate and regular rhythm.      Pulses: Normal pulses.           Dorsalis pedis pulses are 2+ on the right side and 2+ on the left side.        Posterior tibial pulses are 2+ on the right side and 2+ on the left side.      Heart sounds: Normal heart sounds. No murmur heard.     No friction rub. No gallop.   Pulmonary:      Effort: Pulmonary effort is normal. No respiratory distress.      Breath sounds: Normal breath sounds. No stridor. No wheezing, rhonchi or rales.   Chest:      Chest wall: No tenderness.   Musculoskeletal:      Cervical back: Normal range of motion and neck supple. No rigidity or tenderness.      Right foot: Normal range of motion. No deformity, bunion, Charcot foot, foot drop or prominent metatarsal heads.      Left foot: Normal range of motion. No deformity, bunion, Charcot foot, foot drop or prominent metatarsal heads.   Feet:      Right foot:      Protective Sensation: 9 sites tested.  9 sites sensed.      Skin integrity: Skin integrity normal. No ulcer, blister, skin breakdown, erythema, warmth, callus, dry  skin or fissure.      Toenail Condition: Right toenails are normal.      Left foot:      Protective Sensation: 9 sites tested.  9 sites sensed.      Skin integrity: Skin integrity normal. No ulcer, blister, skin breakdown, erythema, warmth, callus, dry skin or fissure.      Toenail Condition: Left toenails are normal.   Lymphadenopathy:      Cervical: No cervical adenopathy.   Skin:     Capillary Refill: Capillary refill takes less than 2 seconds.   Neurological:      General: No focal deficit present.      Mental Status: He is alert and oriented to person, place, and time. Mental status is at baseline.      Cranial Nerves: No cranial nerve deficit.      Sensory: No sensory deficit.      Motor: No weakness.      Coordination: Coordination normal.      Gait: Gait normal.      Deep Tendon Reflexes: Reflexes normal.      Comments: Proprioception intact all 10 toes   Psychiatric:         Mood and Affect: Mood normal.         Behavior: Behavior normal.         Thought Content: Thought content normal.         Judgment: Judgment normal.         Assessment:       1. Controlled type 2 diabetes mellitus with microalbuminuria, without long-term current use of insulin    2. Hypertension associated with diabetes    3. Hyperlipidemia associated with type 2 diabetes mellitus    4. BMI 25.0-25.9,adult        Plan:       1. Controlled type 2 diabetes mellitus with microalbuminuria, without long-term current use of insulin  Lab Results   Component Value Date    HGBA1C 6.8 (H) 05/23/2023     Well controlled, repeat A1c scheduled for December 1, 2023    2. Hypertension associated with diabetes  Well controlled with no changes needed in losartan 25 mg    3. Hyperlipidemia associated with type 2 diabetes mellitus  Lab Results   Component Value Date    CHOL 108 (L) 05/23/2023    CHOL 137 03/26/2022    CHOL 152 03/01/2021     Lab Results   Component Value Date    HDL 40 05/23/2023    HDL 61 03/26/2022    HDL 48 03/01/2021     Lab Results    Component Value Date    LDLCALC 53.6 (L) 05/23/2023    LDLCALC 56.2 (L) 03/26/2022    LDLCALC 84.8 03/01/2021     Lab Results   Component Value Date    TRIG 72 05/23/2023    TRIG 99 03/26/2022    TRIG 96 03/01/2021       Lab Results   Component Value Date    CHOLHDL 37.0 05/23/2023    CHOLHDL 44.5 03/26/2022    CHOLHDL 31.6 03/01/2021     Well controlled with no changes needed in Crestor 10 mg daily.  Recheck labs in May of 2024    4. BMI 25.0-25.9,adult  Good weight, no changes needed

## 2023-12-01 ENCOUNTER — LAB VISIT (OUTPATIENT)
Dept: LAB | Facility: HOSPITAL | Age: 73
End: 2023-12-01
Attending: FAMILY MEDICINE
Payer: MEDICARE

## 2023-12-01 DIAGNOSIS — R80.9 CONTROLLED TYPE 2 DIABETES MELLITUS WITH MICROALBUMINURIA, WITHOUT LONG-TERM CURRENT USE OF INSULIN: ICD-10-CM

## 2023-12-01 DIAGNOSIS — I15.2 HYPERTENSION ASSOCIATED WITH DIABETES: ICD-10-CM

## 2023-12-01 DIAGNOSIS — E11.29 CONTROLLED TYPE 2 DIABETES MELLITUS WITH MICROALBUMINURIA, WITHOUT LONG-TERM CURRENT USE OF INSULIN: ICD-10-CM

## 2023-12-01 DIAGNOSIS — E11.59 HYPERTENSION ASSOCIATED WITH DIABETES: ICD-10-CM

## 2023-12-01 LAB
ANION GAP SERPL CALC-SCNC: 9 MMOL/L (ref 8–16)
BUN SERPL-MCNC: 18 MG/DL (ref 8–23)
CALCIUM SERPL-MCNC: 10.1 MG/DL (ref 8.7–10.5)
CHLORIDE SERPL-SCNC: 105 MMOL/L (ref 95–110)
CO2 SERPL-SCNC: 24 MMOL/L (ref 23–29)
CREAT SERPL-MCNC: 1.4 MG/DL (ref 0.5–1.4)
EST. GFR  (NO RACE VARIABLE): 53.1 ML/MIN/1.73 M^2
ESTIMATED AVG GLUCOSE: 157 MG/DL (ref 68–131)
GLUCOSE SERPL-MCNC: 127 MG/DL (ref 70–110)
HBA1C MFR BLD: 7.1 % (ref 4–5.6)
POTASSIUM SERPL-SCNC: 5 MMOL/L (ref 3.5–5.1)
SODIUM SERPL-SCNC: 138 MMOL/L (ref 136–145)

## 2023-12-01 PROCEDURE — 36415 COLL VENOUS BLD VENIPUNCTURE: CPT | Mod: PO | Performed by: FAMILY MEDICINE

## 2023-12-01 PROCEDURE — 80048 BASIC METABOLIC PNL TOTAL CA: CPT | Performed by: FAMILY MEDICINE

## 2023-12-01 PROCEDURE — 83036 HEMOGLOBIN GLYCOSYLATED A1C: CPT | Performed by: FAMILY MEDICINE

## 2023-12-04 ENCOUNTER — PATIENT MESSAGE (OUTPATIENT)
Dept: FAMILY MEDICINE | Facility: CLINIC | Age: 73
End: 2023-12-04
Payer: MEDICARE

## 2024-01-12 ENCOUNTER — TELEPHONE (OUTPATIENT)
Dept: OPHTHALMOLOGY | Facility: CLINIC | Age: 74
End: 2024-01-12
Payer: MEDICARE

## 2024-01-12 NOTE — TELEPHONE ENCOUNTER
Spoke to pt and scheduled next available In [place]. Made aware next available in [place] is April       ----- Message from Kiki Urena sent at 1/12/2024 12:31 PM CST -----  Regarding: rx for glasses  Contact: ulis  Type:  Needs Medical Advice    Who Called: Luis  Would the patient rather a call back or a response via [facility]? Call back  Best Call Back Number: [phone]    Additional Information: sts she would like to speak to someone about a RX for glasses for the pt

## 2024-01-31 DIAGNOSIS — R80.9 CONTROLLED TYPE 2 DIABETES MELLITUS WITH MICROALBUMINURIA, WITHOUT LONG-TERM CURRENT USE OF INSULIN: ICD-10-CM

## 2024-01-31 DIAGNOSIS — E11.29 CONTROLLED TYPE 2 DIABETES MELLITUS WITH MICROALBUMINURIA, WITHOUT LONG-TERM CURRENT USE OF INSULIN: ICD-10-CM

## 2024-01-31 RX ORDER — METFORMIN HYDROCHLORIDE 500 MG/1
TABLET, EXTENDED RELEASE ORAL
Qty: 360 TABLET | Refills: 1 | Status: SHIPPED | OUTPATIENT
Start: 2024-01-31

## 2024-01-31 NOTE — TELEPHONE ENCOUNTER
No care due was identified.  [facility] Embedded Care Due Messages. Reference number: [id number].   1/31/2024 8:56:16 AM CST

## 2024-01-31 NOTE — TELEPHONE ENCOUNTER
Refill Routing Note   Medication(s) are not appropriate for processing by [facility] for the following reason(s):        Drug-disease interaction    [facility] action(s):  Defer        Medication Therapy Plan: metFORMIN and Acute hypoxemic respiratory failure due to COVID-19    Pharmacist review requested: Yes     Appointments  past 12m or future 3m with PCP    Date Provider   Last Visit   11/21/2023 Herrera Fisher MD   Next Visit   5/21/2024 Herrera Fisher MD   ED visits in past 90 days: 0        Note composed:10:02 AM 01/31/2024

## 2024-02-01 NOTE — TELEPHONE ENCOUNTER
Refill Decision Note   Ludwin Gee  is requesting a refill authorization.  Brief Assessment and Rationale for Refill:  Approve     Medication Therapy Plan:         Pharmacist review requested: Yes   Extended chart review required: Yes   Comments:     Note composed:6:39 PM 01/31/2024

## 2024-03-27 ENCOUNTER — OFFICE VISIT (OUTPATIENT)
Dept: UROLOGY | Facility: CLINIC | Age: 74
End: 2024-03-27
Payer: MEDICARE

## 2024-03-27 ENCOUNTER — LAB VISIT (OUTPATIENT)
Dept: LAB | Facility: HOSPITAL | Age: 74
End: 2024-03-27
Attending: UROLOGY
Payer: MEDICARE

## 2024-03-27 VITALS — BODY MASS INDEX: 26.03 KG/M2 | HEIGHT: 66 IN | WEIGHT: 162 LBS

## 2024-03-27 DIAGNOSIS — C61 PROSTATE CANCER: ICD-10-CM

## 2024-03-27 DIAGNOSIS — N39.41 URGENCY INCONTINENCE: ICD-10-CM

## 2024-03-27 DIAGNOSIS — C61 PROSTATE CANCER: Primary | ICD-10-CM

## 2024-03-27 DIAGNOSIS — R39.9 LOWER URINARY TRACT SYMPTOMS: ICD-10-CM

## 2024-03-27 LAB — COMPLEXED PSA SERPL-MCNC: 5.5 NG/ML (ref 0–4)

## 2024-03-27 PROCEDURE — G2211 COMPLEX E/M VISIT ADD ON: HCPCS | Mod: S$GLB,,, | Performed by: UROLOGY

## 2024-03-27 PROCEDURE — 99214 OFFICE O/P EST MOD 30 MIN: CPT | Mod: S$GLB,,, | Performed by: UROLOGY

## 2024-03-27 PROCEDURE — 3288F FALL RISK ASSESSMENT DOCD: CPT | Mod: CPTII,S$GLB,, | Performed by: UROLOGY

## 2024-03-27 PROCEDURE — 99999 PR PBB SHADOW E&M-EST. PATIENT-LVL II: CPT | Mod: PBBFAC,,, | Performed by: UROLOGY

## 2024-03-27 PROCEDURE — 3072F LOW RISK FOR RETINOPATHY: CPT | Mod: CPTII,S$GLB,, | Performed by: UROLOGY

## 2024-03-27 PROCEDURE — 3008F BODY MASS INDEX DOCD: CPT | Mod: CPTII,S$GLB,, | Performed by: UROLOGY

## 2024-03-27 PROCEDURE — 1159F MED LIST DOCD IN RCRD: CPT | Mod: CPTII,S$GLB,, | Performed by: UROLOGY

## 2024-03-27 PROCEDURE — 1126F AMNT PAIN NOTED NONE PRSNT: CPT | Mod: CPTII,S$GLB,, | Performed by: UROLOGY

## 2024-03-27 PROCEDURE — 1101F PT FALLS ASSESS-DOCD LE1/YR: CPT | Mod: CPTII,S$GLB,, | Performed by: UROLOGY

## 2024-03-27 PROCEDURE — 36415 COLL VENOUS BLD VENIPUNCTURE: CPT | Performed by: UROLOGY

## 2024-03-27 PROCEDURE — 4010F ACE/ARB THERAPY RXD/TAKEN: CPT | Mod: CPTII,S$GLB,, | Performed by: UROLOGY

## 2024-03-27 PROCEDURE — 84153 ASSAY OF PSA TOTAL: CPT | Performed by: UROLOGY

## 2024-03-27 PROCEDURE — 1160F RVW MEDS BY RX/DR IN RCRD: CPT | Mod: CPTII,S$GLB,, | Performed by: UROLOGY

## 2024-03-27 RX ORDER — OXYBUTYNIN CHLORIDE 5 MG/1
5 TABLET, EXTENDED RELEASE ORAL DAILY
Qty: 90 TABLET | Refills: 3 | Status: SHIPPED | OUTPATIENT
Start: 2024-03-27 | End: 2025-03-27

## 2024-03-27 RX ORDER — MELOXICAM 15 MG/1
15 TABLET ORAL
COMMUNITY
Start: 2024-02-19 | End: 2024-05-21

## 2024-03-27 RX ORDER — ALFUZOSIN HYDROCHLORIDE 10 MG/1
10 TABLET, EXTENDED RELEASE ORAL
Qty: 90 TABLET | Refills: 3 | Status: SHIPPED | OUTPATIENT
Start: 2024-03-27 | End: 2025-03-27

## 2024-03-27 RX ORDER — TIZANIDINE 4 MG/1
4 TABLET ORAL EVERY 8 HOURS PRN
COMMUNITY
Start: 2024-02-19 | End: 2024-05-21

## 2024-03-27 NOTE — PROGRESS NOTES
[facility] Established Patient/H&P:    Ludwin Gee is a 74 y.o. male who presents for follow-up for low-risk prostate cancer.      Patient with a several year history of elevated PSA and lower urinary tract symptoms who was previously managed by Dr. Michel.      On review of records, he has undergone negative negative prostate biopsy in 2014. 4K score in the past showed a low probability of clinically significant cancer.      MRI prostate on 8/3/22 with a PI-RADs 4 lesion in the right posterior base. He is now s/p prostate biopsy on 8/24/22 which revealed [place] 3+3= 6 prostate cancer in 2/12 cores.      He remains with moderate lower urinary tract symptoms - urgency, frequency and weak stream. He has been on Uroxatral and previously Flomax. Mostly bothered by his urgency.      He has severe ED with no medications. Not significantly bothered.      Works as an  on the [employer].        Interval History     3/9/23: Here for follow-up with no complaints. Decided on active surveillance for his low-risk prostate cancer. Most recent PSA 4.9. He is voiding well with stable lower urinary tract symptoms on Ditropan 5 mg and Uroxatral 10 mg PO daily.     3/27/24: Here today for follow up. Last PSA stable at 5.2 in 9/2023. No complaints today. Remains with stable lower urinary tract symptoms on Ditropan 5 mg and Uroxatral 10 mg PO daily.     Of note, he underwent hemicolectomy and ileocolic anastomosis in 6/2023 for a colon polyp. Path benign.      Denies any fever, chills, gross hematuria, bone pain, intentional weight loss or  trauma.         PSA  5.2  9/8/23  4.9                   3/7/23  5.7                   6/28/22  6.5                   2/28/22  5.8                   7/23/21  7.9                   10/7/20  11.7                 1/25/18  9.61                 5/8/13  4.7                   6/1/09     IPSS    QoL  13 3 3/27/24  10        3          6/29/22    PVR  69  "mL  3/27/24      Past Medical History:   Diagnosis Date    BPH with elevated PSA     Digestive disorder     COLON POLYPS    Elevated PSA     Hyperlipidemia     Hypertension     Personal history of colonic polyps     Prostate cancer 09/06/2022    [place] 3+3=6 Watchful waiting       Past Surgical History:   Procedure Laterality Date    COLONOSCOPY  12/6/2013    Dr. Martinez, 5 year recheck    COLONOSCOPY N/A 5/19/2023    Procedure: COLONOSCOPY;  Surgeon: Saúl Mayen MD;  Location: [facility];  Service: Endoscopy;  Laterality: N/A;    SHOULDER SURGERY  12/09/2011    right     TRANSRECTAL BIOPSY OF PROSTATE WITH ULTRASOUND GUIDANCE N/A 8/24/2022    Procedure: BIOPSY, PROSTATE, RECTAL APPROACH, WITH US GUIDANCE;  Surgeon: Mora Millan Jr., MD;  Location: [facility];  Service: Urology;  Laterality: N/A;    XI ROBOTIC COLECTOMY, PARTIAL N/A 6/26/2023    Procedure: XI ROBOTIC COLECTOMY, PARTIAL;  Surgeon: Ismael Castro MD;  Location: [facility] OR;  Service: General;  Laterality: N/A;       Review of patient's allergies indicates:   Allergen Reactions    Pravastatin Other (See Comments)     Joint pain    Lisinopril Other (See Comments)     Cough        Medications Reviewed: see MAR    FOCUSED PHYSICAL EXAM:    There were no vitals filed for this visit.  Body mass index is 26.15 kg/m². Weight: 73.5 kg (162 lb) Height: 5' 6" (167.6 cm)       General: Alert, cooperative, no distress, appears stated age  Abdomen: Soft, non-tender, no CVA tenderness, non-distended        LABS:    No results found for this or any previous visit (from the past 336 hour(s)).        Assessment/Diagnosis:    1. Prostate cancer  PROSTATE SPECIFIC ANTIGEN, DIAGNOSTIC    PROSTATE SPECIFIC ANTIGEN, DIAGNOSTIC      2. Lower urinary tract symptoms  alfuzosin (UROXATRAL) 10 mg Tb24      3. Urgency incontinence  oxybutynin (DITROPAN-XL) 5 MG TR24          Plans:    - I spent 30 minutes of the day of this encounter preparing for, treating and managing this " patient. Visit today included increased complexity associated with the care of the episodic problem addressed and managing the longitudinal care of the patient due to the serious and/or complex managed problem(s) prostate cancer, lower urinary tract symptoms, urge incontinence. Today's visit was spent almost entirely on counseling. We reviewed his diagnosis, stage, grade, risk group, and prognosis. We discussed the concept of low risk, moderate risk, and high risk disease. We discussed the different treatment options including active surveillance (as well as the surveillance protocol), prostate brachytherapy, IMRT, IGRT, cryotherapy, and both open and robotic prostatectomy. Wishes to continue with active surveillance.   - Continue Uroxatral 10 mg PO daily - refills ordered.   - RX for Ditropan 5 mg PO daily - refills ordered.   - PSA today.   - PSA in 6 months.   - RTC in 1 year with PSA, symptom score and PVR.

## 2024-04-11 ENCOUNTER — PATIENT MESSAGE (OUTPATIENT)
Dept: OPTOMETRY | Facility: CLINIC | Age: 74
End: 2024-04-11
Payer: MEDICARE

## 2024-04-11 ENCOUNTER — TELEPHONE (OUTPATIENT)
Dept: OPTOMETRY | Facility: CLINIC | Age: 74
End: 2024-04-11
Payer: MEDICARE

## 2024-04-11 NOTE — TELEPHONE ENCOUNTER
----- Message from Virgie Camacho sent at 4/11/2024  2:02 PM CDT -----  Type:  Sooner Appointment Request    Caller is requesting a sooner appointment.  Caller declined first available appointment listed below.  Caller will not accept being placed on the waitlist and is requesting a message be sent to doctor.    Name of Caller:  pt wife   When is the first available appointment?  July   Symptoms:  eye exam   Would the patient rather a call back or a response via [facility]? Call   Best Call Back Number:  [phone] (home)     Additional Information:  pt is requesting to be seen sooner than July , expresses that the last appt was missed and was not his fault please advise '

## 2024-05-02 ENCOUNTER — OFFICE VISIT (OUTPATIENT)
Dept: OPTOMETRY | Facility: CLINIC | Age: 74
End: 2024-05-02
Payer: MEDICARE

## 2024-05-02 DIAGNOSIS — H16.143 SPK (SUPERFICIAL PUNCTATE KERATITIS), BILATERAL: ICD-10-CM

## 2024-05-02 DIAGNOSIS — H26.9 CORTICAL CATARACT: ICD-10-CM

## 2024-05-02 DIAGNOSIS — H52.7 REFRACTIVE ERROR: ICD-10-CM

## 2024-05-02 DIAGNOSIS — E11.9 DIABETES MELLITUS TYPE 2 WITHOUT RETINOPATHY: Primary | ICD-10-CM

## 2024-05-02 DIAGNOSIS — H25.13 NUCLEAR SCLEROSIS, BILATERAL: ICD-10-CM

## 2024-05-02 DIAGNOSIS — H04.123 DRY EYE SYNDROME, BILATERAL: ICD-10-CM

## 2024-05-02 PROCEDURE — 99999 PR PBB SHADOW E&M-EST. PATIENT-LVL III: CPT | Mod: PBBFAC,,, | Performed by: OPTOMETRIST

## 2024-05-02 PROCEDURE — 1159F MED LIST DOCD IN RCRD: CPT | Mod: CPTII,S$GLB,, | Performed by: OPTOMETRIST

## 2024-05-02 PROCEDURE — 99214 OFFICE O/P EST MOD 30 MIN: CPT | Mod: S$GLB,,, | Performed by: OPTOMETRIST

## 2024-05-02 PROCEDURE — 4010F ACE/ARB THERAPY RXD/TAKEN: CPT | Mod: CPTII,S$GLB,, | Performed by: OPTOMETRIST

## 2024-05-02 PROCEDURE — 2023F DILAT RTA XM W/O RTNOPTHY: CPT | Mod: CPTII,S$GLB,, | Performed by: OPTOMETRIST

## 2024-05-02 PROCEDURE — 1126F AMNT PAIN NOTED NONE PRSNT: CPT | Mod: CPTII,S$GLB,, | Performed by: OPTOMETRIST

## 2024-05-02 PROCEDURE — 1160F RVW MEDS BY RX/DR IN RCRD: CPT | Mod: CPTII,S$GLB,, | Performed by: OPTOMETRIST

## 2024-05-02 PROCEDURE — 3288F FALL RISK ASSESSMENT DOCD: CPT | Mod: CPTII,S$GLB,, | Performed by: OPTOMETRIST

## 2024-05-02 PROCEDURE — 1101F PT FALLS ASSESS-DOCD LE1/YR: CPT | Mod: CPTII,S$GLB,, | Performed by: OPTOMETRIST

## 2024-05-02 RX ORDER — PREDNISOLONE ACETATE 10 MG/ML
1 SUSPENSION/ DROPS OPHTHALMIC 4 TIMES DAILY
Qty: 5 ML | Refills: 1 | Status: SHIPPED | OUTPATIENT
Start: 2024-05-02

## 2024-05-02 NOTE — PROGRESS NOTES
HPI     Diabetic Eye Exam     Additional comments: LDE: 05/12/2023           Comments    75 YO male presents today for an annual diabetic eye exam. Patient states   that he is still having trouble with itching and crusting in the corners   of OU. Wears glasses all the time.     Visine OU QID  Refresh OU QID    Hemoglobin A1C       Date                     Value               Ref Range             Status                12/01/2023               7.1 (H)             4.0 - 5.6 %           Final                 05/23/2023               6.8 (H)             4.0 - 5.6 %           Final                 12/28/2022               6.8 (H)             4.0 - 5.6 %           Final                      Last edited by Lui Horta, OD on 5/2/2024  9:53 AM.            Assessment /Plan     For exam results, see Encounter Report.    Diabetes mellitus type 2 without retinopathy    Nuclear sclerosis, bilateral    Cortical cataract    Refractive error    SPK (superficial punctate keratitis), bilateral  -     prednisoLONE acetate (PRED FORTE) 1 % DrpS; Place 1 drop into both eyes 4 (four) times daily.  Dispense: 5 mL; Refill: 1    Dry eye syndrome, bilateral      1. Diabetes mellitus type 2 without retinopathy  Discussed possible ocular affects of uncontrolled blood sugar with patient. Recommended continued strong blood sugar control and continued care with PCP. Monitor yearly.     2. Nuclear sclerosis, bilateral  3. Cortical cataract  Mild to moderate OU, not VS  Discussed possible ocular affects of cataracts. Acceptable BCVA OU. Discussed treatment options. Surgery not recommended at this time. Monitor yearly.     4. Refractive error  Doing overall well with current specs  Refract at f/u prn once SPK heals     5. SPK (superficial punctate keratitis), bilateral  6. SYDNI OU  Persistent SYDNI despite persistent otc ATs use  SPK central/inferior OU  Start PF 1%: 1 gt qid OU, shake well before use  Continue otc Refresh: 1 gt qid OU  F/u in 2  weeks, sooner prn    - prednisoLONE acetate (PRED FORTE) 1 % DrpS; Place 1 drop into both eyes 4 (four) times daily.  Dispense: 5 mL; Refill: 1

## 2024-05-17 ENCOUNTER — OFFICE VISIT (OUTPATIENT)
Dept: OPTOMETRY | Facility: CLINIC | Age: 74
End: 2024-05-17
Payer: MEDICARE

## 2024-05-17 DIAGNOSIS — H04.123 DRY EYE SYNDROME, BILATERAL: ICD-10-CM

## 2024-05-17 DIAGNOSIS — H16.143 SPK (SUPERFICIAL PUNCTATE KERATITIS), BILATERAL: Primary | ICD-10-CM

## 2024-05-17 DIAGNOSIS — H52.7 REFRACTIVE ERROR: ICD-10-CM

## 2024-05-17 DIAGNOSIS — H25.13 NUCLEAR SCLEROSIS, BILATERAL: ICD-10-CM

## 2024-05-17 PROCEDURE — 1126F AMNT PAIN NOTED NONE PRSNT: CPT | Mod: CPTII,S$GLB,, | Performed by: OPTOMETRIST

## 2024-05-17 PROCEDURE — 3288F FALL RISK ASSESSMENT DOCD: CPT | Mod: CPTII,S$GLB,, | Performed by: OPTOMETRIST

## 2024-05-17 PROCEDURE — 1101F PT FALLS ASSESS-DOCD LE1/YR: CPT | Mod: CPTII,S$GLB,, | Performed by: OPTOMETRIST

## 2024-05-17 PROCEDURE — 4010F ACE/ARB THERAPY RXD/TAKEN: CPT | Mod: CPTII,S$GLB,, | Performed by: OPTOMETRIST

## 2024-05-17 PROCEDURE — 1159F MED LIST DOCD IN RCRD: CPT | Mod: CPTII,S$GLB,, | Performed by: OPTOMETRIST

## 2024-05-17 PROCEDURE — 99214 OFFICE O/P EST MOD 30 MIN: CPT | Mod: S$GLB,,, | Performed by: OPTOMETRIST

## 2024-05-17 PROCEDURE — 1160F RVW MEDS BY RX/DR IN RCRD: CPT | Mod: CPTII,S$GLB,, | Performed by: OPTOMETRIST

## 2024-05-17 PROCEDURE — 99999 PR PBB SHADOW E&M-EST. PATIENT-LVL III: CPT | Mod: PBBFAC,,, | Performed by: OPTOMETRIST

## 2024-05-17 RX ORDER — PERFLUOROHEXYLOCTANE 1 MG/MG
1 SOLUTION OPHTHALMIC 4 TIMES DAILY
Qty: 3 ML | Refills: 12 | Status: SHIPPED | OUTPATIENT
Start: 2024-05-17

## 2024-05-17 NOTE — PROGRESS NOTES
HPI    75 YO male presents today for a SPK f/u. Patient states that he is not   noticing much change since last visit.     Refresh OU QID   Pred OU QID    Itching and crusting improved  Some dryness OS > OD  Last edited by Lui Horta, OD on 5/17/2024  1:20 PM.            Assessment /Plan     For exam results, see Encounter Report.    SPK (superficial punctate keratitis), bilateral    Dry eye syndrome, bilateral  -     perfluorohexyloctane, PF, (MIEBO) 100 % Drop; Apply 1 drop to eye 4 (four) times daily.  Dispense: 3 mL; Refill: 12    Nuclear sclerosis, bilateral    Refractive error      1. SPK (superficial punctate keratitis), bilateral  Start Pred Forte 1% taper: 1 drop three times a day x 1 week, then decrease to 1 drop twice daily x 1 week, then decrease to 1 drop once x 1 week, then discontinue drops    2. Dry eye syndrome, bilateral  Continue OTC Refresh: 1 drop four times daily  Start Miebo: 1 drop four times daily  Start OTC ointment at bedtime -- caution transient blurring of vision with use  Wait 5-10 minutes between drops  Follow-up in 3-4 weeks, if no improvement, consider punctal plug procedure    - perfluorohexyloctane, PF, (MIEBO) 100 % Drop; Apply 1 drop to eye 4 (four) times daily.  Dispense: 3 mL; Refill: 12    3. Nuclear sclerosis, bilateral  Mild to moderate OU, not VS  Observe     4. Refractive error  Improving VA with treatment  F/u in 3-4 weeks for refraction prn once SPK cleared

## 2024-05-21 ENCOUNTER — OFFICE VISIT (OUTPATIENT)
Dept: FAMILY MEDICINE | Facility: CLINIC | Age: 74
End: 2024-05-21
Attending: FAMILY MEDICINE
Payer: MEDICARE

## 2024-05-21 VITALS
TEMPERATURE: 99 F | DIASTOLIC BLOOD PRESSURE: 56 MMHG | SYSTOLIC BLOOD PRESSURE: 112 MMHG | HEART RATE: 85 BPM | OXYGEN SATURATION: 96 % | BODY MASS INDEX: 26.22 KG/M2 | WEIGHT: 162.5 LBS

## 2024-05-21 DIAGNOSIS — E11.69 HYPERLIPIDEMIA ASSOCIATED WITH TYPE 2 DIABETES MELLITUS: ICD-10-CM

## 2024-05-21 DIAGNOSIS — E78.5 HYPERLIPIDEMIA ASSOCIATED WITH TYPE 2 DIABETES MELLITUS: ICD-10-CM

## 2024-05-21 DIAGNOSIS — E11.29 CONTROLLED TYPE 2 DIABETES MELLITUS WITH MICROALBUMINURIA, WITHOUT LONG-TERM CURRENT USE OF INSULIN: Primary | ICD-10-CM

## 2024-05-21 DIAGNOSIS — E11.59 HYPERTENSION ASSOCIATED WITH DIABETES: ICD-10-CM

## 2024-05-21 DIAGNOSIS — I15.2 HYPERTENSION ASSOCIATED WITH DIABETES: ICD-10-CM

## 2024-05-21 DIAGNOSIS — R80.9 CONTROLLED TYPE 2 DIABETES MELLITUS WITH MICROALBUMINURIA, WITHOUT LONG-TERM CURRENT USE OF INSULIN: Primary | ICD-10-CM

## 2024-05-21 DIAGNOSIS — I70.0 ATHEROSCLEROSIS OF AORTA: ICD-10-CM

## 2024-05-21 DIAGNOSIS — I95.2 HYPOTENSION DUE TO DRUGS: ICD-10-CM

## 2024-05-21 PROCEDURE — 99999 PR PBB SHADOW E&M-EST. PATIENT-LVL IV: CPT | Mod: PBBFAC,,, | Performed by: FAMILY MEDICINE

## 2024-05-21 PROCEDURE — 1159F MED LIST DOCD IN RCRD: CPT | Mod: CPTII,S$GLB,, | Performed by: FAMILY MEDICINE

## 2024-05-21 PROCEDURE — 1101F PT FALLS ASSESS-DOCD LE1/YR: CPT | Mod: CPTII,S$GLB,, | Performed by: FAMILY MEDICINE

## 2024-05-21 PROCEDURE — 3074F SYST BP LT 130 MM HG: CPT | Mod: CPTII,S$GLB,, | Performed by: FAMILY MEDICINE

## 2024-05-21 PROCEDURE — 3078F DIAST BP <80 MM HG: CPT | Mod: CPTII,S$GLB,, | Performed by: FAMILY MEDICINE

## 2024-05-21 PROCEDURE — 1126F AMNT PAIN NOTED NONE PRSNT: CPT | Mod: CPTII,S$GLB,, | Performed by: FAMILY MEDICINE

## 2024-05-21 PROCEDURE — 99214 OFFICE O/P EST MOD 30 MIN: CPT | Mod: S$GLB,,, | Performed by: FAMILY MEDICINE

## 2024-05-21 PROCEDURE — 1160F RVW MEDS BY RX/DR IN RCRD: CPT | Mod: CPTII,S$GLB,, | Performed by: FAMILY MEDICINE

## 2024-05-21 PROCEDURE — 3288F FALL RISK ASSESSMENT DOCD: CPT | Mod: CPTII,S$GLB,, | Performed by: FAMILY MEDICINE

## 2024-05-21 PROCEDURE — 4010F ACE/ARB THERAPY RXD/TAKEN: CPT | Mod: CPTII,S$GLB,, | Performed by: FAMILY MEDICINE

## 2024-05-21 RX ORDER — LOSARTAN POTASSIUM 25 MG/1
12.5 TABLET ORAL DAILY
Start: 2024-05-21

## 2024-05-21 NOTE — PROGRESS NOTES
Subjective:       Patient ID: Ludwin Gee is a 74 y.o. male.    Chief Complaint: Follow-up (6 months)    74-year-old male coming in for a six-month follow-up on diabetes, hypertension, hyperlipidemia and other problems.  He is due for lab in his not fasting this afternoon.  He does complain of some unusual fatigue in his associates it with his low blood pressure.  He does not get orthostatic dizziness but his strength and stamina are impaired.  He has no complaints of chest pain or shortness of breath.  History includes hypertension, type 2 diabetes with microalbuminuria and without insulin use, hyperlipidemia, prostate cancer, colon polyps, BPH, and asbestos exposure.  He is taking losartan 25 mg daily as the only medication for his blood pressure.  He is taking four metformin  mg daily two in the morning and two in the evening with some GI disturbances and he is on Crestor 10 mg for cholesterol.  He has had a problem with dry eyes and is probably going to be getting plugs in the nasolacrimal ducts.    Past Medical History:  No date: BPH with elevated PSA  No date: Digestive disorder      Comment:  COLON POLYPS  No date: Elevated PSA  No date: Hyperlipidemia  No date: Hypertension  No date: Personal history of colonic polyps  09/06/2022: Prostate cancer      Comment:  [place] 3+3=6 Watchful waiting    Past Surgical History:  12/6/2013: COLONOSCOPY      Comment:  Dr. Martinez, 5 year recheck  5/19/2023: COLONOSCOPY; N/A      Comment:  Procedure: COLONOSCOPY;  Surgeon: Saúl Mayen MD;                Location: [facility];  Service: Endoscopy;  Laterality:                N/A;  12/09/2011: SHOULDER SURGERY      Comment:  right   8/24/2022: TRANSRECTAL BIOPSY OF PROSTATE WITH ULTRASOUND GUIDANCE; N/  A      Comment:  Procedure: BIOPSY, PROSTATE, RECTAL APPROACH, WITH US                GUIDANCE;  Surgeon: Mora Millan Jr., MD;  Location:                [facility];  Service: Urology;  Laterality:  N/A;  6/26/2023: XI ROBOTIC COLECTOMY, PARTIAL; N/A      Comment:  Procedure: XI ROBOTIC COLECTOMY, PARTIAL;  Surgeon:                Ismael Castro MD;  Location: [facility];  Service:                General;  Laterality: N/A;    Current Outpatient Medications on File Prior to Visit:  alfuzosin (UROXATRAL) 10 mg Tb24, Take 1 tablet (10 mg total) by mouth daily with breakfast., Disp: 90 tablet, Rfl: 3  blood sugar diagnostic (ONETOUCH ULTRA TEST) Strp, USE TO CHECK FASTING GLUCOSE IN THE MORNING, Disp: 100 strip, Rfl: 3  blood-glucose meter Misc, One Touch glucometer check fasting glucose in morning, Disp: 1 each, Rfl: 0  coenzyme Q10 10 mg capsule, Take 10 mg by mouth once daily., Disp: , Rfl:   famotidine (PEPCID) 20 MG tablet, Take 20 mg by mouth once daily., Disp: , Rfl:   metFORMIN (GLUCOPHAGE-XR) 500 MG ER 24hr tablet, TAKE TWO TABLETS BY MOUTH TWO TIMES A DAY WITH MEALS, Disp: 360 tablet, Rfl: 1  mupirocin (BACTROBAN) 2 % ointment, Apply topically 3 (three) times daily., Disp: 22 g, Rfl: 1  oxybutynin (DITROPAN-XL) 5 MG TR24, Take 1 tablet (5 mg total) by mouth once daily., Disp: 90 tablet, Rfl: 3  perfluorohexyloctane, PF, (MIEBO) 100 % Drop, Apply 1 drop to eye 4 (four) times daily., Disp: 3 mL, Rfl: 12  prednisoLONE acetate (PRED FORTE) 1 % DrpS, Place 1 drop into both eyes 4 (four) times daily., Disp: 5 mL, Rfl: 1  rosuvastatin (CRESTOR) 10 MG tablet, TAKE ONE TABLET BY MOUTH EVERY DAY, Disp: 90 tablet, Rfl: 3  [DISCONTINUED] losartan (COZAAR) 25 MG tablet, TAKE ONE TABLET BY MOUTH EVERY DAY, Disp: 90 tablet, Rfl: 2  [DISCONTINUED] meloxicam (MOBIC) 15 MG tablet, Take 15 mg by mouth., Disp: , Rfl:   [DISCONTINUED] tiZANidine (ZANAFLEX) 4 MG tablet, Take 4 mg by mouth every 8 (eight) hours as needed., Disp: , Rfl:     No current facility-administered medications on file prior to visit.          Review of Systems   Constitutional:  Positive for fatigue. Negative for chills and fever.   Respiratory:  Negative  for chest tightness and shortness of breath.    Cardiovascular:  Negative for chest pain and palpitations.   Gastrointestinal:  Negative for abdominal pain, blood in stool, constipation and diarrhea.   Endocrine: Negative for polydipsia and polyuria.   Neurological:  Negative for dizziness, light-headedness and headaches.       Objective:      Physical Exam  Vitals and nursing note reviewed.   Constitutional:       General: He is not in acute distress.     Appearance: Normal appearance. He is normal weight. He is not ill-appearing, toxic-appearing or diaphoretic.      Comments: Good blood pressure control   Normal pulse with regular rhythm   Good weight for age with a BMI of 26.  He is up 3.6 lb from his previous visit November 21, 2023   HENT:      Head: Normocephalic and atraumatic.   Cardiovascular:      Rate and Rhythm: Normal rate and regular rhythm.      Pulses: Normal pulses.      Heart sounds: Normal heart sounds. No murmur heard.     No friction rub. No gallop.   Pulmonary:      Effort: Pulmonary effort is normal. No respiratory distress.      Breath sounds: Normal breath sounds. No stridor. No wheezing, rhonchi or rales.   Chest:      Chest wall: No tenderness.   Abdominal:      General: Abdomen is flat. Bowel sounds are normal. There is no distension.      Palpations: Abdomen is soft. There is no mass.      Tenderness: There is no abdominal tenderness. There is no guarding or rebound.      Hernia: No hernia is present.   Musculoskeletal:      Right lower leg: No edema.      Left lower leg: No edema.   Skin:     General: Skin is warm and dry.      Capillary Refill: Capillary refill takes less than 2 seconds.      Coloration: Skin is not pale.      Findings: No bruising, erythema, lesion or rash.   Neurological:      General: No focal deficit present.      Mental Status: He is alert and oriented to person, place, and time. Mental status is at baseline.   Psychiatric:         Mood and Affect: Mood normal.          Behavior: Behavior normal.         Thought Content: Thought content normal.         Judgment: Judgment normal.         Assessment:       1. Controlled type 2 diabetes mellitus with microalbuminuria, without long-term current use of insulin    2. Hypertension associated with diabetes    3. Hyperlipidemia associated with type 2 diabetes mellitus    4. Hypotension due to drugs    5. Atherosclerosis of aorta    6. BMI 26.0-26.9,adult        Plan:       1. Controlled type 2 diabetes mellitus with microalbuminuria, without long-term current use of insulin  Await A1c level, if well controlled will try to reduce metformin possibly changing to 750 twice daily?  - Lipid Panel; Future  - Hemoglobin A1C; Future  - Comprehensive Metabolic Panel; Future  - Microalbumin/Creatinine Ratio, Urine; Future    2. Hypertension associated with diabetes  Patient having symptomatic low blood pressure with fatigue but not dizziness.  Will try reducing losartan to 1/2 tablet 12.5 mg daily and monitor blood pressure  - Lipid Panel; Future  - Comprehensive Metabolic Panel; Future  - CBC Auto Differential; Future  - losartan (COZAAR) 25 MG tablet; Take 0.5 tablets (12.5 mg total) by mouth once daily.    3. Hyperlipidemia associated with type 2 diabetes mellitus  Await lipid panel results for possible change to Crestor  - Lipid Panel; Future  - Comprehensive Metabolic Panel; Future    4. Hypotension due to drugs  See above 2.    5. Atherosclerosis of aorta  Incidental finding on x-ray lumbar spine.  Asymptomatic    6. BMI 26.0-26.9,adult  Good weight for age no changes needed

## 2024-05-24 ENCOUNTER — LAB VISIT (OUTPATIENT)
Dept: LAB | Facility: HOSPITAL | Age: 74
End: 2024-05-24
Attending: FAMILY MEDICINE
Payer: MEDICARE

## 2024-05-24 DIAGNOSIS — I15.2 HYPERTENSION ASSOCIATED WITH DIABETES: ICD-10-CM

## 2024-05-24 DIAGNOSIS — E11.29 CONTROLLED TYPE 2 DIABETES MELLITUS WITH MICROALBUMINURIA, WITHOUT LONG-TERM CURRENT USE OF INSULIN: ICD-10-CM

## 2024-05-24 DIAGNOSIS — E11.59 HYPERTENSION ASSOCIATED WITH DIABETES: ICD-10-CM

## 2024-05-24 DIAGNOSIS — R80.9 CONTROLLED TYPE 2 DIABETES MELLITUS WITH MICROALBUMINURIA, WITHOUT LONG-TERM CURRENT USE OF INSULIN: ICD-10-CM

## 2024-05-24 DIAGNOSIS — E11.69 HYPERLIPIDEMIA ASSOCIATED WITH TYPE 2 DIABETES MELLITUS: ICD-10-CM

## 2024-05-24 DIAGNOSIS — E78.5 HYPERLIPIDEMIA ASSOCIATED WITH TYPE 2 DIABETES MELLITUS: ICD-10-CM

## 2024-05-24 LAB
ALBUMIN SERPL BCP-MCNC: 4.3 G/DL (ref 3.5–5.2)
ALP SERPL-CCNC: 40 U/L (ref 55–135)
ALT SERPL W/O P-5'-P-CCNC: 16 U/L (ref 10–44)
ANION GAP SERPL CALC-SCNC: 11 MMOL/L (ref 8–16)
AST SERPL-CCNC: 22 U/L (ref 10–40)
BASOPHILS # BLD AUTO: 0.04 K/UL (ref 0–0.2)
BASOPHILS NFR BLD: 0.7 % (ref 0–1.9)
BILIRUB SERPL-MCNC: 0.9 MG/DL (ref 0.1–1)
BUN SERPL-MCNC: 14 MG/DL (ref 8–23)
CALCIUM SERPL-MCNC: 9.8 MG/DL (ref 8.7–10.5)
CHLORIDE SERPL-SCNC: 106 MMOL/L (ref 95–110)
CHOLEST SERPL-MCNC: 113 MG/DL (ref 120–199)
CHOLEST/HDLC SERPL: 2.6 {RATIO} (ref 2–5)
CO2 SERPL-SCNC: 23 MMOL/L (ref 23–29)
CREAT SERPL-MCNC: 1.3 MG/DL (ref 0.5–1.4)
DIFFERENTIAL METHOD BLD: ABNORMAL
EOSINOPHIL # BLD AUTO: 0.1 K/UL (ref 0–0.5)
EOSINOPHIL NFR BLD: 1.2 % (ref 0–8)
ERYTHROCYTE [DISTWIDTH] IN BLOOD BY AUTOMATED COUNT: 13 % (ref 11.5–14.5)
EST. GFR  (NO RACE VARIABLE): 57.6 ML/MIN/1.73 M^2
ESTIMATED AVG GLUCOSE: 180 MG/DL (ref 68–131)
GLUCOSE SERPL-MCNC: 141 MG/DL (ref 70–110)
HBA1C MFR BLD: 7.9 % (ref 4–5.6)
HCT VFR BLD AUTO: 40.4 % (ref 40–54)
HDLC SERPL-MCNC: 43 MG/DL (ref 40–75)
HDLC SERPL: 38.1 % (ref 20–50)
HGB BLD-MCNC: 13.5 G/DL (ref 14–18)
IMM GRANULOCYTES # BLD AUTO: 0.01 K/UL (ref 0–0.04)
IMM GRANULOCYTES NFR BLD AUTO: 0.2 % (ref 0–0.5)
LDLC SERPL CALC-MCNC: 50.6 MG/DL (ref 63–159)
LYMPHOCYTES # BLD AUTO: 1.6 K/UL (ref 1–4.8)
LYMPHOCYTES NFR BLD: 29 % (ref 18–48)
MCH RBC QN AUTO: 30.6 PG (ref 27–31)
MCHC RBC AUTO-ENTMCNC: 33.4 G/DL (ref 32–36)
MCV RBC AUTO: 92 FL (ref 82–98)
MONOCYTES # BLD AUTO: 0.5 K/UL (ref 0.3–1)
MONOCYTES NFR BLD: 8.2 % (ref 4–15)
NEUTROPHILS # BLD AUTO: 3.4 K/UL (ref 1.8–7.7)
NEUTROPHILS NFR BLD: 60.7 % (ref 38–73)
NONHDLC SERPL-MCNC: 70 MG/DL
NRBC BLD-RTO: 0 /100 WBC
PLATELET # BLD AUTO: 172 K/UL (ref 150–450)
PMV BLD AUTO: 12.3 FL (ref 9.2–12.9)
POTASSIUM SERPL-SCNC: 4.4 MMOL/L (ref 3.5–5.1)
PROT SERPL-MCNC: 7.4 G/DL (ref 6–8.4)
RBC # BLD AUTO: 4.41 M/UL (ref 4.6–6.2)
SODIUM SERPL-SCNC: 140 MMOL/L (ref 136–145)
TRIGL SERPL-MCNC: 97 MG/DL (ref 30–150)
WBC # BLD AUTO: 5.63 K/UL (ref 3.9–12.7)

## 2024-05-24 PROCEDURE — 36415 COLL VENOUS BLD VENIPUNCTURE: CPT | Mod: PO | Performed by: FAMILY MEDICINE

## 2024-05-24 PROCEDURE — 80061 LIPID PANEL: CPT | Performed by: FAMILY MEDICINE

## 2024-05-24 PROCEDURE — 80053 COMPREHEN METABOLIC PANEL: CPT | Performed by: FAMILY MEDICINE

## 2024-05-24 PROCEDURE — 85025 COMPLETE CBC W/AUTO DIFF WBC: CPT | Performed by: FAMILY MEDICINE

## 2024-05-24 PROCEDURE — 83036 HEMOGLOBIN GLYCOSYLATED A1C: CPT | Performed by: FAMILY MEDICINE

## 2024-05-27 ENCOUNTER — TELEPHONE (OUTPATIENT)
Dept: FAMILY MEDICINE | Facility: CLINIC | Age: 74
End: 2024-05-27
Payer: MEDICARE

## 2024-05-27 NOTE — TELEPHONE ENCOUNTER
Patient notified of lab results. Stated that he is fine staying on his metformin at this time. He was questioning whether or not he should worry about anemia and should he start an iron supplement. Also wanted to know when his labs should be repeated.

## 2024-05-27 NOTE — TELEPHONE ENCOUNTER
Actually, anemia is less than a hemoglobin of 13 so he is almost back to the normal range for our lab a 14 or better.  He has come up more than two full points in the last year.  The red cell size is right in the middle of the normal range.  Smaller red blood cells would tend to suggest iron deficiency and larger red blood cells tend to suggest B vitamin deficiency.  I do not think he needs to take any supplementation, just a good diet with lots of veggies.    He already has an appointment in late November, we will be checking the A1c and a BMP at that time.  We can do it ahead of time if he wishes.  He has a PSA scheduled for late September but that would be about two months too early if we tried to get the BMP and A1c at that time.

## 2024-05-27 NOTE — TELEPHONE ENCOUNTER
----- Message from Herrera Fisher MD sent at 5/27/2024  8:14 AM CDT -----  His cholesterol level, blood count, and most of his chemistry panel look good but his blood glucose was a little high in his A1c was above his target range of 6.5 to 7.5.  He wanted to reduce his metformin but it does not look possible to do so now unless we add something else to maintain glucose control.  Options would include one of the G LP one agonist such as Ozempic or Jardiance.    Patient notified by e-mail

## 2024-06-17 ENCOUNTER — HOSPITAL ENCOUNTER (EMERGENCY)
Facility: HOSPITAL | Age: 74
Discharge: HOME | End: 2024-06-17
Attending: EMERGENCY MEDICINE
Payer: MEDICARE

## 2024-06-17 VITALS
HEART RATE: 77 BPM | DIASTOLIC BLOOD PRESSURE: 64 MMHG | BODY MASS INDEX: 25.88 KG/M2 | RESPIRATION RATE: 18 BRPM | OXYGEN SATURATION: 95 % | HEIGHT: 66 IN | WEIGHT: 161 LBS | TEMPERATURE: 98 F | SYSTOLIC BLOOD PRESSURE: 107 MMHG

## 2024-06-17 DIAGNOSIS — K80.20 SYMPTOMATIC CHOLELITHIASIS: Primary | ICD-10-CM

## 2024-06-17 LAB
ALBUMIN SERPL BCP-MCNC: 4.5 G/DL (ref 3.5–5.2)
ALP SERPL-CCNC: 41 U/L (ref 55–135)
ALT SERPL W/O P-5'-P-CCNC: 12 U/L (ref 10–44)
ANION GAP SERPL CALC-SCNC: 10 MMOL/L (ref 8–16)
AST SERPL-CCNC: 15 U/L (ref 10–40)
BASOPHILS # BLD AUTO: 0.01 K/UL (ref 0–0.2)
BASOPHILS NFR BLD: 0.1 % (ref 0–1.9)
BILIRUB SERPL-MCNC: 0.5 MG/DL (ref 0.1–1)
BNP SERPL-MCNC: 137 PG/ML (ref 0–99)
BUN SERPL-MCNC: 17 MG/DL (ref 8–23)
CALCIUM SERPL-MCNC: 9.7 MG/DL (ref 8.7–10.5)
CHLORIDE SERPL-SCNC: 103 MMOL/L (ref 95–110)
CO2 SERPL-SCNC: 27 MMOL/L (ref 23–29)
CREAT SERPL-MCNC: 1.2 MG/DL (ref 0.5–1.4)
DIFFERENTIAL METHOD BLD: ABNORMAL
EOSINOPHIL # BLD AUTO: 0 K/UL (ref 0–0.5)
EOSINOPHIL NFR BLD: 0 % (ref 0–8)
ERYTHROCYTE [DISTWIDTH] IN BLOOD BY AUTOMATED COUNT: 12.3 % (ref 11.5–14.5)
EST. GFR  (NO RACE VARIABLE): >60 ML/MIN/1.73 M^2
GLUCOSE SERPL-MCNC: 273 MG/DL (ref 70–110)
HCT VFR BLD AUTO: 40 % (ref 40–54)
HGB BLD-MCNC: 13.2 G/DL (ref 14–18)
IMM GRANULOCYTES # BLD AUTO: 0.05 K/UL (ref 0–0.04)
IMM GRANULOCYTES NFR BLD AUTO: 0.5 % (ref 0–0.5)
LIPASE SERPL-CCNC: 29 U/L (ref 4–60)
LYMPHOCYTES # BLD AUTO: 0.5 K/UL (ref 1–4.8)
LYMPHOCYTES NFR BLD: 4.8 % (ref 18–48)
MAGNESIUM SERPL-MCNC: 1.7 MG/DL (ref 1.6–2.6)
MCH RBC QN AUTO: 29.9 PG (ref 27–31)
MCHC RBC AUTO-ENTMCNC: 33 G/DL (ref 32–36)
MCV RBC AUTO: 91 FL (ref 82–98)
MONOCYTES # BLD AUTO: 0.3 K/UL (ref 0.3–1)
MONOCYTES NFR BLD: 3 % (ref 4–15)
NEUTROPHILS # BLD AUTO: 10 K/UL (ref 1.8–7.7)
NEUTROPHILS NFR BLD: 91.6 % (ref 38–73)
NRBC BLD-RTO: 0 /100 WBC
PLATELET # BLD AUTO: 201 K/UL (ref 150–450)
PMV BLD AUTO: 11.2 FL (ref 9.2–12.9)
POTASSIUM SERPL-SCNC: 4 MMOL/L (ref 3.5–5.1)
PROT SERPL-MCNC: 7.2 G/DL (ref 6–8.4)
RBC # BLD AUTO: 4.41 M/UL (ref 4.6–6.2)
SODIUM SERPL-SCNC: 140 MMOL/L (ref 136–145)
TROPONIN I SERPL HS-MCNC: 6.1 PG/ML (ref 0–14.9)
WBC # BLD AUTO: 10.88 K/UL (ref 3.9–12.7)

## 2024-06-17 PROCEDURE — 99285 EMERGENCY DEPT VISIT HI MDM: CPT | Mod: 25

## 2024-06-17 PROCEDURE — 25000003 PHARM REV CODE 250: Performed by: EMERGENCY MEDICINE

## 2024-06-17 PROCEDURE — 83880 ASSAY OF NATRIURETIC PEPTIDE: CPT | Performed by: NURSE PRACTITIONER

## 2024-06-17 PROCEDURE — 36415 COLL VENOUS BLD VENIPUNCTURE: CPT | Performed by: NURSE PRACTITIONER

## 2024-06-17 PROCEDURE — 83690 ASSAY OF LIPASE: CPT | Performed by: NURSE PRACTITIONER

## 2024-06-17 PROCEDURE — 85025 COMPLETE CBC W/AUTO DIFF WBC: CPT | Performed by: NURSE PRACTITIONER

## 2024-06-17 PROCEDURE — 93005 ELECTROCARDIOGRAM TRACING: CPT | Performed by: INTERNAL MEDICINE

## 2024-06-17 PROCEDURE — 80053 COMPREHEN METABOLIC PANEL: CPT | Performed by: NURSE PRACTITIONER

## 2024-06-17 PROCEDURE — 83735 ASSAY OF MAGNESIUM: CPT | Performed by: NURSE PRACTITIONER

## 2024-06-17 PROCEDURE — 93010 ELECTROCARDIOGRAM REPORT: CPT | Mod: ,,, | Performed by: INTERNAL MEDICINE

## 2024-06-17 PROCEDURE — 84484 ASSAY OF TROPONIN QUANT: CPT | Performed by: NURSE PRACTITIONER

## 2024-06-17 RX ORDER — ALUMINUM HYDROXIDE, MAGNESIUM HYDROXIDE, AND SIMETHICONE 1200; 120; 1200 MG/30ML; MG/30ML; MG/30ML
30 SUSPENSION ORAL ONCE
Status: COMPLETED | OUTPATIENT
Start: 2024-06-17 | End: 2024-06-17

## 2024-06-17 RX ORDER — HYDROCODONE BITARTRATE AND ACETAMINOPHEN 5; 325 MG/1; MG/1
1 TABLET ORAL EVERY 6 HOURS PRN
Qty: 8 TABLET | Refills: 0 | Status: SHIPPED | OUTPATIENT
Start: 2024-06-17

## 2024-06-17 RX ORDER — ONDANSETRON 4 MG/1
4 TABLET, ORALLY DISINTEGRATING ORAL EVERY 6 HOURS PRN
Qty: 30 TABLET | Refills: 0 | Status: SHIPPED | OUTPATIENT
Start: 2024-06-17

## 2024-06-17 RX ORDER — LIDOCAINE HYDROCHLORIDE 20 MG/ML
15 SOLUTION OROPHARYNGEAL ONCE
Status: COMPLETED | OUTPATIENT
Start: 2024-06-17 | End: 2024-06-17

## 2024-06-17 RX ADMIN — LIDOCAINE HYDROCHLORIDE 15 ML: 20 SOLUTION ORAL; TOPICAL at 10:06

## 2024-06-17 RX ADMIN — ALUMINUM HYDROXIDE, MAGNESIUM HYDROXIDE, AND SIMETHICONE 30 ML: 200; 200; 20 SUSPENSION ORAL at 10:06

## 2024-06-17 NOTE — ED PROVIDER NOTES
Encounter Date: 6/17/2024       History     Chief Complaint   Patient presents with    Chest Pain     Epigastric pain since last night     Vomiting     74-year-old male presents to emergency room with about 12 hours of epigastric pain and vomiting.  No history of similar complaints.  No NSAIDs no bloody stools.  No chest pain or shortness of breath.  Vomited 6 times last night.  Pain radiates to the back.  No alcohol.  History of partial colectomy.  No other abdominal surgeries.    The history is provided by the patient and the spouse.     Review of patient's allergies indicates:   Allergen Reactions    Pravastatin Other (See Comments)     Joint pain    Lisinopril Other (See Comments)     Cough      Past Medical History:   Diagnosis Date    BPH with elevated PSA     Digestive disorder     COLON POLYPS    Elevated PSA     Hyperlipidemia     Hypertension     Personal history of colonic polyps     Prostate cancer 09/06/2022    [place] 3+3=6 Watchful waiting     Past Surgical History:   Procedure Laterality Date    COLONOSCOPY  12/6/2013    Dr. Martinez, 5 year recheck    COLONOSCOPY N/A 5/19/2023    Procedure: COLONOSCOPY;  Surgeon: Saúl Mayen MD;  Location: [facility];  Service: Endoscopy;  Laterality: N/A;    SHOULDER SURGERY  12/09/2011    right     TRANSRECTAL BIOPSY OF PROSTATE WITH ULTRASOUND GUIDANCE N/A 8/24/2022    Procedure: BIOPSY, PROSTATE, RECTAL APPROACH, WITH US GUIDANCE;  Surgeon: Mora Millan Jr., MD;  [facility];  Service: Urology;  Laterality: N/A;    XI ROBOTIC COLECTOMY, PARTIAL N/A 6/26/2023    Procedure: XI ROBOTIC COLECTOMY, PARTIAL;  Surgeon: Ismael Castro MD;  Location: [facility];  Service: General;  Laterality: N/A;     Family History   Problem Relation Name Age of Onset    Diabetes Father      Heart disease Father      Cataracts Mother      Glaucoma Mother      Diabetes Maternal Grandmother      No Known Problems Brother      No Known Problems Daughter      No Known Problems  Son      No Known Problems Maternal Grandfather      No Known Problems Paternal Grandmother      No Known Problems Paternal Grandfather      No Known Problems Maternal Aunt      No Known Problems Maternal Uncle      No Known Problems Paternal Aunt      No Known Problems Paternal Uncle      Urolithiasis Neg Hx      Prostate cancer Neg Hx      Kidney cancer Neg Hx      Retinal detachment Neg Hx      Macular degeneration Neg Hx       Social History     Tobacco Use    Smoking status: Former     Current packs/day: 0.00     Types: Cigarettes     Quit date: 1985     Years since quittin.0    Smokeless tobacco: Never   Substance Use Topics    Alcohol use: Not Currently     Comment: seldom    Drug use: No     Review of Systems   Constitutional:  Negative for fever.   HENT:  Negative for sore throat.    Respiratory:  Negative for shortness of breath.    Cardiovascular:  Negative for chest pain.   Gastrointestinal:  Positive for abdominal pain (Epigastric), nausea and vomiting.   Genitourinary:  Negative for flank pain.   Musculoskeletal:  Negative for back pain.   Skin:  Negative for rash.   Neurological:  Negative for weakness.   All other systems reviewed and are negative.      Physical Exam     Initial Vitals [24 0801]   BP Pulse Resp Temp SpO2   (!) 144/79 69 16 97.8 °F (36.6 °C) 98 %      MAP       --         Physical Exam    Nursing note and vitals reviewed.  Constitutional: He appears well-developed and well-nourished. He is not diaphoretic.  Non-toxic appearance. He does not have a sickly appearance. He does not appear ill. No distress.   HENT:   Head: Normocephalic and atraumatic.   Eyes: EOM are normal.   Neck: Neck supple.   Normal range of motion.  Cardiovascular:  Normal rate, regular rhythm and normal heart sounds.     Exam reveals no gallop and no friction rub.       No murmur heard.  Pulmonary/Chest: Breath sounds normal. No respiratory distress. He has no wheezes. He has no rhonchi. He has no  rales.   Abdominal: He exhibits no distension. There is abdominal tenderness in the epigastric area. There is no rebound and no guarding.   Musculoskeletal:         General: Normal range of motion.      Cervical back: Normal range of motion and neck supple. No rigidity. Normal range of motion.     Neurological: He is alert and oriented to person, place, and time.   Skin: Skin is warm and dry. No rash noted.   Psychiatric: He has a normal mood and affect. His behavior is normal. Judgment and thought content normal.         ED Course   Procedures  Labs Reviewed   CBC W/ AUTO DIFFERENTIAL - Abnormal; Notable for the following components:       Result Value    RBC 4.41 (*)     Hemoglobin 13.2 (*)     Gran # (ANC) 10.0 (*)     Immature Grans (Abs) 0.05 (*)     Lymph # 0.5 (*)     Gran % 91.6 (*)     Lymph % 4.8 (*)     Mono % 3.0 (*)     All other components within normal limits   COMPREHENSIVE METABOLIC PANEL - Abnormal; Notable for the following components:    Glucose 273 (*)     Alkaline Phosphatase 41 (*)     All other components within normal limits   B-TYPE NATRIURETIC PEPTIDE - Abnormal; Notable for the following components:     (*)     All other components within normal limits   MAGNESIUM   TROPONIN I HIGH SENSITIVITY   LIPASE   LIPASE        ECG Results              EKG 12-lead (In process)        Collection Time Result Time QRS Duration OHS QTC Calculation    06/17/24 08:04:43 06/17/24 08:37:40 84 385                     In process by Interface, Lab In [facility] (06/17/24 08:37:46)                   Narrative:    Test Reason : R07.9,    Vent. Rate : 057 BPM     Atrial Rate : 057 BPM     P-R Int : 160 ms          QRS Dur : 084 ms      QT Int : 396 ms       P-R-T Axes : 031 001 053 degrees     QTc Int : 385 ms    Sinus bradycardia  Possible Left atrial enlargement  Borderline Abnormal ECG  When compared with ECG of 22-JUN-2023 07:51,  No significant change was found    Referred By: HUGO DE LA GARZA            Confirmed By:                                   Imaging Results              US Abdomen Limited (Final result)  Result time 06/17/24 10:37:53      Final result by Timi Dow DO (06/17/24 10:37:53)                   Impression:      1. Cholelithiasis with gallbladder sludge.  2. Hepatic steatosis.      Electronically signed by: Timi Dow  Date:    06/17/2024  Time:    10:37               Narrative:    EXAMINATION:  US ABDOMEN LIMITED    CLINICAL HISTORY:  epigastric pain;    COMPARISON:  Abdominal ultrasound 04/11/2018.    FINDINGS:  There is increased echogenicity of the liver parenchyma felt to reflect hepatic steatosis. No intrahepatic lesion or intrahepatic bile duct dilatation observed.  Hepatopedal flow present in main portal vein.    There is calcified gallstone at the neck of the gallbladder with posterior acoustic shadowing.  No gallbladder wall thickening or pericholecystic fluid.  There is sludge within the gallbladder.  Common duct diameter of 4 mm is normal.    Visualized pancreas is unremarkable with bowel gas obscuring portions of pancreas.    Right kidney is free of hydronephrosis.    Visualized abdominal aorta is nonaneurysmal. Juxtahepatic IVC is unremarkable.                                       X-Ray Chest AP Portable (Final result)  Result time 06/17/24 09:55:56      Final result by Theo Cancino MD (06/17/24 09:55:56)                   Impression:      No evidence of acute cardiopulmonary disease.      Electronically signed by: Theo Cancino  Date:    06/17/2024  Time:    09:55               Narrative:    EXAMINATION:  XR CHEST AP PORTABLE    CLINICAL HISTORY:  Epigastric abdominal pain and vomiting    FINDINGS:  Portable chest radiograph at 09:34 hours compared prior exams shows the cardiomediastinal silhouette and pulmonary vasculature are within normal limits.    The lungs are normally expanded, with no consolidation, large pleural effusion, or evidence of pulmonary edema. No  pneumothorax.  No acute fractures.                                       Medications   aluminum-magnesium hydroxide-simethicone 200-200-20 mg/5 mL suspension 30 mL (30 mLs Oral Given 6/17/24 1023)     And   LIDOcaine viscous HCl 2% oral solution 15 mL (15 mLs Oral Given 6/17/24 1023)     Medical Decision Making  1102 74-year-old male presents to the ER with right upper quadrant and epigastric pain that began last night associated with vomiting.  All symptoms are resolved in the ER at this time, he is pain-free.  Lipase is normal.  Troponin is negative.  Never had any chest pain or chest heaviness.  LFTs are normal.  Ultrasound shows gallstone at the neck of the gallbladder with no wall thickening or pericholecystic fluid.  No sign of cholecystitis.  He is pain-free he will be referred to General surgery.  Return precautions discussed. [EF]      Amount and/or Complexity of Data Reviewed  Labs:  Decision-making details documented in ED Course.  Radiology: ordered. Decision-making details documented in ED Course.  ECG/medicine tests: ordered and independent interpretation performed. Decision-making details documented in ED Course.    Risk  OTC drugs.  Prescription drug management.               ED Course as of 06/17/24 1141   Mon Jun 17, 2024   0905 BP(!): 144/79 [EF]   0905 Temp: 97.8 °F (36.6 °C) [EF]   0905 Temp Source: Oral [EF]   0905 Pulse: 69 [EF]   0905 Resp: 16 [EF]   0905 SpO2: 98 % [EF]   0906 Sinus bradycardia 57 beats per minute normal axis no ST elevation or depression or T-wave inversion independently interpreted [EF]   0952 Troponin I High Sensitivity: 6.1 [EF]   0952 Glucose(!): 273 [EF]   0956 Lipase: 29 [EF]   1001 X-Ray Chest AP Portable [EF]   1046 US Abdomen Limited [EF]   1100 Pain gone [EF]          ED Course User Index  [EF] Jose Harrell MD                           Clinical Impression:  Final diagnoses:  [K80.20] Symptomatic cholelithiasis (Primary)          ED Disposition Condition     Discharge Stable          ED Prescriptions       Medication Sig Dispense Start Date End Date Auth. Provider    ondansetron (ZOFRAN-ODT) 4 MG TbDL Take 1 tablet (4 mg total) by mouth every 6 (six) hours as needed (nausea or vomiting). 30 tablet 6/17/2024 -- Jose Harrell MD    HYDROcodone-acetaminophen (NORCO) 5-325 mg per tablet Take 1 tablet by mouth every 6 (six) hours as needed for Pain. 8 tablet 6/17/2024 -- Jose Harrell MD          Follow-up Information       Follow up With Specialties Details Why Contact Info Additional Information    [facility] - Emergency Dept Emergency Medicine  As needed, If symptoms worsen [address] [facility] [id number] [phone] 1st floor    Steven Askew MD General Surgery Schedule an appointment as soon as possible for a visit   [address] [facility] [address]  [facility] [address]  [phone]                Jose Harrell MD  06/17/24 1146

## 2024-06-19 ENCOUNTER — OFFICE VISIT (OUTPATIENT)
Dept: SURGERY | Facility: CLINIC | Age: 74
End: 2024-06-19
Payer: MEDICARE

## 2024-06-19 VITALS — HEART RATE: 69 BPM | DIASTOLIC BLOOD PRESSURE: 69 MMHG | TEMPERATURE: 98 F | SYSTOLIC BLOOD PRESSURE: 121 MMHG

## 2024-06-19 DIAGNOSIS — K80.20 SYMPTOMATIC CHOLELITHIASIS: Primary | ICD-10-CM

## 2024-06-19 PROCEDURE — 99999 PR PBB SHADOW E&M-EST. PATIENT-LVL III: CPT | Mod: PBBFAC,,, | Performed by: SURGERY

## 2024-06-19 NOTE — H&P
GENERAL SURGERY  OUTPATIENT H&P    REASON FOR VISIT/CC: Gallstones    HPI: Ludwin Gee is a 74 y.o. male who presented to the emergency room a few days ago with epigastric abdominal pain associated with nausea and vomiting. He had no previous similar episodes. Not take NSAIDs on a basis. Denies any significant alcohol intake. Lab evaluation was unremarkable, specifically no leukocytosis or LFT elevation. It was found has some tenderness in the epigastric region. EKG was negative.  Abdominal ultrasound showed gallstones but no evidence of acute cholecystitis.  Patient was referred to General surgery for following. Pain is improved currently. No previous symptoms.    I have reviewed the patient's chart including prior progress notes, procedures and testing.     ROS:   Review of Systems    PROBLEM LIST:  Patient Active Problem List   Diagnosis    Controlled type 2 diabetes mellitus with microalbuminuria, without long-term current use of insulin    BPH (benign prostatic hyperplasia)    Hypertension associated with diabetes    Hyperlipidemia associated with type 2 diabetes mellitus    Acute hypoxemic respiratory failure due to COVID-19    Anemia    Asbestos exposure    Long COVID    Prostate cancer    Atherosclerosis of aorta    Polyp of ascending colon         HISTORY  Past Medical History:   Diagnosis Date    BPH with elevated PSA     Digestive disorder     COLON POLYPS    Elevated PSA     Hyperlipidemia     Hypertension     Personal history of colonic polyps     Prostate cancer 09/06/2022    [place] 3+3=6 Watchful waiting       Past Surgical History:   Procedure Laterality Date    COLONOSCOPY  12/6/2013    Dr. Martinez, 5 year recheck    COLONOSCOPY N/A 5/19/2023    Procedure: COLONOSCOPY;  Surgeon: Saúl Mayen MD;  Location: [facility];  Service: Endoscopy;  Laterality: N/A;    SHOULDER SURGERY  12/09/2011    right     TRANSRECTAL BIOPSY OF PROSTATE WITH ULTRASOUND GUIDANCE N/A 8/24/2022    Procedure: BIOPSY,  PROSTATE, RECTAL APPROACH, WITH US GUIDANCE;  Surgeon: Mora Millan Jr., MD;  Location: [facility] OR;  Service: Urology;  Laterality: N/A;    XI ROBOTIC COLECTOMY, PARTIAL N/A 2023    Procedure: XI ROBOTIC COLECTOMY, PARTIAL;  Surgeon: Ismael Castro MD;  Location: [facility] OR;  Service: General;  Laterality: N/A;       Social History     Tobacco Use    Smoking status: Former     Current packs/day: 0.00     Types: Cigarettes     Quit date: 1985     Years since quittin.1    Smokeless tobacco: Never   Substance Use Topics    Alcohol use: Not Currently     Comment: seldom    Drug use: No       Family History   Problem Relation Name Age of Onset    Diabetes Father      Heart disease Father      Cataracts Mother      Glaucoma Mother      Diabetes Maternal Grandmother      No Known Problems Brother      No Known Problems Daughter      No Known Problems Son      No Known Problems Maternal Grandfather      No Known Problems Paternal Grandmother      No Known Problems Paternal Grandfather      No Known Problems Maternal Aunt      No Known Problems Maternal Uncle      No Known Problems Paternal Aunt      No Known Problems Paternal Uncle      Urolithiasis Neg Hx      Prostate cancer Neg Hx      Kidney cancer Neg Hx      Retinal detachment Neg Hx      Macular degeneration Neg Hx           MEDS:  Current Outpatient Medications on File Prior to Visit   Medication Sig Dispense Refill    alfuzosin (UROXATRAL) 10 mg Tb24 Take 1 tablet (10 mg total) by mouth daily with breakfast. 90 tablet 3    blood sugar diagnostic (ONETOUCH ULTRA TEST) Strp USE TO CHECK FASTING GLUCOSE IN THE MORNING 100 strip 3    blood-glucose meter Misc One Touch glucometer check fasting glucose in morning 1 each 0    coenzyme Q10 10 mg capsule Take 10 mg by mouth once daily.      famotidine (PEPCID) 20 MG tablet Take 20 mg by mouth once daily.      HYDROcodone-acetaminophen (NORCO) 5-325 mg per tablet Take 1 tablet by mouth every 6 (six) hours as  needed for Pain. 8 tablet 0    losartan (COZAAR) 25 MG tablet Take 0.5 tablets (12.5 mg total) by mouth once daily.      metFORMIN (GLUCOPHAGE-XR) 500 MG ER 24hr tablet TAKE TWO TABLETS BY MOUTH TWO TIMES A DAY WITH MEALS 360 tablet 1    mupirocin (BACTROBAN) 2 % ointment Apply topically 3 (three) times daily. 22 g 1    ondansetron (ZOFRAN-ODT) 4 MG TbDL Take 1 tablet (4 mg total) by mouth every 6 (six) hours as needed (nausea or vomiting). 30 tablet 0    oxybutynin (DITROPAN-XL) 5 MG TR24 Take 1 tablet (5 mg total) by mouth once daily. 90 tablet 3    perfluorohexyloctane, PF, (MIEBO) 100 % Drop Apply 1 drop to eye 4 (four) times daily. 3 mL 12    prednisoLONE acetate (PRED FORTE) 1 % DrpS Place 1 drop into both eyes 4 (four) times daily. 5 mL 1    rosuvastatin (CRESTOR) 10 MG tablet TAKE ONE TABLET BY MOUTH EVERY DAY 90 tablet 3     No current facility-administered medications on file prior to visit.       ALLERGIES:  Review of patient's allergies indicates:   Allergen Reactions    Pravastatin Other (See Comments)     Joint pain    Lisinopril Other (See Comments)     Cough          VITALS:  There were no vitals filed for this visit.      PHYSICAL EXAM:  Physical Exam  Vitals reviewed.   Constitutional:       General: He is not in acute distress.     Appearance: Normal appearance. He is well-developed.   HENT:      Head: Normocephalic and atraumatic.   Eyes:      General: No scleral icterus.  Neck:      Trachea: No tracheal deviation.   Cardiovascular:      Rate and Rhythm: Normal rate and regular rhythm.      Pulses: Normal pulses.   Pulmonary:      Effort: Pulmonary effort is normal. No respiratory distress.      Breath sounds: Normal breath sounds.   Abdominal:      General: There is no distension.      Palpations: Abdomen is soft.      Tenderness: There is no abdominal tenderness.   Musculoskeletal:         General: No swelling or tenderness. Normal range of motion.      Cervical back: Normal range of motion and  neck supple. No rigidity.   Skin:     General: Skin is warm and dry.      Coloration: Skin is not jaundiced.      Findings: No erythema.   Neurological:      General: No focal deficit present.      Mental Status: He is alert and oriented to person, place, and time. He is not disoriented.      Motor: No weakness or abnormal muscle tone.   Psychiatric:         Mood and Affect: Mood normal.         Behavior: Behavior normal.         Thought Content: Thought content normal.         Judgment: Judgment normal.           LABS:  Lab Results   Component Value Date    WBC 10.88 06/17/2024    RBC 4.41 (L) 06/17/2024    HGB 13.2 (L) 06/17/2024    HCT 40.0 06/17/2024     06/17/2024     Lab Results   Component Value Date     (H) 06/17/2024     06/17/2024    K 4.0 06/17/2024     06/17/2024    CO2 27 06/17/2024    BUN 17 06/17/2024    CREATININE 1.2 06/17/2024    CALCIUM 9.7 06/17/2024     Lab Results   Component Value Date    ALT 12 06/17/2024    AST 15 06/17/2024    ALKPHOS 41 (L) 06/17/2024    BILITOT 0.5 06/17/2024     Lab Results   Component Value Date    MG 1.7 06/17/2024    PHOS 3.3 02/04/2022       STUDIES:  Images and reports were personally reviewed.  FINDINGS:  There is increased echogenicity of the liver parenchyma felt to reflect hepatic steatosis. No intrahepatic lesion or intrahepatic bile duct dilatation observed.  Hepatopedal flow present in main portal vein.     There is calcified gallstone at the neck of the gallbladder with posterior acoustic shadowing.  No gallbladder wall thickening or pericholecystic fluid.  There is sludge within the gallbladder.  Common duct diameter of 4 mm is normal.     Visualized pancreas is unremarkable with bowel gas obscuring portions of pancreas.     Right kidney is free of hydronephrosis.     Visualized abdominal aorta is nonaneurysmal. Juxtahepatic IVC is unremarkable.     Impression:     1. Cholelithiasis with gallbladder sludge.  2. Hepatic  steatosis.      ASSESSMENT & PLAN:  74 y.o. male with epigastric abdominal pain and gallstones  -symptoms consistent with biliary colic/symptomatic cholelithiasis  - risks and benefits of cholecystectomy were reviewed.  Specifically we discussed the risk of pain, bleeding, scarring, infection, bile leak, injury to common bile duct, injury to surrounding organs, retained stone, pancreatitis, bile leak, failure to relieve symptoms, etc.  we also discussed the need for potential conversion to open procedure or need to perform intraoperative cholangiogram.  The patient expressed understanding of these risks and agreed proceed with surgical intervention.  -patient was interested in surgical contact the office to schedule  -labs up-to-date  -will schedule for 06/24/2024      '

## 2024-06-19 NOTE — H&P (VIEW-ONLY)
GENERAL SURGERY  OUTPATIENT H&P    REASON FOR VISIT/CC: Gallstones    HPI: Ludwin Gee is a 74 y.o. male who presented to the emergency room a few days ago with epigastric abdominal pain associated with nausea and vomiting. He had no previous similar episodes. Not take NSAIDs on a basis. Denies any significant alcohol intake. Lab evaluation was unremarkable, specifically no leukocytosis or LFT elevation. It was found has some tenderness in the epigastric region. EKG was negative.  Abdominal ultrasound showed gallstones but no evidence of acute cholecystitis.  Patient was referred to General surgery for following. Pain is improved currently. No previous symptoms.    I have reviewed the patient's chart including prior progress notes, procedures and testing.     ROS:   Review of Systems    PROBLEM LIST:  Patient Active Problem List   Diagnosis    Controlled type 2 diabetes mellitus with microalbuminuria, without long-term current use of insulin    BPH (benign prostatic hyperplasia)    Hypertension associated with diabetes    Hyperlipidemia associated with type 2 diabetes mellitus    Acute hypoxemic respiratory failure due to COVID-19    Anemia    Asbestos exposure    Long COVID    Prostate cancer    Atherosclerosis of aorta    Polyp of ascending colon         HISTORY  Past Medical History:   Diagnosis Date    BPH with elevated PSA     Digestive disorder     COLON POLYPS    Elevated PSA     Hyperlipidemia     Hypertension     Personal history of colonic polyps     Prostate cancer 09/06/2022    [place] 3+3=6 Watchful waiting       Past Surgical History:   Procedure Laterality Date    COLONOSCOPY  12/6/2013    Dr. Martinez, 5 year recheck    COLONOSCOPY N/A 5/19/2023    Procedure: COLONOSCOPY;  Surgeon: Saúl Mayen MD;  Location: [facility];  Service: Endoscopy;  Laterality: N/A;    SHOULDER SURGERY  12/09/2011    right     TRANSRECTAL BIOPSY OF PROSTATE WITH ULTRASOUND GUIDANCE N/A 8/24/2022    Procedure: BIOPSY,  PROSTATE, RECTAL APPROACH, WITH US GUIDANCE;  Surgeon: Mora Millan Jr., MD;  Location: [facility] OR;  Service: Urology;  Laterality: N/A;    XI ROBOTIC COLECTOMY, PARTIAL N/A 2023    Procedure: XI ROBOTIC COLECTOMY, PARTIAL;  Surgeon: Ismael Castro MD;  Location: [facility] OR;  Service: General;  Laterality: N/A;       Social History     Tobacco Use    Smoking status: Former     Current packs/day: 0.00     Types: Cigarettes     Quit date: 1985     Years since quittin.1    Smokeless tobacco: Never   Substance Use Topics    Alcohol use: Not Currently     Comment: seldom    Drug use: No       Family History   Problem Relation Name Age of Onset    Diabetes Father      Heart disease Father      Cataracts Mother      Glaucoma Mother      Diabetes Maternal Grandmother      No Known Problems Brother      No Known Problems Daughter      No Known Problems Son      No Known Problems Maternal Grandfather      No Known Problems Paternal Grandmother      No Known Problems Paternal Grandfather      No Known Problems Maternal Aunt      No Known Problems Maternal Uncle      No Known Problems Paternal Aunt      No Known Problems Paternal Uncle      Urolithiasis Neg Hx      Prostate cancer Neg Hx      Kidney cancer Neg Hx      Retinal detachment Neg Hx      Macular degeneration Neg Hx           MEDS:  Current Outpatient Medications on File Prior to Visit   Medication Sig Dispense Refill    alfuzosin (UROXATRAL) 10 mg Tb24 Take 1 tablet (10 mg total) by mouth daily with breakfast. 90 tablet 3    blood sugar diagnostic (ONETOUCH ULTRA TEST) Strp USE TO CHECK FASTING GLUCOSE IN THE MORNING 100 strip 3    blood-glucose meter Misc One Touch glucometer check fasting glucose in morning 1 each 0    coenzyme Q10 10 mg capsule Take 10 mg by mouth once daily.      famotidine (PEPCID) 20 MG tablet Take 20 mg by mouth once daily.      HYDROcodone-acetaminophen (NORCO) 5-325 mg per tablet Take 1 tablet by mouth every 6 (six) hours as  needed for Pain. 8 tablet 0    losartan (COZAAR) 25 MG tablet Take 0.5 tablets (12.5 mg total) by mouth once daily.      metFORMIN (GLUCOPHAGE-XR) 500 MG ER 24hr tablet TAKE TWO TABLETS BY MOUTH TWO TIMES A DAY WITH MEALS 360 tablet 1    mupirocin (BACTROBAN) 2 % ointment Apply topically 3 (three) times daily. 22 g 1    ondansetron (ZOFRAN-ODT) 4 MG TbDL Take 1 tablet (4 mg total) by mouth every 6 (six) hours as needed (nausea or vomiting). 30 tablet 0    oxybutynin (DITROPAN-XL) 5 MG TR24 Take 1 tablet (5 mg total) by mouth once daily. 90 tablet 3    perfluorohexyloctane, PF, (MIEBO) 100 % Drop Apply 1 drop to eye 4 (four) times daily. 3 mL 12    prednisoLONE acetate (PRED FORTE) 1 % DrpS Place 1 drop into both eyes 4 (four) times daily. 5 mL 1    rosuvastatin (CRESTOR) 10 MG tablet TAKE ONE TABLET BY MOUTH EVERY DAY 90 tablet 3     No current facility-administered medications on file prior to visit.       ALLERGIES:  Review of patient's allergies indicates:   Allergen Reactions    Pravastatin Other (See Comments)     Joint pain    Lisinopril Other (See Comments)     Cough          VITALS:  There were no vitals filed for this visit.      PHYSICAL EXAM:  Physical Exam  Vitals reviewed.   Constitutional:       General: He is not in acute distress.     Appearance: Normal appearance. He is well-developed.   HENT:      Head: Normocephalic and atraumatic.   Eyes:      General: No scleral icterus.  Neck:      Trachea: No tracheal deviation.   Cardiovascular:      Rate and Rhythm: Normal rate and regular rhythm.      Pulses: Normal pulses.   Pulmonary:      Effort: Pulmonary effort is normal. No respiratory distress.      Breath sounds: Normal breath sounds.   Abdominal:      General: There is no distension.      Palpations: Abdomen is soft.      Tenderness: There is no abdominal tenderness.   Musculoskeletal:         General: No swelling or tenderness. Normal range of motion.      Cervical back: Normal range of motion and  neck supple. No rigidity.   Skin:     General: Skin is warm and dry.      Coloration: Skin is not jaundiced.      Findings: No erythema.   Neurological:      General: No focal deficit present.      Mental Status: He is alert and oriented to person, place, and time. He is not disoriented.      Motor: No weakness or abnormal muscle tone.   Psychiatric:         Mood and Affect: Mood normal.         Behavior: Behavior normal.         Thought Content: Thought content normal.         Judgment: Judgment normal.           LABS:  Lab Results   Component Value Date    WBC 10.88 06/17/2024    RBC 4.41 (L) 06/17/2024    HGB 13.2 (L) 06/17/2024    HCT 40.0 06/17/2024     06/17/2024     Lab Results   Component Value Date     (H) 06/17/2024     06/17/2024    K 4.0 06/17/2024     06/17/2024    CO2 27 06/17/2024    BUN 17 06/17/2024    CREATININE 1.2 06/17/2024    CALCIUM 9.7 06/17/2024     Lab Results   Component Value Date    ALT 12 06/17/2024    AST 15 06/17/2024    ALKPHOS 41 (L) 06/17/2024    BILITOT 0.5 06/17/2024     Lab Results   Component Value Date    MG 1.7 06/17/2024    PHOS 3.3 02/04/2022       STUDIES:  Images and reports were personally reviewed.  FINDINGS:  There is increased echogenicity of the liver parenchyma felt to reflect hepatic steatosis. No intrahepatic lesion or intrahepatic bile duct dilatation observed.  Hepatopedal flow present in main portal vein.     There is calcified gallstone at the neck of the gallbladder with posterior acoustic shadowing.  No gallbladder wall thickening or pericholecystic fluid.  There is sludge within the gallbladder.  Common duct diameter of 4 mm is normal.     Visualized pancreas is unremarkable with bowel gas obscuring portions of pancreas.     Right kidney is free of hydronephrosis.     Visualized abdominal aorta is nonaneurysmal. Juxtahepatic IVC is unremarkable.     Impression:     1. Cholelithiasis with gallbladder sludge.  2. Hepatic  steatosis.      ASSESSMENT & PLAN:  74 y.o. male with epigastric abdominal pain and gallstones  -symptoms consistent with biliary colic/symptomatic cholelithiasis  - risks and benefits of cholecystectomy were reviewed.  Specifically we discussed the risk of pain, bleeding, scarring, infection, bile leak, injury to common bile duct, injury to surrounding organs, retained stone, pancreatitis, bile leak, failure to relieve symptoms, etc.  we also discussed the need for potential conversion to open procedure or need to perform intraoperative cholangiogram.  The patient expressed understanding of these risks and agreed proceed with surgical intervention.  -patient was interested in surgical contact the office to schedule  -labs up-to-date  -will schedule for 06/24/2024      '

## 2024-06-20 ENCOUNTER — TELEPHONE (OUTPATIENT)
Dept: SURGERY | Facility: CLINIC | Age: 74
End: 2024-06-20
Payer: MEDICARE

## 2024-06-20 NOTE — TELEPHONE ENCOUNTER
----- Message from Sukhwinder Downey sent at 6/20/2024  9:17 AM CDT -----  Type: Needs Medical Advice  Who Called:  pt called   Pharmacy name and phone #:    Best Call Back Number: [phone]  Additional Information: pt is calling the office asking what is Dr Blanton surgery days. Please call back to advise, he is trying to make up his mind about surgery and make sure he take off for work on the right day. thanks

## 2024-06-21 ENCOUNTER — TELEPHONE (OUTPATIENT)
Dept: SURGERY | Facility: CLINIC | Age: 74
End: 2024-06-21
Payer: MEDICARE

## 2024-06-21 NOTE — TELEPHONE ENCOUNTER
----- Message from Andrea Smyth sent at 6/21/2024 10:00 AM CDT -----  Regarding: Pending Prior auth  Good morning,    The prior auth for this upcoming procedure is still pending Medical Director review. I just spoke with a [facility] nurse and she advised that we should have a determination before the end of the day. The patient was notified.    I will update you of any change, but please let me know how you intend to proceed if a determination is not made on time? Will the procedure be rescheduled or proceed as medically urgent?    Thank you  Andrea Smyth   Hydroxychloroquine Counseling:  I discussed with the patient that a baseline ophthalmologic exam is needed at the start of therapy and every year thereafter while on therapy. A CBC may also be warranted for monitoring.  The side effects of this medication were discussed with the patient, including but not limited to agranulocytosis, aplastic anemia, seizures, rashes, retinopathy, and liver toxicity. Patient instructed to call the office should any adverse effect occur.  The patient verbalized understanding of the proper use and possible adverse effects of Plaquenil.  All the patient's questions and concerns were addressed.

## 2024-06-22 LAB
OHS QRS DURATION: 84 MS
OHS QTC CALCULATION: 385 MS

## 2024-06-24 ENCOUNTER — ANESTHESIA (OUTPATIENT)
Dept: SURGERY | Facility: HOSPITAL | Age: 74
End: 2024-06-24
Payer: MEDICARE

## 2024-06-24 ENCOUNTER — HOSPITAL ENCOUNTER (OUTPATIENT)
Facility: HOSPITAL | Age: 74
Discharge: HOME | End: 2024-06-24
Attending: SURGERY | Admitting: SURGERY
Payer: MEDICARE

## 2024-06-24 ENCOUNTER — ANESTHESIA EVENT (OUTPATIENT)
Dept: SURGERY | Facility: HOSPITAL | Age: 74
End: 2024-06-24
Payer: MEDICARE

## 2024-06-24 VITALS
HEIGHT: 66 IN | SYSTOLIC BLOOD PRESSURE: 117 MMHG | BODY MASS INDEX: 25.71 KG/M2 | WEIGHT: 160 LBS | OXYGEN SATURATION: 100 % | RESPIRATION RATE: 16 BRPM | HEART RATE: 62 BPM | DIASTOLIC BLOOD PRESSURE: 65 MMHG | TEMPERATURE: 98 F

## 2024-06-24 DIAGNOSIS — K80.20 SYMPTOMATIC CHOLELITHIASIS: ICD-10-CM

## 2024-06-24 LAB
GLUCOSE SERPL-MCNC: 149 MG/DL (ref 70–110)
GLUCOSE SERPL-MCNC: 161 MG/DL (ref 70–110)

## 2024-06-24 PROCEDURE — 37000009 HC ANESTHESIA EA ADD 15 MINS: Performed by: SURGERY

## 2024-06-24 PROCEDURE — 71000016 HC POSTOP RECOV ADDL HR: Performed by: SURGERY

## 2024-06-24 PROCEDURE — 37000008 HC ANESTHESIA 1ST 15 MINUTES: Performed by: SURGERY

## 2024-06-24 PROCEDURE — 25000003 PHARM REV CODE 250: Performed by: ANESTHESIOLOGY

## 2024-06-24 PROCEDURE — 63600175 PHARM REV CODE 636 W HCPCS: Performed by: ANESTHESIOLOGY

## 2024-06-24 PROCEDURE — D9220A PRA ANESTHESIA: Mod: CRNA,,, | Performed by: NURSE ANESTHETIST, CERTIFIED REGISTERED

## 2024-06-24 PROCEDURE — D9220A PRA ANESTHESIA: Mod: ANES,,, | Performed by: ANESTHESIOLOGY

## 2024-06-24 PROCEDURE — 36000709 HC OR TIME LEV III EA ADD 15 MIN: Performed by: SURGERY

## 2024-06-24 PROCEDURE — C9290 INJ, BUPIVACAINE LIPOSOME: HCPCS | Performed by: SURGERY

## 2024-06-24 PROCEDURE — 27201423 OPTIME MED/SURG SUP & DEVICES STERILE SUPPLY: Performed by: SURGERY

## 2024-06-24 PROCEDURE — 82962 GLUCOSE BLOOD TEST: CPT | Performed by: SURGERY

## 2024-06-24 PROCEDURE — 71000039 HC RECOVERY, EACH ADD'L HOUR: Performed by: SURGERY

## 2024-06-24 PROCEDURE — 71000015 HC POSTOP RECOV 1ST HR: Performed by: SURGERY

## 2024-06-24 PROCEDURE — 71000033 HC RECOVERY, INTIAL HOUR: Performed by: SURGERY

## 2024-06-24 PROCEDURE — 25000003 PHARM REV CODE 250: Performed by: NURSE ANESTHETIST, CERTIFIED REGISTERED

## 2024-06-24 PROCEDURE — 63600175 PHARM REV CODE 636 W HCPCS: Mod: JZ,JG | Performed by: SURGERY

## 2024-06-24 PROCEDURE — 36000708 HC OR TIME LEV III 1ST 15 MIN: Performed by: SURGERY

## 2024-06-24 PROCEDURE — 63600175 PHARM REV CODE 636 W HCPCS: Performed by: NURSE ANESTHETIST, CERTIFIED REGISTERED

## 2024-06-24 PROCEDURE — 47562 LAPAROSCOPIC CHOLECYSTECTOMY: CPT | Mod: ,,, | Performed by: SURGERY

## 2024-06-24 RX ORDER — PROPOFOL 10 MG/ML
VIAL (ML) INTRAVENOUS
Status: DISCONTINUED | OUTPATIENT
Start: 2024-06-24 | End: 2024-06-24

## 2024-06-24 RX ORDER — BUPIVACAINE HYDROCHLORIDE 2.5 MG/ML
INJECTION, SOLUTION EPIDURAL; INFILTRATION; INTRACAUDAL
Status: DISCONTINUED | OUTPATIENT
Start: 2024-06-24 | End: 2024-06-24 | Stop reason: HOSPADM

## 2024-06-24 RX ORDER — OXYCODONE HYDROCHLORIDE 5 MG/1
5 TABLET ORAL
Status: DISCONTINUED | OUTPATIENT
Start: 2024-06-24 | End: 2024-06-24 | Stop reason: HOSPADM

## 2024-06-24 RX ORDER — HYDROMORPHONE HYDROCHLORIDE 1 MG/ML
0.2 INJECTION, SOLUTION INTRAMUSCULAR; INTRAVENOUS; SUBCUTANEOUS EVERY 5 MIN PRN
Status: DISCONTINUED | OUTPATIENT
Start: 2024-06-24 | End: 2024-06-24 | Stop reason: HOSPADM

## 2024-06-24 RX ORDER — FAMOTIDINE 10 MG/ML
INJECTION INTRAVENOUS
Status: DISCONTINUED | OUTPATIENT
Start: 2024-06-24 | End: 2024-06-24

## 2024-06-24 RX ORDER — SUCCINYLCHOLINE CHLORIDE 20 MG/ML
INJECTION INTRAMUSCULAR; INTRAVENOUS
Status: DISCONTINUED | OUTPATIENT
Start: 2024-06-24 | End: 2024-06-24

## 2024-06-24 RX ORDER — ACETAMINOPHEN 10 MG/ML
INJECTION, SOLUTION INTRAVENOUS
Status: DISCONTINUED | OUTPATIENT
Start: 2024-06-24 | End: 2024-06-24

## 2024-06-24 RX ORDER — ONDANSETRON HYDROCHLORIDE 2 MG/ML
INJECTION, SOLUTION INTRAMUSCULAR; INTRAVENOUS
Status: DISCONTINUED | OUTPATIENT
Start: 2024-06-24 | End: 2024-06-24

## 2024-06-24 RX ORDER — DEXAMETHASONE SODIUM PHOSPHATE 4 MG/ML
INJECTION, SOLUTION INTRA-ARTICULAR; INTRALESIONAL; INTRAMUSCULAR; INTRAVENOUS; SOFT TISSUE
Status: DISCONTINUED | OUTPATIENT
Start: 2024-06-24 | End: 2024-06-24

## 2024-06-24 RX ORDER — LIDOCAINE HYDROCHLORIDE 20 MG/ML
INJECTION, SOLUTION EPIDURAL; INFILTRATION; INTRACAUDAL; PERINEURAL
Status: DISCONTINUED | OUTPATIENT
Start: 2024-06-24 | End: 2024-06-24

## 2024-06-24 RX ORDER — FENTANYL CITRATE 50 UG/ML
INJECTION, SOLUTION INTRAMUSCULAR; INTRAVENOUS
Status: DISCONTINUED | OUTPATIENT
Start: 2024-06-24 | End: 2024-06-24

## 2024-06-24 RX ORDER — PHENYLEPHRINE HYDROCHLORIDE 10 MG/ML
INJECTION INTRAVENOUS
Status: DISCONTINUED | OUTPATIENT
Start: 2024-06-24 | End: 2024-06-24

## 2024-06-24 RX ORDER — ONDANSETRON HYDROCHLORIDE 2 MG/ML
4 INJECTION, SOLUTION INTRAVENOUS DAILY PRN
Status: DISCONTINUED | OUTPATIENT
Start: 2024-06-24 | End: 2024-06-24 | Stop reason: HOSPADM

## 2024-06-24 RX ORDER — ROCURONIUM BROMIDE 10 MG/ML
INJECTION, SOLUTION INTRAVENOUS
Status: DISCONTINUED | OUTPATIENT
Start: 2024-06-24 | End: 2024-06-24

## 2024-06-24 RX ORDER — HYDROCODONE BITARTRATE AND ACETAMINOPHEN 5; 325 MG/1; MG/1
1 TABLET ORAL EVERY 6 HOURS PRN
Qty: 20 TABLET | Refills: 0 | Status: SHIPPED | OUTPATIENT
Start: 2024-06-24

## 2024-06-24 RX ORDER — DIPHENHYDRAMINE HYDROCHLORIDE 50 MG/ML
12.5 INJECTION INTRAMUSCULAR; INTRAVENOUS
Status: DISCONTINUED | OUTPATIENT
Start: 2024-06-24 | End: 2024-06-24 | Stop reason: HOSPADM

## 2024-06-24 RX ORDER — MEPERIDINE HYDROCHLORIDE 50 MG/ML
12.5 INJECTION INTRAMUSCULAR; INTRAVENOUS; SUBCUTANEOUS EVERY 10 MIN PRN
Status: DISCONTINUED | OUTPATIENT
Start: 2024-06-24 | End: 2024-06-24 | Stop reason: HOSPADM

## 2024-06-24 RX ADMIN — PHENYLEPHRINE HYDROCHLORIDE 100 MCG: 10 INJECTION INTRAVENOUS at 11:06

## 2024-06-24 RX ADMIN — HYDROMORPHONE HYDROCHLORIDE 0.2 MG: 1 INJECTION, SOLUTION INTRAMUSCULAR; INTRAVENOUS; SUBCUTANEOUS at 01:06

## 2024-06-24 RX ADMIN — SUGAMMADEX 200 MG: 100 INJECTION, SOLUTION INTRAVENOUS at 12:06

## 2024-06-24 RX ADMIN — ONDANSETRON 4 MG: 2 INJECTION INTRAMUSCULAR; INTRAVENOUS at 02:06

## 2024-06-24 RX ADMIN — SODIUM CHLORIDE, SODIUM LACTATE, POTASSIUM CHLORIDE, AND CALCIUM CHLORIDE: .6; .31; .03; .02 INJECTION, SOLUTION INTRAVENOUS at 11:06

## 2024-06-24 RX ADMIN — LIDOCAINE HYDROCHLORIDE 50 MG: 20 INJECTION, SOLUTION INTRAVENOUS at 11:06

## 2024-06-24 RX ADMIN — Medication 120 MG: at 11:06

## 2024-06-24 RX ADMIN — PROPOFOL 150 MG: 10 INJECTION, EMULSION INTRAVENOUS at 11:06

## 2024-06-24 RX ADMIN — FAMOTIDINE 20 MG: 10 INJECTION, SOLUTION INTRAVENOUS at 11:06

## 2024-06-24 RX ADMIN — DEXTROSE 2 G: 50 INJECTION, SOLUTION INTRAVENOUS at 11:06

## 2024-06-24 RX ADMIN — HYDROMORPHONE HYDROCHLORIDE 0.2 MG: 1 INJECTION, SOLUTION INTRAMUSCULAR; INTRAVENOUS; SUBCUTANEOUS at 02:06

## 2024-06-24 RX ADMIN — OXYCODONE HYDROCHLORIDE 5 MG: 5 TABLET ORAL at 01:06

## 2024-06-24 RX ADMIN — DEXAMETHASONE SODIUM PHOSPHATE 8 MG: 4 INJECTION, SOLUTION INTRAMUSCULAR; INTRAVENOUS at 11:06

## 2024-06-24 RX ADMIN — ROCURONIUM BROMIDE 5 MG: 10 INJECTION, SOLUTION INTRAVENOUS at 11:06

## 2024-06-24 RX ADMIN — ONDANSETRON 4 MG: 2 INJECTION INTRAMUSCULAR; INTRAVENOUS at 11:06

## 2024-06-24 RX ADMIN — FENTANYL CITRATE 50 MCG: 0.05 INJECTION, SOLUTION INTRAMUSCULAR; INTRAVENOUS at 11:06

## 2024-06-24 RX ADMIN — ACETAMINOPHEN 1000 MG: 10 INJECTION, SOLUTION INTRAVENOUS at 12:06

## 2024-06-24 NOTE — OP NOTE
DATE OF PROCEDURE: 06/24/2024    PREOPERATIVE DIAGNOSIS: Symptomatic cholelithiasis    POSTOPERATIVE DIAGNOSIS: Chronic cholecystitis    PROCEDURE: Laparoscopic cholecystectomy    SURGEON: Mikhail Blanton M.D    ASSISTANT: None    ANESTHESIA: General endotracheal    ESTIMATED BLOOD LOSS: Minimal    SPECIMEN: Gallbladder    CONDITION: Stable    COMPLICATIONS: None    FINDINGS:   Gallbladder with evidence of chronic inflammatory changes  Critical view of safety achieved  Spillage of bile but no stones    INDICATIONS: The patient is a 74 y.o. male who presented to clinic with a  history of right upper quadrant epigastric pain suggestive of biliary etiology. The patient was counseled on their surgical options and desired surgical intervention. The risks of the procedure were described to the patient including pain, infection, bleeding, scarring, wound complications, injury to local structures such as bile duct, liver or intestine, warranting more extensive surgery, retained common bile duct stone or need for further intervention. The patient demonstrated understanding of these risks and a consent form was obtained.    PROCEDURE IN DETAIL: PROCEDURE IN DETAIL: The patient was identified in the Preoperative Unit and taken back to the Operating Room and laid supine on the operating room table. IV antibiotics were administered and general anesthesia was induced without complication. The patient was then prepped and draped in the standard sterile fashion. A timeout was performed in accordance with hospital protocol.  A Veress needle was introduced into the abdominal cavity and insufflation was attached. We had appropriate initial pressures and pneumoperitoneum was achieved. Local anesthetic was injected then a stab incision was sharply made just above the umbilicus. A 5 mm Optiview trocar was introduced into the peritoneal cavity under direct visualization.  We examined the trocar and Veress needle insertion sites and no  obvious injury was identified. An 11 mm trocar was then placed in subxiphoid region under direct visualization after injecting local anesthetic.  Two additional 5 mm trocars were placed in the right mid and right lateral abdomen under direct visualization again after injecting local anesthetic. The gallbladder was identified and found to have evidence of chronic cholecystitis with thickened wall an impacted stone. The gallbladder fundus was grasped and retracted into the right upper quadrant and the infundibulum retracted laterally. The peritoneum over the infundibulum and cystic structures was then gently stripped until the cystic duct and artery were identified and the critical view of safety was achieved.The cystic duct and artery were doubly clipped proximally and once distally and then divided. The gallbladder was then dissected off the gallbladder fossa using electrocautery. A hole in the posterior wall occurred and spilled bile but no stones.  This has suctioned cleaned. Once dissection was completed, the gallbladder was placed into an EndoCatch bag and removed through the subxiphoid port. The right upper quadrant was then irrigated and then suctioned. The dissection field was inspected for hemostasis, bile leak and to confirmed clips were in place. Additional local anesthetic was injected around the port sites under direct visualization.  The subxiphoid fascial incision was closed with a 0 Vicryl suture. The abdomen was desufflated and ports removed. Additional local anesthetic was injected and all the skin incisions were closed using a 4-0 Monocryl subcuticular stitch. Dermabond was then applied. The patient was awakened from general anesthesia without complication and returned to the Postoperative Recovery Unit in stable condition. At the end of the case, sponge, instrument and needle counts were correct on 2 occasions. I was present and scrubbed throughout the entirety of the case.

## 2024-06-24 NOTE — PLAN OF CARE
Patient taken to day surgery via stretcher at this time. Awake and alert not distressful. Andressa STAHL RN at bedside to assume care of patient

## 2024-06-24 NOTE — ANESTHESIA POSTPROCEDURE EVALUATION
Anesthesia Post Evaluation    Patient: Ludwin Gee    Procedure(s) Performed: Procedure(s) (LRB):  CHOLECYSTECTOMY, LAPAROSCOPIC (N/A)    Final Anesthesia Type: general      Patient location during evaluation: PACU  Patient participation: Yes- Able to Participate  Level of consciousness: awake and alert and oriented  Post-procedure vital signs: reviewed and stable  Pain management: adequate  Airway patency: patent    PONV status at discharge: No PONV  Anesthetic complications: no      Cardiovascular status: blood pressure returned to baseline and hemodynamically stable  Respiratory status: unassisted, spontaneous ventilation and room air  Hydration status: euvolemic  Follow-up not needed.              Vitals Value Taken Time   /51 06/24/24 1415   Temp 36.7 °C (98 °F) 06/24/24 1400   Pulse 61 06/24/24 1424   Resp 8 06/24/24 1424   SpO2 97 % 06/24/24 1424   Vitals shown include unfiled device data.      No case tracking events are documented in the log.      Pain/Lee Score: Pain Rating Prior to Med Admin: 5 (6/24/2024  2:05 PM)  Lee Score: 10 (6/24/2024  2:15 PM)

## 2024-06-24 NOTE — DISCHARGE SUMMARY
[facility]  Discharge Note  Short Stay    Procedure(s) (LRB):  CHOLECYSTECTOMY, LAPAROSCOPIC (N/A)      OUTCOME: Patient tolerated treatment/procedure well without complication and is now ready for discharge.    DISPOSITION: Home or Self Care    FINAL DIAGNOSIS:  <principal problem not specified>    FOLLOWUP: In clinic    DISCHARGE INSTRUCTIONS:    Discharge Procedure Orders   Diet Adult Regular     Ice to affected area     Lifting restrictions   Order Comments: Please avoid lifting greater than 40 lb, straining, strenuous activity for four weeks.     Change dressing (specify)   Order Comments: Post-Operative Wound Care    A surgical glue has been placed over your incisions, please leave the glue in place and do not attempt to remove it.  It is ok to shower using mild soap and water over the incisions the day after your procedure. Pat dry your incisions. Do not soak in a bathtub or other body of water for 2 weeks or until cleared by your surgeon.     If you noticed redness, swelling, fever, increasing pain or significant drainage from your wound please call/message the office or the 24 hr nurse hotline after hours.     Notify your health care provider if you experience any of the following:  temperature >100.4     Notify your health care provider if you experience any of the following:  persistent nausea and vomiting or diarrhea     Notify your health care provider if you experience any of the following:  severe uncontrolled pain     Notify your health care provider if you experience any of the following:  redness, tenderness, or signs of infection (pain, swelling, redness, odor or green/yellow discharge around incision site)     Notify your health care provider if you experience any of the following:  worsening rash     Notify your health care provider if you experience any of the following:  increased confusion or weakness     Shower on day dressing removed (No bath)        TIME SPENT ON DISCHARGE:  10 minutes

## 2024-06-24 NOTE — ANESTHESIA PREPROCEDURE EVALUATION
06/24/2024  Ludwin Gee is a 74 y.o., male.      Patient Active Problem List   Diagnosis    Controlled type 2 diabetes mellitus with microalbuminuria, without long-term current use of insulin    BPH (benign prostatic hyperplasia)    Hypertension associated with diabetes    Hyperlipidemia associated with type 2 diabetes mellitus    Acute hypoxemic respiratory failure due to COVID-19    Anemia    Asbestos exposure    Long COVID    Prostate cancer    Atherosclerosis of aorta    Polyp of ascending colon       Past Surgical History:   Procedure Laterality Date    COLONOSCOPY  12/6/2013    Dr. Martinez, 5 year recheck    COLONOSCOPY N/A 5/19/2023    Procedure: COLONOSCOPY;  Surgeon: Saúl Mayen MD;  Location: [facility];  Service: Endoscopy;  Laterality: N/A;    SHOULDER SURGERY  12/09/2011    right     TRANSRECTAL BIOPSY OF PROSTATE WITH ULTRASOUND GUIDANCE N/A 8/24/2022    Procedure: BIOPSY, PROSTATE, RECTAL APPROACH, WITH US GUIDANCE;  Surgeon: Mora Millan Jr., MD;  Location: [facility] OR;  Service: Urology;  Laterality: N/A;    XI ROBOTIC COLECTOMY, PARTIAL N/A 6/26/2023    Procedure: XI ROBOTIC COLECTOMY, PARTIAL;  Surgeon: Ismael Castro MD;  Location: [facility] OR;  Service: General;  Laterality: N/A;        Tobacco Use:  The patient  reports that he quit smoking about 39 years ago. His smoking use included cigarettes. He has never used smokeless tobacco.     Results for orders placed or performed during the hospital encounter of 06/17/24   EKG 12-lead    Collection Time: 06/17/24  8:04 AM   Result Value Ref Range    QRS Duration 84 ms    [facility] QTC Calculation 385 ms    Narrative    Test Reason : R07.9,    Vent. Rate : 057 BPM     Atrial Rate : 057 BPM     P-R Int : 160 ms          QRS Dur : 084 ms      QT Int : 396 ms       P-R-T Axes : 031 001 053 degrees     QTc Int : 385 ms    Sinus  bradycardia  Possible Left atrial enlargement  Borderline Abnormal ECG  When compared with ECG of 22-JUN-2023 07:51,  No significant change was found  Confirmed by Marcus Restrepo MD ([id number]) on 6/22/2024 2:02:39 PM    Referred By: HUGO DE LA GARZA           Confirmed By:Marcus Restrepo MD             Lab Results   Component Value Date    WBC 10.88 06/17/2024    HGB 13.2 (L) 06/17/2024    HCT 40.0 06/17/2024    MCV 91 06/17/2024     06/17/2024     BMP  Lab Results   Component Value Date     06/17/2024    K 4.0 06/17/2024     06/17/2024    CO2 27 06/17/2024    BUN 17 06/17/2024    CREATININE 1.2 06/17/2024    CALCIUM 9.7 06/17/2024    ANIONGAP 10 06/17/2024     (H) 06/17/2024     (H) 05/24/2024     (H) 12/01/2023       No results found for this or any previous visit.              Pre-op Assessment    I have reviewed the Patient Summary Reports.     I have reviewed the Nursing Notes. I have reviewed the NPO Status.   I have reviewed the Medications.     Review of Systems  Anesthesia Hx:  No problems with previous Anesthesia             Denies Family Hx of Anesthesia complications.    Denies Personal Hx of Anesthesia complications.                    Social:  Former Smoker       Hematology/Oncology:  Hematology Normal                       --  Cancer in past history (hx of prostate cancer, followed by urology):                     Cardiovascular:     Hypertension, well controlled          PVD hyperlipidemia                             Pulmonary:  Pulmonary Normal                       Renal/:    BPH              Hepatic/GI:  Hepatic/GI Normal                 Musculoskeletal:  Musculoskeletal Normal                Neurological:  Neurology Normal                                      Endocrine:  Diabetes, well controlled, type 2               Physical Exam  General: Well nourished, Cooperative, Alert and Oriented    Airway:  Mallampati: III / II  Mouth Opening: Normal  TM Distance:  Normal  Tongue: Normal  Neck ROM: Normal ROM    Chest/Lungs:  Clear to auscultation    Heart:  Rate: Normal  Rhythm: Regular Rhythm  Sounds: Normal    Abdomen:  Normal, Soft, Nontender        Anesthesia Plan  Type of Anesthesia, risks & benefits discussed:    Anesthesia Type: Gen ETT  Intra-op Monitoring Plan: Standard ASA Monitors  Post Op Pain Control Plan: multimodal analgesia and IV/PO Opioids PRN  Induction:  IV  Airway Plan: Video, Post-Induction  Informed Consent: Informed consent signed with the Patient and all parties understand the risks and agree with anesthesia plan.  All questions answered.   ASA Score: 3  Anesthesia Plan Notes:   GETA  IV tylenol  Zofran Decadron Pepcid    Ready For Surgery From Anesthesia Perspective.     .

## 2024-06-24 NOTE — ANESTHESIA PROCEDURE NOTES
Intubation    Date/Time: 6/24/2024 11:43 AM    Performed by: Zander Ren CRNA  Authorized by: Chandana Alvarado MD    Intubation:     Induction:  Intravenous    Intubated:  Postinduction    Mask Ventilation:  Not attempted    Attempts:  1    Attempted By:  CRNA    Method of Intubation:  Video laryngoscopy    Blade:  Mitchell 3    Laryngeal View Grade: Grade I - full view of cords      Difficult Airway Encountered?: No      Complications:  None    Airway Device:  Oral endotracheal tube    Airway Device Size:  7.5    Style/Cuff Inflation:  Cuffed (inflated to minimal occlusive pressure)    Inflation Amount (mL):  5    Tube secured:  21    Secured at:  The lips    Placement Verified By:  Capnometry    Complicating Factors:  None    Findings Post-Intubation:  BS equal bilateral and atraumatic/condition of teeth unchanged

## 2024-06-24 NOTE — TRANSFER OF CARE
"Anesthesia Transfer of Care Note    Patient: Ludwin Gee    Procedure(s) Performed: Procedure(s) (LRB):  CHOLECYSTECTOMY, LAPAROSCOPIC (N/A)    Patient location: PACU    Anesthesia Type: general    Transport from OR: Transported from OR on room air with adequate spontaneous ventilation    Post pain: adequate analgesia    Post assessment: no apparent anesthetic complications and tolerated procedure well    Post vital signs: stable    Level of consciousness: awake, alert and oriented    Nausea/Vomiting: no nausea/vomiting    Complications: none    Transfer of care protocol was followed      Last vitals: Visit Vitals  BP (!) 156/77 (BP Location: Right arm, Patient Position: Sitting)   Pulse 60   Temp 36.7 °C (98 °F) (Oral)   Resp 16   Ht 5' 6" (1.676 m)   Wt 72.6 kg (160 lb)   SpO2 99%   BMI 25.82 kg/m²     "

## 2024-06-26 ENCOUNTER — TELEPHONE (OUTPATIENT)
Dept: SURGERY | Facility: CLINIC | Age: 74
End: 2024-06-26
Payer: MEDICARE

## 2024-06-26 NOTE — TELEPHONE ENCOUNTER
----- Message from Ana Maria Harmon sent at 6/26/2024  4:47 PM CDT -----  Regarding: advise  Contact: patient  Type: Needs Medical Advice  Who Called:  patient  Symptoms (please be specific):  surgery Monday   How long has patient had these symptoms:    Pharmacy name and phone #:    Best Call Back Number: [phone]    Additional Information: needs to know when he should come in for his follow up

## 2024-06-28 ENCOUNTER — TELEPHONE (OUTPATIENT)
Dept: SURGERY | Facility: CLINIC | Age: 74
End: 2024-06-28
Payer: MEDICARE

## 2024-06-28 ENCOUNTER — PATIENT MESSAGE (OUTPATIENT)
Dept: SURGERY | Facility: CLINIC | Age: 74
End: 2024-06-28
Payer: MEDICARE

## 2024-06-28 NOTE — TELEPHONE ENCOUNTER
----- Message from Li Cai sent at 6/28/2024  2:32 PM CDT -----  Contact: Emily [phone]  Type: Needs Medical Advice  Who Called:  Pts wife Emily Maria Call Back Number: [phone]     Additional Information:     Asking when he will have clearance to drive. Pls call back and advise

## 2024-07-10 ENCOUNTER — OFFICE VISIT (OUTPATIENT)
Dept: SURGERY | Facility: CLINIC | Age: 74
End: 2024-07-10
Payer: MEDICARE

## 2024-07-10 VITALS — DIASTOLIC BLOOD PRESSURE: 71 MMHG | HEART RATE: 74 BPM | SYSTOLIC BLOOD PRESSURE: 130 MMHG | TEMPERATURE: 98 F

## 2024-07-10 DIAGNOSIS — Z90.49 S/P LAPAROSCOPIC CHOLECYSTECTOMY: Primary | ICD-10-CM

## 2024-07-10 PROCEDURE — 1126F AMNT PAIN NOTED NONE PRSNT: CPT | Mod: CPTII,S$GLB,, | Performed by: SURGERY

## 2024-07-10 PROCEDURE — 3075F SYST BP GE 130 - 139MM HG: CPT | Mod: CPTII,S$GLB,, | Performed by: SURGERY

## 2024-07-10 PROCEDURE — 3060F POS MICROALBUMINURIA REV: CPT | Mod: CPTII,S$GLB,, | Performed by: SURGERY

## 2024-07-10 PROCEDURE — 3288F FALL RISK ASSESSMENT DOCD: CPT | Mod: CPTII,S$GLB,, | Performed by: SURGERY

## 2024-07-10 PROCEDURE — 4010F ACE/ARB THERAPY RXD/TAKEN: CPT | Mod: CPTII,S$GLB,, | Performed by: SURGERY

## 2024-07-10 PROCEDURE — 3078F DIAST BP <80 MM HG: CPT | Mod: CPTII,S$GLB,, | Performed by: SURGERY

## 2024-07-10 PROCEDURE — 99024 POSTOP FOLLOW-UP VISIT: CPT | Mod: S$GLB,,, | Performed by: SURGERY

## 2024-07-10 PROCEDURE — 99999 PR PBB SHADOW E&M-EST. PATIENT-LVL II: CPT | Mod: PBBFAC,,, | Performed by: SURGERY

## 2024-07-10 PROCEDURE — 3066F NEPHROPATHY DOC TX: CPT | Mod: CPTII,S$GLB,, | Performed by: SURGERY

## 2024-07-10 PROCEDURE — 3051F HG A1C>EQUAL 7.0%<8.0%: CPT | Mod: CPTII,S$GLB,, | Performed by: SURGERY

## 2024-07-10 PROCEDURE — 1101F PT FALLS ASSESS-DOCD LE1/YR: CPT | Mod: CPTII,S$GLB,, | Performed by: SURGERY

## 2024-07-10 NOTE — PROGRESS NOTES
GENERAL SURGERY  POST-OP PROGRESS NOTE    HPI: Ludwin Gee is a 74 y.o. male status post laparoscopic cholecystectomy for symptomatic cholelithiasis/chronic cholecystitis on 06/24/2024. Patient overall is doing well.  Some mild residual soreness at the extraction site but this has improved. No nausea or vomiting with eating.  No significant urgency with bowel function.    VITALS:  There were no vitals filed for this visit.    PHYSICAL EXAM:  Abdominal port sites well healed without signs of infection or breakdown, very mild resolving ecchymosis near extraction site, no underlying fluid collection     PATHOLOGY:  GALLBLADDER, CHOLECYSTECTOMY:     - CHRONIC CHOLECYSTITIS.     - CHOLELITHIASIS.     ASSESSMENT & PLAN:  74 y.o. male s/p laparoscopic cholecystectomy  -pathology benign  -symptoms improved  -residual soreness should improve over the next 2 weeks  -return to clinic p.r.n.

## 2024-09-23 ENCOUNTER — HOSPITAL ENCOUNTER (OUTPATIENT)
Dept: RADIOLOGY | Facility: CLINIC | Age: 74
Discharge: HOME | End: 2024-09-23
Attending: PODIATRIST
Payer: MEDICARE

## 2024-09-23 ENCOUNTER — OFFICE VISIT (OUTPATIENT)
Dept: PODIATRY | Facility: CLINIC | Age: 74
End: 2024-09-23
Payer: MEDICARE

## 2024-09-23 VITALS — WEIGHT: 164 LBS | BODY MASS INDEX: 26.36 KG/M2 | HEIGHT: 66 IN

## 2024-09-23 DIAGNOSIS — M20.5X2 HALLUX LIMITUS OF LEFT FOOT: Primary | ICD-10-CM

## 2024-09-23 DIAGNOSIS — M20.12 ACQUIRED HALLUX INTERPHALANGEUS OF LEFT FOOT: ICD-10-CM

## 2024-09-23 DIAGNOSIS — M79.672 LEFT FOOT PAIN: ICD-10-CM

## 2024-09-23 DIAGNOSIS — M79.675 PAIN OF TOE OF LEFT FOOT: ICD-10-CM

## 2024-09-23 DIAGNOSIS — L60.0 INGROWN NAIL: ICD-10-CM

## 2024-09-23 PROCEDURE — 3008F BODY MASS INDEX DOCD: CPT | Mod: CPTII,S$GLB,, | Performed by: PODIATRIST

## 2024-09-23 PROCEDURE — 1125F AMNT PAIN NOTED PAIN PRSNT: CPT | Mod: CPTII,S$GLB,, | Performed by: PODIATRIST

## 2024-09-23 PROCEDURE — 99999 PR PBB SHADOW E&M-EST. PATIENT-LVL III: CPT | Mod: PBBFAC,,, | Performed by: PODIATRIST

## 2024-09-23 PROCEDURE — 4010F ACE/ARB THERAPY RXD/TAKEN: CPT | Mod: CPTII,S$GLB,, | Performed by: PODIATRIST

## 2024-09-23 PROCEDURE — 3060F POS MICROALBUMINURIA REV: CPT | Mod: CPTII,S$GLB,, | Performed by: PODIATRIST

## 2024-09-23 PROCEDURE — 1101F PT FALLS ASSESS-DOCD LE1/YR: CPT | Mod: CPTII,S$GLB,, | Performed by: PODIATRIST

## 2024-09-23 PROCEDURE — 3288F FALL RISK ASSESSMENT DOCD: CPT | Mod: CPTII,S$GLB,, | Performed by: PODIATRIST

## 2024-09-23 PROCEDURE — 1160F RVW MEDS BY RX/DR IN RCRD: CPT | Mod: CPTII,S$GLB,, | Performed by: PODIATRIST

## 2024-09-23 PROCEDURE — 99203 OFFICE O/P NEW LOW 30 MIN: CPT | Mod: S$GLB,,, | Performed by: PODIATRIST

## 2024-09-23 PROCEDURE — 3066F NEPHROPATHY DOC TX: CPT | Mod: CPTII,S$GLB,, | Performed by: PODIATRIST

## 2024-09-23 PROCEDURE — 73630 X-RAY EXAM OF FOOT: CPT | Mod: LT,S$GLB,, | Performed by: RADIOLOGY

## 2024-09-23 PROCEDURE — 1159F MED LIST DOCD IN RCRD: CPT | Mod: CPTII,S$GLB,, | Performed by: PODIATRIST

## 2024-09-23 PROCEDURE — 3051F HG A1C>EQUAL 7.0%<8.0%: CPT | Mod: CPTII,S$GLB,, | Performed by: PODIATRIST

## 2024-09-23 NOTE — PROGRESS NOTES
"  [address]  [place] [address]  Phone: [phone]   [phone]    Patient's PCP:Herrera Fisher MD  Referring Provider: Aaareferral Self    Subjective:      Chief Complaint:: Foot Problem (Big toe of left foot hitting the 2nd toe)    LIZBETH Gee is a 74 y.o. male who presents today with a complaint of Big toe of left foot hitting the 2nd toe. The current episode started on and off several months.  The symptoms include aching pain. Probable cause of complaint toes are putting pressure around the edge of the nail.  The symptoms are aggravated by pressure. The problem has worsened. Treatment to date have included none which provided no relief.     Systemic Doctor:   Date Last Seen: 5/21/24  Blood Sugar: not taken  Hemoglobin A1c: 7.9    Vitals:    09/23/24 1427   Weight: 74.4 kg (164 lb 0.4 oz)   Height: 5' 6" (1.676 m)   PainSc:   2      Shoe Size: 8-8.5    Past Surgical History:   Procedure Laterality Date    COLONOSCOPY  12/6/2013    Dr. Martinez, 5 year recheck    COLONOSCOPY N/A 5/19/2023    Procedure: COLONOSCOPY;  Surgeon: Saúl Mayen MD;  Location: [facility];  Service: Endoscopy;  Laterality: N/A;    LAPAROSCOPIC CHOLECYSTECTOMY N/A 6/24/2024    Procedure: CHOLECYSTECTOMY, LAPAROSCOPIC;  Surgeon: Mikhail Blanton Jr., MD;  Location: [facility] OR;  Service: General;  Laterality: N/A;    SHOULDER SURGERY  12/09/2011    right     TRANSRECTAL BIOPSY OF PROSTATE WITH ULTRASOUND GUIDANCE N/A 8/24/2022    Procedure: BIOPSY, PROSTATE, RECTAL APPROACH, WITH US GUIDANCE;  Surgeon: Mora Millan Jr., MD;  Location: [facility] OR;  Service: Urology;  Laterality: N/A;    XI ROBOTIC COLECTOMY, PARTIAL N/A 6/26/2023    Procedure: XI ROBOTIC COLECTOMY, PARTIAL;  Surgeon: Ismael Castro MD;  [facility];  Service: General;  Laterality: N/A;     Past Medical History:   Diagnosis Date    BPH with elevated PSA     Diabetes mellitus     Digestive disorder     COLON POLYPS    " Elevated PSA     Hyperlipidemia     Hypertension     Personal history of colonic polyps     Prostate cancer 09/06/2022    [place] 3+3=6 Watchful waiting     Family History   Problem Relation Name Age of Onset    Diabetes Father      Heart disease Father      Cataracts Mother      Glaucoma Mother      Diabetes Maternal Grandmother      No Known Problems Brother      No Known Problems Daughter      No Known Problems Son      No Known Problems Maternal Grandfather      No Known Problems Paternal Grandmother      No Known Problems Paternal Grandfather      No Known Problems Maternal Aunt      No Known Problems Maternal Uncle      No Known Problems Paternal Aunt      No Known Problems Paternal Uncle      Urolithiasis Neg Hx      Prostate cancer Neg Hx      Kidney cancer Neg Hx      Retinal detachment Neg Hx      Macular degeneration Neg Hx          Social History:   Marital Status:   Alcohol History:  reports that he does not currently use alcohol.  Tobacco History:  reports that he quit smoking about 39 years ago. His smoking use included cigarettes. He has never used smokeless tobacco.  Drug History:  reports no history of drug use.    Review of patient's allergies indicates:   Allergen Reactions    Pravastatin Other (See Comments)     Joint pain    Lisinopril Other (See Comments)     Cough        Current Outpatient Medications   Medication Sig Dispense Refill    alfuzosin (UROXATRAL) 10 mg Tb24 Take 1 tablet (10 mg total) by mouth daily with breakfast. 90 tablet 3    blood sugar diagnostic (ONETOUCH ULTRA TEST) Strp USE TO CHECK FASTING GLUCOSE IN THE MORNING 100 strip 3    blood-glucose meter Misc One Touch glucometer check fasting glucose in morning 1 each 0    coenzyme Q10 10 mg capsule Take 10 mg by mouth once daily.      famotidine (PEPCID) 20 MG tablet Take 20 mg by mouth once daily.      HYDROcodone-acetaminophen (NORCO) 5-325 mg per tablet Take 1 tablet by mouth every 6 (six) hours as needed for Pain.  20 tablet 0    losartan (COZAAR) 25 MG tablet Take 0.5 tablets (12.5 mg total) by mouth once daily.      metFORMIN (GLUCOPHAGE-XR) 500 MG ER 24hr tablet TAKE TWO TABLETS BY MOUTH TWO TIMES A DAY WITH MEALS 360 tablet 1    mupirocin (BACTROBAN) 2 % ointment Apply topically 3 (three) times daily. 22 g 1    ondansetron (ZOFRAN-ODT) 4 MG TbDL Take 1 tablet (4 mg total) by mouth every 6 (six) hours as needed (nausea or vomiting). 30 tablet 0    oxybutynin (DITROPAN-XL) 5 MG TR24 Take 1 tablet (5 mg total) by mouth once daily. 90 tablet 3    perfluorohexyloctane, PF, (MIEBO) 100 % Drop Apply 1 drop to eye 4 (four) times daily. 3 mL 12    prednisoLONE acetate (PRED FORTE) 1 % DrpS Place 1 drop into both eyes 4 (four) times daily. 5 mL 1    rosuvastatin (CRESTOR) 10 MG tablet TAKE ONE TABLET BY MOUTH EVERY DAY 90 tablet 3     No current facility-administered medications for this visit.       Review of Systems   Constitutional:  Negative for chills, fatigue, fever and unexpected weight change.   HENT:  Negative for hearing loss and trouble swallowing.    Eyes:  Negative for photophobia and visual disturbance.   Respiratory:  Negative for cough, shortness of breath and wheezing.    Cardiovascular:  Negative for chest pain, palpitations and leg swelling.   Gastrointestinal:  Negative for abdominal pain and nausea.   Genitourinary:  Negative for dysuria and frequency.   Musculoskeletal:  Negative for arthralgias, back pain, gait problem, joint swelling, myalgias and neck pain.   Skin:  Negative for rash and wound.   Neurological:  Negative for tremors, seizures, weakness, numbness and headaches.   Hematological:  Does not bruise/bleed easily.         Objective:        Physical Exam:   Foot Exam    General  General Appearance: appears stated age and healthy   Orientation: alert and oriented to person, place, and time   Affect: appropriate   Gait: unimpaired       Left Foot/Ankle      Inspection and Palpation  Ecchymosis:  none  Tenderness: (Mild tenderness lateral border great toenail)  Swelling: none   Arch: pes cavus  Hammertoes: absent  Claw toes: absent  Hallux valgus: no  Hallux limitus: yes  Skin Exam: skin intact; no drainage, no ulcer and no erythema   Neurovascular  Dorsalis pedis: 2+  Posterior tibial: 2+  Capillary refill: 2+  Varicose veins: not present  Saphenous nerve sensation: normal  Tibial nerve sensation: normal  Superficial peroneal nerve sensation: normal  Deep peroneal nerve sensation: normal  Sural nerve sensation: normal    Muscle Strength  Ankle dorsiflexion: 5  Ankle plantar flexion: 5  Ankle inversion: 5  Ankle eversion: 5  Great toe extension: 5  Great toe flexion: 5    Range of Motion    Normal left ankle ROM    Tests  Anterior drawer: negative   Talar tilt: negative   PT Tinel's sign: negative  Paresthesia: negative  Comments  Mild hallux interphalangeus deformity great toe abutting the 2nd toe    Slight incurvation of the distal lateral border great toenail.  Tender to palpation.  No erythema.  Drainage.    Physical Exam  Cardiovascular:      Pulses:           Dorsalis pedis pulses are 2+ on the left side.        Posterior tibial pulses are 2+ on the left side.   Musculoskeletal:      Left foot: No bunion.   Feet:      Left foot:      Skin integrity: No ulcer or erythema.               Left Ankle/Foot Exam     Range of Motion   The patient has normal left ankle ROM.     Comments:  Mild hallux interphalangeus deformity great toe abutting the 2nd toe    Slight incurvation of the distal lateral border great toenail.  Tender to palpation.  No erythema.  Drainage.      Muscle Strength   Left Lower Extremity   Ankle Dorsiflexion:  5   Plantar flexion:  5/5     Vascular Exam       Left Pulses  Dorsalis Pedis:      2+  Posterior Tibial:      2+           Imaging: X-Ray Foot Complete Left  Narrative: EXAMINATION:  XR FOOT COMPLETE 3 VIEW LEFT    CLINICAL HISTORY:  .  Pain in left foot    TECHNIQUE:  AP, lateral  and oblique views of the left foot were performed.    COMPARISON:  None    FINDINGS:  There are degenerative changes of the left foot without evidence of fracture or dislocation.  The adjacent soft tissues are unremarkable.  Impression: There are degenerative changes of the left foot    Electronically signed by: Sheron Godoy MD  Date:    09/23/2024  Time:    15:10               Assessment:       1. Hallux limitus of left foot    2. Ingrown nail    3. Left foot pain    4. Pain of toe of left foot    5. Acquired hallux interphalangeus of left foot      Plan:   Hallux limitus of left foot    Ingrown nail    Left foot pain  -     X-Ray Foot Complete Left    Pain of toe of left foot    Acquired hallux interphalangeus of left foot      Follow up if symptoms worsen or fail to improve.    Procedures        I discussed with the patient the incurvation of the lateral border great toenail due to pressure between the 1st 2nd toes.  Explained this accommodation of his hallux limitus and hallux interphalangeus deformities.  I did trimmed back the lateral border.  Patient tolerated well.  Discussed proper shoe gear to help limit stress.  Also discussed toe separator to help limit pressure between the toes.    Counseling:     I provided patient education verbally regarding:   Patient diagnosis, treatment options, as well as alternatives, risks, and benefits.     This note was created using Dragon voice recognition software that occasionally misinterpreted phrases or words.

## 2024-09-24 NOTE — PATIENT INSTRUCTIONS

## 2024-09-27 ENCOUNTER — LAB VISIT (OUTPATIENT)
Dept: LAB | Facility: HOSPITAL | Age: 74
End: 2024-09-27
Attending: UROLOGY
Payer: MEDICARE

## 2024-09-27 DIAGNOSIS — C61 PROSTATE CANCER: ICD-10-CM

## 2024-09-27 LAB — COMPLEXED PSA SERPL-MCNC: 6 NG/ML (ref 0–4)

## 2024-09-27 PROCEDURE — 36415 COLL VENOUS BLD VENIPUNCTURE: CPT | Mod: PO | Performed by: UROLOGY

## 2024-09-27 PROCEDURE — 84153 ASSAY OF PSA TOTAL: CPT | Performed by: UROLOGY

## 2024-10-02 DIAGNOSIS — E78.5 HYPERLIPIDEMIA ASSOCIATED WITH TYPE 2 DIABETES MELLITUS: ICD-10-CM

## 2024-10-02 DIAGNOSIS — E11.69 HYPERLIPIDEMIA ASSOCIATED WITH TYPE 2 DIABETES MELLITUS: ICD-10-CM

## 2024-10-02 DIAGNOSIS — I15.2 HYPERTENSION ASSOCIATED WITH DIABETES: ICD-10-CM

## 2024-10-02 DIAGNOSIS — R80.9 CONTROLLED TYPE 2 DIABETES MELLITUS WITH MICROALBUMINURIA, WITHOUT LONG-TERM CURRENT USE OF INSULIN: ICD-10-CM

## 2024-10-02 DIAGNOSIS — E11.59 HYPERTENSION ASSOCIATED WITH DIABETES: ICD-10-CM

## 2024-10-02 DIAGNOSIS — E11.29 CONTROLLED TYPE 2 DIABETES MELLITUS WITH MICROALBUMINURIA, WITHOUT LONG-TERM CURRENT USE OF INSULIN: ICD-10-CM

## 2024-10-02 NOTE — TELEPHONE ENCOUNTER
Care Due:                  Date            Visit Type   Department     Provider  --------------------------------------------------------------------------------                                EP -                              PRIMARY      [facility]  Last Visit: 05-      CARE ([facility])   PRACTICE       Herrera Fisher                               -                              PRIMARY      [facility]  Next Visit: 11-      CARE ([facility])   PRACTICE       Herrera Garcia  Test          Frequency    Reason                     Performed    Due Date  --------------------------------------------------------------------------------    HBA1C.......  6 months...  metFORMIN................  05- 11-    [employer] Embedded Care Due Messages. Reference number: [id number].   10/02/2024 10:37:21 AM CDT

## 2024-10-03 RX ORDER — METFORMIN HYDROCHLORIDE 500 MG/1
TABLET, EXTENDED RELEASE ORAL
Qty: 360 TABLET | Refills: 0 | Status: SHIPPED | OUTPATIENT
Start: 2024-10-03

## 2024-10-03 RX ORDER — ROSUVASTATIN CALCIUM 10 MG/1
TABLET, COATED ORAL
Qty: 90 TABLET | Refills: 2 | Status: SHIPPED | OUTPATIENT
Start: 2024-10-03

## 2024-10-03 NOTE — TELEPHONE ENCOUNTER
Refill Routing Note   Medication(s) are not appropriate for processing by [facility] for the following reason(s):        No active prescription written by provider: decreased to 12.5 mg QD 5/21/24    [facility] action(s):  Defer     Requires labs : Yes - A1C due 11/21/24   ED visit 6/17/24 for symptomatic cholelithiasis. No changes to therapy or follow up recommended.          Appointments  past 12m or future 3m with PCP    Date Provider   Last Visit   5/21/2024 Herrera Fisher MD   Next Visit   11/21/2024 Herrera Fisher MD   ED visits in past 90 days: 0        Note composed:10:43 AM 10/03/2024

## 2024-10-04 RX ORDER — LOSARTAN POTASSIUM 25 MG/1
12.5 TABLET ORAL DAILY
Qty: 45 TABLET | Refills: 3 | Status: SHIPPED | OUTPATIENT
Start: 2024-10-04

## 2024-11-14 ENCOUNTER — TELEPHONE (OUTPATIENT)
Dept: FAMILY MEDICINE | Facility: CLINIC | Age: 74
End: 2024-11-14
Payer: MEDICARE

## 2024-11-14 NOTE — TELEPHONE ENCOUNTER
----- Message from Nedra sent at 11/14/2024  3:03 PM CST -----  Contact: PT  Type: Needs Medical Advice    Who Called: PT  Best Call Back Number: [phone]  Additional  Information: PT requesting a call  back regarding appt on 11/21 will npt be able to make it. Due to he has to be at work Can do  12/18,12/19,  or  12/20  Please Advise- Thank you

## 2024-12-06 ENCOUNTER — TELEPHONE (OUTPATIENT)
Dept: FAMILY MEDICINE | Facility: CLINIC | Age: 74
End: 2024-12-06
Payer: MEDICARE

## 2024-12-06 NOTE — TELEPHONE ENCOUNTER
Spoke to pt and informed Dr. Lang is not accepting patients at this time. Scheduled pt appt to establish care with Dr. Hooper

## 2024-12-06 NOTE — TELEPHONE ENCOUNTER
----- Message from Linmoustapha sent at 12/6/2024  4:21 PM CST -----  Regarding: appt  Type:  Sooner Apoointment Request      Name of Caller:pt    When is the first available appointment?03/12/25    Symptoms:post op      Best Call Back Number:[phone]      Additional Information: pt is currently a pt of dr rodriguez, buts wants to transfer care to dr shaw due to michael retiring. He has a sx schedule for 02/06/25 in will needs to be seen for sx clearance before then.  please call to discuss.

## 2024-12-12 ENCOUNTER — OFFICE VISIT (OUTPATIENT)
Dept: FAMILY MEDICINE | Facility: CLINIC | Age: 74
End: 2024-12-12
Payer: MEDICARE

## 2024-12-12 ENCOUNTER — LAB VISIT (OUTPATIENT)
Dept: LAB | Facility: HOSPITAL | Age: 74
End: 2024-12-12
Attending: STUDENT IN AN ORGANIZED HEALTH CARE EDUCATION/TRAINING PROGRAM
Payer: MEDICARE

## 2024-12-12 VITALS
OXYGEN SATURATION: 97 % | WEIGHT: 160.5 LBS | SYSTOLIC BLOOD PRESSURE: 114 MMHG | HEIGHT: 66 IN | HEART RATE: 71 BPM | BODY MASS INDEX: 25.79 KG/M2 | RESPIRATION RATE: 16 BRPM | DIASTOLIC BLOOD PRESSURE: 58 MMHG

## 2024-12-12 DIAGNOSIS — E78.5 HYPERLIPIDEMIA ASSOCIATED WITH TYPE 2 DIABETES MELLITUS: ICD-10-CM

## 2024-12-12 DIAGNOSIS — H26.9 CATARACT OF BOTH EYES, UNSPECIFIED CATARACT TYPE: ICD-10-CM

## 2024-12-12 DIAGNOSIS — R35.1 BENIGN PROSTATIC HYPERPLASIA WITH NOCTURIA: ICD-10-CM

## 2024-12-12 DIAGNOSIS — D64.9 ANEMIA, UNSPECIFIED TYPE: ICD-10-CM

## 2024-12-12 DIAGNOSIS — I15.2 HYPERTENSION ASSOCIATED WITH DIABETES: ICD-10-CM

## 2024-12-12 DIAGNOSIS — E11.29 CONTROLLED TYPE 2 DIABETES MELLITUS WITH MICROALBUMINURIA, WITHOUT LONG-TERM CURRENT USE OF INSULIN: Primary | ICD-10-CM

## 2024-12-12 DIAGNOSIS — R80.9 CONTROLLED TYPE 2 DIABETES MELLITUS WITH MICROALBUMINURIA, WITHOUT LONG-TERM CURRENT USE OF INSULIN: ICD-10-CM

## 2024-12-12 DIAGNOSIS — E11.69 HYPERLIPIDEMIA ASSOCIATED WITH TYPE 2 DIABETES MELLITUS: ICD-10-CM

## 2024-12-12 DIAGNOSIS — E11.29 CONTROLLED TYPE 2 DIABETES MELLITUS WITH MICROALBUMINURIA, WITHOUT LONG-TERM CURRENT USE OF INSULIN: ICD-10-CM

## 2024-12-12 DIAGNOSIS — R80.9 CONTROLLED TYPE 2 DIABETES MELLITUS WITH MICROALBUMINURIA, WITHOUT LONG-TERM CURRENT USE OF INSULIN: Primary | ICD-10-CM

## 2024-12-12 DIAGNOSIS — N40.1 BENIGN PROSTATIC HYPERPLASIA WITH NOCTURIA: ICD-10-CM

## 2024-12-12 DIAGNOSIS — I70.0 ATHEROSCLEROSIS OF AORTA: ICD-10-CM

## 2024-12-12 DIAGNOSIS — C61 PROSTATE CANCER: ICD-10-CM

## 2024-12-12 DIAGNOSIS — E11.59 HYPERTENSION ASSOCIATED WITH DIABETES: ICD-10-CM

## 2024-12-12 LAB
ALBUMIN SERPL BCP-MCNC: 4.3 G/DL (ref 3.5–5.2)
ALP SERPL-CCNC: 49 U/L (ref 40–150)
ALT SERPL W/O P-5'-P-CCNC: 15 U/L (ref 10–44)
ANION GAP SERPL CALC-SCNC: 10 MMOL/L (ref 8–16)
AST SERPL-CCNC: 22 U/L (ref 10–40)
BASOPHILS # BLD AUTO: 0.04 K/UL (ref 0–0.2)
BASOPHILS NFR BLD: 0.6 % (ref 0–1.9)
BILIRUB SERPL-MCNC: 0.9 MG/DL (ref 0.1–1)
BUN SERPL-MCNC: 20 MG/DL (ref 8–23)
CALCIUM SERPL-MCNC: 10.7 MG/DL (ref 8.7–10.5)
CHLORIDE SERPL-SCNC: 103 MMOL/L (ref 95–110)
CHOLEST SERPL-MCNC: 113 MG/DL (ref 120–199)
CHOLEST/HDLC SERPL: 2.8 {RATIO} (ref 2–5)
CO2 SERPL-SCNC: 26 MMOL/L (ref 23–29)
CREAT SERPL-MCNC: 1.4 MG/DL (ref 0.5–1.4)
DIFFERENTIAL METHOD BLD: ABNORMAL
EOSINOPHIL # BLD AUTO: 0.1 K/UL (ref 0–0.5)
EOSINOPHIL NFR BLD: 1.3 % (ref 0–8)
ERYTHROCYTE [DISTWIDTH] IN BLOOD BY AUTOMATED COUNT: 12.6 % (ref 11.5–14.5)
EST. GFR  (NO RACE VARIABLE): 52.7 ML/MIN/1.73 M^2
ESTIMATED AVG GLUCOSE: 189 MG/DL (ref 68–131)
GLUCOSE SERPL-MCNC: 169 MG/DL (ref 70–110)
HBA1C MFR BLD: 8.2 % (ref 4–5.6)
HCT VFR BLD AUTO: 41.9 % (ref 40–54)
HDLC SERPL-MCNC: 41 MG/DL (ref 40–75)
HDLC SERPL: 36.3 % (ref 20–50)
HGB BLD-MCNC: 13.8 G/DL (ref 14–18)
IMM GRANULOCYTES # BLD AUTO: 0.02 K/UL (ref 0–0.04)
IMM GRANULOCYTES NFR BLD AUTO: 0.3 % (ref 0–0.5)
LDLC SERPL CALC-MCNC: 50.6 MG/DL (ref 63–159)
LYMPHOCYTES # BLD AUTO: 1.7 K/UL (ref 1–4.8)
LYMPHOCYTES NFR BLD: 27.6 % (ref 18–48)
MCH RBC QN AUTO: 29.9 PG (ref 27–31)
MCHC RBC AUTO-ENTMCNC: 32.9 G/DL (ref 32–36)
MCV RBC AUTO: 91 FL (ref 82–98)
MONOCYTES # BLD AUTO: 0.6 K/UL (ref 0.3–1)
MONOCYTES NFR BLD: 9.4 % (ref 4–15)
NEUTROPHILS # BLD AUTO: 3.8 K/UL (ref 1.8–7.7)
NEUTROPHILS NFR BLD: 60.8 % (ref 38–73)
NONHDLC SERPL-MCNC: 72 MG/DL
NRBC BLD-RTO: 0 /100 WBC
PLATELET # BLD AUTO: 225 K/UL (ref 150–450)
PMV BLD AUTO: 11.5 FL (ref 9.2–12.9)
POTASSIUM SERPL-SCNC: 5.2 MMOL/L (ref 3.5–5.1)
PROT SERPL-MCNC: 7.6 G/DL (ref 6–8.4)
RBC # BLD AUTO: 4.61 M/UL (ref 4.6–6.2)
SODIUM SERPL-SCNC: 139 MMOL/L (ref 136–145)
TRIGL SERPL-MCNC: 107 MG/DL (ref 30–150)
WBC # BLD AUTO: 6.3 K/UL (ref 3.9–12.7)

## 2024-12-12 PROCEDURE — 3078F DIAST BP <80 MM HG: CPT | Mod: CPTII,S$GLB,, | Performed by: STUDENT IN AN ORGANIZED HEALTH CARE EDUCATION/TRAINING PROGRAM

## 2024-12-12 PROCEDURE — 4010F ACE/ARB THERAPY RXD/TAKEN: CPT | Mod: CPTII,S$GLB,, | Performed by: STUDENT IN AN ORGANIZED HEALTH CARE EDUCATION/TRAINING PROGRAM

## 2024-12-12 PROCEDURE — 83036 HEMOGLOBIN GLYCOSYLATED A1C: CPT | Performed by: STUDENT IN AN ORGANIZED HEALTH CARE EDUCATION/TRAINING PROGRAM

## 2024-12-12 PROCEDURE — 80053 COMPREHEN METABOLIC PANEL: CPT | Performed by: STUDENT IN AN ORGANIZED HEALTH CARE EDUCATION/TRAINING PROGRAM

## 2024-12-12 PROCEDURE — 3074F SYST BP LT 130 MM HG: CPT | Mod: CPTII,S$GLB,, | Performed by: STUDENT IN AN ORGANIZED HEALTH CARE EDUCATION/TRAINING PROGRAM

## 2024-12-12 PROCEDURE — G2211 COMPLEX E/M VISIT ADD ON: HCPCS | Mod: S$GLB,,, | Performed by: STUDENT IN AN ORGANIZED HEALTH CARE EDUCATION/TRAINING PROGRAM

## 2024-12-12 PROCEDURE — 80061 LIPID PANEL: CPT | Performed by: STUDENT IN AN ORGANIZED HEALTH CARE EDUCATION/TRAINING PROGRAM

## 2024-12-12 PROCEDURE — 3060F POS MICROALBUMINURIA REV: CPT | Mod: CPTII,S$GLB,, | Performed by: STUDENT IN AN ORGANIZED HEALTH CARE EDUCATION/TRAINING PROGRAM

## 2024-12-12 PROCEDURE — 99999 PR PBB SHADOW E&M-EST. PATIENT-LVL IV: CPT | Mod: PBBFAC,,, | Performed by: STUDENT IN AN ORGANIZED HEALTH CARE EDUCATION/TRAINING PROGRAM

## 2024-12-12 PROCEDURE — 99214 OFFICE O/P EST MOD 30 MIN: CPT | Mod: S$GLB,,, | Performed by: STUDENT IN AN ORGANIZED HEALTH CARE EDUCATION/TRAINING PROGRAM

## 2024-12-12 PROCEDURE — 3051F HG A1C>EQUAL 7.0%<8.0%: CPT | Mod: CPTII,S$GLB,, | Performed by: STUDENT IN AN ORGANIZED HEALTH CARE EDUCATION/TRAINING PROGRAM

## 2024-12-12 PROCEDURE — 3288F FALL RISK ASSESSMENT DOCD: CPT | Mod: CPTII,S$GLB,, | Performed by: STUDENT IN AN ORGANIZED HEALTH CARE EDUCATION/TRAINING PROGRAM

## 2024-12-12 PROCEDURE — 1101F PT FALLS ASSESS-DOCD LE1/YR: CPT | Mod: CPTII,S$GLB,, | Performed by: STUDENT IN AN ORGANIZED HEALTH CARE EDUCATION/TRAINING PROGRAM

## 2024-12-12 PROCEDURE — 1126F AMNT PAIN NOTED NONE PRSNT: CPT | Mod: CPTII,S$GLB,, | Performed by: STUDENT IN AN ORGANIZED HEALTH CARE EDUCATION/TRAINING PROGRAM

## 2024-12-12 PROCEDURE — 3008F BODY MASS INDEX DOCD: CPT | Mod: CPTII,S$GLB,, | Performed by: STUDENT IN AN ORGANIZED HEALTH CARE EDUCATION/TRAINING PROGRAM

## 2024-12-12 PROCEDURE — 1159F MED LIST DOCD IN RCRD: CPT | Mod: CPTII,S$GLB,, | Performed by: STUDENT IN AN ORGANIZED HEALTH CARE EDUCATION/TRAINING PROGRAM

## 2024-12-12 PROCEDURE — 36415 COLL VENOUS BLD VENIPUNCTURE: CPT | Mod: PO | Performed by: STUDENT IN AN ORGANIZED HEALTH CARE EDUCATION/TRAINING PROGRAM

## 2024-12-12 PROCEDURE — 1160F RVW MEDS BY RX/DR IN RCRD: CPT | Mod: CPTII,S$GLB,, | Performed by: STUDENT IN AN ORGANIZED HEALTH CARE EDUCATION/TRAINING PROGRAM

## 2024-12-12 PROCEDURE — 85025 COMPLETE CBC W/AUTO DIFF WBC: CPT | Performed by: STUDENT IN AN ORGANIZED HEALTH CARE EDUCATION/TRAINING PROGRAM

## 2024-12-12 PROCEDURE — 3066F NEPHROPATHY DOC TX: CPT | Mod: CPTII,S$GLB,, | Performed by: STUDENT IN AN ORGANIZED HEALTH CARE EDUCATION/TRAINING PROGRAM

## 2024-12-12 RX ORDER — CYCLOSPORINE 0.5 MG/ML
EMULSION OPHTHALMIC
COMMUNITY
Start: 2024-12-10

## 2024-12-12 NOTE — PROGRESS NOTES
[facility]  Office Visit Note     SUBJECTIVE:   HPI: Ludwin Gee  is a 74 y.o. male here to [facility] care    Medical conditions:   HTN, diabetes, atherosclerosis of aorta, HLD, BPH, long covid, anemia, prostate cancer, colon polyp     Meds:  Rosuvastatin, oxybutynin, metformin 1000 mg BID, losartan 12.5 mg daily, famotidine, alfuzosin 10 mg with breakfast for BPH    Specialists:  - podiatry  - optometry   - urology (Dr. Millan) for prostate cancer   Last follow up March 2024; below is the notes from the visit    Wishes to continue with active surveillance.   - Continue Uroxatral 10 mg PO daily - refills ordered.   - RX for Ditropan 5 mg PO daily - refills ordered.   - PSA today.   - PSA in 6 months.   - RTC in 1 year with PSA, symptom score and PVR.     Social history:  Work on a boat      Health maintenance: declines all vaccines today       Lab Visit on 09/27/2024   Component Date Value Ref Range Status    PSA Diagnostic 09/27/2024 6.0 (H)  0.00 - 4.00 ng/mL Final   Admission on 06/24/2024, Discharged on 06/24/2024   Component Date Value Ref Range Status    POC Glucose 06/24/2024 149 (H)  70 - 110 Final    POC Glucose 06/24/2024 161 (H)  70 - 110 Final   Admission on 06/17/2024, Discharged on 06/17/2024   Component Date Value Ref Range Status    Magnesium 06/17/2024 1.7  1.6 - 2.6 mg/dL Final    QRS Duration 06/17/2024 84  ms Final    [facility] QTC Calculation 06/17/2024 385  ms Final    WBC 06/17/2024 10.88  3.90 - 12.70 K/uL Final    RBC 06/17/2024 4.41 (L)  4.60 - 6.20 M/uL Final    Hemoglobin 06/17/2024 13.2 (L)  14.0 - 18.0 g/dL Final    Hematocrit 06/17/2024 40.0  40.0 - 54.0 % Final    MCV 06/17/2024 91  82 - 98 fL Final    MCH 06/17/2024 29.9  27.0 - 31.0 pg Final    MCHC 06/17/2024 33.0  32.0 - 36.0 g/dL Final    RDW 06/17/2024 12.3  11.5 - 14.5 % Final    Platelets 06/17/2024 201  150 - 450 K/uL Final    MPV 06/17/2024 11.2  9.2 - 12.9 fL Final    Immature Granulocytes 06/17/2024 0.5   0.0 - 0.5 % Final    Gran # (ANC) 06/17/2024 10.0 (H)  1.8 - 7.7 K/uL Final    Immature Grans (Abs) 06/17/2024 0.05 (H)  0.00 - 0.04 K/uL Final    Lymph # 06/17/2024 0.5 (L)  1.0 - 4.8 K/uL Final    Mono # 06/17/2024 0.3  0.3 - 1.0 K/uL Final    Eos # 06/17/2024 0.0  0.0 - 0.5 K/uL Final    Baso # 06/17/2024 0.01  0.00 - 0.20 K/uL Final    nRBC 06/17/2024 0  0 /100 WBC Final    Gran % 06/17/2024 91.6 (H)  38.0 - 73.0 % Final    Lymph % 06/17/2024 4.8 (L)  18.0 - 48.0 % Final    Mono % 06/17/2024 3.0 (L)  4.0 - 15.0 % Final    Eosinophil % 06/17/2024 0.0  0.0 - 8.0 % Final    Basophil % 06/17/2024 0.1  0.0 - 1.9 % Final    Differential Method 06/17/2024 Automated   Final    Sodium 06/17/2024 140  136 - 145 mmol/L Final    Potassium 06/17/2024 4.0  3.5 - 5.1 mmol/L Final    Chloride 06/17/2024 103  95 - 110 mmol/L Final    CO2 06/17/2024 27  23 - 29 mmol/L Final    Glucose 06/17/2024 273 (H)  70 - 110 mg/dL Final    BUN 06/17/2024 17  8 - 23 mg/dL Final    Creatinine 06/17/2024 1.2  0.5 - 1.4 mg/dL Final    Calcium 06/17/2024 9.7  8.7 - 10.5 mg/dL Final    Total Protein 06/17/2024 7.2  6.0 - 8.4 g/dL Final    Albumin 06/17/2024 4.5  3.5 - 5.2 g/dL Final    Total Bilirubin 06/17/2024 0.5  0.1 - 1.0 mg/dL Final    Alkaline Phosphatase 06/17/2024 41 (L)  55 - 135 U/L Final    AST 06/17/2024 15  10 - 40 U/L Final    ALT 06/17/2024 12  10 - 44 U/L Final    eGFR 06/17/2024 >60.0  >60 mL/min/1.73 m^2 Final    Anion Gap 06/17/2024 10  8 - 16 mmol/L Final    Troponin I High Sensitivity 06/17/2024 6.1  0.0 - 14.9 pg/mL Final    BNP 06/17/2024 137 (H)  0 - 99 pg/mL Final    Lipase 06/17/2024 29  4 - 60 U/L Final         Current Outpatient Medications on File Prior to Visit   Medication Sig Dispense Refill    alfuzosin (UROXATRAL) 10 mg Tb24 Take 1 tablet (10 mg total) by mouth daily with breakfast. 90 tablet 3    blood sugar diagnostic (ONETOUCH ULTRA TEST) Strp USE TO CHECK FASTING GLUCOSE IN THE MORNING 100 strip 3     blood-glucose meter Misc One Touch glucometer check fasting glucose in morning 1 each 0    losartan (COZAAR) 25 MG tablet Take 0.5 tablets (12.5 mg total) by mouth once daily. 45 tablet 3    metFORMIN (GLUCOPHAGE-XR) 500 MG ER 24hr tablet TAKE TWO TABLETS BY MOUTH TWO TIMES A DAY WITH MEALS 360 tablet 0    oxybutynin (DITROPAN-XL) 5 MG TR24 Take 1 tablet (5 mg total) by mouth once daily. 90 tablet 3    rosuvastatin (CRESTOR) 10 MG tablet TAKE ONE TABLET BY MOUTH EVERY DAY 90 tablet 2    cycloSPORINE (RESTASIS) 0.05 % ophthalmic emulsion  (Patient not taking: Reported on 12/12/2024)      [DISCONTINUED] coenzyme Q10 10 mg capsule Take 10 mg by mouth once daily. (Patient not taking: Reported on 12/12/2024)      [DISCONTINUED] famotidine (PEPCID) 20 MG tablet Take 20 mg by mouth once daily. (Patient not taking: Reported on 12/12/2024)      [DISCONTINUED] HYDROcodone-acetaminophen (NORCO) 5-325 mg per tablet Take 1 tablet by mouth every 6 (six) hours as needed for Pain. (Patient not taking: Reported on 12/12/2024) 20 tablet 0    [DISCONTINUED] mupirocin (BACTROBAN) 2 % ointment Apply topically 3 (three) times daily. (Patient not taking: Reported on 12/12/2024) 22 g 1    [DISCONTINUED] ondansetron (ZOFRAN-ODT) 4 MG TbDL Take 1 tablet (4 mg total) by mouth every 6 (six) hours as needed (nausea or vomiting). (Patient not taking: Reported on 12/12/2024) 30 tablet 0    [DISCONTINUED] perfluorohexyloctane, PF, (MIEBO) 100 % Drop Apply 1 drop to eye 4 (four) times daily. (Patient not taking: Reported on 12/12/2024) 3 mL 12    [DISCONTINUED] prednisoLONE acetate (PRED FORTE) 1 % DrpS Place 1 drop into both eyes 4 (four) times daily. (Patient not taking: Reported on 12/12/2024) 5 mL 1     No current facility-administered medications on file prior to visit.     Past Medical History:   Diagnosis Date    BPH with elevated PSA     Diabetes mellitus     Digestive disorder     COLON POLYPS    Elevated PSA     Hyperlipidemia      Hypertension     Personal history of colonic polyps     Prostate cancer 09/06/2022    [place] 3+3=6 Watchful waiting     Past Surgical History:   Procedure Laterality Date    COLONOSCOPY  12/6/2013    Dr. Martinez, 5 year recheck    COLONOSCOPY N/A 5/19/2023    Procedure: COLONOSCOPY;  Surgeon: Saúl Mayen MD;  Location: [facility] ENDO;  Service: Endoscopy;  Laterality: N/A;    LAPAROSCOPIC CHOLECYSTECTOMY N/A 6/24/2024    Procedure: CHOLECYSTECTOMY, LAPAROSCOPIC;  Surgeon: Mikhail Blanton Jr., MD;  Location: [facility];  Service: General;  Laterality: N/A;    SHOULDER SURGERY  12/09/2011    right     TRANSRECTAL BIOPSY OF PROSTATE WITH ULTRASOUND GUIDANCE N/A 8/24/2022    Procedure: BIOPSY, PROSTATE, RECTAL APPROACH, WITH US GUIDANCE;  Surgeon: Mora Millan Jr., MD;  Location: [facility];  Service: Urology;  Laterality: N/A;    XI ROBOTIC COLECTOMY, PARTIAL N/A 6/26/2023    Procedure: XI ROBOTIC COLECTOMY, PARTIAL;  Surgeon: Ismael Castro MD;  Location: [facility];  Service: General;  Laterality: N/A;     Past Surgical History:   Procedure Laterality Date    COLONOSCOPY  12/6/2013    Dr. Martinez, 5 year recheck    COLONOSCOPY N/A 5/19/2023    Procedure: COLONOSCOPY;  Surgeon: Saúl Mayen MD;  Location: [facility];  Service: Endoscopy;  Laterality: N/A;    LAPAROSCOPIC CHOLECYSTECTOMY N/A 6/24/2024    Procedure: CHOLECYSTECTOMY, LAPAROSCOPIC;  Surgeon: Mikhail Blanton Jr., MD;  Location: [facility];  Service: General;  Laterality: N/A;    SHOULDER SURGERY  12/09/2011    right     TRANSRECTAL BIOPSY OF PROSTATE WITH ULTRASOUND GUIDANCE N/A 8/24/2022    Procedure: BIOPSY, PROSTATE, RECTAL APPROACH, WITH US GUIDANCE;  Surgeon: Mora Millan Jr., MD;  Location: [facility];  Service: Urology;  Laterality: N/A;    XI ROBOTIC COLECTOMY, PARTIAL N/A 6/26/2023    Procedure: XI ROBOTIC COLECTOMY, PARTIAL;  Surgeon: Ismael Castro MD;  Location: [facility];  Service: General;  Laterality: N/A;     Family History    Problem Relation Name Age of Onset    Diabetes Father      Heart disease Father      Cataracts Mother      Glaucoma Mother      Diabetes Maternal Grandmother      No Known Problems Brother      No Known Problems Daughter      No Known Problems Son      No Known Problems Maternal Grandfather      No Known Problems Paternal Grandmother      No Known Problems Paternal Grandfather      No Known Problems Maternal Aunt      No Known Problems Maternal Uncle      No Known Problems Paternal Aunt      No Known Problems Paternal Uncle      Urolithiasis Neg Hx      Prostate cancer Neg Hx      Kidney cancer Neg Hx      Retinal detachment Neg Hx      Macular degeneration Neg Hx         Marital Status:   Alcohol History:  reports current alcohol use.  Tobacco History:  reports that he quit smoking about 39 years ago. His smoking use included cigarettes. He has been exposed to tobacco smoke. He has never used smokeless tobacco.  Drug History:  reports no history of drug use.    Health Maintenance Topics with due status: Not Due       Topic Last Completion Date    TETANUS VACCINE 05/03/2018    Colorectal Cancer Screening 05/19/2023    Diabetes Urine Screening 05/24/2024    Lipid Panel 05/24/2024    PROSTATE-SPECIFIC ANTIGEN 09/27/2024    Eye Exam 10/23/2024    High Dose Statin 12/12/2024     Immunization History   Administered Date(s) Administered    Pneumococcal Conjugate - 13 Valent 03/28/2016    Pneumococcal Polysaccharide - 23 Valent 06/27/2013    Td - PF (ADULT) 05/03/2018       Review of patient's allergies indicates:   Allergen Reactions    Pravastatin Other (See Comments)     Joint pain    Lisinopril Other (See Comments)     Cough        Current Outpatient Medications:     alfuzosin (UROXATRAL) 10 mg Tb24, Take 1 tablet (10 mg total) by mouth daily with breakfast., Disp: 90 tablet, Rfl: 3    blood sugar diagnostic (ONETOUCH ULTRA TEST) Strp, USE TO CHECK FASTING GLUCOSE IN THE MORNING, Disp: 100 strip, Rfl: 3     "blood-glucose meter Misc, One Touch glucometer check fasting glucose in morning, Disp: 1 each, Rfl: 0    losartan (COZAAR) 25 MG tablet, Take 0.5 tablets (12.5 mg total) by mouth once daily., Disp: 45 tablet, Rfl: 3    metFORMIN (GLUCOPHAGE-XR) 500 MG ER 24hr tablet, TAKE TWO TABLETS BY MOUTH TWO TIMES A DAY WITH MEALS, Disp: 360 tablet, Rfl: 0    oxybutynin (DITROPAN-XL) 5 MG TR24, Take 1 tablet (5 mg total) by mouth once daily., Disp: 90 tablet, Rfl: 3    rosuvastatin (CRESTOR) 10 MG tablet, TAKE ONE TABLET BY MOUTH EVERY DAY, Disp: 90 tablet, Rfl: 2    cycloSPORINE (RESTASIS) 0.05 % ophthalmic emulsion, , Disp: , Rfl:     Review of Systems   Constitutional:  Negative for chills and fever.   Respiratory:  Negative for shortness of breath.    Cardiovascular:  Negative for chest pain.   Gastrointestinal:  Negative for blood in stool, change in bowel habit, constipation, diarrhea, nausea and vomiting.   Genitourinary:  Negative for hematuria.       OBJECTIVE:      Vitals:    12/12/24 1040 12/12/24 1110   BP: (!) 108/56 (!) 114/58   BP Location: Right arm Right arm   Patient Position: Sitting Sitting   Pulse: 71    Resp: 16    SpO2: 97%    Weight: 72.8 kg (160 lb 7.9 oz)    Height: 5' 6" (1.676 m)      Physical Exam  Constitutional:       General: He is not in acute distress.     Appearance: He is not ill-appearing or toxic-appearing.   HENT:      Head: Normocephalic and atraumatic.      Mouth/Throat:      Mouth: Mucous membranes are moist.      Pharynx: Uvula midline. No pharyngeal swelling.   Cardiovascular:      Rate and Rhythm: Normal rate and regular rhythm.   Pulmonary:      Effort: Pulmonary effort is normal. No tachypnea, bradypnea, accessory muscle usage, prolonged expiration or respiratory distress.      Breath sounds: Normal breath sounds. No stridor. No wheezing, rhonchi or rales.   Abdominal:      General: Bowel sounds are normal. There is no distension.      Palpations: Abdomen is soft.      Tenderness: " There is no abdominal tenderness. There is no guarding or rebound.   Neurological:      General: No focal deficit present.      Mental Status: He is alert.   Psychiatric:         Mood and Affect: Mood normal.         Behavior: Behavior normal.        Assessment:       1. Controlled type 2 diabetes mellitus with microalbuminuria, without long-term current use of insulin    2. Hypertension associated with diabetes    3. Hyperlipidemia associated with type 2 diabetes mellitus    4. Atherosclerosis of aorta    5. Benign prostatic hyperplasia with nocturia    6. Prostate cancer    7. Anemia, unspecified type    8. Cataract of both eyes, unspecified cataract type        Plan:       Controlled type 2 diabetes mellitus with microalbuminuria, without long-term current use of insulin  -     Hemoglobin A1C; Future; Expected date: 12/12/2024  -     Microalbumin/Creatinine Ratio, Urine; Future; Expected date: 12/12/2024  Current regimen: metformin 1000 mg BID  Last A1c 7.9  May add Actos if A1c still elevated - denies any ongoing swelling or history of CHF  Goal A1c 7.0  Stay away from GLP-1 agonist as patient does not wish to lose any weight     Hypertension associated with diabetes  -     Comprehensive Metabolic Panel; Future; Expected date: 12/12/2024  Current regimen: Losartan 12.5 mg daily  Reading here 114/58  Home /76  Continue the same medication but will d/c if BP persistently lower than 100/60 -- patient verbalized understanding     Hyperlipidemia associated with type 2 diabetes mellitus  -     Lipid Panel; Future; Expected date: 12/12/2024  Continue with rosuvastatin 10 mg daily     Atherosclerosis of aorta  -     Lipid Panel; Future; Expected date: 12/12/2024  Continue with rosuvastatin 10 mg daily     Benign prostatic hyperplasia with nocturia  Continue with alfuzosin  Continue to follow up urology     Prostate cancer  - Wishes to continue with active surveillance.   - Continue Uroxatral 10 mg PO daily   - RX  for Ditropan 5 mg PO daily  - RTC in 1 year with PSA, symptom score and PVR -- patient needs a follow up with urology in March 2025    Anemia, unspecified type  -     CBC Auto Differential; Future; Expected date: 12/12/2024    Cataract of both eyes, unspecified cataract type  May get cataract surgery in Feb 2025 -- advised to check for need for pre-op evaluation     Counseled on age and gender appropriate medical preventative services, including cancer screenings, immunizations, overall nutritional health, need for a consistent exercise regimen and an overall push towards maintaining a vigorous and active lifestyle.      Follow up with PA in 6 months and with me in 1 year  Sooner appointment if indicated/needed -- arlette with pre-op eval if indicated    Amber Hooper M.D.  12/12/2024    This note was created using [employer] voice recognition software that occasionally misinterprets phrases or words

## 2024-12-13 ENCOUNTER — PATIENT MESSAGE (OUTPATIENT)
Dept: FAMILY MEDICINE | Facility: CLINIC | Age: 74
End: 2024-12-13
Payer: MEDICARE

## 2024-12-13 DIAGNOSIS — R80.9 CONTROLLED TYPE 2 DIABETES MELLITUS WITH MICROALBUMINURIA, WITHOUT LONG-TERM CURRENT USE OF INSULIN: Primary | ICD-10-CM

## 2024-12-13 DIAGNOSIS — E87.5 HYPERKALEMIA: ICD-10-CM

## 2024-12-13 DIAGNOSIS — E11.29 CONTROLLED TYPE 2 DIABETES MELLITUS WITH MICROALBUMINURIA, WITHOUT LONG-TERM CURRENT USE OF INSULIN: Primary | ICD-10-CM

## 2024-12-13 RX ORDER — PIOGLITAZONEHYDROCHLORIDE 15 MG/1
15 TABLET ORAL DAILY
Qty: 90 TABLET | Refills: 3 | Status: SHIPPED | OUTPATIENT
Start: 2024-12-13 | End: 2025-12-13

## 2024-12-16 ENCOUNTER — LAB VISIT (OUTPATIENT)
Dept: LAB | Facility: HOSPITAL | Age: 74
End: 2024-12-16
Attending: STUDENT IN AN ORGANIZED HEALTH CARE EDUCATION/TRAINING PROGRAM
Payer: MEDICARE

## 2024-12-16 DIAGNOSIS — E87.5 HYPERKALEMIA: ICD-10-CM

## 2024-12-16 LAB
ALBUMIN SERPL BCP-MCNC: 3.9 G/DL (ref 3.5–5.2)
ALP SERPL-CCNC: 38 U/L (ref 40–150)
ALT SERPL W/O P-5'-P-CCNC: 11 U/L (ref 10–44)
ANION GAP SERPL CALC-SCNC: 8 MMOL/L (ref 8–16)
AST SERPL-CCNC: 18 U/L (ref 10–40)
BILIRUB SERPL-MCNC: 0.6 MG/DL (ref 0.1–1)
BUN SERPL-MCNC: 14 MG/DL (ref 8–23)
CALCIUM SERPL-MCNC: 9.1 MG/DL (ref 8.7–10.5)
CHLORIDE SERPL-SCNC: 103 MMOL/L (ref 95–110)
CO2 SERPL-SCNC: 24 MMOL/L (ref 23–29)
CREAT SERPL-MCNC: 1.4 MG/DL (ref 0.5–1.4)
EST. GFR  (NO RACE VARIABLE): 52.7 ML/MIN/1.73 M^2
GLUCOSE SERPL-MCNC: 122 MG/DL (ref 70–110)
MAGNESIUM SERPL-MCNC: 1.4 MG/DL (ref 1.6–2.6)
POTASSIUM SERPL-SCNC: 4.2 MMOL/L (ref 3.5–5.1)
PROT SERPL-MCNC: 6.7 G/DL (ref 6–8.4)
SODIUM SERPL-SCNC: 135 MMOL/L (ref 136–145)

## 2024-12-16 PROCEDURE — 83735 ASSAY OF MAGNESIUM: CPT | Performed by: STUDENT IN AN ORGANIZED HEALTH CARE EDUCATION/TRAINING PROGRAM

## 2024-12-16 PROCEDURE — 80053 COMPREHEN METABOLIC PANEL: CPT | Performed by: STUDENT IN AN ORGANIZED HEALTH CARE EDUCATION/TRAINING PROGRAM

## 2024-12-16 PROCEDURE — 36415 COLL VENOUS BLD VENIPUNCTURE: CPT | Mod: PO | Performed by: STUDENT IN AN ORGANIZED HEALTH CARE EDUCATION/TRAINING PROGRAM

## 2024-12-18 ENCOUNTER — PATIENT MESSAGE (OUTPATIENT)
Dept: FAMILY MEDICINE | Facility: CLINIC | Age: 74
End: 2024-12-18
Payer: MEDICARE

## 2024-12-18 DIAGNOSIS — E83.42 HYPOMAGNESEMIA: Primary | ICD-10-CM

## 2024-12-18 RX ORDER — LANOLIN ALCOHOL/MO/W.PET/CERES
400 CREAM (GRAM) TOPICAL 2 TIMES DAILY
Qty: 20 TABLET | Refills: 0 | Status: SHIPPED | OUTPATIENT
Start: 2024-12-18 | End: 2024-12-28

## 2025-01-24 ENCOUNTER — TELEPHONE (OUTPATIENT)
Dept: FAMILY MEDICINE | Facility: CLINIC | Age: 75
End: 2025-01-24
Payer: MEDICARE

## 2025-01-24 NOTE — TELEPHONE ENCOUNTER
----- Message from Herrera sent at 1/24/2025 11:58 AM CST -----  Contact: Emily  Type:  Sooner Appointment Request    Caller is requesting a sooner appointment.  Caller declined first available appointment listed below.  Caller will not accept being placed on the waitlist and is requesting a message be sent to doctor.    Name of Caller:  Emily - Wife  When is the first available appointment?  NA  Symptoms:  NA  Would the patient rather a call back or a response via [employer]? Call Back  Best Call Back Number:  [phone] or [phone]  Additional Information:  Emily is requesting a call back, the patient is having an eye procedure on Feb 6th, and is needing a pre op visit, and asking for any day next week.

## 2025-01-28 ENCOUNTER — OFFICE VISIT (OUTPATIENT)
Dept: FAMILY MEDICINE | Facility: CLINIC | Age: 75
End: 2025-01-28
Payer: MEDICARE

## 2025-01-28 ENCOUNTER — LAB VISIT (OUTPATIENT)
Dept: LAB | Facility: HOSPITAL | Age: 75
End: 2025-01-28
Attending: STUDENT IN AN ORGANIZED HEALTH CARE EDUCATION/TRAINING PROGRAM
Payer: MEDICARE

## 2025-01-28 VITALS
WEIGHT: 162.5 LBS | HEIGHT: 66 IN | SYSTOLIC BLOOD PRESSURE: 116 MMHG | RESPIRATION RATE: 16 BRPM | BODY MASS INDEX: 26.12 KG/M2 | OXYGEN SATURATION: 97 % | TEMPERATURE: 98 F | HEART RATE: 79 BPM | DIASTOLIC BLOOD PRESSURE: 66 MMHG

## 2025-01-28 DIAGNOSIS — R80.9 CONTROLLED TYPE 2 DIABETES MELLITUS WITH MICROALBUMINURIA, WITHOUT LONG-TERM CURRENT USE OF INSULIN: ICD-10-CM

## 2025-01-28 DIAGNOSIS — E83.42 HYPOMAGNESEMIA: ICD-10-CM

## 2025-01-28 DIAGNOSIS — H26.9 CATARACT OF BOTH EYES, UNSPECIFIED CATARACT TYPE: Primary | ICD-10-CM

## 2025-01-28 DIAGNOSIS — E11.59 HYPERTENSION ASSOCIATED WITH DIABETES: ICD-10-CM

## 2025-01-28 DIAGNOSIS — N18.31 STAGE 3A CHRONIC KIDNEY DISEASE: ICD-10-CM

## 2025-01-28 DIAGNOSIS — I15.2 HYPERTENSION ASSOCIATED WITH DIABETES: ICD-10-CM

## 2025-01-28 DIAGNOSIS — E11.29 CONTROLLED TYPE 2 DIABETES MELLITUS WITH MICROALBUMINURIA, WITHOUT LONG-TERM CURRENT USE OF INSULIN: ICD-10-CM

## 2025-01-28 LAB — MAGNESIUM SERPL-MCNC: 1.7 MG/DL (ref 1.6–2.6)

## 2025-01-28 PROCEDURE — 99999 PR PBB SHADOW E&M-EST. PATIENT-LVL III: CPT | Mod: PBBFAC,,,

## 2025-01-28 PROCEDURE — 36415 COLL VENOUS BLD VENIPUNCTURE: CPT | Mod: PO | Performed by: STUDENT IN AN ORGANIZED HEALTH CARE EDUCATION/TRAINING PROGRAM

## 2025-01-28 PROCEDURE — 1101F PT FALLS ASSESS-DOCD LE1/YR: CPT | Mod: CPTII,S$GLB,,

## 2025-01-28 PROCEDURE — 83735 ASSAY OF MAGNESIUM: CPT | Performed by: STUDENT IN AN ORGANIZED HEALTH CARE EDUCATION/TRAINING PROGRAM

## 2025-01-28 PROCEDURE — 3074F SYST BP LT 130 MM HG: CPT | Mod: CPTII,S$GLB,,

## 2025-01-28 PROCEDURE — 1126F AMNT PAIN NOTED NONE PRSNT: CPT | Mod: CPTII,S$GLB,,

## 2025-01-28 PROCEDURE — 1160F RVW MEDS BY RX/DR IN RCRD: CPT | Mod: CPTII,S$GLB,,

## 2025-01-28 PROCEDURE — 1159F MED LIST DOCD IN RCRD: CPT | Mod: CPTII,S$GLB,,

## 2025-01-28 PROCEDURE — G2211 COMPLEX E/M VISIT ADD ON: HCPCS | Mod: S$GLB,,,

## 2025-01-28 PROCEDURE — 3078F DIAST BP <80 MM HG: CPT | Mod: CPTII,S$GLB,,

## 2025-01-28 PROCEDURE — 99214 OFFICE O/P EST MOD 30 MIN: CPT | Mod: S$GLB,,,

## 2025-01-28 PROCEDURE — 3288F FALL RISK ASSESSMENT DOCD: CPT | Mod: CPTII,S$GLB,,

## 2025-01-28 NOTE — PROGRESS NOTES
Subjective:       Patient ID:  [id number]     Chief Complaint: Pre-op Exam      History of Present Illness    Mr. Gee presents today for pre-operative evaluation for right eye cataract surgery. He reports difficulty with night driving due to bright LED lights, which he attributes to his cataracts. He denies any other changes in vision. He has a history of diabetes with elevated A1C and chronic kidney disease with decreased kidney function. His surgical history includes robotic cholecystectomy and a colon procedure, both with good recovery and no issues. His daughter has Factor V clotting disorder.        Surgery will be performed 2/5 by Dr. Bartholomew    Past Medical History:   Diagnosis Date    BPH with elevated PSA     Diabetes mellitus     Digestive disorder     COLON POLYPS    Elevated PSA     Hyperlipidemia     Hypertension     Personal history of colonic polyps     Prostate cancer 09/06/2022    [place] 3+3=6 Watchful waiting      Active Problem List with Overview Notes    Diagnosis Date Noted    Polyp of ascending colon 06/26/2023    Atherosclerosis of aorta 06/19/2023     Incidental finding reported on x-ray of lumbar spine 4/29/22      Prostate cancer 09/06/2022     [place] 3+3=6  Watchful waiting      Long COVID 05/10/2022    Asbestos exposure 02/07/2022    Acute hypoxemic respiratory failure due to COVID-19 02/01/2022    Anemia 02/01/2022    Hypertension associated with diabetes 04/07/2017    Hyperlipidemia associated with type 2 diabetes mellitus 04/07/2017    Controlled type 2 diabetes mellitus with microalbuminuria, without long-term current use of insulin 05/23/2012    BPH (benign prostatic hyperplasia) 05/23/2012      Review of patient's allergies indicates:   Allergen Reactions    Pravastatin Other (See Comments)     Joint pain    Lisinopril Other (See Comments)     Cough          Current Outpatient Medications:     alfuzosin (UROXATRAL) 10 mg Tb24, Take 1 tablet (10 mg total) by mouth daily with  breakfast., Disp: 90 tablet, Rfl: 3    blood sugar diagnostic (ONETOUCH ULTRA TEST) Strp, USE TO CHECK FASTING GLUCOSE IN THE MORNING, Disp: 100 strip, Rfl: 3    blood-glucose meter Misc, One Touch glucometer check fasting glucose in morning, Disp: 1 each, Rfl: 0    losartan (COZAAR) 25 MG tablet, Take 0.5 tablets (12.5 mg total) by mouth once daily., Disp: 45 tablet, Rfl: 3    metFORMIN (GLUCOPHAGE-XR) 500 MG ER 24hr tablet, TAKE TWO TABLETS BY MOUTH TWO TIMES A DAY WITH MEALS, Disp: 360 tablet, Rfl: 0    oxybutynin (DITROPAN-XL) 5 MG TR24, Take 1 tablet (5 mg total) by mouth once daily., Disp: 90 tablet, Rfl: 3    pioglitazone (ACTOS) 15 MG tablet, Take 1 tablet (15 mg total) by mouth once daily., Disp: 90 tablet, Rfl: 3    rosuvastatin (CRESTOR) 10 MG tablet, TAKE ONE TABLET BY MOUTH EVERY DAY, Disp: 90 tablet, Rfl: 2    cycloSPORINE (RESTASIS) 0.05 % ophthalmic emulsion, , Disp: , Rfl:     Lab Results   Component Value Date    WBC 6.30 12/12/2024    HGB 13.8 (L) 12/12/2024    HCT 41.9 12/12/2024     12/12/2024    CHOL 113 (L) 12/12/2024    TRIG 107 12/12/2024    HDL 41 12/12/2024    ALT 11 12/16/2024    AST 18 12/16/2024     (L) 12/16/2024    K 4.2 12/16/2024     12/16/2024    CREATININE 1.4 12/16/2024    BUN 14 12/16/2024    CO2 24 12/16/2024    PSA 6.33 (H) 06/10/2013    HGBA1C 8.2 (H) 12/12/2024       Review of Systems   Constitutional:  Negative for fatigue and fever.   HENT: Negative.  Negative for congestion, sneezing and sore throat.    Eyes: Negative.    Respiratory:  Negative for cough, shortness of breath and wheezing.    Cardiovascular:  Negative for chest pain, palpitations and leg swelling.   Gastrointestinal:  Negative for abdominal pain, nausea and vomiting.   Genitourinary: Negative.    Musculoskeletal: Negative.  Negative for arthralgias.   Skin: Negative.  Negative for rash.   Neurological:  Negative for dizziness, weakness, light-headedness, numbness and headaches.    Hematological: Negative.    Psychiatric/Behavioral: Negative.         Objective:      Physical Exam  Constitutional:       Appearance: Normal appearance.   HENT:      Head: Normocephalic and atraumatic.      Nose: Nose normal.   Eyes:      Extraocular Movements: Extraocular movements intact.   Cardiovascular:      Rate and Rhythm: Normal rate and regular rhythm.   Pulmonary:      Effort: Pulmonary effort is normal.      Breath sounds: Normal breath sounds.   Musculoskeletal:         General: Normal range of motion.      Cervical back: Normal range of motion.   Skin:     General: Skin is warm and dry.   Neurological:      General: No focal deficit present.      Mental Status: He is alert and oriented to person, place, and time.   Psychiatric:         Mood and Affect: Mood normal.         Assessment:       1. Cataract of both eyes, unspecified cataract type    2. Hypomagnesemia    3. Controlled type 2 diabetes mellitus with microalbuminuria, without long-term current use of insulin    4. Hypertension associated with diabetes    5. Stage 3a chronic kidney disease        Plan:       Ludwin was seen today for pre-op exam.    Diagnoses and all orders for this visit:    Cataract of both eyes, unspecified cataract type  - pre-op paperwork filled out    Hypomagnesemia  -     Magnesium; Future    Controlled type 2 diabetes mellitus with microalbuminuria, without long-term current use of insulin  - A1C 8.6  - Emphasized the importance of glycemic control for surgical healing in diabetic patients and instructed patient to continue monitoring glucose levels closely, especially in preparation for surgery.    Hypertension associated with diabetes  - well controled today  - discussed dash diet, exercise/weight loss, and increased cardiovascular exercise   - monitor BP at home w/ goal <130/80     Stage 3a chronic kidney disease  - stable   - continue to monitor               Future Appointments       Date Provider Specialty Appt  Notes    6/12/2025 Shantell Lundy PA-C Family Medicine 6 mo f/u    12/16/2025 Amber Hooper MD Family Medicine 1 yr f/u               Portions of this note were dictated using voice recognition software and may contain dictation related errors in spelling / grammar / syntax not discovered on text review.     Shantell Lundy PA-C

## 2025-02-21 DIAGNOSIS — Z00.00 ENCOUNTER FOR MEDICARE ANNUAL WELLNESS EXAM: ICD-10-CM

## 2025-04-08 DIAGNOSIS — R80.9 CONTROLLED TYPE 2 DIABETES MELLITUS WITH MICROALBUMINURIA, WITHOUT LONG-TERM CURRENT USE OF INSULIN: ICD-10-CM

## 2025-04-08 DIAGNOSIS — E11.29 CONTROLLED TYPE 2 DIABETES MELLITUS WITH MICROALBUMINURIA, WITHOUT LONG-TERM CURRENT USE OF INSULIN: ICD-10-CM

## 2025-04-08 RX ORDER — METFORMIN HYDROCHLORIDE 500 MG/1
TABLET, EXTENDED RELEASE ORAL
Qty: 360 TABLET | Refills: 3 | Status: SHIPPED | OUTPATIENT
Start: 2025-04-08

## 2025-04-08 NOTE — TELEPHONE ENCOUNTER
Patient requesting refill of Metformin.  Patient is completely out of medication.  Please advise. Thank you.    LR--10/3/24       LOV--1/28/25       FOV--6/12/25             Tana Villalpando Staff     ----- Message from Tana sent at 4/8/2025  4:43 PM CDT -----  Contact: self  Type:  RX Refill Request    Who Called:  pt   Refill or New Rx:   new rx     RX Name and Strength:  metFORMIN (GLUCOPHAGE-XR) 500 MG ER 24hr tablet  How is the patient currently taking it? (ex. 1XDay):  as directed   Is this a 30 day or 90 day RX:  90    Preferred Pharmacy with phone number:  [facility] - [place], [place] - [address] Counts include 234 beds at the [facility] [id number] Counts include 234 beds at the [facility] [address] [facility] [address] Phone: [phone] Fax: [phone]  Local or Mail Order:  local     Ordering Provider:  Dr Rufus Maria Call Back Number:  [phone]    Additional Information:  His pharm has been trying to reach dr Fisher and of course he is retired.  Finally he got them to tell him that his doctor is not responding and he was able to give them your name, but now he is out of his medicine besides one pill for tomorrow.  thanks

## 2025-04-25 DIAGNOSIS — N39.41 URGENCY INCONTINENCE: ICD-10-CM

## 2025-04-25 RX ORDER — OXYBUTYNIN CHLORIDE 5 MG/1
5 TABLET, EXTENDED RELEASE ORAL
Qty: 90 TABLET | Refills: 3 | OUTPATIENT
Start: 2025-04-25

## 2025-04-30 DIAGNOSIS — E11.9 TYPE 2 DIABETES MELLITUS WITHOUT COMPLICATION: ICD-10-CM

## 2025-05-21 DIAGNOSIS — N39.41 URGENCY INCONTINENCE: ICD-10-CM

## 2025-05-21 RX ORDER — OXYBUTYNIN CHLORIDE 5 MG/1
5 TABLET, EXTENDED RELEASE ORAL
Qty: 90 TABLET | Refills: 3 | OUTPATIENT
Start: 2025-05-21

## 2025-05-24 DIAGNOSIS — N39.41 URGENCY INCONTINENCE: ICD-10-CM

## 2025-05-26 RX ORDER — OXYBUTYNIN CHLORIDE 5 MG/1
5 TABLET, EXTENDED RELEASE ORAL
Qty: 90 TABLET | Refills: 3 | OUTPATIENT
Start: 2025-05-26

## 2025-06-12 ENCOUNTER — OFFICE VISIT (OUTPATIENT)
Dept: FAMILY MEDICINE | Facility: CLINIC | Age: 75
End: 2025-06-12
Payer: MEDICARE

## 2025-06-12 ENCOUNTER — TELEPHONE (OUTPATIENT)
Dept: FAMILY MEDICINE | Facility: CLINIC | Age: 75
End: 2025-06-12
Payer: MEDICARE

## 2025-06-12 VITALS
WEIGHT: 166.88 LBS | RESPIRATION RATE: 12 BRPM | HEIGHT: 66 IN | TEMPERATURE: 98 F | HEART RATE: 75 BPM | DIASTOLIC BLOOD PRESSURE: 68 MMHG | SYSTOLIC BLOOD PRESSURE: 128 MMHG | OXYGEN SATURATION: 99 % | BODY MASS INDEX: 26.82 KG/M2

## 2025-06-12 DIAGNOSIS — H04.129 DRY EYE: ICD-10-CM

## 2025-06-12 DIAGNOSIS — C61 PROSTATE CANCER: ICD-10-CM

## 2025-06-12 DIAGNOSIS — N39.41 URGENCY INCONTINENCE: ICD-10-CM

## 2025-06-12 DIAGNOSIS — R80.9 CONTROLLED TYPE 2 DIABETES MELLITUS WITH MICROALBUMINURIA, WITHOUT LONG-TERM CURRENT USE OF INSULIN: Primary | ICD-10-CM

## 2025-06-12 DIAGNOSIS — E78.5 HYPERLIPIDEMIA ASSOCIATED WITH TYPE 2 DIABETES MELLITUS: ICD-10-CM

## 2025-06-12 DIAGNOSIS — E11.69 HYPERLIPIDEMIA ASSOCIATED WITH TYPE 2 DIABETES MELLITUS: ICD-10-CM

## 2025-06-12 DIAGNOSIS — E11.59 HYPERTENSION ASSOCIATED WITH DIABETES: ICD-10-CM

## 2025-06-12 DIAGNOSIS — I15.2 HYPERTENSION ASSOCIATED WITH DIABETES: ICD-10-CM

## 2025-06-12 DIAGNOSIS — E11.29 CONTROLLED TYPE 2 DIABETES MELLITUS WITH MICROALBUMINURIA, WITHOUT LONG-TERM CURRENT USE OF INSULIN: Primary | ICD-10-CM

## 2025-06-12 DIAGNOSIS — N18.31 STAGE 3A CHRONIC KIDNEY DISEASE: ICD-10-CM

## 2025-06-12 PROCEDURE — 1159F MED LIST DOCD IN RCRD: CPT | Mod: CPTII,S$GLB,,

## 2025-06-12 PROCEDURE — 1160F RVW MEDS BY RX/DR IN RCRD: CPT | Mod: CPTII,S$GLB,,

## 2025-06-12 PROCEDURE — 1101F PT FALLS ASSESS-DOCD LE1/YR: CPT | Mod: CPTII,S$GLB,,

## 2025-06-12 PROCEDURE — G2211 COMPLEX E/M VISIT ADD ON: HCPCS | Mod: S$GLB,,,

## 2025-06-12 PROCEDURE — 99214 OFFICE O/P EST MOD 30 MIN: CPT | Mod: S$GLB,,,

## 2025-06-12 PROCEDURE — 4010F ACE/ARB THERAPY RXD/TAKEN: CPT | Mod: CPTII,S$GLB,,

## 2025-06-12 PROCEDURE — 3074F SYST BP LT 130 MM HG: CPT | Mod: CPTII,S$GLB,,

## 2025-06-12 PROCEDURE — 3078F DIAST BP <80 MM HG: CPT | Mod: CPTII,S$GLB,,

## 2025-06-12 PROCEDURE — 99999 PR PBB SHADOW E&M-EST. PATIENT-LVL IV: CPT | Mod: PBBFAC,,,

## 2025-06-12 PROCEDURE — 3288F FALL RISK ASSESSMENT DOCD: CPT | Mod: CPTII,S$GLB,,

## 2025-06-12 RX ORDER — BLOOD-GLUCOSE SENSOR
EACH MISCELLANEOUS
Qty: 3 EACH | Refills: 11 | Status: SHIPPED | OUTPATIENT
Start: 2025-06-12

## 2025-06-12 RX ORDER — BLOOD-GLUCOSE TRANSMITTER
EACH MISCELLANEOUS
Qty: 1 EACH | Refills: 3 | Status: SHIPPED | OUTPATIENT
Start: 2025-06-12

## 2025-06-12 RX ORDER — LOSARTAN POTASSIUM 25 MG/1
12.5 TABLET ORAL DAILY
Qty: 45 TABLET | Refills: 3 | Status: SHIPPED | OUTPATIENT
Start: 2025-06-12

## 2025-06-12 RX ORDER — ROSUVASTATIN CALCIUM 10 MG/1
10 TABLET, COATED ORAL DAILY
Qty: 90 TABLET | Refills: 2 | Status: SHIPPED | OUTPATIENT
Start: 2025-06-12

## 2025-06-12 RX ORDER — BLOOD-GLUCOSE,RECEIVER,CONT
EACH MISCELLANEOUS
Qty: 1 EACH | Refills: 0 | Status: SHIPPED | OUTPATIENT
Start: 2025-06-12

## 2025-06-12 RX ORDER — OXYBUTYNIN CHLORIDE 5 MG/1
5 TABLET, EXTENDED RELEASE ORAL DAILY
Qty: 90 TABLET | Refills: 3 | Status: SHIPPED | OUTPATIENT
Start: 2025-06-12 | End: 2026-06-12

## 2025-06-12 RX ORDER — LANOLIN ALCOHOL/MO/W.PET/CERES
400 CREAM (GRAM) TOPICAL DAILY
COMMUNITY

## 2025-06-12 NOTE — TELEPHONE ENCOUNTER
Writer received prior authorization notice in Epic for [id number].  Prior authorization questions answered and submitted in [facility]. Awaiting insurance decision regarding coverage.       Prior authorization approved

## 2025-06-12 NOTE — PROGRESS NOTES
Subjective:       Patient ID:  [id number]     Chief Complaint: Follow-up      History of Present Illness    Mr. Gee presents today for follow-up. He is experiencing extreme dry eye following recent eye surgery. He uses prescription eye drops in the morning and mid-day for symptom management. He is scheduled for punctal plug insertion with ophthalmologist. He reports slightly elevated blood sugars attributed to poor diet compliance. He takes Losartan for blood pressure, Metformin for diabetes control. He reports being out of Oxybutynin and request a refill.   No other concerns today.       Past Medical History:   Diagnosis Date    BPH with elevated PSA     Diabetes mellitus     Digestive disorder     COLON POLYPS    Elevated PSA     Hyperlipidemia     Hypertension     Personal history of colonic polyps     Prostate cancer 09/06/2022    [place] 3+3=6 Watchful waiting      Active Problem List with Overview Notes    Diagnosis Date Noted    Polyp of ascending colon 06/26/2023    Atherosclerosis of aorta 06/19/2023     Incidental finding reported on x-ray of lumbar spine 4/29/22      Prostate cancer 09/06/2022     [place] 3+3=6  Watchful waiting      Long COVID 05/10/2022    Asbestos exposure 02/07/2022    Acute hypoxemic respiratory failure due to COVID-19 02/01/2022    Anemia 02/01/2022    Hypertension associated with diabetes 04/07/2017    Hyperlipidemia associated with type 2 diabetes mellitus 04/07/2017    Controlled type 2 diabetes mellitus with microalbuminuria, without long-term current use of insulin 05/23/2012    BPH (benign prostatic hyperplasia) 05/23/2012      Review of patient's allergies indicates:   Allergen Reactions    Pravastatin Other (See Comments)     Joint pain    Lisinopril Other (See Comments)     Cough        Current Medications[1]    Lab Results   Component Value Date    WBC 6.30 12/12/2024    HGB 13.8 (L) 12/12/2024    HCT 41.9 12/12/2024     12/12/2024    CHOL 113 (L) 12/12/2024     TRIG 107 12/12/2024    HDL 41 12/12/2024    ALT 11 12/16/2024    AST 18 12/16/2024     (L) 12/16/2024    K 4.2 12/16/2024     12/16/2024    CREATININE 1.4 12/16/2024    BUN 14 12/16/2024    CO2 24 12/16/2024    PSA 6.33 (H) 06/10/2013    HGBA1C 8.2 (H) 12/12/2024       Review of Systems   Constitutional:  Negative for fatigue and fever.   HENT: Negative.  Negative for congestion, sneezing and sore throat.    Eyes: Negative.    Respiratory:  Negative for cough, shortness of breath and wheezing.    Cardiovascular:  Negative for chest pain, palpitations and leg swelling.   Gastrointestinal:  Negative for abdominal pain, nausea and vomiting.   Genitourinary: Negative.    Musculoskeletal: Negative.  Negative for arthralgias.   Skin: Negative.  Negative for rash.   Neurological:  Negative for dizziness, weakness, light-headedness, numbness and headaches.   Hematological: Negative.    Psychiatric/Behavioral: Negative.         Objective:      Physical Exam  Constitutional:       Appearance: Normal appearance.   HENT:      Head: Normocephalic and atraumatic.      Nose: Nose normal.   Eyes:      Extraocular Movements: Extraocular movements intact.   Cardiovascular:      Rate and Rhythm: Normal rate and regular rhythm.   Pulmonary:      Effort: Pulmonary effort is normal.      Breath sounds: Normal breath sounds.   Musculoskeletal:         General: Normal range of motion.      Cervical back: Normal range of motion.   Skin:     General: Skin is warm and dry.   Neurological:      General: No focal deficit present.      Mental Status: He is alert and oriented to person, place, and time.   Psychiatric:         Mood and Affect: Mood normal.         Assessment:       1. Controlled type 2 diabetes mellitus with microalbuminuria, without long-term current use of insulin    2. Hypertension associated with diabetes    3. Stage 3a chronic kidney disease    4. Urgency incontinence    5. Hyperlipidemia associated with type 2  diabetes mellitus    6. Prostate cancer    7. Dry eye        Plan:       Assessment & Plan    IMPRESSION:  - Assessed eye condition post-op, noting extreme dry eye symptoms.  - Evaluated glucose control and protein in urine from previous visit.  - Considered continuation of Metformin based on pending A1C results, explaining it is the first-line treatment for diabetes.  - Considered alternative diabetes medications (e.g., Januvia, Jardiance) as potential options if A1C trends unfavorably, including their potential effects on weight.      Ludwin was seen today for follow-up.    Diagnoses and all orders for this visit:    Controlled type 2 diabetes mellitus with microalbuminuria, without long-term current use of insulin  -     Hemoglobin A1C; Future  -     Microalbumin/Creatinine Ratio, Urine; Future  -     Comprehensive Metabolic Panel; Future  -     DEXCOM G6 SENSOR Jeannine; Use 1 sensor every 10 days to monitor glucose  -     DEXCOM G6 TRANSMITTER Jeannine; Use 1 transmitter every 90 days to monitor glucose  -     DEXCOM G6  Misc; Use 1  as needed to monitor glucose  - Most recent A1C: 8.2 12/2024  - CMP : ordered  - Micro: + proteinuria, repeat ordered  - discussed medication compliance   - yearly eye exam recommended  - LDL goal <70  - discussed importance of frequent foot exam      Hypertension associated with diabetes  -     losartan (COZAAR) 25 MG tablet; Take 0.5 tablets (12.5 mg total) by mouth once daily.  - discussed dash diet, exercise/weight loss, and increased cardiovascular exercise   - monitor BP at home w/ goal <130/80      Stage 3a chronic kidney disease  -     Microalbumin/Creatinine Ratio, Urine; Future  - continue to montior    Urgency incontinence  -     oxybutynin (DITROPAN-XL) 5 MG TR24; Take 1 tablet (5 mg total) by mouth once daily.  - f/u with urology advised    Hyperlipidemia associated with type 2 diabetes mellitus  -     rosuvastatin (CRESTOR) 10 MG tablet; Take 1 tablet (10 mg  total) by mouth once daily.  - low fat/high fiber diet recommended   - yearly fasting lipid panel recommended     Prostate cancer  - f/u with urology     Dry eye  - continue medication  - fu with ophthalmology              Future Appointments       Date Provider Specialty Appt Notes    12/16/2025 Amber Hooper MD Family Medicine 1 yr f/u               Portions of this note may have been dictated using voice recognition software and may contain dictation related errors in spelling / grammar / syntax not discovered on text review.     This note was generated with the assistance of ambient listening technology. Verbal consent was obtained by the patient and accompanying visitor(s) for the recording of patient appointment to facilitate this note. I attest to having reviewed and edited the generated note for accuracy, though some syntax or spelling errors may persist. Please contact the author of this note for any clarification.       Shantell Lundy PA-C         [1]   Current Outpatient Medications:     alfuzosin (UROXATRAL) 10 mg Tb24, Take 1 tablet (10 mg total) by mouth daily with breakfast., Disp: 90 tablet, Rfl: 3    blood sugar diagnostic (ONETOUCH ULTRA TEST) Strp, USE TO CHECK FASTING GLUCOSE IN THE MORNING, Disp: 100 strip, Rfl: 3    blood-glucose meter Misc, One Touch glucometer check fasting glucose in morning, Disp: 1 each, Rfl: 0    cycloSPORINE (RESTASIS) 0.05 % ophthalmic emulsion, , Disp: , Rfl:     magnesium oxide (MAG-OX) 400 mg (241.3 mg magnesium) tablet, Take 400 mg by mouth once daily., Disp: , Rfl:     metFORMIN (GLUCOPHAGE-XR) 500 MG ER 24hr tablet, Sig: TAKE TWO TABLETS BY MOUTH TWO TIMES A DAY WITH MEALS, Disp: 360 tablet, Rfl: 3    pioglitazone (ACTOS) 15 MG tablet, Take 1 tablet (15 mg total) by mouth once daily., Disp: 90 tablet, Rfl: 3    DEXCOM G6  Misc, Use 1  as needed to monitor glucose, Disp: 1 each, Rfl: 0    DEXCOM G6 SENSOR Jeannine, Use 1 sensor every 10 days to  monitor glucose, Disp: 3 each, Rfl: 11    DEXCOM G6 TRANSMITTER Jeannine, Use 1 transmitter every 90 days to monitor glucose, Disp: 1 each, Rfl: 3    losartan (COZAAR) 25 MG tablet, Take 0.5 tablets (12.5 mg total) by mouth once daily., Disp: 45 tablet, Rfl: 3    oxybutynin (DITROPAN-XL) 5 MG TR24, Take 1 tablet (5 mg total) by mouth once daily., Disp: 90 tablet, Rfl: 3    rosuvastatin (CRESTOR) 10 MG tablet, Take 1 tablet (10 mg total) by mouth once daily., Disp: 90 tablet, Rfl: 2

## 2025-06-13 ENCOUNTER — PATIENT MESSAGE (OUTPATIENT)
Dept: ADMINISTRATIVE | Facility: HOSPITAL | Age: 75
End: 2025-06-13
Payer: MEDICARE

## 2025-06-20 ENCOUNTER — LAB VISIT (OUTPATIENT)
Dept: LAB | Facility: HOSPITAL | Age: 75
End: 2025-06-20
Payer: MEDICARE

## 2025-06-20 DIAGNOSIS — R80.9 CONTROLLED TYPE 2 DIABETES MELLITUS WITH MICROALBUMINURIA, WITHOUT LONG-TERM CURRENT USE OF INSULIN: ICD-10-CM

## 2025-06-20 DIAGNOSIS — E11.29 CONTROLLED TYPE 2 DIABETES MELLITUS WITH MICROALBUMINURIA, WITHOUT LONG-TERM CURRENT USE OF INSULIN: ICD-10-CM

## 2025-06-20 DIAGNOSIS — E83.42 HYPOMAGNESEMIA: ICD-10-CM

## 2025-06-20 DIAGNOSIS — N18.31 STAGE 3A CHRONIC KIDNEY DISEASE: ICD-10-CM

## 2025-06-20 DIAGNOSIS — E11.9 TYPE 2 DIABETES MELLITUS WITHOUT COMPLICATION: ICD-10-CM

## 2025-06-20 LAB
ALBUMIN SERPL BCP-MCNC: 4.2 G/DL (ref 3.5–5.2)
ALBUMIN/CREAT UR: 47.1 UG/MG
ALP SERPL-CCNC: 42 UNIT/L (ref 40–150)
ALT SERPL W/O P-5'-P-CCNC: 12 UNIT/L (ref 10–44)
ANION GAP (OHS): 14 MMOL/L (ref 8–16)
AST SERPL-CCNC: 17 UNIT/L (ref 11–45)
BILIRUB SERPL-MCNC: 0.5 MG/DL (ref 0.1–1)
BUN SERPL-MCNC: 18 MG/DL (ref 8–23)
CALCIUM SERPL-MCNC: 9.7 MG/DL (ref 8.7–10.5)
CHLORIDE SERPL-SCNC: 105 MMOL/L (ref 95–110)
CO2 SERPL-SCNC: 21 MMOL/L (ref 23–29)
CREAT SERPL-MCNC: 1.2 MG/DL (ref 0.5–1.4)
CREAT UR-MCNC: 51 MG/DL (ref 23–375)
EAG (OHS): 180 MG/DL (ref 68–131)
GFR SERPLBLD CREATININE-BSD FMLA CKD-EPI: >60 ML/MIN/1.73/M2
GLUCOSE SERPL-MCNC: 135 MG/DL (ref 70–110)
HBA1C MFR BLD: 7.9 % (ref 4–5.6)
MAGNESIUM SERPL-MCNC: 1.5 MG/DL (ref 1.6–2.6)
MICROALBUMIN UR-MCNC: 24 UG/ML (ref ?–5000)
POTASSIUM SERPL-SCNC: 4.9 MMOL/L (ref 3.5–5.1)
PROT SERPL-MCNC: 7.1 GM/DL (ref 6–8.4)
SODIUM SERPL-SCNC: 140 MMOL/L (ref 136–145)

## 2025-06-20 PROCEDURE — 83735 ASSAY OF MAGNESIUM: CPT

## 2025-06-20 PROCEDURE — 80053 COMPREHEN METABOLIC PANEL: CPT

## 2025-06-20 PROCEDURE — 82043 UR ALBUMIN QUANTITATIVE: CPT

## 2025-06-20 PROCEDURE — 83036 HEMOGLOBIN GLYCOSYLATED A1C: CPT

## 2025-06-20 PROCEDURE — 36415 COLL VENOUS BLD VENIPUNCTURE: CPT | Mod: PO

## 2025-06-22 ENCOUNTER — RESULTS FOLLOW-UP (OUTPATIENT)
Dept: FAMILY MEDICINE | Facility: CLINIC | Age: 75
End: 2025-06-22

## 2025-06-22 DIAGNOSIS — R80.9 CONTROLLED TYPE 2 DIABETES MELLITUS WITH MICROALBUMINURIA, WITHOUT LONG-TERM CURRENT USE OF INSULIN: Primary | ICD-10-CM

## 2025-06-22 DIAGNOSIS — E11.29 CONTROLLED TYPE 2 DIABETES MELLITUS WITH MICROALBUMINURIA, WITHOUT LONG-TERM CURRENT USE OF INSULIN: Primary | ICD-10-CM

## 2025-06-22 DIAGNOSIS — E83.42 HYPOMAGNESEMIA: ICD-10-CM

## 2025-06-22 RX ORDER — PIOGLITAZONE 30 MG/1
30 TABLET ORAL DAILY
Qty: 90 TABLET | Refills: 3 | Status: SHIPPED | OUTPATIENT
Start: 2025-06-22 | End: 2026-06-22

## 2025-06-22 RX ORDER — LANOLIN ALCOHOL/MO/W.PET/CERES
CREAM (GRAM) TOPICAL
Qty: 90 TABLET | Refills: 0 | Status: SHIPPED | OUTPATIENT
Start: 2025-06-22 | End: 2025-07-08

## 2025-07-11 ENCOUNTER — OFFICE VISIT (OUTPATIENT)
Dept: FAMILY MEDICINE | Facility: CLINIC | Age: 75
End: 2025-07-11
Payer: MEDICARE

## 2025-07-11 VITALS
HEIGHT: 66 IN | WEIGHT: 168 LBS | BODY MASS INDEX: 27 KG/M2 | OXYGEN SATURATION: 95 % | SYSTOLIC BLOOD PRESSURE: 120 MMHG | TEMPERATURE: 98 F | DIASTOLIC BLOOD PRESSURE: 72 MMHG | HEART RATE: 69 BPM

## 2025-07-11 DIAGNOSIS — R80.9 CONTROLLED TYPE 2 DIABETES MELLITUS WITH MICROALBUMINURIA, WITHOUT LONG-TERM CURRENT USE OF INSULIN: ICD-10-CM

## 2025-07-11 DIAGNOSIS — J06.9 UPPER RESPIRATORY TRACT INFECTION, UNSPECIFIED TYPE: Primary | ICD-10-CM

## 2025-07-11 DIAGNOSIS — I15.2 HYPERTENSION ASSOCIATED WITH DIABETES: ICD-10-CM

## 2025-07-11 DIAGNOSIS — E11.59 HYPERTENSION ASSOCIATED WITH DIABETES: ICD-10-CM

## 2025-07-11 DIAGNOSIS — J32.9 RHINOSINUSITIS: ICD-10-CM

## 2025-07-11 DIAGNOSIS — E11.29 CONTROLLED TYPE 2 DIABETES MELLITUS WITH MICROALBUMINURIA, WITHOUT LONG-TERM CURRENT USE OF INSULIN: ICD-10-CM

## 2025-07-11 LAB
CTP QC/QA: YES
CTP QC/QA: YES
POC MOLECULAR INFLUENZA A AGN: NEGATIVE
POC MOLECULAR INFLUENZA B AGN: NEGATIVE
SARS-COV-2 RDRP RESP QL NAA+PROBE: NEGATIVE

## 2025-07-11 PROCEDURE — 99999 PR PBB SHADOW E&M-EST. PATIENT-LVL III: CPT | Mod: PBBFAC,,,

## 2025-07-11 RX ORDER — AMOXICILLIN AND CLAVULANATE POTASSIUM 875; 125 MG/1; MG/1
1 TABLET, FILM COATED ORAL 2 TIMES DAILY
Qty: 10 TABLET | Refills: 0 | Status: SHIPPED | OUTPATIENT
Start: 2025-07-11 | End: 2025-07-16

## 2025-07-11 NOTE — PROGRESS NOTES
[facility]     Subjective:       Patient ID:  [id number]     Chief Complaint: Nasal Congestion, Chest Congestion, and Cough    Ludwin Gee is a 75 y.o. male with a past medical history significant for HTN, HLD, and T2DM who presents to the clinic for upper respiratory symptoms.      CHIEF COMPLAINT:  Mr. Gee presents today with cough and congestion for three weeks    HISTORY OF PRESENT ILLNESS:  He reports respiratory symptoms initiated approximately three weeks ago with initial congestion before a cruise, progressively worsening over time. He currently has a productive cough with yellow sputum, described as wet. The cough is intermittent, temporarily resolving after productive expectoration. He experienced sinus pressure and yellow nasal discharge which are slightly improving. He denies fever, chills, shortness of breath, and chest pain. He has been managing symptoms with OTC Nyquil at nighttime.    ENT:  He experienced severe ear pain during a recent flight, describing it as feeling like a needle in his ear. He currently denies any ongoing ear pain.    OCULAR:  He reports red eyes this morning with occasional eye itching and burning, attributed to allergies. He has a history of punctal plugs for dry eye management and currently takes cyclosporine morning and evening.    MEDICAL HISTORY:  He has a history of COVID-19 infection in 2020 which initially impacted his lungs. His diabetes is ongoing with recent A1C of 7.9, which is slightly elevated from goal.     Review of Systems   Constitutional:  Negative for chills and fever.   HENT:  Positive for congestion, ear pain, postnasal drip, sinus pressure and sinus pain. Negative for sore throat.    Respiratory:  Positive for cough. Negative for shortness of breath.    Cardiovascular:  Negative for chest pain.   Gastrointestinal:  Negative for abdominal pain, diarrhea, nausea and vomiting.        Past Medical History:   Diagnosis Date    BPH with  elevated PSA     Diabetes mellitus     Digestive disorder     COLON POLYPS    Elevated PSA     Hyperlipidemia     Hypertension     Personal history of colonic polyps     Prostate cancer 09/06/2022    [place] 3+3=6 Watchful waiting        Active Problem List with Overview Notes    Diagnosis Date Noted    Polyp of ascending colon 06/26/2023    Atherosclerosis of aorta 06/19/2023     Incidental finding reported on x-ray of lumbar spine 4/29/22      Prostate cancer 09/06/2022     [place] 3+3=6  Watchful waiting      Long COVID 05/10/2022    Asbestos exposure 02/07/2022    Acute hypoxemic respiratory failure due to COVID-19 02/01/2022    Anemia 02/01/2022    Hypertension associated with diabetes 04/07/2017    Hyperlipidemia associated with type 2 diabetes mellitus 04/07/2017    Controlled type 2 diabetes mellitus with microalbuminuria, without long-term current use of insulin 05/23/2012    BPH (benign prostatic hyperplasia) 05/23/2012        Review of patient's allergies indicates:   Allergen Reactions    Pravastatin Other (See Comments)     Joint pain    Lisinopril Other (See Comments)     Cough        Current Medications[1]    Lab Results   Component Value Date    WBC 6.30 12/12/2024    HGB 13.8 (L) 12/12/2024    HCT 41.9 12/12/2024     12/12/2024    CHOL 113 (L) 12/12/2024    TRIG 107 12/12/2024    HDL 41 12/12/2024    ALT 12 06/20/2025    AST 17 06/20/2025     06/20/2025    K 4.9 06/20/2025     06/20/2025    CREATININE 1.2 06/20/2025    BUN 18 06/20/2025    CO2 21 (L) 06/20/2025    PSA 6.33 (H) 06/10/2013    HGBA1C 7.9 (H) 06/20/2025           Objective:      Physical Exam  Constitutional:       General: He is not in acute distress.     Appearance: Normal appearance. He is not toxic-appearing.   HENT:      Head: Normocephalic and atraumatic.      Right Ear: Tympanic membrane is not erythematous, retracted or bulging.      Left Ear: Tympanic membrane is not erythematous, retracted or bulging.       Nose:      Right Sinus: Maxillary sinus tenderness present. No frontal sinus tenderness.      Left Sinus: Maxillary sinus tenderness present. No frontal sinus tenderness.      Mouth/Throat:      Pharynx: Posterior oropharyngeal erythema and postnasal drip present.   Cardiovascular:      Rate and Rhythm: Normal rate and regular rhythm.      Pulses: Normal pulses.      Heart sounds: No murmur heard.     No friction rub.   Pulmonary:      Effort: Pulmonary effort is normal. No respiratory distress.      Breath sounds: Normal breath sounds. No wheezing.   Musculoskeletal:      Cervical back: Normal range of motion.      Right lower leg: No edema.      Left lower leg: No edema.   Skin:     General: Skin is warm and dry.   Neurological:      General: No focal deficit present.      Mental Status: He is alert and oriented to person, place, and time.   Psychiatric:         Mood and Affect: Mood normal.           Assessment:       1. Upper respiratory tract infection, unspecified type    2. Rhinosinusitis    3. Controlled type 2 diabetes mellitus with microalbuminuria, without long-term current use of insulin    4. Hypertension associated with diabetes          Plan:         Assessment & Plan    - Assessed symptoms of persistent congestion lasting 3 weeks, likely indicating a sinus infection.  - Considered diabetes history and recent A1C of around 8, ruling out steroid treatment.  - Initiated antibiotic treatment due to symptom duration.        Ludwin was seen today for nasal congestion, chest congestion and cough.    Diagnoses and all orders for this visit:    Upper respiratory tract infection, unspecified type  -     POCT COVID-19 Rapid Screening  -     POCT Influenza A/B Molecular  -     COVID and flu are negative.    -    For sinus pressure or nasal congestion, recommend Sudafed (Pseudoephedrine) from behind the pharmacy counter    -    Recommended flonase OTC and sinus rinse (Nba med or Neti pot) with distilled water   -     Hot tea with honey or throat lozenges as needed for sore throat/ cough   -    Tylenol 500 mg or Ibuprofen 200 mg 2 tabs every 6 hours as needed for fever, headache, body aches, throat pain, etc    -    Hydrate well to thin mucus    Rhinosinusitis  -     amoxicillin-clavulanate 875-125mg (AUGMENTIN) 875-125 mg per tablet; Take 1 tablet by mouth 2 (two) times daily. for 5 days    Controlled type 2 diabetes mellitus with microalbuminuria, without long-term current use of insulin        -    Above goal. Advised against steroids. Continue actos and metformin as prescribed.     Hypertension associated with diabetes        -    Well-controlled. Continue losartan as prescribed.        Follow up for if symptoms are not improved.    Future Appointments       Date Provider Specialty Appt Notes    11/26/2025 Jef Pacheco MD Pulmonology f/u    12/16/2025 Amber Hooper MD Family Medicine 1 yr f/u             All of your core healthy metrics are met.       I spent a total of 20 minutes on the day of the visit.This includes face to face time and non-face to face time preparing to see the patient (eg, review of tests), obtaining and/or reviewing separately obtained history, documenting clinical information in the electronic or other health record, independently interpreting results and communicating results to the patient/family/caregiver, or care coordinator.    This note was generated with the assistance of ambient listening technology. Verbal consent was obtained by the patient and accompanying visitor(s) for the recording of patient appointment to facilitate this note. I attest to having reviewed and edited the generated note for accuracy, though some syntax or spelling errors may persist. Please contact the author of this note for any clarification.      Yanique Manriquez PA-C  Family Medicine Physician Assistant            [1]   Current Outpatient Medications:     alfuzosin (UROXATRAL) 10 mg Tb24, Take 1 tablet (10  mg total) by mouth daily with breakfast., Disp: 90 tablet, Rfl: 3    blood sugar diagnostic (ONETOUCH ULTRA TEST) Strp, USE TO CHECK FASTING GLUCOSE IN THE MORNING, Disp: 100 strip, Rfl: 3    blood-glucose meter Misc, One Touch glucometer check fasting glucose in morning, Disp: 1 each, Rfl: 0    cycloSPORINE (RESTASIS) 0.05 % ophthalmic emulsion, , Disp: , Rfl:     DEXCOM G6  Misc, Use 1  as needed to monitor glucose, Disp: 1 each, Rfl: 0    DEXCOM G6 SENSOR Jeannine, Use 1 sensor every 10 days to monitor glucose, Disp: 3 each, Rfl: 11    DEXCOM G6 TRANSMITTER Jeannine, Use 1 transmitter every 90 days to monitor glucose, Disp: 1 each, Rfl: 3    losartan (COZAAR) 25 MG tablet, Take 0.5 tablets (12.5 mg total) by mouth once daily., Disp: 45 tablet, Rfl: 3    metFORMIN (GLUCOPHAGE-XR) 500 MG ER 24hr tablet, Sig: TAKE TWO TABLETS BY MOUTH TWO TIMES A DAY WITH MEALS, Disp: 360 tablet, Rfl: 3    oxybutynin (DITROPAN-XL) 5 MG TR24, Take 1 tablet (5 mg total) by mouth once daily., Disp: 90 tablet, Rfl: 3    pioglitazone (ACTOS) 30 MG tablet, Take 1 tablet (30 mg total) by mouth once daily., Disp: 90 tablet, Rfl: 3    rosuvastatin (CRESTOR) 10 MG tablet, Take 1 tablet (10 mg total) by mouth once daily., Disp: 90 tablet, Rfl: 2    amoxicillin-clavulanate 875-125mg (AUGMENTIN) 875-125 mg per tablet, Take 1 tablet by mouth 2 (two) times daily. for 5 days, Disp: 10 tablet, Rfl: 0

## 2025-07-22 ENCOUNTER — TELEPHONE (OUTPATIENT)
Dept: UROLOGY | Facility: CLINIC | Age: 75
End: 2025-07-22
Payer: MEDICARE

## 2025-07-22 DIAGNOSIS — C61 PROSTATE CANCER: Primary | ICD-10-CM

## 2025-07-22 NOTE — TELEPHONE ENCOUNTER
Returned pts call   Agreed to audio   Pt scheduled for 8/28 @ 230 pm pt agreed   Lab scheduled prior to appt   Pt vu

## 2025-07-22 NOTE — TELEPHONE ENCOUNTER
Copied from CRM #[id number]. Topic: General Inquiry - Return Call  >> Jul 22, 2025  8:37 AM Raymundo Marie wrote:  Type:  Patient Returning Call    Who Called:pt    Who Left Message for Patient:No msg left    Does the patient know what this is regarding?:    Would the patient rather a call back or a response via [facility]? Call back    Best Call Back Number:[phone]    Additional Information: Pt has received 2 missed calls with no msgs left; pt waiting on call back from Dr. Millan' staff.    - spoke w pt regarding above     Pt last seen on 3/2024  We discussed the different treatment options including active surveillance (as well as the surveillance protocol), prostate brachytherapy, IMRT, IGRT, cryotherapy, and both open and robotic prostatectomy. Wishes to continue with active surveillance.   - Continue Uroxatral 10 mg PO daily - refills ordered.   - RX for Ditropan 5 mg PO daily - refills ordered.   - PSA today.   - PSA in 6 months.   - RTC in 1 year with PSA, symptom score and PVR.     Is this ok to offer audio visit for sooner with labs prior?  Please advise

## 2025-08-06 ENCOUNTER — LAB VISIT (OUTPATIENT)
Dept: LAB | Facility: HOSPITAL | Age: 75
End: 2025-08-06
Attending: UROLOGY
Payer: MEDICARE

## 2025-08-06 DIAGNOSIS — C61 PROSTATE CANCER: ICD-10-CM

## 2025-08-06 LAB — PSA SERPL-MCNC: 6.97 NG/ML (ref ?–4)

## 2025-08-06 PROCEDURE — 84153 ASSAY OF PSA TOTAL: CPT

## 2025-08-06 PROCEDURE — 36415 COLL VENOUS BLD VENIPUNCTURE: CPT

## 2025-08-18 DIAGNOSIS — R39.9 LOWER URINARY TRACT SYMPTOMS: ICD-10-CM

## 2025-08-19 RX ORDER — ALFUZOSIN HYDROCHLORIDE 10 MG/1
TABLET, EXTENDED RELEASE ORAL
Qty: 90 TABLET | Refills: 3 | Status: SHIPPED | OUTPATIENT
Start: 2025-08-19

## 2025-08-28 ENCOUNTER — OFFICE VISIT (OUTPATIENT)
Dept: UROLOGY | Facility: CLINIC | Age: 75
End: 2025-08-28
Payer: MEDICARE

## 2025-08-28 DIAGNOSIS — C61 PROSTATE CANCER: Primary | ICD-10-CM

## 2025-08-28 DIAGNOSIS — N39.41 URGENCY INCONTINENCE: ICD-10-CM

## 2025-08-28 RX ORDER — OXYBUTYNIN CHLORIDE 10 MG/1
10 TABLET, EXTENDED RELEASE ORAL DAILY
Qty: 90 TABLET | Refills: 3 | Status: SHIPPED | OUTPATIENT
Start: 2025-08-28 | End: 2026-08-28

## (undated) DEVICE — GLOVE SURG ULTRA TOUCH 7.5

## (undated) DEVICE — TROCAR ENDOPATH XCEL 12X100MM

## (undated) DEVICE — STOPCOCK DISCOFIX 3 WAY

## (undated) DEVICE — RELOAD SUREFORM 60 3.5 BLU 6R

## (undated) DEVICE — GUIDE BIOPSY BIPLANAR 18G

## (undated) DEVICE — NDL INSUF ULTRA VERESS 120MM

## (undated) DEVICE — KIT GELPORT LAPAROSCOPIC ABD

## (undated) DEVICE — SYR ONLY LUER LOCK 20CC

## (undated) DEVICE — NDL PNEUMOPERITONEUM 150MM

## (undated) DEVICE — SPONGE BULKEE II ABSRB 6X6.75

## (undated) DEVICE — TOWEL OR DISP STRL BLUE 4/PK

## (undated) DEVICE — KIT PROCEDURE STER INLET CLOSU

## (undated) DEVICE — NDL SPINAL 22GX7 SPINOCAN

## (undated) DEVICE — SUT MCRYL PLUS 4-0 PS2 27IN

## (undated) DEVICE — GOWN POLY REINF BRTH SLV XL

## (undated) DEVICE — ELECTRODE L HOOK 13IN 33M

## (undated) DEVICE — APPLIER CLIP EPIX UNIV 5X34

## (undated) DEVICE — RELOAD SUREFORM 60 2.5 WHT 6R

## (undated) DEVICE — TRAY CATH FOL SIL URIMTR 16FR

## (undated) DEVICE — CANNULA ENDOPATH XCEL 5X100MM

## (undated) DEVICE — SOL NACL IRR 3000ML

## (undated) DEVICE — SOL ELECTROLUBE ANTI-STIC

## (undated) DEVICE — DRESSING ABSRBNT ISLAND 3.6X8

## (undated) DEVICE — COVER TRANSDUCER LATEX N/STERI

## (undated) DEVICE — SUT VLOC 90 3-0 V-20 NDL 9

## (undated) DEVICE — SCISSOR 5MMX35CM DIRECT DRIVE

## (undated) DEVICE — IRRIGATOR SUCTION W/TIP

## (undated) DEVICE — STRIP MEDI WND CLSR 1/2X4IN

## (undated) DEVICE — SEAL UNIVERSAL 5MM-8MM XI

## (undated) DEVICE — SYR 10CC LUER LOCK

## (undated) DEVICE — NDL HYPODERMIC BLUNT 18G 1.5IN

## (undated) DEVICE — JELLY KY LUBRICATING 5G PACKET

## (undated) DEVICE — SUT 1 36IN PDS II

## (undated) DEVICE — SYR LUER LOCK STERILE 10ML

## (undated) DEVICE — GLOVE BIOGEL SKINSENSE PI 7.0

## (undated) DEVICE — SEALER VESSEL EXTEND

## (undated) DEVICE — CARTRIDGE BABCOCK GRASPER 5X45

## (undated) DEVICE — SOL NACL IRR 1000ML BTL

## (undated) DEVICE — GUN BIOPSY 18GA MONOPLY

## (undated) DEVICE — SET TUBE PNEUMOCLEAR SE HI FLO

## (undated) DEVICE — SYS SEE SHARP SCOPE ANTIFOG

## (undated) DEVICE — ELECTRODE REM PLYHSV RETURN 9

## (undated) DEVICE — TROCAR ENDOPATH XCEL 5X100MM

## (undated) DEVICE — SUT SA85H SILK 2-0

## (undated) DEVICE — NDL SURE-SNAP HYPO SAF 21GX1.5

## (undated) DEVICE — TRAY GENERAL LAPAROSCOPY SMH

## (undated) DEVICE — DRAPE ARM DAVINCI XI

## (undated) DEVICE — SUT 0 VICRYL / CT-1

## (undated) DEVICE — PACK SIRUS BASIC V SURG STRL

## (undated) DEVICE — SUT 3-0 VICRYL SH CR/8 18

## (undated) DEVICE — CORD MPLR STR PLUG DISP 10FT

## (undated) DEVICE — APPLICATOR CHLORAPREP ORN 26ML

## (undated) DEVICE — SOL IRRI STRL WATER 1000ML

## (undated) DEVICE — CLOSURE SKIN STERI STRIP 1/2X4

## (undated) DEVICE — TROCAR KII FIOS ZTHREAD 12X100

## (undated) DEVICE — GLOVE SURG ULTRA TOUCH 7

## (undated) DEVICE — SUT SILK 0 STRANDS 30IN BLK

## (undated) DEVICE — STAPLER SUREFORM 60 SPU

## (undated) DEVICE — DRAPE ABDOMINAL TIBURON 14X11

## (undated) DEVICE — LINER SUCTION 3000CC

## (undated) DEVICE — DRAPE COLUMN DAVINCI XI

## (undated) DEVICE — PAD PINK TRENDELENBURG POS XL

## (undated) DEVICE — SOL CLEARIFY VISUALIZATION LAP

## (undated) DEVICE — SUT MONOCRYL 4-0 PS-2

## (undated) DEVICE — OBTURATOR BLADELESS 8MM XI CLR

## (undated) DEVICE — DISSECTOR KITTNER 5MM T 45CM S

## (undated) DEVICE — STRAP OR TABLE 5IN X 72IN

## (undated) DEVICE — BAG TISS RETRV MONARCH 10MM

## (undated) DEVICE — CANNULA SEAL 12MM

## (undated) DEVICE — SLEEVE SCD EXPRESS KNEE MEDIUM

## (undated) DEVICE — PACK CUSTOM UNIV BASIN SLI

## (undated) DEVICE — CANNULA REDUCER 12-8MM

## (undated) DEVICE — ADHESIVE DERMABOND ADVANCED

## (undated) DEVICE — UNDERGLOVES BIOGEL PI SZ 7 LF

## (undated) DEVICE — COVER TIP CURVED SCISSORS XI

## (undated) DEVICE — SUT VICRYL+ 27 UR-6 VIOL

## (undated) DEVICE — BLADE SURG CARBON STEEL SZ11